# Patient Record
Sex: MALE | Race: WHITE | NOT HISPANIC OR LATINO | Employment: OTHER | URBAN - METROPOLITAN AREA
[De-identification: names, ages, dates, MRNs, and addresses within clinical notes are randomized per-mention and may not be internally consistent; named-entity substitution may affect disease eponyms.]

---

## 2017-01-04 RX ORDER — HYDROCODONE BITARTRATE AND ACETAMINOPHEN 5; 325 MG/1; MG/1
1 TABLET ORAL 4 TIMES DAILY PRN
Qty: 120 TABLET | Refills: 0 | Status: SHIPPED | OUTPATIENT
Start: 2017-01-04 | End: 2017-02-14 | Stop reason: SDUPTHER

## 2017-01-04 NOTE — TELEPHONE ENCOUNTER
Medication Refill Request    Date of phone call: 1/3/17    Medication being requested: Hydro-apap 5 sixday  Qty: 120    Date of last visit: 16    Date of last refill: 16    HOME up to date?: 16    Next Follow up?: 17    Any new pertinent information? (i.e, new medication allergies, new use of medications, change in patient's health or condition, non-compliance or inconsistency with prescribing agreement?): n/a

## 2017-01-18 ENCOUNTER — OFFICE VISIT (OUTPATIENT)
Dept: SPORTS MEDICINE | Facility: CLINIC | Age: 67
End: 2017-01-18

## 2017-01-18 ENCOUNTER — OFFICE VISIT (OUTPATIENT)
Dept: PAIN MEDICINE | Facility: CLINIC | Age: 67
End: 2017-01-18

## 2017-01-18 VITALS
DIASTOLIC BLOOD PRESSURE: 80 MMHG | TEMPERATURE: 98.1 F | SYSTOLIC BLOOD PRESSURE: 135 MMHG | WEIGHT: 203 LBS | HEIGHT: 67 IN | HEART RATE: 77 BPM | OXYGEN SATURATION: 92 % | BODY MASS INDEX: 31.86 KG/M2

## 2017-01-18 VITALS
BODY MASS INDEX: 32.02 KG/M2 | HEART RATE: 113 BPM | RESPIRATION RATE: 16 BRPM | DIASTOLIC BLOOD PRESSURE: 95 MMHG | TEMPERATURE: 97.7 F | OXYGEN SATURATION: 94 % | SYSTOLIC BLOOD PRESSURE: 161 MMHG | HEIGHT: 67 IN | WEIGHT: 204 LBS

## 2017-01-18 DIAGNOSIS — Z79.899 ENCOUNTER FOR LONG-TERM (CURRENT) USE OF HIGH-RISK MEDICATION: ICD-10-CM

## 2017-01-18 DIAGNOSIS — G62.9 POLYNEUROPATHY: ICD-10-CM

## 2017-01-18 DIAGNOSIS — G89.29 OTHER CHRONIC PAIN: Primary | ICD-10-CM

## 2017-01-18 DIAGNOSIS — K14.8 LESION OF TONGUE: Primary | ICD-10-CM

## 2017-01-18 DIAGNOSIS — M79.609 PAIN IN EXTREMITY, UNSPECIFIED EXTREMITY: ICD-10-CM

## 2017-01-18 LAB
POC AMPHETAMINES: NEGATIVE
POC BARBITURATES: NEGATIVE
POC BENZODIAZEPHINES: NEGATIVE
POC COCAINE: NEGATIVE
POC METHADONE: NEGATIVE
POC METHAMPHETAMINE SCREEN URINE: NEGATIVE
POC OPIATES: POSITIVE
POC OXYCODONE: POSITIVE
POC PHENCYCLIDINE: NEGATIVE
POC PROPOXYPHENE: NEGATIVE
POC THC: NEGATIVE
POC TRICYCLIC ANTIDEPRESSANTS: POSITIVE

## 2017-01-18 PROCEDURE — 80305 DRUG TEST PRSMV DIR OPT OBS: CPT | Performed by: NURSE PRACTITIONER

## 2017-01-18 PROCEDURE — 99212 OFFICE O/P EST SF 10 MIN: CPT | Performed by: FAMILY MEDICINE

## 2017-01-18 PROCEDURE — 99213 OFFICE O/P EST LOW 20 MIN: CPT | Performed by: NURSE PRACTITIONER

## 2017-01-18 NOTE — MR AVS SNAPSHOT
Kelby Peters   1/18/2017 9:40 AM   Office Visit    Dept Phone:  [phone]   Encounter #:  [id number]    Provider:  ABEBA Calvert   Department:  [facility] PAIN MANAGEMENT                Your Full Care Plan              Your Updated Medication List          This list is accurate as of: 1/18/17 10:19 AM.  Always use your most recent med list.                amLODIPine 5 MG tablet   Commonly known as:  NORVASC   TAKE ONE TABLET BY MOUTH DAILY       B-D ULTRAFINE III SHORT PEN 31G X 8 MM misc   Generic drug:  Insulin Pen Needle       CINNAMON PO       DULoxetine 60 MG capsule   Commonly known as:  CYMBALTA   TAKE ONE CAPSULE BY MOUTH DAILY       FLORASTOR 250 MG capsule   Generic drug:  saccharomyces boulardii   TAKE ONE CAPSULE BY MOUTH TWICE A DAY       gabapentin 800 MG tablet   Commonly known as:  NEURONTIN   TAKE ONE TABLET BY MOUTH FOUR TIMES A DAY       HYDROcodone-acetaminophen 5-325 MG per tablet   Commonly known as:  NORCO   Take 1 tablet by mouth 4 (Four) Times a Day As Needed (pain).       KROGER BLOOD GLUCOSE TEST test strip   Generic drug:  glucose blood       KROGER LANCETS MICRO THIN 33G misc       levETIRAcetam 500 MG tablet   Commonly known as:  KEPPRA   TAKE ONE TABLET BY MOUTH TWICE A DAY       MAGNESIUM PO       NOVOLIN N RELION 100 UNIT/ML injection   Generic drug:  insulin NPH       NOVOLOG FLEXPEN 100 UNIT/ML solution pen-injector sc pen   Generic drug:  insulin aspart       PRILOSEC OTC 20 MG EC tablet   Generic drug:  omeprazole OTC       VITAMIN B-12 CR PO               You Were Diagnosed With        Codes Comments    Other chronic pain    -  Primary ICD-10-CM: G89.29  ICD-9-CM: 338.29     Polyneuropathy     ICD-10-CM: G62.9  ICD-9-CM: 356.9     Pain in extremity, unspecified extremity     ICD-10-CM: M79.609  ICD-9-CM: 729.5     Encounter for long-term (current) use of high-risk medication     ICD-10-CM: Z79.899  ICD-9-CM: V58.69        "  Instructions     None    Patient Instructions History      Upcoming Appointments     Visit Type Date Time Department    OFFICE VISIT 2017  9:40 AM [facility]      BookBag Signup     [facility] Book[employer] allows you to send messages to your doctor, view your test results, renew your prescriptions, schedule appointments, and more. To sign up, go to [employer] and click on the Sign Up Now link in the New User? box. Enter your BookBag Activation Code exactly as it appears below along with the last four digits of your Social Security Number and your Date of Birth () to complete the sign-up process. If you do not sign up before the expiration date, you must request a new code.    BookBag Activation Code: [email or web address]  Expires: 2017  5:40 AM    If you have questions, you can email [email or web address] or call [phone] to talk to our BookBag staff. Remember, BookBag is NOT to be used for urgent needs. For medical emergencies, dial 911.               Other Info from Your Visit           Allergies     No Known Allergies      Reason for Visit     Pain           Vital Signs     Blood Pressure Pulse Temperature Respirations Height Weight    161/95 113 97.7 °F (36.5 °C) 16 67\" (170.2 cm) 204 lb (92.5 kg)    Oxygen Saturation Body Mass Index Smoking Status             94% 31.95 kg/m2 Former Smoker         Problems and Diagnoses Noted     Chronic pain    Encounter for long-term (current) use of high-risk medication    Limb pain    Polyneuropathy        "

## 2017-01-18 NOTE — MR AVS SNAPSHOT
Kelby Peters   1/18/2017 11:00 AM   Office Visit    Dept Phone:  [phone]   Encounter #:  [id number]    Provider:  Francois Dougherty MD   Department:  [facility] FAMILY [facility] SP[facility]ICINE                Your Full Care Plan              Your Updated Medication List          This list is accurate as of: 1/18/17 11:07 AM.  Always use your most recent med list.                amLODIPine 5 MG tablet   Commonly known as:  NORVASC   TAKE ONE TABLET BY MOUTH DAILY       B-D ULTRAFINE III SHORT PEN 31G X 8 MM misc   Generic drug:  Insulin Pen Needle       CINNAMON PO       DULoxetine 60 MG capsule   Commonly known as:  CYMBALTA   TAKE ONE CAPSULE BY MOUTH DAILY       FLORASTOR 250 MG capsule   Generic drug:  saccharomyces boulardii   TAKE ONE CAPSULE BY MOUTH TWICE A DAY       gabapentin 800 MG tablet   Commonly known as:  NEURONTIN   TAKE ONE TABLET BY MOUTH FOUR TIMES A DAY       HYDROcodone-acetaminophen 5-325 MG per tablet   Commonly known as:  NORCO   Take 1 tablet by mouth 4 (Four) Times a Day As Needed (pain).       KROGER BLOOD GLUCOSE TEST test strip   Generic drug:  glucose blood       KROGER LANCETS MICRO THIN 33G misc       levETIRAcetam 500 MG tablet   Commonly known as:  KEPPRA   TAKE ONE TABLET BY MOUTH TWICE A DAY       MAGNESIUM PO       NOVOLIN N RELION 100 UNIT/ML injection   Generic drug:  insulin NPH       NOVOLOG FLEXPEN 100 UNIT/ML solution pen-injector sc pen   Generic drug:  insulin aspart       PRILOSEC OTC 20 MG EC tablet   Generic drug:  omeprazole OTC       VITAMIN B-12 CR PO               We Performed the Following     Ambulatory Referral to ENT (Otolaryngology)       You Were Diagnosed With        Codes Comments    Lesion of tongue    -  Primary ICD-10-CM: K14.8  ICD-9-CM: 529.8       Instructions     None    Patient Instructions History      Upcoming Appointments     Visit Type Date Time Department    OFFICE VISIT 1/18/2017  9:40 AM [facility]    OFFICE VISIT 2017 11:00 AM [facility]    OFFICE VISIT 3/16/2017  9:40 AM [facility]      [facility] Signup     [facility] [employer] allows you to send messages to your doctor, view your test results, renew your prescriptions, schedule appointments, and more. To sign up, go to [employer] and click on the Sign Up Now link in the New User? box. Enter your [employer] Activation Code exactly as it appears below along with the last four digits of your Social Security Number and your Date of Birth () to complete the sign-up process. If you do not sign up before the expiration date, you must request a new code.    [employer] Activation Code: [email or web address]  Expires: 2017  5:40 AM    If you have questions, you can email [email or web address] or call [phone] to talk to our [employer] staff. Remember, [employer] is NOT to be used for urgent needs. For medical emergencies, dial 911.               Other Info from Your Visit           Your Appointments     Mar 16, 2017  9:40 AM EDT   Office Visit with ABEBA Calvert   [facility] (--)    [address] [facility], [address] [address] [phone]           Arrive 15 minutes prior to appointment. Arrive 30 minutes prior to your appointment. Bring insurance card, photo ID and copay.              Allergies     No Known Allergies      Reason for Visit     Follow-up pulled tooth and is now having issues      Vital Signs     Blood Pressure Pulse Temperature Height Weight Oxygen Saturation    135/80 (BP Location: Left arm, Patient Position: Sitting, Cuff Size: Adult) 77 98.1 °F (36.7 °C) (Oral) 67\" (170.2 cm) 203 lb (92.1 kg) 92%    Body Mass Index Smoking Status                31.79 kg/m2 Former Smoker          Problems and Diagnoses Noted     Lesion of tongue    -  Primary        "

## 2017-01-18 NOTE — PROGRESS NOTES
CHIEF COMPLAINT  Since 11/18/16 office visit,Pt states bilateral lower arms,hands,lower legs and feet pain is basically unchanged,being tolerable overall    Subjective   Kelby Peters is a 66 y.o. male  who presents to the office for follow-up.He has a history of extremity pain due to peripheral neuropathy. ALso has diabetic neuropathy.    Complains of pain in his extremities. Today his pain is 6/10VAS. Describes the pain as fairly continuous. Continues with Hydrocodone 5/325 3-4/day, Cymbalta and gabapentin. Denies any side effects from the regimen. The regimen helps decrease his pain moderately. ADL's by self.    He self-removed a tooth last week. Now he is having increased mouth pain. Seeing his PCP to discuss this.    Extremity Pain    The pain is present in the left hand, right hand, left lower leg and right lower leg. This is a chronic problem. The current episode started more than 1 year ago. The problem occurs constantly. Progression since onset: relatively unchanged since last office visit. The pain is at a severity of 6/10. The pain is moderate. Pertinent negatives include no fever or numbness. He has tried oral narcotics and rest (cymbalta) for the symptoms. The treatment provided moderate relief. His past medical history is significant for diabetes.      PEG Assessment   What number best describes your pain on average in the past week?6  What number best describes how, during the past week, pain has interfered with your enjoyment of life?6  What number best describes how, during the past week, pain has interfered with your general activity?  6    The following portions of the patient's history were reviewed and updated as appropriate: allergies, current medications, past family history, past medical history, past social history, past surgical history and problem list.    Review of Systems   Constitutional: Negative for activity change, appetite change and fever.   Respiratory: Positive for apnea. Negative  "for chest tightness and shortness of breath.    Cardiovascular: Negative for chest pain.   Gastrointestinal: Negative for constipation, diarrhea and nausea.   Genitourinary: Negative for difficulty urinating and dysuria.   Neurological: Negative for dizziness, light-headedness and numbness.   Psychiatric/Behavioral: Negative for sleep disturbance and suicidal ideas.       Vitals:    01/18/17 0955   BP: 161/95   Pulse: 113   Resp: 16   Temp: 97.7 °F (36.5 °C)   SpO2: 94%   Weight: 204 lb (92.5 kg)   Height: 67\" (170.2 cm)   PainSc: 6  Comment: extremity pain ranges from 2-8/10   PainLoc: Generalized     Objective   Physical Exam   Constitutional: He is oriented to person, place, and time. Vital signs are normal. He appears well-developed and well-nourished. He is cooperative.   HENT:   Head: Normocephalic and atraumatic.   Nose: Nose normal.   Eyes: Conjunctivae and lids are normal.   Neck: No spinous process tenderness and no muscular tenderness present. Decreased range of motion (head forward posturing noted) present.   Cardiovascular: Normal rate, regular rhythm and normal heart sounds.    Pulmonary/Chest: Effort normal and breath sounds normal. No respiratory distress.   Abdominal: Normal appearance.   Musculoskeletal:   Dysthesia of BUE and BLE.    Neurological: He is alert and oriented to person, place, and time. Coordination and gait normal.   Reflex Scores:       Bicep reflexes are 1+ on the right side and 1+ on the left side.       Brachioradialis reflexes are 1+ on the right side and 1+ on the left side.       Patellar reflexes are 1+ on the right side and 1+ on the left side.  Skin: Skin is warm, dry and intact.   Psychiatric: He has a normal mood and affect. His speech is normal and behavior is normal. Judgment and thought content normal. Cognition and memory are normal.   Nursing note and vitals reviewed.      Assessment/Plan   Kelby was seen today for pain.    Diagnoses and all orders for this " visit:    Other chronic pain  -     Urine Drug Screen Confirmation  -     POC Urine Drug Screen, Triage    Polyneuropathy  -     Urine Drug Screen Confirmation  -     POC Urine Drug Screen, Triage    Pain in extremity, unspecified extremity  -     Urine Drug Screen Confirmation  -     POC Urine Drug Screen, Triage    Encounter for long-term (current) use of high-risk medication  -     Urine Drug Screen Confirmation  -     POC Urine Drug Screen, Triage      --- Routine UDS in office today as part of monitoring requirements for controlled substances.  The specimen was viewed and the immunoassay result reviewed and is +OPI, +TCA, +OXY(anticipate + OXY is false positive).  This specimen will be sent to [facility] laboratory for confirmation.   Patient has had regular UDS confirmation in past.  --- Continue with regimen. No changes at this time. Patient appears stable with current regimen. No adverse effects. Regarding continuation of opioids, there is no evidence of aberrant behavior or any red flags.  The patient continues with appropriate response to opioid therapy. ADL's remain intact by self.   --- Encouraged patient to follow-up with dentist in regards to mouth pain and recent self-extraction.  --- Follow-up 2 months or sooner if needed.       HOME REPORT    As part of the patient's treatment plan, I am prescribing controlled substances. The patient has been made aware of appropriate use of such medications, including potential risk of somnolence, limited ability to drive and/or work safely, and the potential for dependence or overdose. It has also bee made clear that these medications are for use by this patient only, without concomitant use of alcohol or other substances unless prescribed.     Patient has completed prescribing agreement detailing terms of continued prescribing of controlled substances, including monitoring HOME reports, urine drug screening, and pill counts if necessary. The patient is aware  that inappropriate use will results in cessation of prescribing such medications.    HOME report has been reviewed and scanned into the patient's chart.    Date of last HOME : 1-13-17. Last refill of Hydrocodone was 1-13-17.    History and physical exam exhibit continued safe and appropriate use of controlled substances.      EMR Dragon/Transcription disclaimer:   Much of this encounter note is an electronic transcription/translation of spoken language to printed text. The electronic translation of spoken language may permit erroneous, or at times, nonsensical words or phrases to be inadvertently transcribed; Although I have reviewed the note for such errors, some may still exist.

## 2017-01-18 NOTE — PROGRESS NOTES
"Subjective   Kelby Peters is a 66 y.o. male.   Chief Complaint   Patient presents with   • Follow-up     pulled tooth and is now having issues       History of Present Illness   1/10/2017 pulled his own R lower molar because it was \"loose\".  The next day he noted an area of irritation on that side of his tongue.  No dysphagia.  No fever or chills.    I have reviewed the patient's medical history in detail and updated the computerized patient record.          Review of Systems   Constitutional: Negative.    HENT:        Per history of present illness   Respiratory: Negative.    Cardiovascular: Negative.      Visit Vitals   • /80 (BP Location: Left arm, Patient Position: Sitting, Cuff Size: Adult)   • Pulse 77   • Temp 98.1 °F (36.7 °C) (Oral)   • Ht 67\" (170.2 cm)   • Wt 203 lb (92.1 kg)   • SpO2 92%   • BMI 31.79 kg/m2       Objective   Physical Exam   Constitutional: He appears well-developed and well-nourished.   HENT:   Base of the tongue on the right there is a shallow ulceration that has a firmness to it also some leukoplakia.  In the tooth socket right lower molar there appears to be a white bony structure and/or root present.   Vitals reviewed.      Assessment/Plan   Kelby was seen today for follow-up.    Diagnoses and all orders for this visit:    Lesion of tongue  -     Ambulatory Referral to ENT (Otolaryngology)      This certainly could be an aphthous ulcer over the leukoplakia and being at the base of the tongue and think it best to refer to ENT.  He will follow-up with his dentist in regard to the structure in the tooth socket.         "

## 2017-01-19 ENCOUNTER — TELEPHONE (OUTPATIENT)
Dept: SPORTS MEDICINE | Facility: CLINIC | Age: 67
End: 2017-01-19

## 2017-02-14 RX ORDER — HYDROCODONE BITARTRATE AND ACETAMINOPHEN 5; 325 MG/1; MG/1
1 TABLET ORAL 4 TIMES DAILY PRN
Qty: 120 TABLET | Refills: 0 | Status: SHIPPED | OUTPATIENT
Start: 2017-02-14 | End: 2017-03-20 | Stop reason: SDUPTHER

## 2017-02-14 NOTE — TELEPHONE ENCOUNTER
Medication Refill Request    Date of phone call: 17    Medication being requested: Hydro-apap 5 sixday  Qty: 120    Date of last visit: 17    Date of last refill: 17    HOME up to date?: 17    Next Follow up?: 3/16/17    Any new pertinent information? (i.e, new medication allergies, new use of medications, change in patient's health or condition, non-compliance or inconsistency with prescribing agreement?): n/a

## 2017-03-14 RX ORDER — GABAPENTIN 800 MG/1
TABLET ORAL
Qty: 120 TABLET | Refills: 1 | Status: SHIPPED | OUTPATIENT
Start: 2017-03-14 | End: 2017-06-01 | Stop reason: SDUPTHER

## 2017-03-16 RX ORDER — HYDROCODONE BITARTRATE AND ACETAMINOPHEN 5; 325 MG/1; MG/1
1 TABLET ORAL 4 TIMES DAILY PRN
Qty: 120 TABLET | Refills: 0 | OUTPATIENT
Start: 2017-03-16

## 2017-03-16 NOTE — TELEPHONE ENCOUNTER
Medication Refill Request    Date of phone call: 3/16/2017    Medication being requested: Hydro-apap 5 sixday  Qty: 120    Date of last visit: 17    Date of last refill: 17    HOME up to date?: 17    Next Follow up?: n/a    Any new pertinent information? (i.e, new medication allergies, new use of medications, change in patient's health or condition, non-compliance or inconsistency with prescribing agreement?): patient was scheduled today and was cancelled through the automated reminder system

## 2017-03-20 ENCOUNTER — OFFICE VISIT (OUTPATIENT)
Dept: PAIN MEDICINE | Facility: CLINIC | Age: 67
End: 2017-03-20

## 2017-03-20 VITALS
BODY MASS INDEX: 33.21 KG/M2 | HEART RATE: 73 BPM | SYSTOLIC BLOOD PRESSURE: 148 MMHG | OXYGEN SATURATION: 99 % | DIASTOLIC BLOOD PRESSURE: 82 MMHG | HEIGHT: 67 IN | RESPIRATION RATE: 16 BRPM | WEIGHT: 211.6 LBS | TEMPERATURE: 97.4 F

## 2017-03-20 DIAGNOSIS — M51.16 NEURITIS OR RADICULITIS DUE TO RUPTURE OF LUMBAR INTERVERTEBRAL DISC: ICD-10-CM

## 2017-03-20 DIAGNOSIS — M79.609 PAIN IN EXTREMITY, UNSPECIFIED EXTREMITY: ICD-10-CM

## 2017-03-20 DIAGNOSIS — G89.29 OTHER CHRONIC PAIN: Primary | ICD-10-CM

## 2017-03-20 DIAGNOSIS — G62.9 POLYNEUROPATHY: ICD-10-CM

## 2017-03-20 DIAGNOSIS — Z79.899 ENCOUNTER FOR LONG-TERM (CURRENT) USE OF HIGH-RISK MEDICATION: ICD-10-CM

## 2017-03-20 PROCEDURE — 99213 OFFICE O/P EST LOW 20 MIN: CPT | Performed by: NURSE PRACTITIONER

## 2017-03-20 RX ORDER — HYDROCODONE BITARTRATE AND ACETAMINOPHEN 5; 325 MG/1; MG/1
1 TABLET ORAL 4 TIMES DAILY PRN
Qty: 120 TABLET | Refills: 0 | Status: SHIPPED | OUTPATIENT
Start: 2017-03-20 | End: 2017-04-20 | Stop reason: SDUPTHER

## 2017-03-20 NOTE — PROGRESS NOTES
CHIEF COMPLAINT    Since last visit on 1-18-17, Pt states bilateral lower arms,hands,lower legs and feet pain (neuropathy) has gotten a little worse by 3-5%.     Subjective   Kelby Peters is a 66 y.o. male  who presents to the office for follow-up.He has a history of extremity pain due to neuropathy. His pain is slightly worse since his last office visit.     Complains of pain in his extremities. Today his pain is 6/10VAs. Describes the pain as continuous. Continues with Hydrocodone 5/325 4/day and gabapentin 800 mg 4/day. Denies any side effects from the regimen. Denies any constipation or somnolence. The regimen helps decrease his pain moderately. ADL's by self.    He is starting a new job at [employer]. It is listed as a full-time position. However, he believes he will be more-so part-time.     He cares for his 43 year old son.     He did go to therapy for 8 weeks.     He did ask about increasing his pain medication.    Extremity Pain    The pain is present in the left hand, right hand, left lower leg and right lower leg. This is a chronic problem. The current episode started more than 1 year ago. The problem occurs constantly. Progression since onset: relatively unchanged since last office visit. The pain is at a severity of 6/10. The pain is moderate. Associated symptoms include numbness. Pertinent negatives include no fever. He has tried oral narcotics and rest (cymbalta) for the symptoms. The treatment provided moderate relief. His past medical history is significant for diabetes.      PEG Assessment   What number best describes your pain on average in the past week?7  What number best describes how, during the past week, pain has interfered with your enjoyment of life?6  What number best describes how, during the past week, pain has interfered with your general activity?  6    The following portions of the patient's history were reviewed and updated as appropriate: allergies, current  "medications, past family history, past medical history, past social history, past surgical history and problem list.    Review of Systems   Constitutional: Negative for activity change, appetite change and fever.   Respiratory: Positive for apnea, cough and shortness of breath. Negative for chest tightness.    Cardiovascular: Negative for chest pain.   Gastrointestinal: Negative for constipation, diarrhea and nausea.   Genitourinary: Negative for difficulty urinating and dysuria.   Neurological: Positive for dizziness, weakness, numbness and headaches. Negative for light-headedness.   Psychiatric/Behavioral: Negative for agitation, sleep disturbance and suicidal ideas.       Vitals:    03/20/17 1115   BP: 148/82   Pulse: 73   Resp: 16   Temp: 97.4 °F (36.3 °C)   SpO2: 99%   Weight: 211 lb 9.6 oz (96 kg)   Height: 67\" (170.2 cm)   PainSc: 6  Comment: arms, hands, lower legs, feet   PainLoc: Generalized       Objective   Physical Exam   Constitutional: He is oriented to person, place, and time. Vital signs are normal. He appears well-developed and well-nourished. He is cooperative.   HENT:   Head: Normocephalic and atraumatic.   Nose: Nose normal.   Eyes: Conjunctivae and lids are normal.   Neck: No spinous process tenderness and no muscular tenderness present. Decreased range of motion (head forward posturing noted) present.   Cardiovascular: Normal rate, regular rhythm and normal heart sounds.    Pulmonary/Chest: Effort normal and breath sounds normal. No respiratory distress.   Abdominal: Normal appearance.   Musculoskeletal:   Dysthesia of BUE and BLE.    Neurological: He is alert and oriented to person, place, and time. Gait normal.   Reflex Scores:       Bicep reflexes are 1+ on the right side and 1+ on the left side.       Brachioradialis reflexes are 1+ on the right side and 1+ on the left side.       Patellar reflexes are 1+ on the right side and 1+ on the left side.  Skin: Skin is warm, dry and intact. "   Psychiatric: He has a normal mood and affect. His speech is normal and behavior is normal. Judgment and thought content normal. Cognition and memory are normal.   Nursing note and vitals reviewed.      Assessment/Plan   Kelby was seen today for pain.    Diagnoses and all orders for this visit:    Other chronic pain    Pain in extremity, unspecified extremity    Polyneuropathy    Neuritis or radiculitis due to rupture of lumbar intervertebral disc    Encounter for long-term (current) use of high-risk medication    Other orders  -     HYDROcodone-acetaminophen (NORCO) 5-325 MG per tablet; Take 1 tablet by mouth 4 (Four) Times a Day As Needed (pain).      --- The urine drug screen confirmation from 1-18-17 has been reviewed and the result is appropriate based on patient history and HOME report  --- Refill Hydrocodone. NO changes to medication regimen. Patient appears stable with current regimen. No adverse effects. Regarding continuation of opioids, there is no evidence of aberrant behavior or any red flags.  The patient continues with appropriate response to opioid therapy. ADL's remain intact by self.   --- Follow-up 2 months or sooner if needed.         HOME REPORT    As part of the patient's treatment plan, I am prescribing controlled substances. The patient has been made aware of appropriate use of such medications, including potential risk of somnolence, limited ability to drive and/or work safely, and the potential for dependence or overdose. It has also bee made clear that these medications are for use by this patient only, without concomitant use of alcohol or other substances unless prescribed.     Patient has completed prescribing agreement detailing terms of continued prescribing of controlled substances, including monitoring HOME reports, urine drug screening, and pill counts if necessary. The patient is aware that inappropriate use will results in cessation of prescribing such  medications.    HOME report has been reviewed and scanned into the patient's chart.    Date of last HOME : 3-17-17    History and physical exam exhibit continued safe and appropriate use of controlled substances.      EMR Dragon/Transcription disclaimer:   Much of this encounter note is an electronic transcription/translation of spoken language to printed text. The electronic translation of spoken language may permit erroneous, or at times, nonsensical words or phrases to be inadvertently transcribed; Although I have reviewed the note for such errors, some may still exist.

## 2017-04-03 RX ORDER — LEVETIRACETAM 500 MG/1
TABLET ORAL
Qty: 60 TABLET | Refills: 9 | Status: SHIPPED | OUTPATIENT
Start: 2017-04-03 | End: 2018-02-01 | Stop reason: SDUPTHER

## 2017-04-20 NOTE — TELEPHONE ENCOUNTER
Medication Refill Request    Date of phone call: 2017    Medication being requested: Hydro-apap 5 sixday  Qty: 120    Date of last visit: 3/20/17    Date of last refill: 3/20/17    HMOE up to date?: 17    Next Follow up?: 17    Any new pertinent information? (i.e, new medication allergies, new use of medications, change in patient's health or condition, non-compliance or inconsistency with prescribing agreement?): n/a

## 2017-04-24 RX ORDER — HYDROCODONE BITARTRATE AND ACETAMINOPHEN 5; 325 MG/1; MG/1
1 TABLET ORAL 4 TIMES DAILY PRN
Qty: 120 TABLET | Refills: 0 | Status: SHIPPED | OUTPATIENT
Start: 2017-04-24 | End: 2017-05-18 | Stop reason: DRUGHIGH

## 2017-05-18 ENCOUNTER — OFFICE VISIT (OUTPATIENT)
Dept: PAIN MEDICINE | Facility: CLINIC | Age: 67
End: 2017-05-18

## 2017-05-18 VITALS
BODY MASS INDEX: 32.62 KG/M2 | SYSTOLIC BLOOD PRESSURE: 156 MMHG | WEIGHT: 207.8 LBS | RESPIRATION RATE: 18 BRPM | HEART RATE: 74 BPM | HEIGHT: 67 IN | DIASTOLIC BLOOD PRESSURE: 80 MMHG | OXYGEN SATURATION: 95 % | TEMPERATURE: 98.1 F

## 2017-05-18 DIAGNOSIS — G89.29 OTHER CHRONIC PAIN: Primary | ICD-10-CM

## 2017-05-18 DIAGNOSIS — M79.609 PAIN IN EXTREMITY, UNSPECIFIED EXTREMITY: ICD-10-CM

## 2017-05-18 DIAGNOSIS — G62.9 POLYNEUROPATHY: ICD-10-CM

## 2017-05-18 DIAGNOSIS — Z79.899 ENCOUNTER FOR LONG-TERM (CURRENT) USE OF HIGH-RISK MEDICATION: ICD-10-CM

## 2017-05-18 PROCEDURE — 99214 OFFICE O/P EST MOD 30 MIN: CPT | Performed by: NURSE PRACTITIONER

## 2017-05-18 RX ORDER — HYDROCODONE BITARTRATE AND ACETAMINOPHEN 7.5; 325 MG/1; MG/1
1 TABLET ORAL EVERY 6 HOURS PRN
Qty: 120 TABLET | Refills: 0 | Status: SHIPPED | OUTPATIENT
Start: 2017-05-18 | End: 2017-06-15 | Stop reason: DRUGHIGH

## 2017-05-22 RX ORDER — AMLODIPINE BESYLATE 5 MG/1
TABLET ORAL
Qty: 30 TABLET | Refills: 4 | Status: SHIPPED | OUTPATIENT
Start: 2017-05-22 | End: 2017-10-18 | Stop reason: SDUPTHER

## 2017-06-01 RX ORDER — GABAPENTIN 800 MG/1
TABLET ORAL
Qty: 120 TABLET | Refills: 1 | Status: SHIPPED | OUTPATIENT
Start: 2017-06-01 | End: 2017-06-15 | Stop reason: SDUPTHER

## 2017-06-15 ENCOUNTER — OFFICE VISIT (OUTPATIENT)
Dept: PAIN MEDICINE | Facility: CLINIC | Age: 67
End: 2017-06-15

## 2017-06-15 VITALS
BODY MASS INDEX: 33.09 KG/M2 | SYSTOLIC BLOOD PRESSURE: 160 MMHG | RESPIRATION RATE: 16 BRPM | TEMPERATURE: 97.3 F | HEART RATE: 70 BPM | WEIGHT: 210.8 LBS | OXYGEN SATURATION: 97 % | DIASTOLIC BLOOD PRESSURE: 78 MMHG | HEIGHT: 67 IN

## 2017-06-15 DIAGNOSIS — G90.9 AUTONOMIC NEUROPATHY: ICD-10-CM

## 2017-06-15 DIAGNOSIS — G62.9 POLYNEUROPATHY: ICD-10-CM

## 2017-06-15 DIAGNOSIS — M79.609 PAIN IN EXTREMITY, UNSPECIFIED EXTREMITY: ICD-10-CM

## 2017-06-15 DIAGNOSIS — G89.29 OTHER CHRONIC PAIN: Primary | ICD-10-CM

## 2017-06-15 DIAGNOSIS — Z79.899 ENCOUNTER FOR LONG-TERM (CURRENT) USE OF HIGH-RISK MEDICATION: ICD-10-CM

## 2017-06-15 PROCEDURE — 99214 OFFICE O/P EST MOD 30 MIN: CPT | Performed by: NURSE PRACTITIONER

## 2017-06-15 RX ORDER — HYDROCODONE BITARTRATE AND ACETAMINOPHEN 5; 325 MG/1; MG/1
1 TABLET ORAL EVERY 6 HOURS PRN
Qty: 120 TABLET | Refills: 0 | Status: SHIPPED | OUTPATIENT
Start: 2017-06-15 | End: 2017-07-25 | Stop reason: SDUPTHER

## 2017-06-15 RX ORDER — GABAPENTIN 800 MG/1
800 TABLET ORAL 4 TIMES DAILY
Qty: 120 TABLET | Refills: 1 | Status: SHIPPED | OUTPATIENT
Start: 2017-06-15 | End: 2017-08-14 | Stop reason: SDUPTHER

## 2017-06-15 NOTE — PROGRESS NOTES
CHIEF COMPLAINT    Since last visit on 5-18-17, pt states leg pain is unchanged. He states he has good and bad days.    Subjective   Kelby Peters is a 66 y.o. male  who presents to the office for follow-up.He has a history of chronic extremity pain due to neuropathy. At his last office visit, his regimen was changed slightly. He was changed to Hydrocodone 7.5/325 q6-8hrs PRN. He is not noticing any improvement in his pain and also feels more tired.    Complains of pain in his extremities. Today his pain is 6/10VAs. His primary pain ins his feet/legs. Describes the pain as continuous. Was taking Hydrocodone 7.5/325 q6hrs PRN. Is noticing increased feelings of sleepiness. He was previously on Hydrocodone 5/325 q6hrs PRN. Denied any side effects from this.  He admits he is only taking gabapentin 800 mg TID. Asked about increasing. Denies any side effects from this. ADL's by self.    Extremity Pain    The pain is present in the left hand, right hand, left lower leg and right lower leg. This is a chronic problem. The current episode started more than 1 year ago. The problem occurs constantly. Progression since onset: relatively unchanged since last office visit. The pain is at a severity of 6/10. The pain is moderate. Associated symptoms include numbness (bilateral feet). Pertinent negatives include no fever. He has tried oral narcotics and rest (cymbalta) for the symptoms. The treatment provided moderate relief. His past medical history is significant for diabetes.      PEG Assessment   What number best describes your pain on average in the past week?6  What number best describes how, during the past week, pain has interfered with your enjoyment of life?5  What number best describes how, during the past week, pain has interfered with your general activity?  6    The following portions of the patient's history were reviewed and updated as appropriate: allergies, current medications, past family history, past medical  "history, past social history, past surgical history and problem list.    Review of Systems   Constitutional: Positive for fatigue. Negative for chills and fever.   HENT: Negative for congestion.    Eyes: Negative for visual disturbance.   Respiratory: Positive for cough. Negative for shortness of breath and wheezing.    Cardiovascular: Negative.    Gastrointestinal: Negative for constipation and diarrhea.   Genitourinary: Negative for difficulty urinating.   Musculoskeletal: Positive for joint swelling (bilateral ankles).   Neurological: Positive for weakness (bilateral arms and hands) and numbness (bilateral feet). Negative for dizziness, light-headedness and headaches.   Psychiatric/Behavioral: Positive for sleep disturbance. Negative for agitation, confusion, hallucinations and suicidal ideas. The patient is not nervous/anxious.        Vitals:    06/15/17 1455   BP: 160/78   Pulse: 70   Resp: 16   Temp: 97.3 °F (36.3 °C)   SpO2: 97%   Weight: 210 lb 12.8 oz (95.6 kg)   Height: 67\" (170.2 cm)   PainSc:   6   PainLoc: Leg     Objective   Physical Exam   Constitutional: He is oriented to person, place, and time. Vital signs are normal. He appears well-developed and well-nourished. He is cooperative.   HENT:   Head: Normocephalic and atraumatic.   Nose: Nose normal.   Eyes: Conjunctivae and lids are normal.   Neck: No spinous process tenderness and no muscular tenderness present. Decreased range of motion (head forward posturing noted) present.   Cardiovascular: Normal rate, regular rhythm and normal heart sounds.    Pulmonary/Chest: Effort normal and breath sounds normal. No respiratory distress.   Abdominal: Normal appearance.   Musculoskeletal:   Dysthesia of BUE and BLE.    Neurological: He is alert and oriented to person, place, and time. Gait normal.   Reflex Scores:       Bicep reflexes are 1+ on the right side and 1+ on the left side.       Brachioradialis reflexes are 1+ on the right side and 1+ on the left " side.       Patellar reflexes are 1+ on the right side and 1+ on the left side.  Skin: Skin is warm, dry and intact.   Psychiatric: He has a normal mood and affect. His speech is normal and behavior is normal. Judgment and thought content normal. Cognition and memory are normal.   Nursing note and vitals reviewed.      Assessment/Plan   Kelby was seen today for extremity pain.    Diagnoses and all orders for this visit:    Other chronic pain    Polyneuropathy    Autonomic neuropathy    Pain in extremity, unspecified extremity    Encounter for long-term (current) use of high-risk medication    Other orders  -     HYDROcodone-acetaminophen (NORCO) 5-325 MG per tablet; Take 1 tablet by mouth Every 6 (Six) Hours As Needed for Severe Pain (7-10).  -     gabapentin (NEURONTIN) 800 MG tablet; Take 1 tablet by mouth 4 (Four) Times a Day.      --- The urine drug screen confirmation from 1-18-17 has been reviewed and the result is appropriate based on patient history and HOME report  --- Increase gabapentin 800 mg QID. Discussed medication with the patient.  Included in this discussion was the potential for side effects and adverse events.  Patient verbalized understanding and wished to proceed.  Prescription will be sent to pharmacy.  --- Return to previous regimen of Hydrocodone 5/325 q6hrs. Patient appears stable with current regimen. No adverse effects. Regarding continuation of opioids, there is no evidence of aberrant behavior or any red flags.  The patient continues with appropriate response to opioid therapy. ADL's remain intact by self.   --- Follow-up 2 months or sooner if needed.         HOME REPORT    As part of the patient's treatment plan, I am prescribing controlled substances. The patient has been made aware of appropriate use of such medications, including potential risk of somnolence, limited ability to drive and/or work safely, and the potential for dependence or overdose. It has also bee made clear  that these medications are for use by this patient only, without concomitant use of alcohol or other substances unless prescribed.     Patient has completed prescribing agreement detailing terms of continued prescribing of controlled substances, including monitoring HOME reports, urine drug screening, and pill counts if necessary. The patient is aware that inappropriate use will results in cessation of prescribing such medications.    HOME report has been reviewed and scanned into the patient's chart.    Date of last HOME : 6-14-17    History and physical exam exhibit continued safe and appropriate use of controlled substances.      EMR Dragon/Transcription disclaimer:   Much of this encounter note is an electronic transcription/translation of spoken language to printed text. The electronic translation of spoken language may permit erroneous, or at times, nonsensical words or phrases to be inadvertently transcribed; Although I have reviewed the note for such errors, some may still exist.

## 2017-07-25 RX ORDER — HYDROCODONE BITARTRATE AND ACETAMINOPHEN 5; 325 MG/1; MG/1
1 TABLET ORAL EVERY 6 HOURS PRN
Qty: 120 TABLET | Refills: 0 | Status: SHIPPED | OUTPATIENT
Start: 2017-07-25 | End: 2017-08-17 | Stop reason: DRUGHIGH

## 2017-07-25 NOTE — TELEPHONE ENCOUNTER
Medication Refill Request    Date of phone call: 7/23/17    Medication being requested: Hydro-apap 5 sig: q6hrs  Qty: 120    Date of last visit: 6/15/17    Date of last refill: 6/15/17    HOME up to date?: 6/14/17    Next Follow up?: 8/17/17    Any new pertinent information? (i.e, new medication allergies, new use of medications, change in patient's health or condition, non-compliance or inconsistency with prescribing agreement?): n/a

## 2017-08-14 RX ORDER — GABAPENTIN 800 MG/1
800 TABLET ORAL 4 TIMES DAILY
Qty: 120 TABLET | Refills: 1 | OUTPATIENT
Start: 2017-08-14 | End: 2017-10-23 | Stop reason: SDUPTHER

## 2017-08-17 ENCOUNTER — OFFICE VISIT (OUTPATIENT)
Dept: PAIN MEDICINE | Facility: CLINIC | Age: 67
End: 2017-08-17

## 2017-08-17 VITALS
SYSTOLIC BLOOD PRESSURE: 157 MMHG | OXYGEN SATURATION: 98 % | BODY MASS INDEX: 32.33 KG/M2 | TEMPERATURE: 97.4 F | HEART RATE: 69 BPM | RESPIRATION RATE: 16 BRPM | WEIGHT: 206 LBS | HEIGHT: 67 IN | DIASTOLIC BLOOD PRESSURE: 81 MMHG

## 2017-08-17 DIAGNOSIS — G89.29 OTHER CHRONIC PAIN: Primary | ICD-10-CM

## 2017-08-17 DIAGNOSIS — Z79.899 ENCOUNTER FOR LONG-TERM (CURRENT) USE OF HIGH-RISK MEDICATION: ICD-10-CM

## 2017-08-17 DIAGNOSIS — G62.9 POLYNEUROPATHY: ICD-10-CM

## 2017-08-17 DIAGNOSIS — G90.9 AUTONOMIC NEUROPATHY: ICD-10-CM

## 2017-08-17 DIAGNOSIS — M79.609 PAIN IN EXTREMITY, UNSPECIFIED EXTREMITY: ICD-10-CM

## 2017-08-17 PROCEDURE — 99214 OFFICE O/P EST MOD 30 MIN: CPT | Performed by: NURSE PRACTITIONER

## 2017-08-17 RX ORDER — HYDROCODONE BITARTRATE AND ACETAMINOPHEN 7.5; 325 MG/1; MG/1
1 TABLET ORAL EVERY 6 HOURS PRN
Qty: 120 TABLET | Refills: 0 | Status: SHIPPED | OUTPATIENT
Start: 2017-08-17 | End: 2017-09-14 | Stop reason: SDUPTHER

## 2017-08-17 NOTE — PROGRESS NOTES
CHIEF COMPLAINT  Pt states bilateral arm and leg pain has significantly increased since 6/15/17 office visit.    Subjective   Kelby Peters is a 67 y.o. male  who presents to the office for follow-up.He has a history of bilateral leg and arm pain due to neuropathy. He states his pain is worse since last office visit.    Complains of pain in his upper and lower extremities. Today his pain is 8/10VAS. Describes the pain as continuous and worse since last office visit. Continues with Hydrocodone 5/325 4/day and gabapentin 800 mg 4/day. Denies any side effects from the regimen. Denies any constipation or somnolence. The regimen helps decrease his pain by 25% . ADL's by self.    Previously, he had been on Hydrocodone 7.5/325 q6hrs. He states he had been having improved pain control with this. At his last office visit he was returned to previous regimen of Hydrocodone 5/325 due to increased pain control.     Extremity Pain    The pain is present in the left hand, right hand, left lower leg and right lower leg. This is a chronic problem. The current episode started more than 1 year ago. The problem occurs constantly. Progression since onset: worse since last office visit. The pain is at a severity of 8/10. The pain is moderate. Associated symptoms include numbness (bilateral feet). Pertinent negatives include no fever. He has tried oral narcotics and rest (cymbalta) for the symptoms. The treatment provided moderate relief. His past medical history is significant for diabetes.      PEG Assessment   What number best describes your pain on average in the past week?7  What number best describes how, during the past week, pain has interfered with your enjoyment of life?8  What number best describes how, during the past week, pain has interfered with your general activity?  7    The following portions of the patient's history were reviewed and updated as appropriate: allergies, current medications, past family history, past  "medical history, past social history, past surgical history and problem list.    Review of Systems   Constitutional: Positive for activity change (decreased) and fatigue. Negative for appetite change, chills and fever.   HENT: Negative for congestion.    Eyes: Negative for visual disturbance.   Respiratory: Positive for apnea. Negative for cough, shortness of breath and wheezing.    Cardiovascular: Negative.    Gastrointestinal: Negative for constipation, diarrhea and nausea.   Genitourinary: Positive for difficulty urinating. Negative for dysuria.   Musculoskeletal: Positive for joint swelling (bilateral ankles).   Neurological: Positive for weakness (bilateral arms and hands) and numbness (bilateral feet). Negative for dizziness, light-headedness and headaches.   Psychiatric/Behavioral: Positive for sleep disturbance. Negative for agitation, confusion, hallucinations and suicidal ideas. The patient is not nervous/anxious.        Vitals:    08/17/17 1003   BP: 157/81   Pulse: 69   Resp: 16   Temp: 97.4 °F (36.3 °C)   SpO2: 98%   Weight: 206 lb (93.4 kg)   Height: 67\" (170.2 cm)   PainSc: 8  Comment: bilateral leg pain ranges from 6.5 to 8/10   PainLoc: Leg     Objective   Physical Exam   Constitutional: He is oriented to person, place, and time. Vital signs are normal. He appears well-developed and well-nourished. He is cooperative.   HENT:   Head: Normocephalic and atraumatic.   Nose: Nose normal.   Eyes: Conjunctivae and lids are normal.   Neck: No spinous process tenderness and no muscular tenderness present. Decreased range of motion (head forward posturing noted) present.   Cardiovascular: Normal rate, regular rhythm and normal heart sounds.    Pulmonary/Chest: Effort normal and breath sounds normal. No respiratory distress.   Abdominal: Normal appearance.   Musculoskeletal:   Dysthesia of BUE and BLE.    Neurological: He is alert and oriented to person, place, and time. Gait normal.   Reflex Scores:       " Bicep reflexes are 1+ on the right side and 1+ on the left side.       Brachioradialis reflexes are 1+ on the right side and 1+ on the left side.       Patellar reflexes are 1+ on the right side and 1+ on the left side.  Skin: Skin is warm, dry and intact.   Psychiatric: He has a normal mood and affect. His speech is normal and behavior is normal. Judgment and thought content normal. Cognition and memory are normal.   Nursing note and vitals reviewed.      Assessment/Plan   Kelby was seen today for leg pain and arm pain.    Diagnoses and all orders for this visit:    Other chronic pain    Autonomic neuropathy    Polyneuropathy    Pain in extremity, unspecified extremity    Encounter for long-term (current) use of high-risk medication    Other orders  -     HYDROcodone-acetaminophen (NORCO) 7.5-325 MG per tablet; Take 1 tablet by mouth Every 6 (Six) Hours As Needed for Moderate Pain .      --- The urine drug screen confirmation from 1-18-17 has been reviewed and the result is appropriate based on patient history and HOME report  --- Change to Hydrocodone 7.5/325 q6hrs PRN. Discussed medication with the patient.  Included in this discussion was the potential for side effects and adverse events.  Patient verbalized understanding and wished to proceed.  Prescription will be given to patient.  --- Follow-up 1 month or sooner if needed.       HOME REPORT    As part of the patient's treatment plan, I am prescribing controlled substances. The patient has been made aware of appropriate use of such medications, including potential risk of somnolence, limited ability to drive and/or work safely, and the potential for dependence or overdose. It has also bee made clear that these medications are for use by this patient only, without concomitant use of alcohol or other substances unless prescribed.     Patient has completed prescribing agreement detailing terms of continued prescribing of controlled substances, including  monitoring HOME reports, urine drug screening, and pill counts if necessary. The patient is aware that inappropriate use will results in cessation of prescribing such medications.    HOME report has been reviewed and scanned into the patient's chart.    Date of last HOME : 8-15-17    History and physical exam exhibit continued safe and appropriate use of controlled substances.      EMR Dragon/Transcription disclaimer:   Much of this encounter note is an electronic transcription/translation of spoken language to printed text. The electronic translation of spoken language may permit erroneous, or at times, nonsensical words or phrases to be inadvertently transcribed; Although I have reviewed the note for such errors, some may still exist.

## 2017-08-29 ENCOUNTER — TELEPHONE (OUTPATIENT)
Dept: SPORTS MEDICINE | Facility: CLINIC | Age: 67
End: 2017-08-29

## 2017-09-14 ENCOUNTER — OFFICE VISIT (OUTPATIENT)
Dept: PAIN MEDICINE | Facility: CLINIC | Age: 67
End: 2017-09-14

## 2017-09-14 VITALS
DIASTOLIC BLOOD PRESSURE: 92 MMHG | OXYGEN SATURATION: 98 % | RESPIRATION RATE: 18 BRPM | HEART RATE: 70 BPM | BODY MASS INDEX: 32.49 KG/M2 | SYSTOLIC BLOOD PRESSURE: 158 MMHG | TEMPERATURE: 98.1 F | WEIGHT: 207 LBS | HEIGHT: 67 IN

## 2017-09-14 DIAGNOSIS — G62.9 POLYNEUROPATHY: ICD-10-CM

## 2017-09-14 DIAGNOSIS — M79.609 PAIN IN EXTREMITY, UNSPECIFIED EXTREMITY: ICD-10-CM

## 2017-09-14 DIAGNOSIS — R51.9 HEADACHE, UNSPECIFIED HEADACHE TYPE: ICD-10-CM

## 2017-09-14 DIAGNOSIS — Z87.898 HISTORY OF SEIZURES: ICD-10-CM

## 2017-09-14 DIAGNOSIS — Z79.899 ENCOUNTER FOR LONG-TERM (CURRENT) USE OF HIGH-RISK MEDICATION: ICD-10-CM

## 2017-09-14 DIAGNOSIS — G90.9 AUTONOMIC NEUROPATHY: ICD-10-CM

## 2017-09-14 DIAGNOSIS — G89.29 OTHER CHRONIC PAIN: Primary | ICD-10-CM

## 2017-09-14 LAB
POC AMPHETAMINES: NEGATIVE
POC BARBITURATES: NEGATIVE
POC BENZODIAZEPHINES: NEGATIVE
POC COCAINE: NEGATIVE
POC METHADONE: NEGATIVE
POC METHAMPHETAMINE SCREEN URINE: NEGATIVE
POC OPIATES: POSITIVE
POC OXYCODONE: NEGATIVE
POC PHENCYCLIDINE: NEGATIVE
POC PROPOXYPHENE: NEGATIVE
POC THC: NEGATIVE
POC TRICYCLIC ANTIDEPRESSANTS: NEGATIVE

## 2017-09-14 PROCEDURE — 80305 DRUG TEST PRSMV DIR OPT OBS: CPT | Performed by: NURSE PRACTITIONER

## 2017-09-14 PROCEDURE — 99213 OFFICE O/P EST LOW 20 MIN: CPT | Performed by: NURSE PRACTITIONER

## 2017-09-14 RX ORDER — HYDROCODONE BITARTRATE AND ACETAMINOPHEN 7.5; 325 MG/1; MG/1
1 TABLET ORAL EVERY 6 HOURS PRN
Qty: 120 TABLET | Refills: 0 | Status: SHIPPED | OUTPATIENT
Start: 2017-09-14 | End: 2017-10-24 | Stop reason: SDUPTHER

## 2017-09-14 NOTE — PROGRESS NOTES
CHIEF COMPLAINT  Patient states that his bilateral arm and leg pain has decreased since last visit.    Subjective   Kelby Peters is a 67 y.o. male  who presents to the office for follow-up.He has a history of bilateral arm and lef pain due to neuropathy. AT his last office visit, his medication regimen was changed to Hydrocodone 7.5/325 q6hrs PRN.    Complains of pain in his hands and feet. Today his pain is 6/10VAS. Describes the pain as continuous and slightly improved since last office visit/with medication changes. Continues with Hydrocodone 7.5/325 4/day and gabapentin 800 mg 4/day. Denies any side effects from the regimen. Denies any constipation or somnolence. The regimen helps decrease his pain moderately . ADL's by self.    He used to take Amitriptylline for silent migraines. Has been having headaches every 2-3 days lately.   Is on Keppra for seizures. Has not seen neurology for some time. Used to be under care of Dr. Rodarte.    Extremity Pain    The pain is present in the left hand, right hand, left lower leg and right lower leg. This is a chronic problem. The current episode started more than 1 year ago. The problem occurs constantly. Progression since onset: improved with medication changes at last office visit. The pain is at a severity of 6/10. The pain is moderate. Pertinent negatives include no fever or numbness. He has tried oral narcotics and rest (cymbalta) for the symptoms. The treatment provided moderate relief. His past medical history is significant for diabetes.      PEG Assessment   What number best describes your pain on average in the past week?6  What number best describes how, during the past week, pain has interfered with your enjoyment of life?6  What number best describes how, during the past week, pain has interfered with your general activity?  6    The following portions of the patient's history were reviewed and updated as appropriate: allergies, current medications, past family  "history, past medical history, past social history, past surgical history and problem list.    Review of Systems   Constitutional: Negative for chills and fever.   Respiratory: Negative for shortness of breath.    Cardiovascular: Negative for chest pain.   Gastrointestinal: Negative for constipation, diarrhea, nausea and vomiting.   Genitourinary: Negative for difficulty urinating and dysuria.   Musculoskeletal:        Bilateral arm and leg pain   Neurological: Positive for headaches. Negative for dizziness, weakness, light-headedness and numbness.   Psychiatric/Behavioral: Negative for confusion, hallucinations, self-injury, sleep disturbance and suicidal ideas. The patient is not nervous/anxious.        Vitals:    09/14/17 1136   BP: 158/92   Pulse: 70   Resp: 18   Temp: 98.1 °F (36.7 °C)   SpO2: 98%   Weight: 207 lb (93.9 kg)   Height: 67\" (170.2 cm)   PainSc:   6   PainLoc: Arm     Objective   Physical Exam   Constitutional: He is oriented to person, place, and time. Vital signs are normal. He appears well-developed and well-nourished. He is cooperative.   HENT:   Head: Normocephalic and atraumatic.   Nose: Nose normal.   Eyes: Conjunctivae and lids are normal.   Neck: No spinous process tenderness and no muscular tenderness present. Decreased range of motion (head forward posturing noted) present.   Cardiovascular: Normal rate, regular rhythm and normal heart sounds.    Pulmonary/Chest: Effort normal and breath sounds normal. No respiratory distress.   Abdominal: Normal appearance.   Musculoskeletal:   Dysthesia of BUE and BLE.    Neurological: He is alert and oriented to person, place, and time. Gait normal.   Reflex Scores:       Bicep reflexes are 1+ on the right side and 1+ on the left side.       Brachioradialis reflexes are 1+ on the right side and 1+ on the left side.  Skin: Skin is warm, dry and intact.   Psychiatric: He has a normal mood and affect. His speech is normal and behavior is normal. Judgment " and thought content normal. Cognition and memory are normal.   Nursing note and vitals reviewed.      Assessment/Plan   Kelby was seen today for extremity pain.    Diagnoses and all orders for this visit:    Other chronic pain  -     Urine Drug Screen Confirmation  -     POC Urine Drug Screen, Triage    Polyneuropathy  -     Urine Drug Screen Confirmation  -     POC Urine Drug Screen, Triage    Pain in extremity, unspecified extremity  -     Urine Drug Screen Confirmation  -     POC Urine Drug Screen, Triage    Autonomic neuropathy  -     Urine Drug Screen Confirmation  -     POC Urine Drug Screen, Triage    Encounter for long-term (current) use of high-risk medication  -     Urine Drug Screen Confirmation  -     POC Urine Drug Screen, Triage    Headache, unspecified headache type  -     Ambulatory Referral to Neurology  -     Urine Drug Screen Confirmation  -     POC Urine Drug Screen, Triage    History of seizures  -     Ambulatory Referral to Neurology  -     Urine Drug Screen Confirmation  -     POC Urine Drug Screen, Triage    Other orders  -     HYDROcodone-acetaminophen (NORCO) 7.5-325 MG per tablet; Take 1 tablet by mouth Every 6 (Six) Hours As Needed for Moderate Pain  (DNF until 9-22-17).      --- Routine UDS in office today as part of monitoring requirements for controlled substances.  The specimen was viewed and the immunoassay result reviewed and is +OPI(APPROPRIATE).  This specimen will be sent to [facility] for confirmation.     --- Refill Hydrocodone. Patient appears stable with current regimen. No adverse effects. Regarding continuation of opioids, there is no evidence of aberrant behavior or any red flags.  The patient continues with appropriate response to opioid therapy. ADL's remain intact by self.   --- Refer to neurology for history of seizures and headaches.   --- Follow-up 2 months or sooner if needed.       HOME REPORT    As part of the patient's treatment plan, I am  prescribing controlled substances. The patient has been made aware of appropriate use of such medications, including potential risk of somnolence, limited ability to drive and/or work safely, and the potential for dependence or overdose. It has also bee made clear that these medications are for use by this patient only, without concomitant use of alcohol or other substances unless prescribed.     Patient has completed prescribing agreement detailing terms of continued prescribing of controlled substances, including monitoring HOME reports, urine drug screening, and pill counts if necessary. The patient is aware that inappropriate use will results in cessation of prescribing such medications.    HOME report has been reviewed and scanned into the patient's chart.    Date of last HOME : 9-12-17    History and physical exam exhibit continued safe and appropriate use of controlled substances.      EMR Dragon/Transcription disclaimer:   Much of this encounter note is an electronic transcription/translation of spoken language to printed text. The electronic translation of spoken language may permit erroneous, or at times, nonsensical words or phrases to be inadvertently transcribed; Although I have reviewed the note for such errors, some may still exist.

## 2017-10-18 RX ORDER — AMLODIPINE BESYLATE 5 MG/1
TABLET ORAL
Qty: 30 TABLET | Refills: 3 | Status: SHIPPED | OUTPATIENT
Start: 2017-10-18 | End: 2018-06-13 | Stop reason: SDUPTHER

## 2017-10-23 RX ORDER — GABAPENTIN 800 MG/1
800 TABLET ORAL 4 TIMES DAILY
Qty: 120 TABLET | Refills: 2 | OUTPATIENT
Start: 2017-10-23 | End: 2018-02-05 | Stop reason: SDUPTHER

## 2017-10-24 RX ORDER — HYDROCODONE BITARTRATE AND ACETAMINOPHEN 7.5; 325 MG/1; MG/1
1 TABLET ORAL EVERY 6 HOURS PRN
Qty: 120 TABLET | Refills: 0 | Status: SHIPPED | OUTPATIENT
Start: 2017-10-24 | End: 2017-11-22 | Stop reason: SDUPTHER

## 2017-10-24 NOTE — TELEPHONE ENCOUNTER
Medication Refill Request    Date of phone call: 10/24/17    Medication being requested: Hydrocodone-acetaminophen 7.5-325 mg  sig: Take 1 tablet by mouth every 6 hours as needed for moderate pain   Qty: 120 tablets    Date of last visit: 9-14-17    Date of last refill: 9-14-17    HOME up to date?: yes    Next Follow up?: 12/04/17    Any new pertinent information? (i.e, new medication allergies, new use of medications, change in patient's health or condition, non-compliance or inconsistency with prescribing agreement?): n/a

## 2017-11-03 ENCOUNTER — OFFICE VISIT (OUTPATIENT)
Dept: SPORTS MEDICINE | Facility: CLINIC | Age: 67
End: 2017-11-03

## 2017-11-03 VITALS
WEIGHT: 203 LBS | DIASTOLIC BLOOD PRESSURE: 72 MMHG | BODY MASS INDEX: 31.86 KG/M2 | HEART RATE: 67 BPM | HEIGHT: 67 IN | OXYGEN SATURATION: 96 % | SYSTOLIC BLOOD PRESSURE: 144 MMHG

## 2017-11-03 DIAGNOSIS — R55 SYNCOPE AND COLLAPSE: ICD-10-CM

## 2017-11-03 DIAGNOSIS — I10 ESSENTIAL HYPERTENSION: ICD-10-CM

## 2017-11-03 DIAGNOSIS — Z11.59 NEED FOR HEPATITIS C SCREENING TEST: ICD-10-CM

## 2017-11-03 DIAGNOSIS — E11.43 TYPE 2 DIABETES MELLITUS WITH DIABETIC AUTONOMIC NEUROPATHY, WITH LONG-TERM CURRENT USE OF INSULIN (HCC): Primary | ICD-10-CM

## 2017-11-03 DIAGNOSIS — G62.9 POLYNEUROPATHY: ICD-10-CM

## 2017-11-03 DIAGNOSIS — Z79.4 TYPE 2 DIABETES MELLITUS WITH DIABETIC AUTONOMIC NEUROPATHY, WITH LONG-TERM CURRENT USE OF INSULIN (HCC): Primary | ICD-10-CM

## 2017-11-03 PROCEDURE — 99214 OFFICE O/P EST MOD 30 MIN: CPT | Performed by: FAMILY MEDICINE

## 2017-11-03 NOTE — PROGRESS NOTES
"Kelby is a 67 y.o. year old male    Chief Complaint   Patient presents with   • Diabetes       History of Present Illness   HPI   1.  BG \"go up and down\", \"I can control them\".   2.  FU htn, No chest pain, shortness breath or palpitations.  Patient has not had any further syncopal episodes.  3.  Follow-up lipids, patient to this point has declined cholesterol medication.  4.  Patient is now working at [employer] and is doing well.  \"I'm glad to be back at work\".  5.  Patient needs updated handicap placard.        I have reviewed the patient's medical, family, and social history in detail and updated the computerized patient record.    Review of Systems   Constitutional: Negative.    HENT: Negative.    Respiratory: Negative.    Cardiovascular: Negative.    Gastrointestinal: Negative.    Endocrine: Negative.    Neurological: Positive for numbness.       /72  Pulse 67  Ht 67\" (170.2 cm)  Wt 203 lb (92.1 kg)  SpO2 96%  BMI 31.79 kg/m2     Physical Exam   Constitutional: He is oriented to person, place, and time. He appears well-developed and well-nourished.   HENT:   Head: Normocephalic and atraumatic.   Eyes: Conjunctivae and EOM are normal. Pupils are equal, round, and reactive to light.   Cardiovascular: Normal rate, regular rhythm and normal heart sounds.    No peripheral edema   Pulmonary/Chest: Effort normal and breath sounds normal. He has no wheezes.   Neurological: He is alert and oriented to person, place, and time.   Skin: Skin is warm and dry.   Psychiatric: He has a normal mood and affect. His behavior is normal.   Vitals reviewed.     Diabetic foot exam:   Left: Filament test absent   Pulses Dorsalis Pedis:  present   Reflexes 1+    Vibratory sensation absent   Proprioception absent   Sharp/dull discrimination diminished       Right: Filament test absent   Pulses Dorsalis Pedis:  present   Reflexes 1+    Vibratory sensation absent   Proprioception absent   Sharp/dull discrimination " diminished      Diagnoses and all orders for this visit:    Type 2 diabetes mellitus with diabetic autonomic neuropathy, with long-term current use of insulin  -     Hemoglobin A1c  -     Microalbumin / Creatinine Urine Ratio - Urine, Clean Catch  -     CBC & Differential  -     Comprehensive Metabolic Panel  -     Lipid Panel With / Chol / HDL Ratio  -     PSA  -     T4, Free  -     TSH  -     UA / M With / Rflx Culture([facility] ONLY) - Urine, Clean Catch  -     Heavy Metals, Blood  -     Hepatitis C Antibody    Essential hypertension  -     Hemoglobin A1c  -     Microalbumin / Creatinine Urine Ratio - Urine, Clean Catch  -     CBC & Differential  -     Comprehensive Metabolic Panel  -     Lipid Panel With / Chol / HDL Ratio  -     PSA  -     T4, Free  -     TSH  -     UA / M With / Rflx Culture([facility] ONLY) - Urine, Clean Catch  -     Heavy Metals, Blood  -     Hepatitis C Antibody    Syncope and collapse  -     Hemoglobin A1c  -     Microalbumin / Creatinine Urine Ratio - Urine, Clean Catch  -     CBC & Differential  -     Comprehensive Metabolic Panel  -     Lipid Panel With / Chol / HDL Ratio  -     PSA  -     T4, Free  -     TSH  -     UA / M With / Rflx Culture([facility] ONLY) - Urine, Clean Catch  -     Heavy Metals, Blood  -     Hepatitis C Antibody    Need for hepatitis C screening test  -     Hemoglobin A1c  -     Microalbumin / Creatinine Urine Ratio - Urine, Clean Catch  -     CBC & Differential  -     Comprehensive Metabolic Panel  -     Lipid Panel With / Chol / HDL Ratio  -     PSA  -     T4, Free  -     TSH  -     UA / M With / Rflx Culture([facility] ONLY) - Urine, Clean Catch  -     Heavy Metals, Blood  -     Hepatitis C Antibody    Polyneuropathy  -     Hemoglobin A1c  -     Microalbumin / Creatinine Urine Ratio - Urine, Clean Catch  -     CBC & Differential  -     Comprehensive Metabolic Panel  -     Lipid Panel With / Chol / HDL Ratio  -     PSA  -     T4, Free  -     TSH  -     UA / M With / Rflx  Culture([facility] ONLY) - Urine, Clean Catch  -     Heavy Metals, Blood  -     Hepatitis C Antibody        EMR Dragon/Transcription disclaimer:    Much of this encounter note is an electronic transcription/translation of spoken language to printed text.  The electronic translation of spoken language may permit erroneous, or at times, nonsensical words or phrases to be inadvertently transcribed.  Although I have reviewed the note for such errors some may still exist.

## 2017-11-06 LAB
ALBUMIN SERPL-MCNC: 4.7 G/DL (ref 3.6–4.8)
ALBUMIN/GLOB SERPL: 1.9 {RATIO} (ref 1.2–2.2)
ALP SERPL-CCNC: 61 IU/L (ref 39–117)
ALT SERPL-CCNC: 18 IU/L (ref 0–44)
ARSENIC BLD-MCNC: 5 UG/L (ref 2–23)
AST SERPL-CCNC: 25 IU/L (ref 0–40)
BASOPHILS # BLD AUTO: 0 X10E3/UL (ref 0–0.2)
BASOPHILS NFR BLD AUTO: 0 %
BILIRUB SERPL-MCNC: 0.4 MG/DL (ref 0–1.2)
BUN SERPL-MCNC: 21 MG/DL (ref 8–27)
BUN/CREAT SERPL: 25 (ref 10–24)
CALCIUM SERPL-MCNC: 9.6 MG/DL (ref 8.6–10.2)
CHLORIDE SERPL-SCNC: 101 MMOL/L (ref 96–106)
CHOLEST SERPL-MCNC: 223 MG/DL (ref 100–199)
CHOLEST/HDLC SERPL: 5.3 RATIO UNITS (ref 0–5)
CO2 SERPL-SCNC: 25 MMOL/L (ref 18–29)
CREAT SERPL-MCNC: 0.84 MG/DL (ref 0.76–1.27)
CREAT UR-MCNC: NORMAL MG/DL
EOSINOPHIL # BLD AUTO: 0.1 X10E3/UL (ref 0–0.4)
EOSINOPHIL NFR BLD AUTO: 2 %
ERYTHROCYTE [DISTWIDTH] IN BLOOD BY AUTOMATED COUNT: 13.6 % (ref 12.3–15.4)
GFR SERPLBLD CREATININE-BSD FMLA CKD-EPI: 105 ML/MIN/1.73
GFR SERPLBLD CREATININE-BSD FMLA CKD-EPI: 91 ML/MIN/1.73
GLOBULIN SER CALC-MCNC: 2.5 G/DL (ref 1.5–4.5)
GLUCOSE SERPL-MCNC: 82 MG/DL (ref 65–99)
GLUCOSE UR QL: NORMAL
HBA1C MFR BLD: 10.4 % (ref 4.8–5.6)
HCT VFR BLD AUTO: 41.3 % (ref 37.5–51)
HCV AB S/CO SERPL IA: <0.1 S/CO RATIO (ref 0–0.9)
HDLC SERPL-MCNC: 42 MG/DL
HGB BLD-MCNC: 13.3 G/DL (ref 12.6–17.7)
IMM GRANULOCYTES # BLD: 0 X10E3/UL (ref 0–0.1)
IMM GRANULOCYTES NFR BLD: 0 %
KETONES UR QL STRIP: NORMAL
LDLC SERPL CALC-MCNC: 137 MG/DL (ref 0–99)
LEAD BLD-MCNC: 1 UG/DL (ref 0–19)
LYMPHOCYTES # BLD AUTO: 1.5 X10E3/UL (ref 0.7–3.1)
LYMPHOCYTES NFR BLD AUTO: 21 %
MCH RBC QN AUTO: 29.8 PG (ref 26.6–33)
MCHC RBC AUTO-ENTMCNC: 32.2 G/DL (ref 31.5–35.7)
MCV RBC AUTO: 92 FL (ref 79–97)
MERCURY BLD-MCNC: NORMAL UG/L (ref 0–14.9)
MICROALBUMIN UR-MCNC: NORMAL
MONOCYTES # BLD AUTO: 0.5 X10E3/UL (ref 0.1–0.9)
MONOCYTES NFR BLD AUTO: 8 %
NEUTROPHILS # BLD AUTO: 4.8 X10E3/UL (ref 1.4–7)
NEUTROPHILS NFR BLD AUTO: 69 %
PH UR STRIP: NORMAL [PH]
PLATELET # BLD AUTO: 222 X10E3/UL (ref 150–379)
POTASSIUM SERPL-SCNC: 5.1 MMOL/L (ref 3.5–5.2)
PROT SERPL-MCNC: 7.2 G/DL (ref 6–8.5)
PROT UR QL STRIP: NORMAL
PSA SERPL-MCNC: 0.3 NG/ML (ref 0–4)
RBC # BLD AUTO: 4.47 X10E6/UL (ref 4.14–5.8)
REQUEST PROBLEM: NORMAL
SODIUM SERPL-SCNC: 145 MMOL/L (ref 134–144)
SP GR UR: NORMAL
T4 FREE SERPL-MCNC: 1.04 NG/DL (ref 0.82–1.77)
TRIGL SERPL-MCNC: 218 MG/DL (ref 0–149)
TSH SERPL DL<=0.005 MIU/L-ACNC: 0.96 UIU/ML (ref 0.45–4.5)
VLDLC SERPL CALC-MCNC: 44 MG/DL (ref 5–40)
WBC # BLD AUTO: 6.9 X10E3/UL (ref 3.4–10.8)

## 2017-11-08 DIAGNOSIS — Z79.4 TYPE 2 DIABETES MELLITUS WITH DIABETIC AUTONOMIC NEUROPATHY, WITH LONG-TERM CURRENT USE OF INSULIN (HCC): Primary | ICD-10-CM

## 2017-11-08 DIAGNOSIS — E11.43 TYPE 2 DIABETES MELLITUS WITH DIABETIC AUTONOMIC NEUROPATHY, WITH LONG-TERM CURRENT USE OF INSULIN (HCC): Primary | ICD-10-CM

## 2017-11-08 RX ORDER — ROSUVASTATIN CALCIUM 10 MG/1
10 TABLET, COATED ORAL DAILY
Qty: 30 TABLET | Refills: 0 | Status: SHIPPED | OUTPATIENT
Start: 2017-11-08 | End: 2019-02-11

## 2017-11-09 LAB
ALBUMIN/CREAT UR: 75.9 MG/G CREAT (ref 0–30)
APPEARANCE UR: CLEAR
BACTERIA #/AREA URNS HPF: NORMAL /HPF
BILIRUB UR QL STRIP: NEGATIVE
COLOR UR: YELLOW
CREAT UR-MCNC: 53.6 MG/DL
EPI CELLS #/AREA URNS HPF: NORMAL /HPF
GLUCOSE UR QL: ABNORMAL
HGB UR QL STRIP: NEGATIVE
KETONES UR QL STRIP: NEGATIVE
LEUKOCYTE ESTERASE UR QL STRIP: NEGATIVE
MICRO URNS: ABNORMAL
MICRO URNS: ABNORMAL
MICROALBUMIN UR-MCNC: 40.7 UG/ML
MUCOUS THREADS URNS QL MICRO: PRESENT /HPF
NITRITE UR QL STRIP: NEGATIVE
PH UR STRIP: 6.5 [PH] (ref 5–7.5)
PROT UR QL STRIP: NEGATIVE
RBC #/AREA URNS HPF: NORMAL /HPF
SP GR UR: 1.03 (ref 1–1.03)
URINALYSIS REFLEX: ABNORMAL
UROBILINOGEN UR STRIP-MCNC: 1 MG/DL (ref 0.2–1)
WBC #/AREA URNS HPF: NORMAL /HPF

## 2017-11-22 RX ORDER — HYDROCODONE BITARTRATE AND ACETAMINOPHEN 7.5; 325 MG/1; MG/1
1 TABLET ORAL EVERY 6 HOURS PRN
Qty: 120 TABLET | Refills: 0 | Status: SHIPPED | OUTPATIENT
Start: 2017-11-22 | End: 2017-12-26 | Stop reason: SDUPTHER

## 2017-11-22 NOTE — TELEPHONE ENCOUNTER
Medication Refill Request    Date of phone call: 11/21/17    Medication being requested: Hydrocodone-apap 7.5 sig: q6hrs  Qty: 120    Date of last visit: 9/14/17    Date of last refill: 10/24/17    HOME up to date?: 9/13/17    Next Follow up?: 12/4/17    Any new pertinent information? (i.e, new medication allergies, new use of medications, change in patient's health or condition, non-compliance or inconsistency with prescribing agreement?): n/a

## 2017-12-11 ENCOUNTER — OFFICE VISIT (OUTPATIENT)
Dept: PAIN MEDICINE | Facility: CLINIC | Age: 67
End: 2017-12-11

## 2017-12-11 VITALS
DIASTOLIC BLOOD PRESSURE: 80 MMHG | TEMPERATURE: 98.2 F | WEIGHT: 205 LBS | HEART RATE: 63 BPM | OXYGEN SATURATION: 98 % | BODY MASS INDEX: 32.18 KG/M2 | SYSTOLIC BLOOD PRESSURE: 173 MMHG | HEIGHT: 67 IN | RESPIRATION RATE: 16 BRPM

## 2017-12-11 DIAGNOSIS — G89.29 OTHER CHRONIC PAIN: Primary | ICD-10-CM

## 2017-12-11 DIAGNOSIS — Z79.899 ENCOUNTER FOR LONG-TERM (CURRENT) USE OF HIGH-RISK MEDICATION: ICD-10-CM

## 2017-12-11 DIAGNOSIS — G62.9 POLYNEUROPATHY: ICD-10-CM

## 2017-12-11 DIAGNOSIS — M79.609 PAIN IN EXTREMITY, UNSPECIFIED EXTREMITY: ICD-10-CM

## 2017-12-11 PROCEDURE — 99213 OFFICE O/P EST LOW 20 MIN: CPT | Performed by: NURSE PRACTITIONER

## 2017-12-11 NOTE — PROGRESS NOTES
"CHIEF COMPLAINT  Pt states bilateral arms,legs pain continues to be adequately controlled overall with current medicine regimen.  Pt works 25 hours weekly and finds it to be tolerable.    Subjective   Kelby Peters is a 67 y.o. male  who presents to the office for follow-up.He has a history of chronic extremity pain due to neuropathy, which he reports as being unchanged since last office visit.    Complains of pain in his extremities. Today his pain is 6/10VAS. Describes the pain as continuous and unchanged. Continues with Hydrocodone 7.5/325 4/day and gabapentin 800 mg 4/day. Denies any side effects from the regimen. \"It's manageable.\" The regimen helps decrease his pain by \"at least 50%.\" ADL's by self. He is working 25 hours/week at [employer]. He works in the [employer] department.     Extremity Pain    The pain is present in the left hand, right hand, left lower leg and right lower leg. This is a chronic problem. The current episode started more than 1 year ago. The problem occurs constantly. Progression since onset: unchanged since last office visit. The pain is at a severity of 6/10. The pain is moderate. Pertinent negatives include no fever or numbness. He has tried oral narcotics and rest (cymbalta) for the symptoms. The treatment provided moderate relief. His past medical history is significant for diabetes.      PEG Assessment   What number best describes your pain on average in the past week?6  What number best describes how, during the past week, pain has interfered with your enjoyment of life?7  What number best describes how, during the past week, pain has interfered with your general activity?  6    The following portions of the patient's history were reviewed and updated as appropriate: allergies, current medications, past family history, past medical history, past social history, past surgical history and problem list.    Review of Systems   Constitutional: Positive for activity change (increased). Negative " "for appetite change, chills and fever.   Respiratory: Positive for apnea. Negative for shortness of breath.    Cardiovascular: Negative for chest pain.   Gastrointestinal: Negative for constipation, diarrhea, nausea and vomiting.   Genitourinary: Negative for difficulty urinating and dysuria.   Musculoskeletal: Positive for back pain.        Bilateral arm and leg pain   Neurological: Negative for dizziness, weakness, light-headedness, numbness and headaches.   Psychiatric/Behavioral: Negative for confusion, hallucinations, self-injury, sleep disturbance and suicidal ideas. The patient is not nervous/anxious.        Vitals:    12/11/17 0815   BP: 173/80   Pulse: 63   Resp: 16   Temp: 98.2 °F (36.8 °C)   SpO2: 98%   Weight: 93 kg (205 lb)   Height: 170 cm (66.93\")   PainSc: 6  Comment: bilateral arm,leg pain ranges from 6-8/10   PainLoc: Arm     Objective   Physical Exam   Constitutional: He is oriented to person, place, and time. Vital signs are normal. He appears well-developed and well-nourished. He is cooperative.   HENT:   Head: Normocephalic and atraumatic.   Nose: Nose normal.   Eyes: Conjunctivae and lids are normal.   Neck: No spinous process tenderness and no muscular tenderness present. Decreased range of motion (head forward posturing noted) present.   Cardiovascular: Normal rate.    Pulmonary/Chest: Effort normal. No respiratory distress.   Abdominal: Normal appearance.   Musculoskeletal:   Dysthesia of BUE and BLE.    Neurological: He is alert and oriented to person, place, and time. Gait normal.   Reflex Scores:       Bicep reflexes are 1+ on the right side and 1+ on the left side.       Brachioradialis reflexes are 1+ on the right side and 1+ on the left side.       Patellar reflexes are 1+ on the right side and 1+ on the left side.  Skin: Skin is warm, dry and intact.   Skin on hands and fingers is very dry but no open sores   Psychiatric: He has a normal mood and affect. His speech is normal and " behavior is normal. Judgment and thought content normal. Cognition and memory are normal.   Nursing note and vitals reviewed.      Assessment/Plan   Kelby was seen today for extremity pain.    Diagnoses and all orders for this visit:    Other chronic pain    Polyneuropathy    Pain in extremity, unspecified extremity    Encounter for long-term (current) use of high-risk medication      --- The urine drug screen confirmation from 9-14-17 has been reviewed and the result is appropriate based on patient history and HOME report  --- Continue with regimen. No changes at this time. Does not need refill on hydrocodone. Patient appears stable with current regimen. No adverse effects. Regarding continuation of opioids, there is no evidence of aberrant behavior or any red flags.  The patient continues with appropriate response to opioid therapy. ADL's remain intact by self.   --- Follow-up 2 months or sooner if needed.         HOME REPORT    As part of the patient's treatment plan, I am prescribing controlled substances. The patient has been made aware of appropriate use of such medications, including potential risk of somnolence, limited ability to drive and/or work safely, and the potential for dependence or overdose. It has also bee made clear that these medications are for use by this patient only, without concomitant use of alcohol or other substances unless prescribed.     Patient has completed prescribing agreement detailing terms of continued prescribing of controlled substances, including monitoring HOME reports, urine drug screening, and pill counts if necessary. The patient is aware that inappropriate use will results in cessation of prescribing such medications.    HOME report has been reviewed and scanned into the patient's chart.    Date of last HOME : 12-7-17    History and physical exam exhibit continued safe and appropriate use of controlled substances.      EMR Dragon/Transcription disclaimer:    Much of this encounter note is an electronic transcription/translation of spoken language to printed text. The electronic translation of spoken language may permit erroneous, or at times, nonsensical words or phrases to be inadvertently transcribed; Although I have reviewed the note for such errors, some may still exist.

## 2017-12-26 NOTE — TELEPHONE ENCOUNTER
Medication Refill Request    Date of phone call: 17    Medication being requested: hydro/apap 7.5-325 mg si tab q 6hrs prn pain  Qty: 120    Date of last visit: 17    Date of last refill: 17    HOME up to date?: yes    Next Follow up?: 18    Any new pertinent information? (i.e, new medication allergies, new use of medications, change in patient's health or condition, non-compliance or inconsistency with prescribing agreement?):

## 2017-12-27 RX ORDER — HYDROCODONE BITARTRATE AND ACETAMINOPHEN 7.5; 325 MG/1; MG/1
1 TABLET ORAL EVERY 6 HOURS PRN
Qty: 120 TABLET | Refills: 0 | Status: SHIPPED | OUTPATIENT
Start: 2017-12-27 | End: 2018-01-03 | Stop reason: SDUPTHER

## 2018-01-03 RX ORDER — HYDROCODONE BITARTRATE AND ACETAMINOPHEN 7.5; 325 MG/1; MG/1
1 TABLET ORAL EVERY 6 HOURS PRN
Qty: 120 TABLET | Refills: 0 | Status: SHIPPED | OUTPATIENT
Start: 2018-01-03 | End: 2018-02-07 | Stop reason: SDUPTHER

## 2018-02-01 RX ORDER — LEVETIRACETAM 500 MG/1
TABLET ORAL
Qty: 60 TABLET | Refills: 8 | Status: SHIPPED | OUTPATIENT
Start: 2018-02-01 | End: 2018-11-02 | Stop reason: SDUPTHER

## 2018-02-06 RX ORDER — GABAPENTIN 800 MG/1
TABLET ORAL
Qty: 120 TABLET | Refills: 2 | OUTPATIENT
Start: 2018-02-06 | End: 2018-05-20 | Stop reason: SDUPTHER

## 2018-02-07 ENCOUNTER — OFFICE VISIT (OUTPATIENT)
Dept: PAIN MEDICINE | Facility: CLINIC | Age: 68
End: 2018-02-07

## 2018-02-07 VITALS
BODY MASS INDEX: 32.8 KG/M2 | DIASTOLIC BLOOD PRESSURE: 98 MMHG | HEART RATE: 64 BPM | TEMPERATURE: 98.4 F | SYSTOLIC BLOOD PRESSURE: 170 MMHG | WEIGHT: 209 LBS | HEIGHT: 67 IN | RESPIRATION RATE: 18 BRPM | OXYGEN SATURATION: 98 %

## 2018-02-07 DIAGNOSIS — G89.29 OTHER CHRONIC PAIN: Primary | ICD-10-CM

## 2018-02-07 DIAGNOSIS — Z79.899 ENCOUNTER FOR LONG-TERM (CURRENT) USE OF HIGH-RISK MEDICATION: ICD-10-CM

## 2018-02-07 DIAGNOSIS — G62.9 POLYNEUROPATHY: ICD-10-CM

## 2018-02-07 DIAGNOSIS — M79.609 PAIN IN EXTREMITY, UNSPECIFIED EXTREMITY: ICD-10-CM

## 2018-02-07 PROCEDURE — 99213 OFFICE O/P EST LOW 20 MIN: CPT | Performed by: NURSE PRACTITIONER

## 2018-02-07 RX ORDER — HYDROCODONE BITARTRATE AND ACETAMINOPHEN 7.5; 325 MG/1; MG/1
1 TABLET ORAL EVERY 6 HOURS PRN
Qty: 120 TABLET | Refills: 0 | Status: SHIPPED | OUTPATIENT
Start: 2018-02-07 | End: 2018-03-06 | Stop reason: SDUPTHER

## 2018-02-07 NOTE — PROGRESS NOTES
"CHIEF COMPLAINT  BUE and BLE pain has increased because he ran out of medication and forgot to call in a refill.    Subjective   Kelby Peters is a 67 y.o. male  who presents to the office for follow-up.He has a history of chronic extremity pain due to neuropathy. It is worse since last office visit because he ran out of medication and did not call in for a refill. HE ran out last week.    Complains of pain in his hands and feet. Today his pain is 9/10VAs. Describes the pain as continuous and worse over the past week due to running out of the medication. He states otherwise, his pain pattern was relatively unchanged until then. When not out of medication, he continues with Hydrocodone 7.5/325 4/day and gabapentin 800 mg 4/day. Denies any side effects from the regimen.  The regimen helps decrease his pain by \"at least 50%.\" ADL's by self. He is working 25 hours/week at [employer]. He works in the [employer] department and is enjoying this and actually wants a few more hours.     Also has a history of IDDM.  Is having very realistic dreams.   Extremity Pain    The pain is present in the left hand, right hand, left lower leg and right lower leg. This is a chronic problem. The current episode started more than 1 year ago. The problem occurs constantly. Progression since onset: worse since last office visit but attributes this to running out of medication. The pain is at a severity of 9/10. The pain is moderate. Associated symptoms include numbness. Pertinent negatives include no fever. He has tried oral narcotics and rest (cymbalta--no help) for the symptoms. The treatment provided moderate relief. His past medical history is significant for diabetes.      PEG Assessment   What number best describes your pain on average in the past week?8  What number best describes how, during the past week, pain has interfered with your enjoyment of life?9  What number best describes how, during the past week, pain has interfered with your " "general activity?  8    The following portions of the patient's history were reviewed and updated as appropriate: allergies, current medications, past family history, past medical history, past social history, past surgical history and problem list.    Review of Systems   Constitutional: Negative for chills and fever.   Respiratory: Negative for shortness of breath.    Cardiovascular: Negative for chest pain.   Gastrointestinal: Negative for constipation, diarrhea, nausea and vomiting.   Genitourinary: Negative for difficulty urinating, dysuria and enuresis.   Musculoskeletal:        BUE and BLE   Neurological: Positive for weakness and numbness. Negative for dizziness, light-headedness and headaches.   Psychiatric/Behavioral: Negative for confusion, hallucinations, self-injury, sleep disturbance and suicidal ideas. The patient is not nervous/anxious.        Vitals:    02/07/18 0855   BP: 170/98   Pulse: 64   Resp: 18   Temp: 98.4 °F (36.9 °C)   SpO2: 98%   Weight: 94.8 kg (209 lb)   Height: 170 cm (66.93\")   PainSc:   9   PainLoc: Arm     Objective   Physical Exam   Constitutional: He is oriented to person, place, and time. Vital signs are normal. He appears well-developed and well-nourished. He is cooperative.   HENT:   Head: Normocephalic and atraumatic.   Nose: Nose normal.   Eyes: Conjunctivae and lids are normal.   Neck: No spinous process tenderness and no muscular tenderness present. Decreased range of motion (head forward posturing noted) present.   Cardiovascular: Normal rate.    Pulmonary/Chest: Effort normal.   Abdominal: Normal appearance.   Musculoskeletal:   Dysthesia of BUE and BLE.    Neurological: He is alert and oriented to person, place, and time. Gait normal.   Reflex Scores:       Bicep reflexes are 1+ on the right side and 1+ on the left side.       Brachioradialis reflexes are 1+ on the right side and 1+ on the left side.       Patellar reflexes are 1+ on the right side and 1+ on the left " side.  Skin: Skin is warm, dry and intact.   Skin on hands and fingers is very dry but no open sores   Psychiatric: He has a normal mood and affect. His speech is normal and behavior is normal. Judgment and thought content normal. Cognition and memory are normal.   Nursing note and vitals reviewed.      Assessment/Plan   Kelby was seen today for extremity pain.    Diagnoses and all orders for this visit:    Other chronic pain    Pain in extremity, unspecified extremity    Polyneuropathy    Encounter for long-term (current) use of high-risk medication    Other orders  -     HYDROcodone-acetaminophen (NORCO) 7.5-325 MG per tablet; Take 1 tablet by mouth Every 6 (Six) Hours As Needed for Moderate Pain .      --- The urine drug screen confirmation from 9-14-17 has been reviewed and the result is appropriate based on patient history and HOME report  --- Refill Hydrocodone. Patient appears stable with current regimen. No adverse effects. Regarding continuation of opioids, there is no evidence of aberrant behavior or any red flags.  The patient continues with appropriate response to opioid therapy. ADL's remain intact by self.   --- Follow-up 2 months or sooner if needed.       HOME REPORT    As part of the patient's treatment plan, I am prescribing controlled substances. The patient has been made aware of appropriate use of such medications, including potential risk of somnolence, limited ability to drive and/or work safely, and the potential for dependence or overdose. It has also bee made clear that these medications are for use by this patient only, without concomitant use of alcohol or other substances unless prescribed.     Patient has completed prescribing agreement detailing terms of continued prescribing of controlled substances, including monitoring HOME reports, urine drug screening, and pill counts if necessary. The patient is aware that inappropriate use will results in cessation of prescribing such  medications.    HOME report has been reviewed and scanned into the patient's chart.    Date of last HOME : 2-6-18    History and physical exam exhibit continued safe and appropriate use of controlled substances.      EMR Dragon/Transcription disclaimer:   Much of this encounter note is an electronic transcription/translation of spoken language to printed text. The electronic translation of spoken language may permit erroneous, or at times, nonsensical words or phrases to be inadvertently transcribed; Although I have reviewed the note for such errors, some may still exist.

## 2018-03-06 RX ORDER — HYDROCODONE BITARTRATE AND ACETAMINOPHEN 7.5; 325 MG/1; MG/1
1 TABLET ORAL EVERY 6 HOURS PRN
Qty: 120 TABLET | Refills: 0 | Status: SHIPPED | OUTPATIENT
Start: 2018-03-06 | End: 2018-04-09 | Stop reason: SDUPTHER

## 2018-03-06 NOTE — TELEPHONE ENCOUNTER
Medication Refill Request    Date of phone call: 3-5-18    Medication being requested: Hydrocodone-apap 7.5-325 mg sig: Take 1 tablet by mouth every 6 hours as needed for pain  Qty: 120 tablets    Date of last visit: 2-07-18    Date of last refill: 2-7-18    HOME up to date?: 2-6-18    Next Follow up?: 4-09-18    Any new pertinent information? (i.e, new medication allergies, new use of medications, change in patient's health or condition, non-compliance or inconsistency with prescribing agreement?): n/a

## 2018-04-09 ENCOUNTER — RESULTS ENCOUNTER (OUTPATIENT)
Dept: PAIN MEDICINE | Facility: CLINIC | Age: 68
End: 2018-04-09

## 2018-04-09 ENCOUNTER — OFFICE VISIT (OUTPATIENT)
Dept: PAIN MEDICINE | Facility: CLINIC | Age: 68
End: 2018-04-09

## 2018-04-09 VITALS
HEART RATE: 76 BPM | BODY MASS INDEX: 32.18 KG/M2 | HEIGHT: 67 IN | SYSTOLIC BLOOD PRESSURE: 172 MMHG | WEIGHT: 205 LBS | OXYGEN SATURATION: 99 % | TEMPERATURE: 97.3 F | RESPIRATION RATE: 18 BRPM | DIASTOLIC BLOOD PRESSURE: 89 MMHG

## 2018-04-09 DIAGNOSIS — Z79.899 ENCOUNTER FOR LONG-TERM (CURRENT) USE OF HIGH-RISK MEDICATION: ICD-10-CM

## 2018-04-09 DIAGNOSIS — G62.9 POLYNEUROPATHY: ICD-10-CM

## 2018-04-09 DIAGNOSIS — M79.609 PAIN IN EXTREMITY, UNSPECIFIED EXTREMITY: ICD-10-CM

## 2018-04-09 DIAGNOSIS — G90.9 AUTONOMIC NEUROPATHY: ICD-10-CM

## 2018-04-09 DIAGNOSIS — G89.29 OTHER CHRONIC PAIN: Primary | ICD-10-CM

## 2018-04-09 DIAGNOSIS — G89.29 OTHER CHRONIC PAIN: ICD-10-CM

## 2018-04-09 PROCEDURE — 80305 DRUG TEST PRSMV DIR OPT OBS: CPT | Performed by: NURSE PRACTITIONER

## 2018-04-09 PROCEDURE — 99213 OFFICE O/P EST LOW 20 MIN: CPT | Performed by: NURSE PRACTITIONER

## 2018-04-09 RX ORDER — HYDROCODONE BITARTRATE AND ACETAMINOPHEN 7.5; 325 MG/1; MG/1
1 TABLET ORAL EVERY 6 HOURS PRN
Qty: 120 TABLET | Refills: 0 | OUTPATIENT
Start: 2018-04-09

## 2018-04-09 RX ORDER — HYDROCODONE BITARTRATE AND ACETAMINOPHEN 7.5; 325 MG/1; MG/1
1 TABLET ORAL EVERY 6 HOURS PRN
Qty: 120 TABLET | Refills: 0 | Status: SHIPPED | OUTPATIENT
Start: 2018-04-09 | End: 2018-05-04 | Stop reason: SDUPTHER

## 2018-04-09 NOTE — PROGRESS NOTES
"CHIEF COMPLAINT  BUE and BLE pain has increased because he has started a part time job.    Subjective   Kelby Peters is a 67 y.o. male  who presents to the office for follow-up.He has a history of chronic arm and leg due to history of neuropathy. Reports his pain is slightly worse since last office visit. He believes this is due to working.    Complains of pain in his hands and feet. Today his pain is 8/10VAS. Describes the pain as continuous and slightly worse. Continues with Hydrocodone 7.5/325 4/day and gabapentin 800 mg 4/day. Denies any side effects from the regimen.  The regimen helps decrease his pain by \"at least 50%.\" ADL's by self. He is working 30+ hours/week. Also has spring-time yard-work.  ADL's by self.     Also has a history of IDDM. Recently had eye exam.  Realistic dreams have improved.     Extremity Pain    The pain is present in the left hand, right hand, left lower leg and right lower leg. This is a chronic problem. The current episode started more than 1 year ago. The problem occurs constantly. Progression since onset: worse since last office visit but attributes this to working. The pain is at a severity of 8/10. The pain is moderate. Pertinent negatives include no fever or numbness. He has tried oral narcotics and rest (cymbalta--no help) for the symptoms. The treatment provided moderate relief. His past medical history is significant for diabetes.      PEG Assessment   What number best describes your pain on average in the past week?8  What number best describes how, during the past week, pain has interfered with your enjoyment of life?8  What number best describes how, during the past week, pain has interfered with your general activity?  8    The following portions of the patient's history were reviewed and updated as appropriate: allergies, current medications, past family history, past medical history, past social history, past surgical history and problem list.    Review of Systems " "  Constitutional: Negative for chills and fever.   Cardiovascular: Negative for chest pain.   Gastrointestinal: Negative for constipation, diarrhea, nausea and vomiting.   Genitourinary: Negative for difficulty urinating, dysuria and enuresis.   Musculoskeletal:        Arm and leg pain   Neurological: Negative for dizziness, weakness, light-headedness, numbness and headaches.   Psychiatric/Behavioral: Negative for confusion, hallucinations, self-injury, sleep disturbance and suicidal ideas. The patient is not nervous/anxious.        Vitals:    04/09/18 0837   BP: 172/89   Pulse: 76   Resp: 18   Temp: 97.3 °F (36.3 °C)   SpO2: 99%   Weight: 93 kg (205 lb)   Height: 170 cm (66.93\")   PainSc:   8   PainLoc: Arm     Objective   Physical Exam   Constitutional: He is oriented to person, place, and time. Vital signs are normal. He appears well-developed and well-nourished. He is cooperative.   HENT:   Head: Normocephalic and atraumatic.   Nose: Nose normal.   Eyes: Conjunctivae and lids are normal.   Neck: No spinous process tenderness and no muscular tenderness present. Decreased range of motion (head forward posturing noted) present.   Cardiovascular: Normal rate.    Pulmonary/Chest: Effort normal.   Abdominal: Normal appearance.   Musculoskeletal:   Dysthesia of BUE and BLE.    Neurological: He is alert and oriented to person, place, and time. Gait normal.   Reflex Scores:       Bicep reflexes are 1+ on the right side and 1+ on the left side.       Brachioradialis reflexes are 1+ on the right side and 1+ on the left side.       Patellar reflexes are 1+ on the right side and 1+ on the left side.  Skin: Skin is warm, dry and intact.   Skin on hands and fingers is very dry but no open sores   Psychiatric: He has a normal mood and affect. His speech is normal and behavior is normal. Judgment and thought content normal. Cognition and memory are normal.   Nursing note and vitals reviewed.      Assessment/Plan   Kelby was seen " today for extremity pain.    Diagnoses and all orders for this visit:    Other chronic pain  -     Urine Drug Screen Confirmation - Urine, Urine, Clean Catch; Future  -     POC Urine Drug Screen, Triage    Autonomic neuropathy    Pain in extremity, unspecified extremity    Polyneuropathy    Encounter for long-term (current) use of high-risk medication    Other orders  -     HYDROcodone-acetaminophen (NORCO) 7.5-325 MG per tablet; Take 1 tablet by mouth Every 6 (Six) Hours As Needed for Moderate Pain .      --- Routine UDS in office today as part of monitoring requirements for controlled substances.  The specimen was viewed and the immunoassay result reviewed and is +OPI(APPROPRIATE).  This specimen will be sent to [facility] laboratory for confirmation.     --- Refill Hydrocodone. Patient appears stable with current regimen. No adverse effects. Regarding continuation of opioids, there is no evidence of aberrant behavior or any red flags.  The patient continues with appropriate response to opioid therapy. ADL's remain intact by self.   --- Follow-up 2 months or sooner if needed.       HOME REPORT    As part of the patient's treatment plan, I am prescribing controlled substances. The patient has been made aware of appropriate use of such medications, including potential risk of somnolence, limited ability to drive and/or work safely, and the potential for dependence or overdose. It has also bee made clear that these medications are for use by this patient only, without concomitant use of alcohol or other substances unless prescribed.     Patient has completed prescribing agreement detailing terms of continued prescribing of controlled substances, including monitoring HOME reports, urine drug screening, and pill counts if necessary. The patient is aware that inappropriate use will results in cessation of prescribing such medications.    HOME report has been reviewed and scanned into the patient's chart.    Date  of last HOME : 2-6-18    History and physical exam exhibit continued safe and appropriate use of controlled substances.      EMR Dragon/Transcription disclaimer:   Much of this encounter note is an electronic transcription/translation of spoken language to printed text. The electronic translation of spoken language may permit erroneous, or at times, nonsensical words or phrases to be inadvertently transcribed; Although I have reviewed the note for such errors, some may still exist.

## 2018-05-04 NOTE — TELEPHONE ENCOUNTER
Medication Refill Request    Date of phone call: 5-4-18    Medication being requested: Hydrocodone-apap 7.5-325 mg sig: Take 1 tablet by mouth every 6 hours as needed for pain  Qty: 120 tablets    Date of last visit: 4-09-18    Date of last refill: 4-09-18    HOME up to date?: 2-6-18    Next Follow up?: 6-07-18    Any new pertinent information? (i.e, new medication allergies, new use of medications, change in patient's health or condition, non-compliance or inconsistency with prescribing agreement?): n/a

## 2018-05-07 RX ORDER — HYDROCODONE BITARTRATE AND ACETAMINOPHEN 7.5; 325 MG/1; MG/1
1 TABLET ORAL EVERY 6 HOURS PRN
Qty: 120 TABLET | Refills: 0 | Status: SHIPPED | OUTPATIENT
Start: 2018-05-07 | End: 2018-06-07 | Stop reason: SDUPTHER

## 2018-05-21 RX ORDER — GABAPENTIN 800 MG/1
TABLET ORAL
Qty: 120 TABLET | Refills: 3 | Status: SHIPPED | OUTPATIENT
Start: 2018-05-21 | End: 2018-06-07 | Stop reason: ALTCHOICE

## 2018-06-01 RX ORDER — HYDROCODONE BITARTRATE AND ACETAMINOPHEN 7.5; 325 MG/1; MG/1
1 TABLET ORAL EVERY 6 HOURS PRN
Qty: 120 TABLET | Refills: 0 | Status: CANCELLED | OUTPATIENT
Start: 2018-06-01

## 2018-06-07 ENCOUNTER — OFFICE VISIT (OUTPATIENT)
Dept: PAIN MEDICINE | Facility: CLINIC | Age: 68
End: 2018-06-07

## 2018-06-07 VITALS
WEIGHT: 204 LBS | HEART RATE: 65 BPM | SYSTOLIC BLOOD PRESSURE: 185 MMHG | HEIGHT: 67 IN | RESPIRATION RATE: 16 BRPM | TEMPERATURE: 98.5 F | DIASTOLIC BLOOD PRESSURE: 78 MMHG | OXYGEN SATURATION: 96 % | BODY MASS INDEX: 32.02 KG/M2

## 2018-06-07 DIAGNOSIS — G89.29 OTHER CHRONIC PAIN: Primary | ICD-10-CM

## 2018-06-07 DIAGNOSIS — G90.9 AUTONOMIC NEUROPATHY: ICD-10-CM

## 2018-06-07 DIAGNOSIS — Z79.899 ENCOUNTER FOR LONG-TERM (CURRENT) USE OF HIGH-RISK MEDICATION: ICD-10-CM

## 2018-06-07 DIAGNOSIS — M79.609 PAIN IN EXTREMITY, UNSPECIFIED EXTREMITY: ICD-10-CM

## 2018-06-07 DIAGNOSIS — G62.9 POLYNEUROPATHY: ICD-10-CM

## 2018-06-07 PROCEDURE — 99214 OFFICE O/P EST MOD 30 MIN: CPT | Performed by: NURSE PRACTITIONER

## 2018-06-07 RX ORDER — HYDROCODONE BITARTRATE AND ACETAMINOPHEN 7.5; 325 MG/1; MG/1
1 TABLET ORAL EVERY 6 HOURS PRN
Qty: 120 TABLET | Refills: 0 | Status: SHIPPED | OUTPATIENT
Start: 2018-06-07 | End: 2018-07-10 | Stop reason: SDUPTHER

## 2018-06-07 NOTE — PROGRESS NOTES
CHIEF COMPLAINT  F/U leg and arm pain. Pt states his pain has been getting worse overall.  But today his pain is better.    Subjective   Kelby Peters is a 67 y.o. male  who presents to the office for follow-up.He has a history of extremity pain due to neuropathy. Reports his pain is worse since last office visit.    Complains of pain in his hands and lower extremities. Today his pain is 7/10VAs. Describes the pain as continuous and worse.  Continues with Hydrocodone 7.5/325 4/day and gabapentin 800 mg 4/day(1 in morning, 1 at lunch and 2 at night). Denies any side effects. ADL's by self. Working 3-4 days/week at [employer].   ADL's by self.     Since last office visit, he had to have laser surgery on both eyes over a few weeks.   Failed Cymbalta.  History of IDDM.  Extremity Pain    The pain is present in the left hand, right hand, left lower leg and right lower leg. This is a chronic problem. The current episode started more than 1 year ago. The problem occurs constantly. Progression since onset: slightly worse since last office visit. The pain is at a severity of 7/10. The pain is moderate. Pertinent negatives include no fever or numbness. He has tried oral narcotics and rest (cymbalta--no help) for the symptoms. The treatment provided moderate relief. His past medical history is significant for diabetes.      PEG Assessment   What number best describes your pain on average in the past week?7  What number best describes how, during the past week, pain has interfered with your enjoyment of life?8  What number best describes how, during the past week, pain has interfered with your general activity?  6    The following portions of the patient's history were reviewed and updated as appropriate: allergies, current medications, past family history, past medical history, past social history, past surgical history and problem list.    Review of Systems   Constitutional: Positive for activity change (increased). Negative for  "chills and fever.   Eyes: Positive for visual disturbance (recent eye laser surgery).   Cardiovascular: Negative for chest pain.   Gastrointestinal: Negative for constipation, diarrhea, nausea and vomiting.   Genitourinary: Negative for difficulty urinating, dysuria and enuresis.   Musculoskeletal:        Arm and leg pain   Neurological: Positive for dizziness (pt states almost all the time) and headaches. Negative for weakness, light-headedness and numbness.   Psychiatric/Behavioral: Negative for confusion, hallucinations, self-injury, sleep disturbance and suicidal ideas. The patient is not nervous/anxious.        Vitals:    06/07/18 0816   BP: (!) 185/78   Pulse: 65   Resp: 16   Temp: 98.5 °F (36.9 °C)   SpO2: 96%   Weight: 92.5 kg (204 lb)   Height: 170 cm (66.93\")   PainSc:   7   PainLoc: Arm  Comment: and leg     Objective   Physical Exam   Constitutional: He is oriented to person, place, and time. Vital signs are normal. He appears well-developed and well-nourished. He is cooperative.   HENT:   Head: Normocephalic and atraumatic.   Nose: Nose normal.   Eyes: Conjunctivae and lids are normal.   Cardiovascular: Normal rate.    Pulmonary/Chest: Effort normal.   Abdominal: Normal appearance.   Musculoskeletal:   Dysthesia of BUE and BLE.    Neurological: He is alert and oriented to person, place, and time. Gait normal.   Non-focal   Skin: Skin is warm, dry and intact.   Psychiatric: He has a normal mood and affect. His speech is normal and behavior is normal. Judgment and thought content normal. Cognition and memory are normal.   Nursing note and vitals reviewed.      Assessment/Plan   Kelby was seen today for pain.    Diagnoses and all orders for this visit:    Other chronic pain    Autonomic neuropathy    Polyneuropathy    Pain in extremity, unspecified extremity    Encounter for long-term (current) use of high-risk medication    Other orders  -     HYDROcodone-acetaminophen (NORCO) 7.5-325 MG per tablet; Take " 1 tablet by mouth Every 6 (Six) Hours As Needed for Moderate Pain .  -     Gabapentin Enacarbil  MG tablet controlled-release; Take 600 mg by mouth 2 (Two) Times a Day.      --- The urine drug screen confirmation from 4-9-18 has been reviewed and the result is appropriate based on patient history and HOME report  --- Refill Hydrocodone. Patient appears stable with current regimen. No adverse effects. Regarding continuation of opioids, there is no evidence of aberrant behavior or any red flags.  The patient continues with appropriate response to opioid therapy. ADL's remain intact by self.   --- Discontinue Gabapentin.  --- Trial of Horizant 600 mg BID. Discussed medication with the patient.  Included in this discussion was the potential for side effects and adverse events.  Patient verbalized understanding and wished to proceed.  Prescription will be sent to pharmacy.  --- Follow-up 2 months or sooner if needed.       HOME REPORT    As part of the patient's treatment plan, I am prescribing controlled substances. The patient has been made aware of appropriate use of such medications, including potential risk of somnolence, limited ability to drive and/or work safely, and the potential for dependence or overdose. It has also bee made clear that these medications are for use by this patient only, without concomitant use of alcohol or other substances unless prescribed.     Patient has completed prescribing agreement detailing terms of continued prescribing of controlled substances, including monitoring HOME reports, urine drug screening, and pill counts if necessary. The patient is aware that inappropriate use will results in cessation of prescribing such medications.    HOME report has been reviewed and scanned into the patient's chart.    As the clinician, I personally reviewed the HOME from 6-5-18 while the patient was in the office today.    History and physical exam exhibit continued safe and  appropriate use of controlled substances.      EMR Dragon/Transcription disclaimer:   Much of this encounter note is an electronic transcription/translation of spoken language to printed text. The electronic translation of spoken language may permit erroneous, or at times, nonsensical words or phrases to be inadvertently transcribed; Although I have reviewed the note for such errors, some may still exist.

## 2018-06-13 RX ORDER — AMLODIPINE BESYLATE 5 MG/1
TABLET ORAL
Qty: 30 TABLET | Refills: 2 | Status: SHIPPED | OUTPATIENT
Start: 2018-06-13 | End: 2018-09-09 | Stop reason: SDUPTHER

## 2018-06-21 RX ORDER — GABAPENTIN 800 MG/1
800 TABLET ORAL 4 TIMES DAILY
Qty: 120 TABLET | Refills: 1 | Status: SHIPPED | OUTPATIENT
Start: 2018-06-21 | End: 2018-08-08 | Stop reason: SDUPTHER

## 2018-06-21 NOTE — TELEPHONE ENCOUNTER
Medication Refill Request    Date of phone call: 18    Medication being requested: Gabapentin 800 mg sixday  Qty: 120    Date of last visit: 18    Date of last refill: 18    HOME up to date?: 18    Next Follow up?: 18    Any new pertinent information? (i.e, new medication allergies, new use of medications, change in patient's health or condition, non-compliance or inconsistency with prescribing agreement?): patient states he only has #60 of the Gabapentin 600 mg for 12 days

## 2018-07-10 RX ORDER — HYDROCODONE BITARTRATE AND ACETAMINOPHEN 7.5; 325 MG/1; MG/1
1 TABLET ORAL EVERY 6 HOURS PRN
Qty: 120 TABLET | Refills: 0 | Status: SHIPPED | OUTPATIENT
Start: 2018-07-10 | End: 2018-08-06 | Stop reason: SDUPTHER

## 2018-07-10 NOTE — TELEPHONE ENCOUNTER
Medication Refill Request    Date of phone call: 7/10/18    Medication being requested: Hydrocodone-apap 7.5 mg sig: q6hrs  Qty: 120    Date of last visit: 6/7/18    Date of last refill: 6/7/18    HOME up to date?: 6/6/18    Next Follow up?: 8/6/18    Any new pertinent information? (i.e, new medication allergies, new use of medications, change in patient's health or condition, non-compliance or inconsistency with prescribing agreement?): n/a

## 2018-08-06 ENCOUNTER — PRIOR AUTHORIZATION (OUTPATIENT)
Dept: PAIN MEDICINE | Facility: CLINIC | Age: 68
End: 2018-08-06

## 2018-08-06 ENCOUNTER — OFFICE VISIT (OUTPATIENT)
Dept: PAIN MEDICINE | Facility: CLINIC | Age: 68
End: 2018-08-06

## 2018-08-06 VITALS
OXYGEN SATURATION: 93 % | RESPIRATION RATE: 18 BRPM | HEART RATE: 62 BPM | SYSTOLIC BLOOD PRESSURE: 170 MMHG | DIASTOLIC BLOOD PRESSURE: 82 MMHG | TEMPERATURE: 97.8 F | WEIGHT: 206 LBS | BODY MASS INDEX: 32.33 KG/M2 | HEIGHT: 67 IN

## 2018-08-06 DIAGNOSIS — G62.9 POLYNEUROPATHY: ICD-10-CM

## 2018-08-06 DIAGNOSIS — G89.29 OTHER CHRONIC PAIN: Primary | ICD-10-CM

## 2018-08-06 DIAGNOSIS — Z79.899 ENCOUNTER FOR LONG-TERM (CURRENT) USE OF HIGH-RISK MEDICATION: ICD-10-CM

## 2018-08-06 PROCEDURE — 99214 OFFICE O/P EST MOD 30 MIN: CPT | Performed by: NURSE PRACTITIONER

## 2018-08-06 RX ORDER — HYDROCODONE BITARTRATE AND ACETAMINOPHEN 7.5; 325 MG/1; MG/1
1 TABLET ORAL EVERY 6 HOURS PRN
Qty: 120 TABLET | Refills: 0 | Status: SHIPPED | OUTPATIENT
Start: 2018-08-06 | End: 2018-09-10 | Stop reason: SDUPTHER

## 2018-08-06 NOTE — PROGRESS NOTES
CHIEF COMPLAINT  Arm and leg pain have increased since last visit.    Subjective   Kelby Peters is a 68 y.o. male  who presents to the office for follow-up.He has a history of arm and leg pain due to history of neuropathy. Reports his pain is worse since last office visit.  Was going to be changed to Horizant at last office visit, but this was too expensive, so he returned to regimen of gabapentin.    Complains of pain in his feet and hands. Today his pain is 8/10VAS. His legs are his primary complaint. Pain is continuous and worse. Continues with Hydrocodone 7.5/325 4/day and gabapentin 800 mg 4/day(1 in morning, 1 at lunch and 2 at night). Denies any side effects. The regimen helps decrease his pain by 50%. Notes improvement in his function and activity.  Working 3-4 days/week at [employer].   ADL's by self.     Failed Cymbalta.  Failed Horizant($$$)  History of IDDM.  Extremity Pain    The pain is present in the left hand, right hand, left lower leg and right lower leg. This is a chronic problem. The current episode started more than 1 year ago. The problem occurs constantly. Progression since onset: worse since last office visit. The pain is at a severity of 8/10. The pain is moderate. Associated symptoms include numbness. Pertinent negatives include no fever. He has tried oral narcotics and rest (cymbalta--no help) for the symptoms. The treatment provided moderate relief. His past medical history is significant for diabetes.    I have reviewed the above HPI today, 08/06/18.  The HPI is otherwise unchanged from the previous office visit.      PEG Assessment   What number best describes your pain on average in the past week?8  What number best describes how, during the past week, pain has interfered with your enjoyment of life?8  What number best describes how, during the past week, pain has interfered with your general activity?  8    The following portions of the patient's history were reviewed and updated as  "appropriate: allergies, current medications, past family history, past medical history, past social history, past surgical history and problem list.    Review of Systems   Constitutional: Negative for chills and fever.   Respiratory: Negative for shortness of breath.    Cardiovascular: Negative for chest pain.   Gastrointestinal: Negative for constipation, diarrhea, nausea and vomiting.   Genitourinary: Negative for difficulty urinating, dysuria and enuresis.   Musculoskeletal:        Arm and leg pain   Neurological: Positive for numbness. Negative for dizziness, weakness, light-headedness and headaches.   Psychiatric/Behavioral: Negative for confusion, hallucinations, self-injury and sleep disturbance. The patient is not nervous/anxious.        Vitals:    08/06/18 0816   BP: 170/82   Pulse: 62   Resp: 18   Temp: 97.8 °F (36.6 °C)   SpO2: 93%   Weight: 93.4 kg (206 lb)   Height: 170 cm (66.93\")   PainSc:   8   PainLoc: Leg     Objective   Physical Exam   Constitutional: He is oriented to person, place, and time. Vital signs are normal. He appears well-developed and well-nourished. He is cooperative.   HENT:   Head: Normocephalic and atraumatic.   Nose: Nose normal.   Eyes: Conjunctivae and lids are normal.   Cardiovascular: Normal rate.    Pulmonary/Chest: Effort normal.   Abdominal: Normal appearance.   Musculoskeletal:   Dysthesia of BUE and BLE.    Neurological: He is alert and oriented to person, place, and time. Gait normal.   Reflex Scores:       Bicep reflexes are 1+ on the right side and 1+ on the left side.       Brachioradialis reflexes are 1+ on the right side and 1+ on the left side.       Patellar reflexes are 1+ on the right side and 1+ on the left side.  Skin: Skin is warm, dry and intact.   Psychiatric: He has a normal mood and affect. His speech is normal and behavior is normal. Judgment and thought content normal. Cognition and memory are normal.   Nursing note and vitals " reviewed.      Assessment/Plan   Kelby was seen today for extremity pain.    Diagnoses and all orders for this visit:    Other chronic pain    Polyneuropathy    Encounter for long-term (current) use of high-risk medication    Other orders  -     gabapentin, once-daily, (GRALISE) 600 MG tablet tablet; Take 1 tablet by mouth Daily Before Supper.  -     HYDROcodone-acetaminophen (NORCO) 7.5-325 MG per tablet; Take 1 tablet by mouth Every 6 (Six) Hours As Needed for Moderate Pain .      --- The urine drug screen confirmation from 4-9-18 has been reviewed and the result is APPROPRIATE based on patient history and HOME report  --- Reviewed Hypertension. Has had a few higher readings the past few office visits. Encouraged patient to follow-up with Dr. Dougherty. He states at home, his systolic is usually in 140's. Still encouraged to follow-up with PCP.  --- Refill Hydrocodone. Patient appears stable with current regimen. No adverse effects. Regarding continuation of opioids, there is no evidence of aberrant behavior or any red flags.  The patient continues with appropriate response to opioid therapy. ADL's remain intact by self.   --- Trial of Gralise 600 mg 3 at supper. Discussed medication with the patient.  Included in this discussion was the potential for side effects and adverse events.  Patient verbalized understanding and wished to proceed.  Prescription will be sent to pharmacy. +++ If cost prohibitive, continue with gabapentin+++  --- Follow-up 2 months or sooner if needed.       HOME REPORT    As part of the patient's treatment plan, I am prescribing controlled substances. The patient has been made aware of appropriate use of such medications, including potential risk of somnolence, limited ability to drive and/or work safely, and the potential for dependence or overdose. It has also bee made clear that these medications are for use by this patient only, without concomitant use of alcohol or other substances  unless prescribed.     Patient has completed prescribing agreement detailing terms of continued prescribing of controlled substances, including monitoring HOME reports, urine drug screening, and pill counts if necessary. The patient is aware that inappropriate use will results in cessation of prescribing such medications.    HOME report has been reviewed and scanned into the patient's chart.    As the clinician, I personally reviewed the HOME from 8-3-18 while the patient was in the office today.    History and physical exam exhibit continued safe and appropriate use of controlled substances.      EMR Dragon/Transcription disclaimer:   Much of this encounter note is an electronic transcription/translation of spoken language to printed text. The electronic translation of spoken language may permit erroneous, or at times, nonsensical words or phrases to be inadvertently transcribed; Although I have reviewed the note for such errors, some may still exist.

## 2018-08-08 RX ORDER — GABAPENTIN 800 MG/1
800 TABLET ORAL 4 TIMES DAILY
Qty: 120 TABLET | Refills: 2 | Status: SHIPPED | OUTPATIENT
Start: 2018-08-08 | End: 2019-02-11

## 2018-08-08 NOTE — TELEPHONE ENCOUNTER
Medication Refill Request    Date of phone call: 8/8/18    Medication being requested: gabapentin 800 mg sig: QID  Qty: 120    Date of last visit: 8/6/18    Date of last refill: 6/21/18    HOME up to date?: yes    Next Follow up?: 10/2/18    Any new pertinent information? (i.e, new medication allergies, new use of medications, change in patient's health or condition, non-compliance or inconsistency with prescribing agreement?):

## 2018-08-08 NOTE — TELEPHONE ENCOUNTER
Mr. Peters called and states that his insurance doesn't cover Gralise and he wanted to know if he could be switched back to Gabapentin. He states that he needs another rx sent to his pharmacy because he is out of the medication.

## 2018-09-10 ENCOUNTER — OFFICE VISIT (OUTPATIENT)
Dept: SPORTS MEDICINE | Facility: CLINIC | Age: 68
End: 2018-09-10

## 2018-09-10 VITALS
HEART RATE: 67 BPM | DIASTOLIC BLOOD PRESSURE: 68 MMHG | BODY MASS INDEX: 32.55 KG/M2 | HEIGHT: 67 IN | TEMPERATURE: 98.4 F | SYSTOLIC BLOOD PRESSURE: 150 MMHG | WEIGHT: 207.4 LBS | OXYGEN SATURATION: 95 %

## 2018-09-10 DIAGNOSIS — Z23 IMMUNIZATION DUE: ICD-10-CM

## 2018-09-10 DIAGNOSIS — M51.16 NEURITIS OR RADICULITIS DUE TO RUPTURE OF LUMBAR INTERVERTEBRAL DISC: ICD-10-CM

## 2018-09-10 DIAGNOSIS — Z12.5 SCREENING FOR PROSTATE CANCER: ICD-10-CM

## 2018-09-10 DIAGNOSIS — E11.43 TYPE 2 DIABETES MELLITUS WITH DIABETIC AUTONOMIC NEUROPATHY, WITH LONG-TERM CURRENT USE OF INSULIN (HCC): ICD-10-CM

## 2018-09-10 DIAGNOSIS — Z79.4 TYPE 2 DIABETES MELLITUS WITH DIABETIC AUTONOMIC NEUROPATHY, WITH LONG-TERM CURRENT USE OF INSULIN (HCC): ICD-10-CM

## 2018-09-10 DIAGNOSIS — I10 ESSENTIAL HYPERTENSION: Primary | ICD-10-CM

## 2018-09-10 PROCEDURE — G0009 ADMIN PNEUMOCOCCAL VACCINE: HCPCS | Performed by: FAMILY MEDICINE

## 2018-09-10 PROCEDURE — 90670 PCV13 VACCINE IM: CPT | Performed by: FAMILY MEDICINE

## 2018-09-10 PROCEDURE — 99214 OFFICE O/P EST MOD 30 MIN: CPT | Performed by: FAMILY MEDICINE

## 2018-09-10 RX ORDER — AMLODIPINE BESYLATE 5 MG/1
TABLET ORAL
Qty: 30 TABLET | Refills: 1 | Status: SHIPPED | OUTPATIENT
Start: 2018-09-10 | End: 2018-11-11 | Stop reason: SDUPTHER

## 2018-09-10 RX ORDER — HYDROCODONE BITARTRATE AND ACETAMINOPHEN 7.5; 325 MG/1; MG/1
1 TABLET ORAL EVERY 6 HOURS PRN
Qty: 120 TABLET | Refills: 0 | Status: SHIPPED | OUTPATIENT
Start: 2018-09-10 | End: 2018-10-02 | Stop reason: SDUPTHER

## 2018-09-10 NOTE — PROGRESS NOTES
"Kelby is a 68 y.o. year old male    Chief Complaint   Patient presents with   • Follow-up     medication        History of Present Illness   HPI   1.  Follow-up hypertension, no chest pain, shortness breath, syncope or near-syncope.  2.  Follow-up type 2 diabetes, no episodes of hypoglycemia.  Not really checking his blood sugars at home.  3.  Patient continues to see pain management for his lumbar radiculitis, was instructed to be sure and get blood work due to medications.  I have reviewed the patient's medical, family, and social history in detail and updated the computerized patient record.    Review of Systems   Constitutional: Negative.    HENT: Negative.    Respiratory: Negative.    Cardiovascular: Negative.    Gastrointestinal: Negative.    Genitourinary: Negative.        /68 (BP Location: Left arm, Patient Position: Sitting, Cuff Size: Large Adult)   Pulse 67   Temp 98.4 °F (36.9 °C) (Oral)   Ht 170 cm (66.93\")   Wt 94.1 kg (207 lb 6.4 oz)   SpO2 95%   BMI 32.55 kg/m²      Physical Exam   Constitutional: He is oriented to person, place, and time. He appears well-developed and well-nourished.   HENT:   Head: Normocephalic and atraumatic.   Eyes: Pupils are equal, round, and reactive to light. Conjunctivae and EOM are normal.   Cardiovascular: Normal rate, regular rhythm and normal heart sounds.    No peripheral edema   Pulmonary/Chest: Effort normal and breath sounds normal.   Neurological: He is alert and oriented to person, place, and time.   Skin: Skin is warm and dry.   Psychiatric: He has a normal mood and affect. His behavior is normal.   Vitals reviewed.       Diagnoses and all orders for this visit:    Essential hypertension  -     Hemoglobin A1c  -     Microalbumin / Creatinine Urine Ratio - Urine, Clean Catch  -     Comprehensive Metabolic Panel  -     CBC & Differential  -     TSH  -     PSA Screen    Type 2 diabetes mellitus with diabetic autonomic neuropathy, with long-term current " use of insulin (CMS/HCC)  -     Hemoglobin A1c  -     Microalbumin / Creatinine Urine Ratio - Urine, Clean Catch  -     Comprehensive Metabolic Panel  -     CBC & Differential  -     TSH  -     PSA Screen    Neuritis or radiculitis due to rupture of lumbar intervertebral disc  -     Hemoglobin A1c  -     Microalbumin / Creatinine Urine Ratio - Urine, Clean Catch  -     Comprehensive Metabolic Panel  -     CBC & Differential  -     TSH  -     PSA Screen    Screening for prostate cancer  -     Hemoglobin A1c  -     Microalbumin / Creatinine Urine Ratio - Urine, Clean Catch  -     Comprehensive Metabolic Panel  -     CBC & Differential  -     TSH  -     PSA Screen    Immunization due  -     Pneumococcal Conjugate Vaccine 13-[facility]             EMR Dragon/Transcription disclaimer:    Much of this encounter note is an electronic transcription/translation of spoken language to printed text.  The electronic translation of spoken language may permit erroneous, or at times, nonsensical words or phrases to be inadvertently transcribed.  Although I have reviewed the note for such errors some may still exist.

## 2018-09-11 LAB
ALBUMIN SERPL-MCNC: 4.3 G/DL (ref 3.5–5.2)
ALBUMIN/CREAT UR: 115.5 MG/G CREAT (ref 0–30)
ALBUMIN/GLOB SERPL: 1.4 G/DL
ALP SERPL-CCNC: 71 U/L (ref 39–117)
ALT SERPL-CCNC: 19 U/L (ref 1–41)
AST SERPL-CCNC: 16 U/L (ref 1–40)
BASOPHILS # BLD AUTO: 0.02 10*3/MM3 (ref 0–0.2)
BASOPHILS NFR BLD AUTO: 0.3 % (ref 0–1.5)
BILIRUB SERPL-MCNC: 0.3 MG/DL (ref 0.1–1.2)
BUN SERPL-MCNC: 12 MG/DL (ref 8–23)
BUN/CREAT SERPL: 18.5 (ref 7–25)
CALCIUM SERPL-MCNC: 9.5 MG/DL (ref 8.6–10.5)
CHLORIDE SERPL-SCNC: 96 MMOL/L (ref 98–107)
CO2 SERPL-SCNC: 28.4 MMOL/L (ref 22–29)
CREAT SERPL-MCNC: 0.65 MG/DL (ref 0.76–1.27)
CREAT UR-MCNC: 49.1 MG/DL
EOSINOPHIL # BLD AUTO: 0.17 10*3/MM3 (ref 0–0.7)
EOSINOPHIL NFR BLD AUTO: 2.6 % (ref 0.3–6.2)
ERYTHROCYTE [DISTWIDTH] IN BLOOD BY AUTOMATED COUNT: 12.5 % (ref 11.5–14.5)
GLOBULIN SER CALC-MCNC: 3 GM/DL
GLUCOSE SERPL-MCNC: 185 MG/DL (ref 65–99)
HBA1C MFR BLD: 9.5 % (ref 4.8–5.6)
HCT VFR BLD AUTO: 42.8 % (ref 40.4–52.2)
HGB BLD-MCNC: 14.3 G/DL (ref 13.7–17.6)
IMM GRANULOCYTES # BLD: 0.02 10*3/MM3 (ref 0–0.03)
IMM GRANULOCYTES NFR BLD: 0.3 % (ref 0–0.5)
LYMPHOCYTES # BLD AUTO: 1.29 10*3/MM3 (ref 0.9–4.8)
LYMPHOCYTES NFR BLD AUTO: 19.4 % (ref 19.6–45.3)
MCH RBC QN AUTO: 31.5 PG (ref 27–32.7)
MCHC RBC AUTO-ENTMCNC: 33.4 G/DL (ref 32.6–36.4)
MCV RBC AUTO: 94.3 FL (ref 79.8–96.2)
MICROALBUMIN UR-MCNC: 56.7 UG/ML
MONOCYTES # BLD AUTO: 0.54 10*3/MM3 (ref 0.2–1.2)
MONOCYTES NFR BLD AUTO: 8.1 % (ref 5–12)
NEUTROPHILS # BLD AUTO: 4.62 10*3/MM3 (ref 1.9–8.1)
NEUTROPHILS NFR BLD AUTO: 69.6 % (ref 42.7–76)
PLATELET # BLD AUTO: 209 10*3/MM3 (ref 140–500)
POTASSIUM SERPL-SCNC: 5.4 MMOL/L (ref 3.5–5.2)
PROT SERPL-MCNC: 7.3 G/DL (ref 6–8.5)
PSA SERPL-MCNC: 0.32 NG/ML (ref 0–4)
RBC # BLD AUTO: 4.54 10*6/MM3 (ref 4.6–6)
SODIUM SERPL-SCNC: 138 MMOL/L (ref 136–145)
TSH SERPL DL<=0.005 MIU/L-ACNC: 1.3 MIU/ML (ref 0.27–4.2)
WBC # BLD AUTO: 6.64 10*3/MM3 (ref 4.5–10.7)

## 2018-09-13 ENCOUNTER — TELEPHONE (OUTPATIENT)
Dept: SPORTS MEDICINE | Facility: CLINIC | Age: 68
End: 2018-09-13

## 2018-10-02 ENCOUNTER — OFFICE VISIT (OUTPATIENT)
Dept: PAIN MEDICINE | Facility: CLINIC | Age: 68
End: 2018-10-02

## 2018-10-02 VITALS
OXYGEN SATURATION: 98 % | RESPIRATION RATE: 15 BRPM | TEMPERATURE: 98.2 F | SYSTOLIC BLOOD PRESSURE: 177 MMHG | BODY MASS INDEX: 32.49 KG/M2 | HEART RATE: 65 BPM | WEIGHT: 207 LBS | DIASTOLIC BLOOD PRESSURE: 86 MMHG | HEIGHT: 67 IN

## 2018-10-02 DIAGNOSIS — G89.29 OTHER CHRONIC PAIN: Primary | ICD-10-CM

## 2018-10-02 DIAGNOSIS — Z79.899 ENCOUNTER FOR LONG-TERM (CURRENT) USE OF HIGH-RISK MEDICATION: ICD-10-CM

## 2018-10-02 DIAGNOSIS — G62.9 POLYNEUROPATHY: ICD-10-CM

## 2018-10-02 PROCEDURE — 99213 OFFICE O/P EST LOW 20 MIN: CPT | Performed by: NURSE PRACTITIONER

## 2018-10-02 RX ORDER — HYDROCODONE BITARTRATE AND ACETAMINOPHEN 7.5; 325 MG/1; MG/1
1 TABLET ORAL EVERY 6 HOURS PRN
Qty: 120 TABLET | Refills: 0 | Status: SHIPPED | OUTPATIENT
Start: 2018-10-02 | End: 2018-11-05 | Stop reason: SDUPTHER

## 2018-10-02 NOTE — PROGRESS NOTES
"CHIEF COMPLAINT  F/U leg and arm pain. Pt states unchanged since last visit.    Subjective   Kelby Peters is a 68 y.o. male  who presents to the office for follow-up.He has a history of chronic extremity pain due to neuropathy. Reports pain is unchanged since last office visit. Wife had hip replacement last week.    Complains of pain in his hands and feet. Today his pain is 5/10VAS. Describes the pain as nearly continuous but unchanged. Can have increased pain at night in his arms. \"It feels like it's on fire.\" Continues with Hydrocodone 7.5/325 4/day and gabapentin 800 mg 4/day(1 in morning, 1 at lunch and 2 at night). Also continues with Keppra 500 mg BID. Denies any side effects. The regimen helps decrease his pain by 50%. Notes improvement in his function and activity.  Working 3-4 days/week at [employer].   ADL's by self.   Recently saw Dr. Dougherty for \"check-up.\" \"Labs are good.' But is going to be referred to endocrinologist.   Failed Cymbalta.  Failed Horizant($$$) and Gralise ($$$).  History of IDDM.  Extremity Pain    The pain is present in the left hand, right hand, left lower leg and right lower leg. This is a chronic problem. The current episode started more than 1 year ago. The problem occurs constantly. Progression since onset: unchanged since last office visit. The pain is at a severity of 5/10. The pain is moderate. Associated symptoms include numbness. Pertinent negatives include no fever. He has tried oral narcotics and rest (cymbalta--no help) for the symptoms. The treatment provided moderate relief. His past medical history is significant for diabetes.      PEG Assessment   What number best describes your pain on average in the past week?7  What number best describes how, during the past week, pain has interfered with your enjoyment of life?7  What number best describes how, during the past week, pain has interfered with your general activity?  7    The following portions of the patient's history " "were reviewed and updated as appropriate: allergies, current medications, past family history, past medical history, past social history, past surgical history and problem list.    Review of Systems   Constitutional: Negative for chills and fever.   Respiratory: Negative for shortness of breath.    Cardiovascular: Negative for chest pain.   Gastrointestinal: Negative for constipation, diarrhea, nausea and vomiting.   Genitourinary: Negative for difficulty urinating, dysuria and enuresis.   Musculoskeletal:        Arm and leg pain   Neurological: Positive for numbness. Negative for dizziness, weakness, light-headedness and headaches.   Psychiatric/Behavioral: Negative for confusion, hallucinations, self-injury and sleep disturbance. The patient is not nervous/anxious.        Vitals:    10/02/18 0801   BP: 177/86   Pulse: 65   Resp: 15   Temp: 98.2 °F (36.8 °C)   SpO2: 98%   Weight: 93.9 kg (207 lb)   Height: 170 cm (66.93\")   PainSc:   5   PainLoc: Leg  Comment: and arm     Objective   Physical Exam   Constitutional: He is oriented to person, place, and time. Vital signs are normal. He appears well-developed and well-nourished. He is cooperative.   HENT:   Head: Normocephalic and atraumatic.   Nose: Nose normal.   Eyes: Conjunctivae and lids are normal.   Cardiovascular: Normal rate.    Pulmonary/Chest: Effort normal.   Abdominal: Normal appearance.   Musculoskeletal:   Dysthesia of BUE and BLE.    Neurological: He is alert and oriented to person, place, and time. Gait normal.   Reflex Scores:       Bicep reflexes are 1+ on the right side and 1+ on the left side.       Brachioradialis reflexes are 1+ on the right side and 1+ on the left side.       Patellar reflexes are 1+ on the right side and 1+ on the left side.  Skin: Skin is warm, dry and intact.   Psychiatric: He has a normal mood and affect. His speech is normal and behavior is normal. Judgment and thought content normal. Cognition and memory are normal. "   Nursing note and vitals reviewed.      Assessment/Plan   Kelby was seen today for extremity pain.    Diagnoses and all orders for this visit:    Other chronic pain    Polyneuropathy    Encounter for long-term (current) use of high-risk medication    Other orders  -     HYDROcodone-acetaminophen (NORCO) 7.5-325 MG per tablet; Take 1 tablet by mouth Every 6 (Six) Hours As Needed for Moderate Pain .      --- The urine drug screen confirmation from 4-9-18 has been reviewed and the result is APPROPRIATE based on patient history and HOME report  --- Refill Hydrocodone DNF until 10-9-18. Patient appears stable with current regimen. No adverse effects. Regarding continuation of opioids, there is no evidence of aberrant behavior or any red flags.  The patient continues with appropriate response to opioid therapy. ADL's remain intact by self.   --- Follow-up 2 months or sooner if needed.       HOME REPORT    As part of the patient's treatment plan, I am prescribing controlled substances. The patient has been made aware of appropriate use of such medications, including potential risk of somnolence, limited ability to drive and/or work safely, and the potential for dependence or overdose. It has also bee made clear that these medications are for use by this patient only, without concomitant use of alcohol or other substances unless prescribed.     Patient has completed prescribing agreement detailing terms of continued prescribing of controlled substances, including monitoring HOME reports, urine drug screening, and pill counts if necessary. The patient is aware that inappropriate use will results in cessation of prescribing such medications.    HOME report has been reviewed and scanned into the patient's chart.    As the clinician, I personally reviewed the HOME from 10-1-18 while the patient was in the office today.    History and physical exam exhibit continued safe and appropriate use of controlled  substances.      EMR Dragon/Transcription disclaimer:   Much of this encounter note is an electronic transcription/translation of spoken language to printed text. The electronic translation of spoken language may permit erroneous, or at times, nonsensical words or phrases to be inadvertently transcribed; Although I have reviewed the note for such errors, some may still exist.

## 2018-11-02 RX ORDER — LEVETIRACETAM 500 MG/1
TABLET ORAL
Qty: 60 TABLET | Refills: 7 | Status: SHIPPED | OUTPATIENT
Start: 2018-11-02 | End: 2018-11-05

## 2018-11-05 RX ORDER — HYDROCODONE BITARTRATE AND ACETAMINOPHEN 7.5; 325 MG/1; MG/1
1 TABLET ORAL EVERY 6 HOURS PRN
Qty: 120 TABLET | Refills: 0 | Status: SHIPPED | OUTPATIENT
Start: 2018-11-05 | End: 2018-12-03 | Stop reason: SDUPTHER

## 2018-11-05 RX ORDER — LEVETIRACETAM 500 MG/1
TABLET ORAL
Qty: 60 TABLET | Refills: 7 | Status: SHIPPED | OUTPATIENT
Start: 2018-11-05 | End: 2019-07-06 | Stop reason: SDUPTHER

## 2018-11-05 NOTE — TELEPHONE ENCOUNTER
Medication Refill Request    Date of phone call: 18    Medication being requested: hydro/apap 7.5-325 mg si tab q 6 hrs  Qty: 120    Date of last visit: 10/2/18    Date of last refill: 10/9/18    HOME up to date?: yes    Next Follow up?: 12/3/18    Any new pertinent information? (i.e, new medication allergies, new use of medications, change in patient's health or condition, non-compliance or inconsistency with prescribing agreement?):

## 2018-11-06 ENCOUNTER — OFFICE VISIT (OUTPATIENT)
Dept: SPORTS MEDICINE | Facility: CLINIC | Age: 68
End: 2018-11-06

## 2018-11-06 VITALS
HEART RATE: 79 BPM | BODY MASS INDEX: 30.92 KG/M2 | WEIGHT: 197 LBS | DIASTOLIC BLOOD PRESSURE: 58 MMHG | SYSTOLIC BLOOD PRESSURE: 124 MMHG | OXYGEN SATURATION: 91 % | TEMPERATURE: 99.2 F | HEIGHT: 67 IN

## 2018-11-06 DIAGNOSIS — R30.0 DYSURIA: Primary | ICD-10-CM

## 2018-11-06 DIAGNOSIS — N41.0 ACUTE PROSTATITIS: ICD-10-CM

## 2018-11-06 DIAGNOSIS — R11.0 NAUSEA: ICD-10-CM

## 2018-11-06 PROCEDURE — 99214 OFFICE O/P EST MOD 30 MIN: CPT | Performed by: FAMILY MEDICINE

## 2018-11-06 RX ORDER — ONDANSETRON 8 MG/1
8 TABLET, ORALLY DISINTEGRATING ORAL EVERY 8 HOURS PRN
Qty: 20 TABLET | Refills: 0 | Status: SHIPPED | OUTPATIENT
Start: 2018-11-06 | End: 2019-02-11

## 2018-11-06 RX ORDER — CIPROFLOXACIN 500 MG/1
500 TABLET, FILM COATED ORAL EVERY 12 HOURS SCHEDULED
Qty: 40 TABLET | Refills: 0 | Status: SHIPPED | OUTPATIENT
Start: 2018-11-06 | End: 2018-12-07

## 2018-11-06 NOTE — PROGRESS NOTES
"Kelby is a 68 y.o. year old male    Chief Complaint   Patient presents with   • Fever   • Abdominal Pain   • Headache       History of Present Illness   HPI   4 days ago began with nausea with emesis after eating pizza, no emesis since then but nauseated still. Has also had loose stool, no melena or BRBPR. Has noted over past 2-3 days dysuria and decrease flow. Has had fever from 99 to 101.8. Has missed work.  Has had a generalized headache as well. Pressure in the perineum as well.      I have reviewed the patient's medical, family, and social history in detail and updated the computerized patient record.    Review of Systems   Constitutional: Positive for fever. Negative for chills.   HENT: Negative.    Respiratory: Negative.    Cardiovascular: Negative.    Gastrointestinal: Negative.    Genitourinary: Positive for dysuria and frequency. Negative for discharge, penile pain and penile swelling.   Neurological: Negative.    Hematological: Negative.        /58 (BP Location: Right arm, Patient Position: Sitting, Cuff Size: Adult)   Pulse 79   Temp 99.2 °F (37.3 °C) (Oral)   Ht 170 cm (66.93\")   Wt 89.4 kg (197 lb)   SpO2 91%   BMI 30.92 kg/m²      Physical Exam   Constitutional: He is oriented to person, place, and time. He appears well-developed and well-nourished.   HENT:   Head: Normocephalic and atraumatic.   Eyes: Pupils are equal, round, and reactive to light. Conjunctivae and EOM are normal.   Cardiovascular: Normal rate, regular rhythm and normal heart sounds.    No peripheral edema   Pulmonary/Chest: Effort normal and breath sounds normal.   Abdominal: Soft. Bowel sounds are normal. He exhibits no distension. There is no tenderness. There is no guarding.   Neurological: He is alert and oriented to person, place, and time.   Skin: Skin is warm and dry.   Psychiatric: He has a normal mood and affect. His behavior is normal.   Vitals reviewed.       Diagnoses and all orders for this " visit:    Dysuria  -     CBC & Differential  -     Comprehensive Metabolic Panel  -     Amylase  -     Lipase  -     PSA Screen  -     UA / M With / Rflx Culture([facility] ONLY) - Urine, Clean Catch    Acute prostatitis  -     CBC & Differential  -     Comprehensive Metabolic Panel  -     Amylase  -     Lipase  -     PSA Screen  -     UA / M With / Rflx Culture([facility] ONLY) - Urine, Clean Catch  -     ciprofloxacin (CIPRO) 500 MG tablet; Take 1 tablet by mouth Every 12 (Twelve) Hours.    Nausea  -     CBC & Differential  -     Comprehensive Metabolic Panel  -     Amylase  -     Lipase  -     PSA Screen  -     UA / M With / Rflx Culture([facility] ONLY) - Urine, Clean Catch  -     ondansetron ODT (ZOFRAN ODT) 8 MG disintegrating tablet; Take 1 tablet by mouth Every 8 (Eight) Hours As Needed for Nausea or Vomiting.       Of course is possible that he has a viral gastroenteritis.  He certainly appears to have some symptoms of acute prostate infection.  We'll check the above labs and start Cipro, if PSA is elevated then we'll treat for 3 weeks.  He'll return the office in 3 days it is not seeing much improvement.      EMR Dragon/Transcription disclaimer:    Much of this encounter note is an electronic transcription/translation of spoken language to printed text.  The electronic translation of spoken language may permit erroneous, or at times, nonsensical words or phrases to be inadvertently transcribed.  Although I have reviewed the note for such errors some may still exist.

## 2018-11-07 LAB
ALBUMIN SERPL-MCNC: 4.1 G/DL (ref 3.5–5.2)
ALBUMIN/GLOB SERPL: 1.3 G/DL
ALP SERPL-CCNC: 78 U/L (ref 39–117)
ALT SERPL-CCNC: 14 U/L (ref 1–41)
AMYLASE SERPL-CCNC: 21 U/L (ref 28–100)
APPEARANCE UR: CLEAR
AST SERPL-CCNC: 14 U/L (ref 1–40)
BACTERIA #/AREA URNS HPF: ABNORMAL /HPF
BASOPHILS # BLD AUTO: 0.03 10*3/MM3 (ref 0–0.2)
BASOPHILS NFR BLD AUTO: 0.2 % (ref 0–1.5)
BILIRUB SERPL-MCNC: 0.3 MG/DL (ref 0.1–1.2)
BILIRUB UR QL STRIP: NEGATIVE
BUN SERPL-MCNC: 18 MG/DL (ref 8–23)
BUN/CREAT SERPL: 21.4 (ref 7–25)
CALCIUM SERPL-MCNC: 9.4 MG/DL (ref 8.6–10.5)
CASTS URNS MICRO: ABNORMAL
CASTS URNS QL MICRO: PRESENT /LPF
CHLORIDE SERPL-SCNC: 97 MMOL/L (ref 98–107)
CO2 SERPL-SCNC: 28.7 MMOL/L (ref 22–29)
COLOR UR: YELLOW
CREAT SERPL-MCNC: 0.84 MG/DL (ref 0.76–1.27)
CRYSTALS URNS MICRO: ABNORMAL
EOSINOPHIL # BLD AUTO: 0.04 10*3/MM3 (ref 0–0.7)
EOSINOPHIL NFR BLD AUTO: 0.3 % (ref 0.3–6.2)
EPI CELLS #/AREA URNS HPF: ABNORMAL /HPF
ERYTHROCYTE [DISTWIDTH] IN BLOOD BY AUTOMATED COUNT: 12.4 % (ref 11.5–14.5)
GLOBULIN SER CALC-MCNC: 3.2 GM/DL
GLUCOSE SERPL-MCNC: 149 MG/DL (ref 65–99)
GLUCOSE UR QL: ABNORMAL
HCT VFR BLD AUTO: 43.6 % (ref 40.4–52.2)
HGB BLD-MCNC: 13.5 G/DL (ref 13.7–17.6)
HGB UR QL STRIP: NEGATIVE
IMM GRANULOCYTES # BLD: 0.03 10*3/MM3 (ref 0–0.03)
IMM GRANULOCYTES NFR BLD: 0.2 % (ref 0–0.5)
KETONES UR QL STRIP: ABNORMAL
LEUKOCYTE ESTERASE UR QL STRIP: NEGATIVE
LIPASE SERPL-CCNC: 20 U/L (ref 13–60)
LYMPHOCYTES # BLD AUTO: 1.52 10*3/MM3 (ref 0.9–4.8)
LYMPHOCYTES NFR BLD AUTO: 12.3 % (ref 19.6–45.3)
MCH RBC QN AUTO: 30.4 PG (ref 27–32.7)
MCHC RBC AUTO-ENTMCNC: 31 G/DL (ref 32.6–36.4)
MCV RBC AUTO: 98.2 FL (ref 79.8–96.2)
MICRO URNS: ABNORMAL
MONOCYTES # BLD AUTO: 1.11 10*3/MM3 (ref 0.2–1.2)
MONOCYTES NFR BLD AUTO: 9 % (ref 5–12)
MUCOUS THREADS URNS QL MICRO: PRESENT /HPF
NEUTROPHILS # BLD AUTO: 9.59 10*3/MM3 (ref 1.9–8.1)
NEUTROPHILS NFR BLD AUTO: 78 % (ref 42.7–76)
NITRITE UR QL STRIP: NEGATIVE
PH UR STRIP: 5.5 [PH] (ref 5–7.5)
PLATELET # BLD AUTO: 307 10*3/MM3 (ref 140–500)
POTASSIUM SERPL-SCNC: 4.2 MMOL/L (ref 3.5–5.2)
PROT SERPL-MCNC: 7.3 G/DL (ref 6–8.5)
PROT UR QL STRIP: ABNORMAL
PSA SERPL-MCNC: 34.74 NG/ML (ref 0–4)
RBC # BLD AUTO: 4.44 10*6/MM3 (ref 4.6–6)
RBC #/AREA URNS HPF: ABNORMAL /HPF
SODIUM SERPL-SCNC: 141 MMOL/L (ref 136–145)
SP GR UR: >=1.03 (ref 1–1.03)
UNIDENT CRYS URNS QL MICRO: PRESENT /LPF
URINALYSIS REFLEX: ABNORMAL
UROBILINOGEN UR STRIP-MCNC: 1 MG/DL (ref 0.2–1)
WBC # BLD AUTO: 12.32 10*3/MM3 (ref 4.5–10.7)
WBC #/AREA URNS HPF: ABNORMAL /HPF

## 2018-11-12 RX ORDER — AMLODIPINE BESYLATE 5 MG/1
TABLET ORAL
Qty: 30 TABLET | Refills: 0 | Status: SHIPPED | OUTPATIENT
Start: 2018-11-12 | End: 2018-11-30 | Stop reason: SDUPTHER

## 2018-11-14 RX ORDER — AMLODIPINE BESYLATE 5 MG/1
TABLET ORAL
Qty: 30 TABLET | Refills: 0 | Status: SHIPPED | OUTPATIENT
Start: 2018-11-14 | End: 2018-12-07 | Stop reason: SDUPTHER

## 2018-11-30 RX ORDER — AMLODIPINE BESYLATE 5 MG/1
TABLET ORAL
Qty: 30 TABLET | Refills: 0 | Status: SHIPPED | OUTPATIENT
Start: 2018-11-30 | End: 2018-12-07

## 2018-12-03 ENCOUNTER — OFFICE VISIT (OUTPATIENT)
Dept: PAIN MEDICINE | Facility: CLINIC | Age: 68
End: 2018-12-03

## 2018-12-03 ENCOUNTER — RESULTS ENCOUNTER (OUTPATIENT)
Dept: PAIN MEDICINE | Facility: CLINIC | Age: 68
End: 2018-12-03

## 2018-12-03 VITALS
OXYGEN SATURATION: 98 % | BODY MASS INDEX: 31.55 KG/M2 | SYSTOLIC BLOOD PRESSURE: 181 MMHG | WEIGHT: 201 LBS | RESPIRATION RATE: 18 BRPM | DIASTOLIC BLOOD PRESSURE: 89 MMHG | HEIGHT: 67 IN | HEART RATE: 62 BPM | TEMPERATURE: 98.3 F

## 2018-12-03 DIAGNOSIS — G62.9 POLYNEUROPATHY: ICD-10-CM

## 2018-12-03 DIAGNOSIS — G89.29 OTHER CHRONIC PAIN: ICD-10-CM

## 2018-12-03 DIAGNOSIS — G89.29 OTHER CHRONIC PAIN: Primary | ICD-10-CM

## 2018-12-03 DIAGNOSIS — Z79.899 ENCOUNTER FOR LONG-TERM (CURRENT) USE OF HIGH-RISK MEDICATION: ICD-10-CM

## 2018-12-03 PROCEDURE — 99213 OFFICE O/P EST LOW 20 MIN: CPT | Performed by: NURSE PRACTITIONER

## 2018-12-03 PROCEDURE — 80305 DRUG TEST PRSMV DIR OPT OBS: CPT | Performed by: NURSE PRACTITIONER

## 2018-12-03 RX ORDER — HYDROCODONE BITARTRATE AND ACETAMINOPHEN 7.5; 325 MG/1; MG/1
1 TABLET ORAL EVERY 6 HOURS PRN
Qty: 120 TABLET | Refills: 0 | Status: SHIPPED | OUTPATIENT
Start: 2018-12-03 | End: 2019-01-07 | Stop reason: SDUPTHER

## 2018-12-03 NOTE — PROGRESS NOTES
CHIEF COMPLAINT  Arm and leg pain have decreased since last visit.    Subjective   Kelby Peters is a 68 y.o. male  who presents to the office for follow-up.He has a history of chronic arm and leg pain due to neuropathy. Reports his pain is improved since last office visit. Feeling better. Had recent diagnosis acute prostatitis. Finished Cipro.     Complains of pain in his arms and legs. Today his pain is 6/10VAS. Describes his pain as continuous burning. Continues with Hydrocodone 7.5/325 4/day and gabapentin 800 mg 4/day(1 in morning, 1 at lunch and 2 at night). Also continues with Keppra 500 mg BID. denies any side effects from the regimen. The regimen helps decrease his pain by 50%. Notes improvement in his function and activity.  Working 3-4 days/week at [employer].   ADL's by self.     Failed Cymbalta.  Failed Horizant($$$) and Gralise ($$$).  History of IDDM.    Extremity Pain    The pain is present in the left hand, right hand, left lower leg and right lower leg. This is a chronic problem. The current episode started more than 1 year ago. The problem occurs constantly. Progression since onset: decreased since last office visit. The pain is at a severity of 6/10. The pain is moderate. Pertinent negatives include no fever or numbness. He has tried oral narcotics and rest (cymbalta--no help) for the symptoms. The treatment provided moderate relief. His past medical history is significant for diabetes.      PEG Assessment   What number best describes your pain on average in the past week?7  What number best describes how, during the past week, pain has interfered with your enjoyment of life?7  What number best describes how, during the past week, pain has interfered with your general activity?  6    The following portions of the patient's history were reviewed and updated as appropriate: allergies, current medications, past family history, past medical history, past social history, past surgical history and problem  "list.    Review of Systems   Constitutional: Negative for chills and fever.   Respiratory: Negative for shortness of breath.    Cardiovascular: Negative for chest pain.   Gastrointestinal: Negative for constipation, diarrhea, nausea and vomiting.   Genitourinary: Negative for difficulty urinating, dysuria and flank pain.   Musculoskeletal:        Arm and leg pain   Neurological: Negative for dizziness, weakness, light-headedness, numbness and headaches.   Psychiatric/Behavioral: Negative for confusion, hallucinations, self-injury, sleep disturbance and suicidal ideas. The patient is not nervous/anxious.        Vitals:    12/03/18 0816   BP: (!) 181/89   BP Location: Left arm   Patient Position: Sitting   Cuff Size: Adult   Pulse: 62   Resp: 18   Temp: 98.3 °F (36.8 °C)   TempSrc: Oral   SpO2: 98%   Weight: 91.2 kg (201 lb)   Height: 170 cm (66.93\")   PainSc:   6   PainLoc: Arm     Objective   Physical Exam   Constitutional: He is oriented to person, place, and time. Vital signs are normal. He appears well-developed and well-nourished. He is cooperative.   HENT:   Head: Normocephalic and atraumatic.   Nose: Nose normal.   Eyes: Conjunctivae and lids are normal.   Cardiovascular: Normal rate.   Pulmonary/Chest: Effort normal.   Abdominal: Normal appearance.   Musculoskeletal:   Dysthesia of BUE and BLE.    Neurological: He is alert and oriented to person, place, and time. Gait normal.   Skin: Skin is warm, dry and intact.   Psychiatric: He has a normal mood and affect. His speech is normal and behavior is normal. Judgment and thought content normal. Cognition and memory are normal.   Nursing note and vitals reviewed.    Assessment/Plan   Kelby was seen today for extremity pain.    Diagnoses and all orders for this visit:    Other chronic pain    Polyneuropathy    Encounter for long-term (current) use of high-risk medication    Other orders  -     HYDROcodone-acetaminophen (NORCO) 7.5-325 MG per tablet; Take 1 tablet " by mouth Every 6 (Six) Hours As Needed for Moderate Pain .      --- Routine UDS in office today as part of monitoring requirements for controlled substances.  The specimen was viewed and the immunoassay result reviewed and is +OPI(APPROPRIATE).  This specimen will be sent to [facility] laboratory for confirmation.   Last dose of Hydrocodone was this morning.   --- Refill Hydrocodone DNF until 12-7-18. Patient appears stable with current regimen. No adverse effects. Regarding continuation of opioids, there is no evidence of aberrant behavior or any red flags.  The patient continues with appropriate response to opioid therapy. ADL's remain intact by self.   ---He did ask about increasing BP medication.  Told to discuss with PCP.  --- Follow-up 2 months or sooner if needed.       HOME REPORT    As part of the patient's treatment plan, I am prescribing controlled substances. The patient has been made aware of appropriate use of such medications, including potential risk of somnolence, limited ability to drive and/or work safely, and the potential for dependence or overdose. It has also bee made clear that these medications are for use by this patient only, without concomitant use of alcohol or other substances unless prescribed.     Patient has completed prescribing agreement detailing terms of continued prescribing of controlled substances, including monitoring HOME reports, urine drug screening, and pill counts if necessary. The patient is aware that inappropriate use will results in cessation of prescribing such medications.    HOME report has been reviewed and scanned into the patient's chart.    As the clinician, I personally reviewed the HOME from 11-29-18 while the patient was in the office today.    History and physical exam exhibit continued safe and appropriate use of controlled substances.      EMR Dragon/Transcription disclaimer:   Much of this encounter note is an electronic  transcription/translation of spoken language to printed text. The electronic translation of spoken language may permit erroneous, or at times, nonsensical words or phrases to be inadvertently transcribed; Although I have reviewed the note for such errors, some may still exist.

## 2018-12-07 ENCOUNTER — OFFICE VISIT (OUTPATIENT)
Dept: SPORTS MEDICINE | Facility: CLINIC | Age: 68
End: 2018-12-07

## 2018-12-07 VITALS
WEIGHT: 202 LBS | OXYGEN SATURATION: 96 % | DIASTOLIC BLOOD PRESSURE: 82 MMHG | TEMPERATURE: 98.1 F | SYSTOLIC BLOOD PRESSURE: 164 MMHG | HEART RATE: 73 BPM | BODY MASS INDEX: 31.71 KG/M2 | HEIGHT: 67 IN

## 2018-12-07 DIAGNOSIS — I10 ESSENTIAL HYPERTENSION: ICD-10-CM

## 2018-12-07 DIAGNOSIS — N41.0 ACUTE PROSTATITIS: Primary | ICD-10-CM

## 2018-12-07 LAB — PSA SERPL-MCNC: 1.79 NG/ML (ref 0–4)

## 2018-12-07 PROCEDURE — 99213 OFFICE O/P EST LOW 20 MIN: CPT | Performed by: FAMILY MEDICINE

## 2018-12-07 RX ORDER — AMLODIPINE BESYLATE 10 MG/1
10 TABLET ORAL DAILY
Qty: 90 TABLET | Refills: 3 | Status: SHIPPED | OUTPATIENT
Start: 2018-12-07 | End: 2020-02-17

## 2018-12-07 NOTE — PROGRESS NOTES
"Kelby is a 68 y.o. year old male    Chief Complaint   Patient presents with   • Follow-up       History of Present Illness   HPI   1.  Patient is feeling much better since treatment for prostatitis.  All symptoms have resolved.  Was treated for 3 weeks with Cipro.  2.  Patient has noted systolic hypertension at home with readings in the 160-180 range.  No chest pain, shortness of breath, syncope or near-syncope.  I have reviewed the patient's medical, family, and social history in detail and updated the computerized patient record.    Review of Systems   Constitutional: Negative.    HENT: Negative.    Respiratory: Negative.    Cardiovascular: Negative.    Gastrointestinal: Negative.    Genitourinary: Negative.        /82 (BP Location: Right arm, Patient Position: Sitting, Cuff Size: Adult)   Pulse 73   Temp 98.1 °F (36.7 °C) (Oral)   Ht 170 cm (66.93\")   Wt 91.6 kg (202 lb)   SpO2 96%   BMI 31.70 kg/m²      Physical Exam   Constitutional: He is oriented to person, place, and time. He appears well-developed and well-nourished.   HENT:   Head: Normocephalic and atraumatic.   Eyes: Conjunctivae and EOM are normal. Pupils are equal, round, and reactive to light.   Cardiovascular: Normal rate, regular rhythm and normal heart sounds.   No peripheral edema   Pulmonary/Chest: Effort normal and breath sounds normal.   Neurological: He is alert and oriented to person, place, and time.   Skin: Skin is warm and dry.   Psychiatric: He has a normal mood and affect. His behavior is normal.   Vitals reviewed.       Diagnoses and all orders for this visit:    Acute prostatitis  -     PSA Screen    Essential hypertension  -     amLODIPine (NORVASC) 10 MG tablet; Take 1 tablet by mouth Daily.         If PSA is still even mildly elevated then will treat for another 3 weeks and recheck PSA after treatment.     Increase amlodipine to 10 mg 1 by mouth daily follow-up blood pressure 1 month.  EMR Dragon/Transcription " disclaimer:    Much of this encounter note is an electronic transcription/translation of spoken language to printed text.  The electronic translation of spoken language may permit erroneous, or at times, nonsensical words or phrases to be inadvertently transcribed.  Although I have reviewed the note for such errors some may still exist.

## 2018-12-10 DIAGNOSIS — N41.0 ACUTE PROSTATITIS: ICD-10-CM

## 2018-12-10 DIAGNOSIS — R97.20 ELEVATED PSA: Primary | ICD-10-CM

## 2018-12-10 RX ORDER — CIPROFLOXACIN 500 MG/1
500 TABLET, FILM COATED ORAL EVERY 12 HOURS SCHEDULED
Qty: 28 TABLET | Refills: 0 | Status: SHIPPED | OUTPATIENT
Start: 2018-12-10 | End: 2020-06-02

## 2018-12-17 RX ORDER — GABAPENTIN 800 MG/1
TABLET ORAL
Qty: 120 TABLET | Refills: 3 | Status: SHIPPED | OUTPATIENT
Start: 2018-12-17 | End: 2019-04-25 | Stop reason: SDUPTHER

## 2019-01-03 ENCOUNTER — RESULTS ENCOUNTER (OUTPATIENT)
Dept: SPORTS MEDICINE | Facility: CLINIC | Age: 69
End: 2019-01-03

## 2019-01-03 DIAGNOSIS — R97.20 ELEVATED PSA: ICD-10-CM

## 2019-01-07 NOTE — TELEPHONE ENCOUNTER
Medication Refill Request    Date of phone call: 19    Medication being requested: hydro/apap 7.5-325 mg si tab q 6hrs  Qty: 120    Date of last visit: 12/3/18    Date of last refill: 18    HOME up to date?: yes    Next Follow up?: 19    Any new pertinent information? (i.e, new medication allergies, new use of medications, change in patient's health or condition, non-compliance or inconsistency with prescribing agreement?):

## 2019-01-08 RX ORDER — HYDROCODONE BITARTRATE AND ACETAMINOPHEN 7.5; 325 MG/1; MG/1
1 TABLET ORAL EVERY 6 HOURS PRN
Qty: 120 TABLET | Refills: 0 | Status: SHIPPED | OUTPATIENT
Start: 2019-01-08 | End: 2019-02-11 | Stop reason: SDUPTHER

## 2019-01-11 ENCOUNTER — PRIOR AUTHORIZATION (OUTPATIENT)
Dept: PAIN MEDICINE | Facility: CLINIC | Age: 69
End: 2019-01-11

## 2019-01-11 ENCOUNTER — TELEPHONE (OUTPATIENT)
Dept: SPORTS MEDICINE | Facility: CLINIC | Age: 69
End: 2019-01-11

## 2019-01-11 LAB — PSA SERPL-MCNC: 0.62 NG/ML (ref 0–4)

## 2019-02-04 ENCOUNTER — TELEPHONE (OUTPATIENT)
Dept: SPORTS MEDICINE | Facility: CLINIC | Age: 69
End: 2019-02-04

## 2019-02-04 NOTE — TELEPHONE ENCOUNTER
Pt c/o recurring prostatitis sxs. Would like to know if you would put him back on abx or if he should make an appointment to be evaluated again. Please advise, thank you.

## 2019-02-05 DIAGNOSIS — N41.0 ACUTE PROSTATITIS: Primary | ICD-10-CM

## 2019-02-05 RX ORDER — DOXYCYCLINE 100 MG/1
100 TABLET ORAL 2 TIMES DAILY
Qty: 60 TABLET | Refills: 0 | Status: SHIPPED | OUTPATIENT
Start: 2019-02-05 | End: 2019-02-11

## 2019-02-05 NOTE — TELEPHONE ENCOUNTER
I will call in antibiotic but he should FU with a urologist if he has one or we can refer to one if he does not

## 2019-02-11 ENCOUNTER — OFFICE VISIT (OUTPATIENT)
Dept: PAIN MEDICINE | Facility: CLINIC | Age: 69
End: 2019-02-11

## 2019-02-11 VITALS
TEMPERATURE: 98.4 F | HEIGHT: 67 IN | OXYGEN SATURATION: 96 % | HEART RATE: 66 BPM | BODY MASS INDEX: 32.49 KG/M2 | DIASTOLIC BLOOD PRESSURE: 81 MMHG | RESPIRATION RATE: 16 BRPM | SYSTOLIC BLOOD PRESSURE: 139 MMHG | WEIGHT: 207 LBS

## 2019-02-11 DIAGNOSIS — G89.29 OTHER CHRONIC PAIN: Primary | ICD-10-CM

## 2019-02-11 DIAGNOSIS — G90.9 AUTONOMIC NEUROPATHY: ICD-10-CM

## 2019-02-11 DIAGNOSIS — M79.609 PAIN IN EXTREMITY, UNSPECIFIED EXTREMITY: ICD-10-CM

## 2019-02-11 DIAGNOSIS — E11.42 DIABETIC PERIPHERAL NEUROPATHY (HCC): ICD-10-CM

## 2019-02-11 DIAGNOSIS — Z79.899 ENCOUNTER FOR LONG-TERM (CURRENT) USE OF HIGH-RISK MEDICATION: ICD-10-CM

## 2019-02-11 PROCEDURE — 99213 OFFICE O/P EST LOW 20 MIN: CPT | Performed by: NURSE PRACTITIONER

## 2019-02-11 RX ORDER — HYDROCODONE BITARTRATE AND ACETAMINOPHEN 7.5; 325 MG/1; MG/1
1 TABLET ORAL EVERY 6 HOURS PRN
Qty: 120 TABLET | Refills: 0 | Status: SHIPPED | OUTPATIENT
Start: 2019-02-11 | End: 2019-03-14 | Stop reason: SDUPTHER

## 2019-02-11 NOTE — PATIENT INSTRUCTIONS
---  Education about Stellate Ganglion Blockade:    The stellate ganglion is part of the sympathetic nervous system that is located in your neck, on either side of your voice box. A stellate ganglion block is an injection of medication into these nerves that can help relieve pain in the head, neck, upper arm and upper chest. It also can help increase circulation and blood supply to the arm.    A stellate ganglion block may be right for you if you have nerve pain in the head, neck, upper arm or upper chest that does not respond to other treatment.  A stellate ganglion block is used to diagnose or treat circulation problems or nerve injuries, including but not limited to Complex Regional Pain Syndrome Types 1 or 2, also known as Reflex Sympathetic Dystrophy (RSD) or Causalgia, respectively, and also sometimes for Phantom Limb Pain or other nerve problems.      Stellate Ganglion Blockade:    First, you may be given an intravenous medication to relax you. Then, you’ll lie on your back on an x-ray table and your neck will be cleansed.  The doctor will insert a thin needle into your neck, near your voice box, and inject a local anesthetic. Then, with x-ray or ultrasound guidance, he or she will insert a second needle to the location on the anterior aspect of the spine, in the vicinity of the stellate ganglion,  and carefully inject an anesthetic medication.  Usually, the procedure takes less than 30 minutes, and you can go home the same day after being monitored in the recovery room.  How effective is a stellate ganglion block?  Some patients report pain relief immediately after the injection, but the pain may return a few hours later as the local anesthetic wears off. Other patients have longer term relief that outlasts the duration of the local anesthetic and helps them to reduce their medication use and increase their participation in physical therapy.  How long the pain relief lasts is different for each patient. Some  may be pain-free for days or weeks. Usually people need a series of injections to continue the pain relief. Sometimes it takes only two injections; sometimes it takes more than 10. The relief tends to last longer with each treatment.  What are the risks?  The risk of complications from a stellate ganglion block is very low. However, there could be bruising or soreness at the injection site. Serious complications, including infection, bleeding and nerve damage, are uncommon.  Side effects of the procedure may include:  Drooping eyelids   Red or “bloodshot” eyes   Tearing   Nasal stuffiness   Hoarse voice   Feeling of a “lump” in your throat   Difficulty swallowing   Warmth or tingling in your arm or hand  These effects will subside within a few hours.  What happens after the procedure?  Do not drive or do any rigorous activity for 24 hours after your stellate ganglion block. Take it easy. You can return to your normal activities the next day.  If you have difficulty swallowing or voice changes, then wait until this resolves before eating a meal.

## 2019-02-11 NOTE — PROGRESS NOTES
CHIEF COMPLAINT  Pt states his bilateral hands numbness and burning has increased since 12/3/18 office visit.  Extremity pain bilaterally is also notable but hands are the most concerning.    Subjective   Kelby Peters is a 68 y.o. male  who presents to the office for follow-up.He has a history of chronic peripheral neuropathy. Reports his pain is worse since last office visit, especially in his hands.     Complains of pain in hand, arms and legs. Today his pain is 7/10VAs.  Describes the pain as continuous burning. Pain increases with cold weather, use of extremities and prolonged activity; pain decreases with medication, rest. Continues with Hydrocodone 7.5/325 4/day and gabapentin 800 mg 4/day(1 in morning, 1 at lunch and 2 at night). Also continues with Keppra 500 mg BID. Denies any side effects from the regimen. The regimen helps decrease his pain by 50%. Notes improvement in his function and activity.  Working 3-4 days/week at [employer].   ADL's by self.     Failed Cymbalta.  Failed Horizant($$$) and Gralise ($$$).  Failed Lyrica---pedal edema  History of IDDM  Extremity Pain    The pain is present in the left hand, right hand, left lower leg and right lower leg. This is a chronic problem. The current episode started more than 1 year ago. The problem occurs constantly. Progression since onset: worse since last office visit-- hands are worst. The pain is at a severity of 7/10. The pain is moderate. Associated symptoms include numbness (hands,feet bilaterally). Pertinent negatives include no fever. He has tried oral narcotics and rest (cymbalta--no help) for the symptoms. The treatment provided moderate relief. His past medical history is significant for diabetes.      PEG Assessment   What number best describes your pain on average in the past week?7  What number best describes how, during the past week, pain has interfered with your enjoyment of life?9  What number best describes how, during the past week, pain has  "interfered with your general activity?  9    The following portions of the patient's history were reviewed and updated as appropriate: allergies, current medications, past family history, past medical history, past social history, past surgical history and problem list.    Review of Systems   Constitutional: Positive for activity change (decreased). Negative for chills and fever.   Respiratory: Negative for apnea and shortness of breath.    Cardiovascular: Negative for chest pain.   Gastrointestinal: Negative for constipation, diarrhea, nausea and vomiting.   Genitourinary: Negative for difficulty urinating, dysuria and flank pain.   Musculoskeletal:        Arm and leg pain   Neurological: Positive for weakness (legs bilaterally) and numbness (hands,feet bilaterally). Negative for dizziness, light-headedness (\"balance not good\") and headaches.   Psychiatric/Behavioral: Negative for confusion, hallucinations, self-injury, sleep disturbance and suicidal ideas. The patient is not nervous/anxious.        Vitals:    02/11/19 0847   BP: 139/81   Pulse: 66   Resp: 16   Temp: 98.4 °F (36.9 °C)   SpO2: 96%   Weight: 93.9 kg (207 lb)   Height: 170 cm (66.93\")   PainSc:   7   PainLoc: Hand  Comment: hand pain bilat. ranges from 7-8/10     Objective   Physical Exam   Constitutional: He is oriented to person, place, and time. Vital signs are normal. He appears well-developed and well-nourished. He is cooperative.   HENT:   Head: Normocephalic and atraumatic.   Nose: Nose normal.   Eyes: Conjunctivae and lids are normal.   Cardiovascular: Normal rate.   Pulmonary/Chest: Effort normal.   Abdominal: Normal appearance.   Musculoskeletal:   Dysthesia of BUE and BLE.    Neurological: He is alert and oriented to person, place, and time. Gait normal.   Skin: Skin is warm, dry and intact.   Psychiatric: He has a normal mood and affect. His speech is normal and behavior is normal. Judgment and thought content normal. Cognition and memory " are normal.   Nursing note and vitals reviewed.      Assessment/Plan   Kelby was seen today for extremity pain.    Diagnoses and all orders for this visit:    Other chronic pain    Autonomic neuropathy    Pain in extremity, unspecified extremity    Diabetic peripheral neuropathy (CMS/HCC)    Encounter for long-term (current) use of high-risk medication    Other orders  -     HYDROcodone-acetaminophen (NORCO) 7.5-325 MG per tablet; Take 1 tablet by mouth Every 6 (Six) Hours As Needed for Moderate Pain .      --- The urine drug screen confirmation from 12-3-18 has been reviewed and the result is APPROPRIATE based on patient history and HOME report  --- Refill Hydrocodone. Patient appears stable with current regimen. No adverse effects. Regarding continuation of opioids, there is no evidence of aberrant behavior or any red flags.  The patient continues with appropriate response to opioid therapy. ADL's remain intact by self.   --- compounded pain creme with gabapentin and ketamine. Discussed medication with the patient.  Included in this discussion was the potential for side effects and adverse events.  Patient verbalized understanding and wished to proceed.  Prescription will be sent to pharmacy.  --- consideration for stellate ganglion block in future PRN. Patient wants to wait at this time. Hand out given.   --- Follow-up 2 months or sooner if needed.       HOME REPORT    As part of the patient's treatment plan, I am prescribing controlled substances. The patient has been made aware of appropriate use of such medications, including potential risk of somnolence, limited ability to drive and/or work safely, and the potential for dependence or overdose. It has also bee made clear that these medications are for use by this patient only, without concomitant use of alcohol or other substances unless prescribed.     Patient has completed prescribing agreement detailing terms of continued prescribing of controlled  substances, including monitoring HOME reports, urine drug screening, and pill counts if necessary. The patient is aware that inappropriate use will results in cessation of prescribing such medications.    HOME report has been reviewed and scanned into the patient's chart.    As the clinician, I personally reviewed the HOME from 2-5-19 while the patient was in the office today.    History and physical exam exhibit continued safe and appropriate use of controlled substances.      EMR Dragon/Transcription disclaimer:   Much of this encounter note is an electronic transcription/translation of spoken language to printed text. The electronic translation of spoken language may permit erroneous, or at times, nonsensical words or phrases to be inadvertently transcribed; Although I have reviewed the note for such errors, some may still exist.

## 2019-03-14 RX ORDER — HYDROCODONE BITARTRATE AND ACETAMINOPHEN 7.5; 325 MG/1; MG/1
1 TABLET ORAL EVERY 6 HOURS PRN
Qty: 120 TABLET | Refills: 0 | Status: SHIPPED | OUTPATIENT
Start: 2019-03-14 | End: 2019-04-11 | Stop reason: SDUPTHER

## 2019-03-14 NOTE — TELEPHONE ENCOUNTER
Medication Refill Request    Date of phone call: 3-13-19    Medication being requested: Hydrocodone-apap 7.5-325 mg sig: Take 1 tablet by mouth Every 6 (Six) Hours As Needed for Moderate Pain   Qty: 120    Date of last visit: 2-11-19    Date of last refill: 2-11-19    HOME up to date?: 2-07-19    Next Follow up?: 4-11-19    Any new pertinent information? (i.e, new medication allergies, new use of medications, change in patient's health or condition, non-compliance or inconsistency with prescribing agreement?): n/a

## 2019-04-08 ENCOUNTER — OFFICE VISIT (OUTPATIENT)
Dept: SPORTS MEDICINE | Facility: CLINIC | Age: 69
End: 2019-04-08

## 2019-04-08 VITALS
OXYGEN SATURATION: 92 % | BODY MASS INDEX: 32.96 KG/M2 | HEART RATE: 73 BPM | DIASTOLIC BLOOD PRESSURE: 60 MMHG | WEIGHT: 210 LBS | HEIGHT: 67 IN | SYSTOLIC BLOOD PRESSURE: 150 MMHG

## 2019-04-08 DIAGNOSIS — M17.11 ARTHRITIS OF RIGHT KNEE: Primary | ICD-10-CM

## 2019-04-08 PROCEDURE — 99214 OFFICE O/P EST MOD 30 MIN: CPT | Performed by: FAMILY MEDICINE

## 2019-04-08 PROCEDURE — 73562 X-RAY EXAM OF KNEE 3: CPT | Performed by: FAMILY MEDICINE

## 2019-04-11 ENCOUNTER — OFFICE VISIT (OUTPATIENT)
Dept: PAIN MEDICINE | Facility: CLINIC | Age: 69
End: 2019-04-11

## 2019-04-11 VITALS
DIASTOLIC BLOOD PRESSURE: 77 MMHG | TEMPERATURE: 98.1 F | SYSTOLIC BLOOD PRESSURE: 166 MMHG | HEART RATE: 68 BPM | BODY MASS INDEX: 32.55 KG/M2 | HEIGHT: 67 IN | OXYGEN SATURATION: 94 % | WEIGHT: 207.4 LBS | RESPIRATION RATE: 18 BRPM

## 2019-04-11 DIAGNOSIS — Z79.899 ENCOUNTER FOR LONG-TERM (CURRENT) USE OF HIGH-RISK MEDICATION: ICD-10-CM

## 2019-04-11 DIAGNOSIS — M51.16 NEURITIS OR RADICULITIS DUE TO RUPTURE OF LUMBAR INTERVERTEBRAL DISC: ICD-10-CM

## 2019-04-11 DIAGNOSIS — G89.29 OTHER CHRONIC PAIN: Primary | ICD-10-CM

## 2019-04-11 DIAGNOSIS — G90.9 AUTONOMIC NEUROPATHY: ICD-10-CM

## 2019-04-11 DIAGNOSIS — G62.9 POLYNEUROPATHY: ICD-10-CM

## 2019-04-11 DIAGNOSIS — M25.569 KNEE PAIN, UNSPECIFIED CHRONICITY, UNSPECIFIED LATERALITY: ICD-10-CM

## 2019-04-11 PROCEDURE — 99214 OFFICE O/P EST MOD 30 MIN: CPT | Performed by: NURSE PRACTITIONER

## 2019-04-11 RX ORDER — HYDROCODONE BITARTRATE AND ACETAMINOPHEN 7.5; 325 MG/1; MG/1
1 TABLET ORAL EVERY 6 HOURS PRN
Qty: 120 TABLET | Refills: 0 | Status: SHIPPED | OUTPATIENT
Start: 2019-04-11 | End: 2019-05-13 | Stop reason: SDUPTHER

## 2019-04-11 NOTE — PROGRESS NOTES
"CHIEF COMPLAINT  F/U leg and arm pain- patient states that his pain has remained the same since his last visit. Patient states that he has developed in his right knee. He had xrays and states they told him it was arthritis.     Subjective   Kelby Peters is a 68 y.o. male  who presents to the office for follow-up.He has a history of chronic extremity pain due to neuropathy. Also has newer right knee pain. Fell recently. Saw PCP. Had Xray. \"He said I have arthritis.\" Is still having pain in the right lower knee, medial compartment. He does also occasionally notice this on left knee as well. Given a trial of Pennsaid \"I don't know if it works.\"     Complains of pain in his hands, legs and knees. Today his pain is 6/10VAS(right knee). Describes the pain as continuous burning. Continues with Hydrocodone 7.5/325 4/day and gabapentin 800 mg 4/day(1 in morning, 1 at lunch and 2 at night). Also continues with Keppra 500 mg BID. Denies any side effects from the regimen. The regimen helps decrease his pain by 40-50%. Notes improvement in his function and activity.  ADL's by self.  Quit work last week. 'I'm getting a lot of things done at home.\"      Failed Cymbalta.  Failed Horizant($$$) and Gralise ($$$).  Failed Lyrica---pedal edema  History of IDDM    Never received compounded pain creme. He states no one ever called him.     Extremity Pain    The pain is present in the left hand, right hand, left lower leg and right lower leg. This is a chronic problem. The current episode started more than 1 year ago. The problem occurs constantly. Progression since onset: unchanged since last office visit. The pain is at a severity of 6/10. The pain is moderate. Associated symptoms include numbness (hands,feet bilaterally). Pertinent negatives include no fever. He has tried oral narcotics and rest (cymbalta--no help) for the symptoms. The treatment provided moderate relief. His past medical history is significant for diabetes.   Knee " "Pain    Associated symptoms include numbness (hands,feet bilaterally).      Dr Dougherty Xray 4-8-19--his personal read. Unable to view xray.       PEG Assessment   What number best describes your pain on average in the past week?7  What number best describes how, during the past week, pain has interfered with your enjoyment of life?7  What number best describes how, during the past week, pain has interfered with your general activity?  7    The following portions of the patient's history were reviewed and updated as appropriate: allergies, current medications, past family history, past medical history, past social history, past surgical history and problem list.    Review of Systems   Constitutional: Positive for activity change (decreased). Negative for chills, fatigue and fever.   Respiratory: Negative for apnea, cough and shortness of breath.    Cardiovascular: Negative for chest pain.   Gastrointestinal: Negative for abdominal pain, constipation, diarrhea, nausea and vomiting.   Genitourinary: Negative for difficulty urinating, dysuria and flank pain.   Musculoskeletal:        Arm and leg pain   Neurological: Positive for weakness (legs bilaterally) and numbness (hands,feet bilaterally). Negative for dizziness, light-headedness (\"balance not good\") and headaches.   Psychiatric/Behavioral: Positive for agitation. Negative for confusion, hallucinations, self-injury, sleep disturbance and suicidal ideas. The patient is not nervous/anxious.        Vitals:    04/11/19 0850   BP: 166/77   Pulse: 68   Resp: 18   Temp: 98.1 °F (36.7 °C)   SpO2: 94%   Weight: 94.1 kg (207 lb 6.4 oz)   Height: 170 cm (66.93\")   PainSc:   6   PainLoc: Knee  Comment: right     Objective   Physical Exam   Constitutional: He is oriented to person, place, and time. Vital signs are normal. He appears well-developed and well-nourished. He is cooperative.   HENT:   Head: Normocephalic and atraumatic.   Nose: Nose normal.   Eyes: Conjunctivae and " lids are normal.   Cardiovascular: Normal rate.   Pulmonary/Chest: Effort normal.   Abdominal: Normal appearance.   Musculoskeletal:        Right knee: He exhibits decreased range of motion and effusion. Tenderness found. Medial joint line tenderness noted.   Dysthesia of BUE and BLE.    Neurological: He is alert and oriented to person, place, and time. Gait normal.   Skin: Skin is warm, dry and intact.   Psychiatric: He has a normal mood and affect. His speech is normal and behavior is normal. Judgment and thought content normal. Cognition and memory are normal.   Nursing note and vitals reviewed.    Assessment/Plan   Kelby was seen today for leg pain.    Diagnoses and all orders for this visit:    Other chronic pain    Autonomic neuropathy    Neuritis or radiculitis due to rupture of lumbar intervertebral disc    Polyneuropathy    Encounter for long-term (current) use of high-risk medication    Knee pain, unspecified chronicity, unspecified laterality    Other orders  -     HYDROcodone-acetaminophen (NORCO) 7.5-325 MG per tablet; Take 1 tablet by mouth Every 6 (Six) Hours As Needed for Moderate Pain . DNF until 4-13-19      --- The urine drug screen confirmation from 12-3-18 has been reviewed and the result is APPROPRIATE based on patient history and HOME report  --- REfill Hydrocodone with DNF. Patient appears stable with current regimen. No adverse effects. Regarding continuation of opioids, there is no evidence of aberrant behavior or any red flags.  The patient continues with appropriate response to opioid therapy. ADL's remain intact by self.   --- Re-order compounded pain creme with gabapentin and ketamine.  --- Follow-up 2 months or sooner if needed.     HOME REPORT    As part of the patient's treatment plan, I am prescribing controlled substances. The patient has been made aware of appropriate use of such medications, including potential risk of somnolence, limited ability to drive and/or work safely,  and the potential for dependence or overdose. It has also bee made clear that these medications are for use by this patient only, without concomitant use of alcohol or other substances unless prescribed.     Patient has completed prescribing agreement detailing terms of continued prescribing of controlled substances, including monitoring HOME reports, urine drug screening, and pill counts if necessary. The patient is aware that inappropriate use will results in cessation of prescribing such medications.    HOME report has been reviewed and scanned into the patient's chart.    As the clinician, I personally reviewed the HOME from 4-9-19 while the patient was in the office today.    History and physical exam exhibit continued safe and appropriate use of controlled substances.      EMR Dragon/Transcription disclaimer:   Much of this encounter note is an electronic transcription/translation of spoken language to printed text. The electronic translation of spoken language may permit erroneous, or at times, nonsensical words or phrases to be inadvertently transcribed; Although I have reviewed the note for such errors, some may still exist.

## 2019-04-24 ENCOUNTER — TELEPHONE (OUTPATIENT)
Dept: SPORTS MEDICINE | Facility: CLINIC | Age: 69
End: 2019-04-24

## 2019-04-24 RX ORDER — MELOXICAM 15 MG/1
15 TABLET ORAL DAILY
Qty: 30 TABLET | Refills: 2 | Status: SHIPPED | OUTPATIENT
Start: 2019-04-24 | End: 2020-08-03

## 2019-04-24 NOTE — TELEPHONE ENCOUNTER
Pt had appointment with Dr. Dougherty today. They rescheduled for May 1st. Patient's wife called saying they want an anti-inflammatory for him. His leg gets swelled and painful by end of day. This is an ongoing problem. Patient's wife says they cannot wait until next week. Could you call in something today  or should I have them wait until Dr. Dougherty comes back tomorrow?

## 2019-04-25 RX ORDER — GABAPENTIN 800 MG/1
TABLET ORAL
Qty: 120 TABLET | Refills: 2 | Status: SHIPPED | OUTPATIENT
Start: 2019-04-25 | End: 2019-07-20 | Stop reason: SDUPTHER

## 2019-04-26 RX ORDER — GABAPENTIN 800 MG/1
800 TABLET ORAL 4 TIMES DAILY
Qty: 120 TABLET | Refills: 3 | OUTPATIENT
Start: 2019-04-26

## 2019-05-13 RX ORDER — HYDROCODONE BITARTRATE AND ACETAMINOPHEN 7.5; 325 MG/1; MG/1
1 TABLET ORAL EVERY 6 HOURS PRN
Qty: 120 TABLET | Refills: 0 | Status: SHIPPED | OUTPATIENT
Start: 2019-05-13 | End: 2019-06-11 | Stop reason: SDUPTHER

## 2019-05-13 NOTE — TELEPHONE ENCOUNTER
Medication Refill Request    Date of phone call: 19    Medication being requested: Norco 7.5-325 mg si tab   Qty: 120    Date of last visit: 19    Date of last refill: 19    HOME up to date?: yes    Next Follow up?: 19    Any new pertinent information? (i.e, new medication allergies, new use of medications, change in patient's health or condition, non-compliance or inconsistency with prescribing agreement?):

## 2019-06-11 ENCOUNTER — OFFICE VISIT (OUTPATIENT)
Dept: PAIN MEDICINE | Facility: CLINIC | Age: 69
End: 2019-06-11

## 2019-06-11 ENCOUNTER — RESULTS ENCOUNTER (OUTPATIENT)
Dept: PAIN MEDICINE | Facility: CLINIC | Age: 69
End: 2019-06-11

## 2019-06-11 VITALS
HEART RATE: 71 BPM | WEIGHT: 207.4 LBS | BODY MASS INDEX: 32.55 KG/M2 | DIASTOLIC BLOOD PRESSURE: 78 MMHG | HEIGHT: 67 IN | OXYGEN SATURATION: 96 % | SYSTOLIC BLOOD PRESSURE: 157 MMHG | TEMPERATURE: 98.5 F | RESPIRATION RATE: 16 BRPM

## 2019-06-11 DIAGNOSIS — E11.42 DIABETIC PERIPHERAL NEUROPATHY (HCC): ICD-10-CM

## 2019-06-11 DIAGNOSIS — Z79.899 ENCOUNTER FOR LONG-TERM (CURRENT) USE OF HIGH-RISK MEDICATION: ICD-10-CM

## 2019-06-11 DIAGNOSIS — G90.9 AUTONOMIC NEUROPATHY: ICD-10-CM

## 2019-06-11 DIAGNOSIS — G89.29 OTHER CHRONIC PAIN: ICD-10-CM

## 2019-06-11 DIAGNOSIS — G89.29 OTHER CHRONIC PAIN: Primary | ICD-10-CM

## 2019-06-11 LAB
POC AMPHETAMINES: NEGATIVE
POC BARBITURATES: NEGATIVE
POC BENZODIAZEPHINES: NEGATIVE
POC COCAINE: NEGATIVE
POC METHADONE: NEGATIVE
POC METHAMPHETAMINE SCREEN URINE: NEGATIVE
POC OPIATES: POSITIVE
POC OXYCODONE: POSITIVE
POC PHENCYCLIDINE: NEGATIVE
POC PROPOXYPHENE: NEGATIVE
POC THC: NEGATIVE
POC TRICYCLIC ANTIDEPRESSANTS: NEGATIVE

## 2019-06-11 PROCEDURE — 99213 OFFICE O/P EST LOW 20 MIN: CPT | Performed by: NURSE PRACTITIONER

## 2019-06-11 PROCEDURE — 80305 DRUG TEST PRSMV DIR OPT OBS: CPT | Performed by: NURSE PRACTITIONER

## 2019-06-11 RX ORDER — HYDROCODONE BITARTRATE AND ACETAMINOPHEN 7.5; 325 MG/1; MG/1
1 TABLET ORAL EVERY 6 HOURS PRN
Qty: 120 TABLET | Refills: 0 | Status: SHIPPED | OUTPATIENT
Start: 2019-06-11 | End: 2019-07-16 | Stop reason: SDUPTHER

## 2019-06-11 NOTE — PROGRESS NOTES
"CHIEF COMPLAINT  F/U bilateral leg and arm pain- Patient states that his pain has worsened since his last visit.     Subjective   Kelby Peters is a 68 y.o. male  who presents to the office for follow-up.He has a history of chronic extremity pain(legs and arms) Reports his pain is worse since last office visit. He is unsure why. He reports he had extreme pain in his left foot that \"was burning in a pin-head.\" He reports the pain was at a 9 and 10 level for almost 3 days.\" He admits his blood sugar is probably running high. His meter is broken. He believes his last A1c was 11 approximately 6 months ago. He states no one is currently prescribing his insulin. He is buying this over the counter at [facility]. Is buying Novolin N and Novolin R.     Complains of pain in his legs and arms. Today his pain is 7/10VAs. Describes the pain as continuous burning with electrical shocking. Continues with Hydrocodone 7.5/325 4/day and gabapentin 800 mg 4/day(1 in morning, 1 at lunch and 2 at night). Also continues with Keppra 500 mg BID. Denies any side effects from the regimen. The regimen helps decrease his pain by 30-40%. Notes improvement in his function and activity.  ADL's by self.     Failed Cymbalta.  Failed Horizant($$$) and Gralise ($$$).  Failed Lyrica---pedal edema  History of IDDM     Extremity Pain    The pain is present in the left hand, right hand, left lower leg and right lower leg. This is a chronic problem. The current episode started more than 1 year ago. The problem occurs constantly. Progression since onset: worse since last office visit. The pain is at a severity of 7/10. The pain is moderate. Associated symptoms include numbness (hands,feet bilaterally). Pertinent negatives include no fever. He has tried oral narcotics and rest (cymbalta--no help) for the symptoms. The treatment provided moderate relief. His past medical history is significant for diabetes.   Knee Pain    Associated symptoms include numbness " "(hands,feet bilaterally).            The following portions of the patient's history were reviewed and updated as appropriate: allergies, current medications, past family history, past medical history, past social history, past surgical history and problem list.    Review of Systems   Constitutional: Positive for activity change (decreased) and fatigue. Negative for chills and fever.   Respiratory: Positive for shortness of breath. Negative for apnea, cough and chest tightness.    Cardiovascular: Negative for chest pain.   Gastrointestinal: Positive for abdominal pain. Negative for constipation, diarrhea, nausea and vomiting.   Genitourinary: Negative for difficulty urinating, dysuria and flank pain.   Musculoskeletal:        Arm and leg pain   Neurological: Positive for weakness (legs bilaterally), numbness (hands,feet bilaterally) and headaches. Negative for dizziness and light-headedness (\"balance not good\").   Psychiatric/Behavioral: Positive for agitation and sleep disturbance. Negative for confusion, hallucinations, self-injury and suicidal ideas. The patient is nervous/anxious.        Vitals:    06/11/19 0932   BP: 157/78   Pulse: 71   Resp: 16   Temp: 98.5 °F (36.9 °C)   SpO2: 96%   Weight: 94.1 kg (207 lb 6.4 oz)   Height: 170.2 cm (67\")   PainSc:   7   PainLoc: Arm  Comment: bilateral arms and legs, back     Objective   Physical Exam   Constitutional: He is oriented to person, place, and time. Vital signs are normal. He appears well-developed and well-nourished. He is cooperative.   HENT:   Head: Normocephalic and atraumatic.   Nose: Nose normal.   Eyes: Conjunctivae and lids are normal.   Cardiovascular: Normal rate.   Pulmonary/Chest: Effort normal.   Abdominal: Normal appearance.   Musculoskeletal:        Right knee: He exhibits decreased range of motion and effusion. Tenderness found. Medial joint line tenderness noted.   Dysthesia of BUE and BLE.    Neurological: He is alert and oriented to person, " place, and time. Gait normal.   Skin: Skin is warm, dry and intact.   Psychiatric: He has a normal mood and affect. His speech is normal and behavior is normal. Judgment and thought content normal. Cognition and memory are normal.   Nursing note and vitals reviewed.      Assessment/Plan   Kelby was seen today for leg pain.    Diagnoses and all orders for this visit:    Other chronic pain    Autonomic neuropathy    Diabetic peripheral neuropathy (CMS/HCC)    Encounter for long-term (current) use of high-risk medication    Other orders  -     HYDROcodone-acetaminophen (NORCO) 7.5-325 MG per tablet; Take 1 tablet by mouth Every 6 (Six) Hours As Needed for Moderate Pain .      --- Routine UDS in office today as part of monitoring requirements for controlled substances.  The specimen was viewed and the immunoassay result reviewed and is +OPI(APPROPRIATE).  This specimen will be sent to [facility] laboratory for confirmation.     --- Refill Hydrocodone. Patient appears stable with current regimen. No adverse effects. Regarding continuation of opioids, there is no evidence of aberrant behavior or any red flags.  The patient continues with appropriate response to opioid therapy. ADL's remain intact by self.   --- Discussed seeing PCP for wellness visit and to evaluated IDDM.  --- Follow-up 2 months or sooner if needed.     HOME REPORT    As part of the patient's treatment plan, I am prescribing controlled substances. The patient has been made aware of appropriate use of such medications, including potential risk of somnolence, limited ability to drive and/or work safely, and the potential for dependence or overdose. It has also bee made clear that these medications are for use by this patient only, without concomitant use of alcohol or other substances unless prescribed.     Patient has completed prescribing agreement detailing terms of continued prescribing of controlled substances, including monitoring HOME reports,  urine drug screening, and pill counts if necessary. The patient is aware that inappropriate use will results in cessation of prescribing such medications.    HOME report has been reviewed and scanned into the patient's chart.    As the clinician, I personally reviewed the HOME from 6-10-19 while the patient was in the office today.    History and physical exam exhibit continued safe and appropriate use of controlled substances.      EMR Dragon/Transcription disclaimer:   Much of this encounter note is an electronic transcription/translation of spoken language to printed text. The electronic translation of spoken language may permit erroneous, or at times, nonsensical words or phrases to be inadvertently transcribed; Although I have reviewed the note for such errors, some may still exist.

## 2019-07-08 RX ORDER — LEVETIRACETAM 500 MG/1
TABLET ORAL
Qty: 60 TABLET | Refills: 2 | Status: SHIPPED | OUTPATIENT
Start: 2019-07-08 | End: 2020-01-28

## 2019-07-16 RX ORDER — HYDROCODONE BITARTRATE AND ACETAMINOPHEN 7.5; 325 MG/1; MG/1
1 TABLET ORAL EVERY 6 HOURS PRN
Qty: 120 TABLET | Refills: 0 | Status: SHIPPED | OUTPATIENT
Start: 2019-07-16 | End: 2019-08-08 | Stop reason: SDUPTHER

## 2019-07-16 NOTE — TELEPHONE ENCOUNTER
Medication Refill Request    Date of phone call: 19    Medication being requested: Norco 7.5-325mg  si tab po q 6 hrs prn  Qty: 120    Date of last visit: 19    Date of last refill: 19    HOME up to date?: yes    Next Follow up?: 19    Any new pertinent information? (i.e, new medication allergies, new use of medications, change in patient's health or condition, non-compliance or inconsistency with prescribing agreement?):

## 2019-07-23 RX ORDER — GABAPENTIN 800 MG/1
TABLET ORAL
Qty: 120 TABLET | Refills: 1 | Status: SHIPPED | OUTPATIENT
Start: 2019-07-23 | End: 2019-09-24 | Stop reason: SDUPTHER

## 2019-08-07 ENCOUNTER — OFFICE VISIT (OUTPATIENT)
Dept: SPORTS MEDICINE | Facility: CLINIC | Age: 69
End: 2019-08-07

## 2019-08-07 VITALS
WEIGHT: 206 LBS | TEMPERATURE: 98.4 F | SYSTOLIC BLOOD PRESSURE: 144 MMHG | HEART RATE: 71 BPM | OXYGEN SATURATION: 98 % | BODY MASS INDEX: 32.33 KG/M2 | DIASTOLIC BLOOD PRESSURE: 80 MMHG | HEIGHT: 67 IN

## 2019-08-07 DIAGNOSIS — R42 VERTIGO: ICD-10-CM

## 2019-08-07 DIAGNOSIS — R11.0 NAUSEATED: ICD-10-CM

## 2019-08-07 DIAGNOSIS — I67.1 CEREBRAL ANEURYSM: ICD-10-CM

## 2019-08-07 DIAGNOSIS — G62.9 POLYNEUROPATHY: ICD-10-CM

## 2019-08-07 DIAGNOSIS — R79.9 ABNORMAL FINDING OF BLOOD CHEMISTRY: ICD-10-CM

## 2019-08-07 DIAGNOSIS — R27.0 ATAXIA: ICD-10-CM

## 2019-08-07 DIAGNOSIS — G90.9 AUTONOMIC NEUROPATHY: Primary | ICD-10-CM

## 2019-08-07 PROCEDURE — 99214 OFFICE O/P EST MOD 30 MIN: CPT | Performed by: FAMILY MEDICINE

## 2019-08-07 RX ORDER — ONDANSETRON 4 MG/1
4 TABLET, ORALLY DISINTEGRATING ORAL EVERY 8 HOURS PRN
Qty: 30 TABLET | Refills: 2 | Status: SHIPPED | OUTPATIENT
Start: 2019-08-07 | End: 2022-07-20 | Stop reason: ALTCHOICE

## 2019-08-07 NOTE — PROGRESS NOTES
"Kelby is a 69 y.o. year old male evaluation of a problem that is new to this examiner.    Chief Complaint   Patient presents with   • Dizziness     x 2 weeks - off and on - has not fallen    • Nausea     x 2 weeks - off and on        History of Present Illness   HPI   2 weeks ago patient was mowing his yard on a riding lawn more, while doing so he became very \"car sick\" had to quit mowing his yard due to dizziness and nauseousness.  Has noted intermittent episodes since that time and they are not precipitated by body or head movement but by peripheral vision movement.  No focal motor deficits noted.  No emesis.  No visual complaints.  History of autonomic neuropathy and polyneuropathy and also with a history of cerebral aneurysm in the past that has not been evaluated for several years according to the patient.  Would like to see a different neurologist.      I have reviewed the patient's medical, family, and social history in detail and updated the computerized patient record.    Review of Systems   Constitutional: Negative.    HENT: Negative.    Respiratory: Negative.    Cardiovascular: Negative.    Gastrointestinal: Positive for nausea. Negative for abdominal distention, abdominal pain, anal bleeding, blood in stool, constipation, diarrhea and vomiting.   Endocrine: Negative.    Genitourinary: Negative.    Allergic/Immunologic: Negative.    Neurological: Positive for dizziness. Negative for weakness.   Psychiatric/Behavioral: Negative.        /80   Pulse 71   Temp 98.4 °F (36.9 °C)   Ht 170.2 cm (67.01\")   Wt 93.4 kg (206 lb)   SpO2 98%   BMI 32.26 kg/m²      Physical Exam   Constitutional: He is oriented to person, place, and time. He appears well-developed and well-nourished.   HENT:   Head: Normocephalic and atraumatic.   Eyes: Conjunctivae and EOM are normal. Pupils are equal, round, and reactive to light.   Cardiovascular:   No peripheral edema   Pulmonary/Chest: Effort normal.   Neurological: He " is alert and oriented to person, place, and time.   Patient has some issues with double leg stance with eyes closed, his rapid alternating movements hands normal, heel-to-shin normal.  Negative [place].   Skin: Skin is warm and dry.   Psychiatric: He has a normal mood and affect. His behavior is normal.   Vitals reviewed.        Current Outpatient Medications:   •  amLODIPine (NORVASC) 10 MG tablet, Take 1 tablet by mouth Daily., Disp: 90 tablet, Rfl: 3  •  ciprofloxacin (CIPRO) 500 MG tablet, Take 1 tablet by mouth Every 12 (Twelve) Hours., Disp: 28 tablet, Rfl: 0  •  gabapentin (NEURONTIN) 800 MG tablet, TAKE ONE TABLET BY MOUTH FOUR TIMES A DAY, Disp: 120 tablet, Rfl: 1  •  glucose blood ([employer] BLOOD GLUCOSE TEST) test strip, daily., Disp: , Rfl:   •  HYDROcodone-acetaminophen (NORCO) 7.5-325 MG per tablet, Take 1 tablet by mouth Every 6 (Six) Hours As Needed for Moderate Pain ., Disp: 120 tablet, Rfl: 0  •  insulin NPH (NOVOLIN N RELION) 100 UNIT/ML injection, Inject under the skin. Inject 35 units under skin once daily, Disp: , Rfl:   •  Insulin Pen Needle (B-D ULTRAFINE III SHORT PEN) 31G X 8 MM misc, , Disp: , Rfl:   •  [employer] LANCETS MICRO THIN 33G misc, , Disp: , Rfl:   •  levETIRAcetam (KEPPRA) 500 MG tablet, TAKE ONE TABLET BY MOUTH TWICE A DAY, Disp: 60 tablet, Rfl: 2  •  meloxicam (MOBIC) 15 MG tablet, Take 1 tablet by mouth Daily., Disp: 30 tablet, Rfl: 2  •  omeprazole OTC (PRILOSEC OTC) 20 MG EC tablet, Take  by mouth As Needed., Disp: , Rfl:   •  ondansetron ODT (ZOFRAN-ODT) 4 MG disintegrating tablet, Take 1 tablet by mouth Every 8 (Eight) Hours As Needed for Nausea or Vomiting., Disp: 30 tablet, Rfl: 2     Diagnoses and all orders for this visit:    Autonomic neuropathy  -     Ambulatory Referral to Neurology  -     MRI angiogram head wo contrast; Future  -     CBC & Differential  -     Comprehensive Metabolic Panel  -     TSH  -     T4, Free  -     Hemoglobin A1c  -     UA / M With / Rflx  Culture([facility] ONLY) - Urine, Clean Catch    Vertigo  -     Ambulatory Referral to Neurology  -     MRI angiogram head wo contrast; Future  -     CBC & Differential  -     Comprehensive Metabolic Panel  -     TSH  -     T4, Free  -     Hemoglobin A1c  -     UA / M With / Rflx Culture([facility] ONLY) - Urine, Clean Catch    Ataxia  -     Ambulatory Referral to Neurology  -     MRI angiogram head wo contrast; Future  -     CBC & Differential  -     Comprehensive Metabolic Panel  -     TSH  -     T4, Free  -     Hemoglobin A1c  -     UA / M With / Rflx Culture([facility] ONLY) - Urine, Clean Catch    Nauseated  -     ondansetron ODT (ZOFRAN-ODT) 4 MG disintegrating tablet; Take 1 tablet by mouth Every 8 (Eight) Hours As Needed for Nausea or Vomiting.  -     Ambulatory Referral to Neurology  -     MRI angiogram head wo contrast; Future  -     CBC & Differential  -     Comprehensive Metabolic Panel  -     TSH  -     T4, Free  -     Hemoglobin A1c  -     UA / M With / Rflx Culture([facility] ONLY) - Urine, Clean Catch    Cerebral aneurysm  -     Ambulatory Referral to Neurology  -     MRI angiogram head wo contrast; Future  -     CBC & Differential  -     Comprehensive Metabolic Panel  -     TSH  -     T4, Free  -     Hemoglobin A1c  -     UA / M With / Rflx Culture([facility] ONLY) - Urine, Clean Catch    Polyneuropathy  -     CBC & Differential  -     Comprehensive Metabolic Panel  -     TSH  -     T4, Free  -     Hemoglobin A1c  -     UA / M With / Rflx Culture([facility] ONLY) - Urine, Clean Catch    Abnormal finding of blood chemistry   -     Hemoglobin A1c             EMR Dragon/Transcription disclaimer:    Much of this encounter note is an electronic transcription/translation of spoken language to printed text.  The electronic translation of spoken language may permit erroneous, or at times, nonsensical words or phrases to be inadvertently transcribed.  Although I have reviewed the note for such errors some may still exist.

## 2019-08-08 ENCOUNTER — TELEPHONE (OUTPATIENT)
Dept: SPORTS MEDICINE | Facility: CLINIC | Age: 69
End: 2019-08-08

## 2019-08-08 ENCOUNTER — OFFICE VISIT (OUTPATIENT)
Dept: PAIN MEDICINE | Facility: CLINIC | Age: 69
End: 2019-08-08

## 2019-08-08 VITALS
BODY MASS INDEX: 32.49 KG/M2 | HEIGHT: 67 IN | DIASTOLIC BLOOD PRESSURE: 84 MMHG | WEIGHT: 207 LBS | TEMPERATURE: 98.4 F | SYSTOLIC BLOOD PRESSURE: 147 MMHG | HEART RATE: 71 BPM | RESPIRATION RATE: 16 BRPM | OXYGEN SATURATION: 96 %

## 2019-08-08 DIAGNOSIS — G62.9 POLYNEUROPATHY: ICD-10-CM

## 2019-08-08 DIAGNOSIS — Z79.899 ENCOUNTER FOR LONG-TERM (CURRENT) USE OF HIGH-RISK MEDICATION: ICD-10-CM

## 2019-08-08 DIAGNOSIS — G90.9 AUTONOMIC NEUROPATHY: ICD-10-CM

## 2019-08-08 DIAGNOSIS — G89.29 OTHER CHRONIC PAIN: Primary | ICD-10-CM

## 2019-08-08 LAB
ALBUMIN SERPL-MCNC: 4.3 G/DL (ref 3.5–5.2)
ALBUMIN/GLOB SERPL: 1.5 G/DL
ALP SERPL-CCNC: 66 U/L (ref 39–117)
ALT SERPL-CCNC: 17 U/L (ref 1–41)
APPEARANCE UR: CLEAR
AST SERPL-CCNC: 19 U/L (ref 1–40)
BACTERIA #/AREA URNS HPF: NORMAL /HPF
BASOPHILS # BLD AUTO: 0.05 10*3/MM3 (ref 0–0.2)
BASOPHILS NFR BLD AUTO: 0.9 % (ref 0–1.5)
BILIRUB SERPL-MCNC: 0.4 MG/DL (ref 0.2–1.2)
BILIRUB UR QL STRIP: NEGATIVE
BUN SERPL-MCNC: 13 MG/DL (ref 8–23)
BUN/CREAT SERPL: 15.7 (ref 7–25)
CALCIUM SERPL-MCNC: 9.4 MG/DL (ref 8.6–10.5)
CHLORIDE SERPL-SCNC: 97 MMOL/L (ref 98–107)
CO2 SERPL-SCNC: 29.1 MMOL/L (ref 22–29)
COLOR UR: YELLOW
CREAT SERPL-MCNC: 0.83 MG/DL (ref 0.76–1.27)
EOSINOPHIL # BLD AUTO: 0.12 10*3/MM3 (ref 0–0.4)
EOSINOPHIL NFR BLD AUTO: 2.2 % (ref 0.3–6.2)
EPI CELLS #/AREA URNS HPF: NORMAL /HPF
ERYTHROCYTE [DISTWIDTH] IN BLOOD BY AUTOMATED COUNT: 12.5 % (ref 12.3–15.4)
GLOBULIN SER CALC-MCNC: 2.9 GM/DL
GLUCOSE SERPL-MCNC: 167 MG/DL (ref 65–99)
GLUCOSE UR QL: ABNORMAL
HBA1C MFR BLD: 11.4 % (ref 4.8–5.6)
HCT VFR BLD AUTO: 48.2 % (ref 37.5–51)
HGB BLD-MCNC: 14.8 G/DL (ref 13–17.7)
HGB UR QL STRIP: NEGATIVE
IMM GRANULOCYTES # BLD AUTO: 0.02 10*3/MM3 (ref 0–0.05)
IMM GRANULOCYTES NFR BLD AUTO: 0.4 % (ref 0–0.5)
KETONES UR QL STRIP: NEGATIVE
LEUKOCYTE ESTERASE UR QL STRIP: NEGATIVE
LYMPHOCYTES # BLD AUTO: 1.35 10*3/MM3 (ref 0.7–3.1)
LYMPHOCYTES NFR BLD AUTO: 24.8 % (ref 19.6–45.3)
MCH RBC QN AUTO: 29.1 PG (ref 26.6–33)
MCHC RBC AUTO-ENTMCNC: 30.7 G/DL (ref 31.5–35.7)
MCV RBC AUTO: 94.9 FL (ref 79–97)
MICRO URNS: ABNORMAL
MICRO URNS: ABNORMAL
MONOCYTES # BLD AUTO: 0.56 10*3/MM3 (ref 0.1–0.9)
MONOCYTES NFR BLD AUTO: 10.3 % (ref 5–12)
MUCOUS THREADS URNS QL MICRO: PRESENT /HPF
NEUTROPHILS # BLD AUTO: 3.35 10*3/MM3 (ref 1.7–7)
NEUTROPHILS NFR BLD AUTO: 61.4 % (ref 42.7–76)
NITRITE UR QL STRIP: NEGATIVE
NRBC BLD AUTO-RTO: 0.2 /100 WBC (ref 0–0.2)
PH UR STRIP: 7.5 [PH] (ref 5–7.5)
PLATELET # BLD AUTO: 234 10*3/MM3 (ref 140–450)
POTASSIUM SERPL-SCNC: 5.2 MMOL/L (ref 3.5–5.2)
PROT SERPL-MCNC: 7.2 G/DL (ref 6–8.5)
PROT UR QL STRIP: ABNORMAL
RBC # BLD AUTO: 5.08 10*6/MM3 (ref 4.14–5.8)
RBC #/AREA URNS HPF: NORMAL /HPF
SODIUM SERPL-SCNC: 138 MMOL/L (ref 136–145)
SP GR UR: 1.01 (ref 1–1.03)
T4 FREE SERPL-MCNC: 1.13 NG/DL (ref 0.93–1.7)
TSH SERPL DL<=0.005 MIU/L-ACNC: 0.92 MIU/ML (ref 0.27–4.2)
URINALYSIS REFLEX: ABNORMAL
UROBILINOGEN UR STRIP-MCNC: 0.2 MG/DL (ref 0.2–1)
WBC # BLD AUTO: 5.45 10*3/MM3 (ref 3.4–10.8)
WBC #/AREA URNS HPF: NORMAL /HPF

## 2019-08-08 PROCEDURE — 99213 OFFICE O/P EST LOW 20 MIN: CPT | Performed by: NURSE PRACTITIONER

## 2019-08-08 RX ORDER — HYDROCODONE BITARTRATE AND ACETAMINOPHEN 7.5; 325 MG/1; MG/1
1 TABLET ORAL EVERY 6 HOURS PRN
Qty: 120 TABLET | Refills: 0 | Status: SHIPPED | OUTPATIENT
Start: 2019-08-08 | End: 2019-09-17 | Stop reason: SDUPTHER

## 2019-08-08 NOTE — PATIENT INSTRUCTIONS
"-----  Education about CBD supplements and Medical Marijuana  Updated August 2018    There has been a sea change regarding perceptions about marijuana use as well as marijuana-derived substances such CBD oils.  While many persons believe that marijuana is a cure-all, unfortunately this is not the case.  There is a common belief that since marijuana is a plant, that this \"all natural\" or otherwise \"organic\" origin makes it both a natural remedy and a safe product, but unfortunately this is note the case either.     Regarding CBD (cannabidiol), it does not appear to be psychoactive like THC, but it does have some psychoactive effects.  However, its medical effects are questionable at best.  Anecdotal reports do not constitute scientific evidence.   CBD products at this time have THC present in them.  Different sources cite that anywhere from 20% to all the CBD products sold will cause a person to confirm THC positivity on drug testing.  A risk of CBD is that 10-20% of persons who utilize it will have an increase in liver enzymes, which is a marker for liver injury.      It is difficult to study marijuana derived products, due to the Schedule-I status of marijuana by the [employer] (Drug Enforcement Agency) of the [place].  The American Society of Regional [employer]est[employer] and Pain [employer] (BRANDON) has issued a position statement urging the federal government to reclassify marijuana as a Schedule-II drug to allow easier access to enter into clinical trials.  The hope would be to identify uses and appropriate candidates and to also allow for development of more tailored therapies to limit side effects.  BRANDON also has urged the National Institutes of Health to develop   research guidelines for cannabis in order to facilitate research.     Difficulties in use of marijuana medically stem from this lack of research.  There is no reasonable evidence that marijuana or any derivatives thereof including CBD have any antiinflammatory " "properties or antianxiety properties.  There have been some very small animal studies and also small human studies that were very biased and of poor quality, and therefore does not constitute medical evidence.  Anecdotal \"evidence\" is not evidence.  There is no evidence that marijuana or any derivative cures cancer.  There is evidence that marijuana can prevent 20% of seizures for patients with a rare type of pediatric seizure disorder.   There is no way to know how to dose marijuana.  This \"natural\" product is not regulated with regards to any standardization of dosing.  Furthermore, marijuana and its derivatives can and do interfere with metabolism of other drugs.      The best guidelines for the consideration for medical marijuana comes from the [employer] of [place].  A publication in their journal, [place] [employer], (Darrion larkin al, CFP, Feb 2008, 64 111-120) discusses best practices for medical marijuana use, which of course is applicable only to [place].  Again, in the [place], it is not appropriate to consider because of our federal guidelines.  The CFP article recommends use of Nabiximols or Nabilone strongly over the raw marijuana plant, and recommends strongly against use of the marijuana plant as the initial product to try.  They continue to recommend strongly against smoking marijuana.  Only three conditions show marijuana performing better than placebo:  Chronic refractory neuropathic pain or end stage cancer pain, spasticity due to multiple sclerosis or spinal cord injury, and post-chemotherapy nausea and vomiting.     Regarding chronic pain secondary to neuropathic pain or end stage cancer pain, and this is as an adjuvant analgesic only.  Again, it is NOT recommended as 1st/2nd line therapy.  The CFP recommends against marijuana for most conditions.  Strong recommendations are against use for acute pain, headache, and rheumatologic pain.  The CFP goes on to strongly " recommend against marijuana for primary or secondary lines of therapy for neuropathic pain, and continues to state a recommendation against use as monotherapy even as 3rd line.  For refractory neuropathy pain, the CFP requires an intense discussion about the limited benefit and the known risks and harms of marijuana, and this discussion must be documented, and requires trials of at least 3 analgesics prior to use of marijuana.  For cancer pain, the CFP issues a strong recommendation against monotherapy and also 1st/2nd line therapy use, requires the same documented discussion for informed consent, and also continues to recommend a trial of at least 2 analgesics prior to marijuana.  Regarding use for spasticity, the CFP article reflects the same recommendations against marijuana or other derived products as 1st or 2nd line therapies.      In conclusion, at this time, the use of marijuana is illegal in the [place], despite any deregulation that an individual state or [facility] may institute.  The presence of THC on drug screening objectively constitutes marijuana use regardless of source, including CBD, and this would preclude the prescription of controlled substances to persons who have THC in their circulation.      ------

## 2019-08-08 NOTE — PROGRESS NOTES
CHIEF COMPLAINT  F/U bilateral leg and arm pain- patient states that his pain has worsened.     Subjective   Kelby Peters is a 69 y.o. male  who presents to the office for follow-up.He has a history of bilateral arm and leg pain. Reports his pain is worse since last office visit.    Complains of pain in his extremities(legs and arms). Today his pain is 7/10VAS.  Describes his pain as continuous burning. Continues with Hydrocodone 7.5/325 4/day and gabapentin 800 mg 4/day(1 in morning, 1 at lunch and 2 at night). Also continues with Keppra 500 mg BID. His pain can interrupt his sleep. He usually awakens nightly at 0330 and has to take pain medication at this time.  Denies any side effects from the regimen. The regimen helps decrease his pain by 30-40%. Notes improvement in his function and activity.  ADL's by self.     Saw Dr Dougherty(PCP) yesterday. Has been having nausea and dizziness.     Failed Cymbalta.  Failed Horizant($$$) and Gralise ($$$).  Failed Lyrica---pedal edema  History of IDDM.    He did ask about CBD oil.     Extremity Pain    The pain is present in the left hand, right hand, left lower leg and right lower leg. This is a chronic problem. The current episode started more than 1 year ago. The problem occurs constantly. Progression since onset: worse since last office visit. The pain is at a severity of 7/10. The pain is moderate. Associated symptoms include numbness (hands,feet bilaterally). Pertinent negatives include no fever. He has tried oral narcotics and rest (cymbalta--no help) for the symptoms. The treatment provided moderate relief. His past medical history is significant for diabetes.   Knee Pain    Associated symptoms include numbness (hands,feet bilaterally).      PEG Assessment   What number best describes your pain on average in the past week?7  What number best describes how, during the past week, pain has interfered with your enjoyment of life?8  What number best describes how, during  "the past week, pain has interfered with your general activity?  8    The following portions of the patient's history were reviewed and updated as appropriate: allergies, current medications, past family history, past medical history, past social history, past surgical history and problem list.    Review of Systems   Constitutional: Positive for activity change (decreased) and fatigue. Negative for chills and fever.   Respiratory: Positive for shortness of breath. Negative for apnea, cough and chest tightness.    Cardiovascular: Negative for chest pain.   Gastrointestinal: Positive for abdominal pain, constipation, diarrhea and nausea. Negative for vomiting.   Genitourinary: Negative for difficulty urinating, dysuria and flank pain.   Musculoskeletal:        Arm and leg pain   Neurological: Positive for dizziness, weakness (legs bilaterally), light-headedness (\"balance not good\"), numbness (hands,feet bilaterally) and headaches.   Psychiatric/Behavioral: Positive for agitation. Negative for confusion, hallucinations, self-injury, sleep disturbance and suicidal ideas. The patient is not nervous/anxious.        Vitals:    08/08/19 0931   BP: 147/84   Pulse: 71   Resp: 16   Temp: 98.4 °F (36.9 °C)   SpO2: 96%   Weight: 93.9 kg (207 lb)   Height: 170.2 cm (67\")   PainSc:   7   PainLoc: Generalized     Objective   Physical Exam   Constitutional: He is oriented to person, place, and time. Vital signs are normal. He appears well-developed and well-nourished. He is cooperative.   HENT:   Head: Normocephalic and atraumatic.   Nose: Nose normal.   Eyes: Conjunctivae and lids are normal.   Cardiovascular: Normal rate.   Pulmonary/Chest: Effort normal.   Abdominal: Normal appearance.   Musculoskeletal:        Right knee: He exhibits decreased range of motion and effusion. Tenderness found. Medial joint line tenderness noted.   Dysthesia of BUE and BLE.    Neurological: He is alert and oriented to person, place, and time. Gait " normal.   Skin: Skin is warm, dry and intact.   Psychiatric: He has a normal mood and affect. His speech is normal and behavior is normal. Judgment and thought content normal. Cognition and memory are normal.   Nursing note and vitals reviewed.    Assessment/Plan   Kelby was seen today for leg pain.    Diagnoses and all orders for this visit:    Other chronic pain    Autonomic neuropathy    Polyneuropathy    Encounter for long-term (current) use of high-risk medication    Other orders  -     HYDROcodone-acetaminophen (NORCO) 7.5-325 MG per tablet; Take 1 tablet by mouth Every 6 (Six) Hours As Needed for Moderate Pain .      --- The urine drug screen confirmation from 6-11-19 has been reviewed and the result is APPROPRIATE based on patient history and HOME report  --- Discussed taking gabapentin at bedtime and then again at 0330 if needed.  --- Refill Hydrocodone DNF until 8-15-19. Patient appears stable with current regimen. No adverse effects. Regarding continuation of opioids, there is no evidence of aberrant behavior or any red flags.  The patient continues with appropriate response to opioid therapy. ADL's remain intact by self.   --- Follow-up 2 months or sooner if needed       HOME REPORT    As part of the patient's treatment plan, I am prescribing controlled substances. The patient has been made aware of appropriate use of such medications, including potential risk of somnolence, limited ability to drive and/or work safely, and the potential for dependence or overdose. It has also bee made clear that these medications are for use by this patient only, without concomitant use of alcohol or other substances unless prescribed.     Patient has completed prescribing agreement detailing terms of continued prescribing of controlled substances, including monitoring HOME reports, urine drug screening, and pill counts if necessary. The patient is aware that inappropriate use will results in cessation of  prescribing such medications.    HOME report has been reviewed and scanned into the patient's chart.    As the clinician, I personally reviewed the HOME from 8-7-19 while the patient was in the office today.    History and physical exam exhibit continued safe and appropriate use of controlled substances.      EMR Dragon/Transcription disclaimer:   Much of this encounter note is an electronic transcription/translation of spoken language to printed text. The electronic translation of spoken language may permit erroneous, or at times, nonsensical words or phrases to be inadvertently transcribed; Although I have reviewed the note for such errors, some may still exist.

## 2019-08-21 ENCOUNTER — HOSPITAL ENCOUNTER (OUTPATIENT)
Dept: MRI IMAGING | Facility: HOSPITAL | Age: 69
Discharge: HOME | End: 2019-08-21
Admitting: FAMILY MEDICINE

## 2019-08-21 DIAGNOSIS — R42 VERTIGO: ICD-10-CM

## 2019-08-21 DIAGNOSIS — R27.0 ATAXIA: ICD-10-CM

## 2019-08-21 DIAGNOSIS — R11.0 NAUSEATED: ICD-10-CM

## 2019-08-21 DIAGNOSIS — I67.1 CEREBRAL ANEURYSM: ICD-10-CM

## 2019-08-21 DIAGNOSIS — G90.9 AUTONOMIC NEUROPATHY: ICD-10-CM

## 2019-08-21 PROCEDURE — 70544 MR ANGIOGRAPHY HEAD W/O DYE: CPT

## 2019-09-17 RX ORDER — HYDROCODONE BITARTRATE AND ACETAMINOPHEN 7.5; 325 MG/1; MG/1
1 TABLET ORAL EVERY 6 HOURS PRN
Qty: 120 TABLET | Refills: 0 | Status: SHIPPED | OUTPATIENT
Start: 2019-09-17 | End: 2019-10-08 | Stop reason: SDUPTHER

## 2019-09-25 RX ORDER — GABAPENTIN 800 MG/1
TABLET ORAL
Qty: 120 TABLET | Refills: 2 | Status: SHIPPED | OUTPATIENT
Start: 2019-09-25 | End: 2019-12-23

## 2019-10-08 ENCOUNTER — OFFICE VISIT (OUTPATIENT)
Dept: PAIN MEDICINE | Facility: CLINIC | Age: 69
End: 2019-10-08

## 2019-10-08 VITALS
HEART RATE: 66 BPM | RESPIRATION RATE: 20 BRPM | TEMPERATURE: 97.2 F | OXYGEN SATURATION: 96 % | SYSTOLIC BLOOD PRESSURE: 175 MMHG | WEIGHT: 209.2 LBS | BODY MASS INDEX: 32.83 KG/M2 | DIASTOLIC BLOOD PRESSURE: 91 MMHG | HEIGHT: 67 IN

## 2019-10-08 DIAGNOSIS — G62.9 POLYNEUROPATHY: ICD-10-CM

## 2019-10-08 DIAGNOSIS — Z79.899 ENCOUNTER FOR LONG-TERM (CURRENT) USE OF HIGH-RISK MEDICATION: ICD-10-CM

## 2019-10-08 DIAGNOSIS — G89.29 OTHER CHRONIC PAIN: Primary | ICD-10-CM

## 2019-10-08 PROCEDURE — 99214 OFFICE O/P EST MOD 30 MIN: CPT | Performed by: NURSE PRACTITIONER

## 2019-10-08 RX ORDER — HYDROCODONE BITARTRATE AND ACETAMINOPHEN 7.5; 325 MG/1; MG/1
1 TABLET ORAL EVERY 6 HOURS PRN
Qty: 120 TABLET | Refills: 0 | Status: SHIPPED | OUTPATIENT
Start: 2019-10-08 | End: 2019-11-14 | Stop reason: SDUPTHER

## 2019-10-08 NOTE — PROGRESS NOTES
CHIEF COMPLAINT  F/U bilateral leg and arm pain- patient states that his pain has worsened since last ov. Pt sts a week ago, left ankle felt like it was on fire, he was experiencing severe pain, it lasted about 2 days then went away. Pt sts norco and gabapentin temporarily numbs the pain.      Subjective   Kelby Peters is a 69 y.o. male  who presents to the office for follow-up.He has a history of bilateral arm and leg pain.    Complains of pain in his extremities(legs and arms). Today his pain is 6/10VAS.  Continues with Hydrocodone 7.5/325 4/day and gabapentin 800 mg 4/day(1 in morning, 1 at lunch and 2 at night). Also continues with Keppra 500 mg BID. Denies any side effects from the regimen. The regimen helps decrease his pain by 30-40%. Notes improvement in his function and activity.  ADL's by self.      Failed Cymbalta.  Failed Horizant($$$) and Gralise ($$$).  Failed Lyrica---pedal edema  History of IDDM.    Extremity Pain    The pain is present in the left hand, right hand, left lower leg and right lower leg. This is a chronic problem. The current episode started more than 1 year ago. The problem occurs constantly. Progression since onset: worse since last office visit. The pain is at a severity of 6/10. The pain is moderate. Associated symptoms include numbness (hands,feet bilaterally). Pertinent negatives include no fever. He has tried oral narcotics and rest (cymbalta--no help) for the symptoms. The treatment provided moderate relief. His past medical history is significant for diabetes.     PEG Assessment   What number best describes your pain on average in the past week?7  What number best describes how, during the past week, pain has interfered with your enjoyment of life?6  What number best describes how, during the past week, pain has interfered with your general activity?  7      The following portions of the patient's history were reviewed and updated as appropriate: allergies, current medications,  "past family history, past medical history, past social history, past surgical history and problem list.    Review of Systems   Constitutional: Positive for fatigue (occ). Negative for activity change, chills and fever.   Respiratory: Negative for apnea, cough, chest tightness and shortness of breath.    Cardiovascular: Negative for chest pain.   Gastrointestinal: Positive for nausea (on zofran). Negative for abdominal pain, constipation, diarrhea and vomiting.   Genitourinary: Negative for difficulty urinating, dysuria and flank pain.   Musculoskeletal: Positive for arthralgias (bilat leg and arms).        Arm and leg pain   Allergic/Immunologic: Negative for immunocompromised state.   Neurological: Positive for dizziness (pt sts lack of balance), weakness (legs bilaterally), numbness (hands,feet bilaterally) and headaches (occ). Negative for light-headedness (\"balance not good\").   Psychiatric/Behavioral: Positive for agitation and sleep disturbance (occ). Negative for confusion, hallucinations, self-injury and suicidal ideas. The patient is not nervous/anxious.        Vitals:    10/08/19 0922   BP: 175/91  Comment: pt sts had bp meds today   Pulse: 66   Resp: 20   Temp: 97.2 °F (36.2 °C)   SpO2: 96%   Weight: 94.9 kg (209 lb 3.2 oz)   Height: 170.2 cm (67\")   PainSc:   6   PainLoc: Leg  Comment: bilat       Objective   Physical Exam   Constitutional: He is oriented to person, place, and time. He appears well-developed and well-nourished. No distress.   HENT:   Head: Normocephalic and atraumatic.   Eyes: Conjunctivae and EOM are normal.   Neck: Neck supple.   Cardiovascular: Normal rate.   Pulmonary/Chest: Effort normal. No respiratory distress.   Neurological: He is alert and oriented to person, place, and time. No sensory deficit. Gait normal.   Skin: Skin is warm and dry. He is not diaphoretic.   Psychiatric: He has a normal mood and affect. His behavior is normal.   Nursing note and vitals " reviewed.    Assessment/Plan   Kelby was seen today for leg pain and arm pain.    Diagnoses and all orders for this visit:    Other chronic pain    Polyneuropathy    Encounter for long-term (current) use of high-risk medication      --- Refill Hydrocodone. Patient appears stable with current regimen. No adverse effects. Regarding continuation of opioids, there is no evidence of aberrant behavior or any red flags.  The patient continues with appropriate response to opioid therapy. ADL's remain intact by self.   --- The urine drug screen confirmation from 6/11/19 has been reviewed and the result is appropriate based on patient history and HOME report  --- Compounded pain cream   --- Follow-up 2 months        HOME REPORT  As part of the patient's treatment plan, I am prescribing controlled substances. The patient has been made aware of appropriate use of such medications, including potential risk of somnolence, limited ability to drive and/or work safely, and the potential for dependence or overdose. It has also bee made clear that these medications are for use by this patient only, without concomitant use of alcohol or other substances unless prescribed.     Patient has completed prescribing agreement detailing terms of continued prescribing of controlled substances, including monitoring HOME reports, urine drug screening, and pill counts if necessary. The patient is aware that inappropriate use will results in cessation of prescribing such medications.    HOME report has been reviewed and scanned into the patient's chart.    As the clinician, I personally reviewed the HOME from 10/8/19 while the patient was in the office today.    History and physical exam exhibit continued safe and appropriate use of controlled substances.    EMR Dragon/Transcription disclaimer:   Much of this encounter note is an electronic transcription/translation of spoken language to printed text. The electronic translation of spoken  language may permit erroneous, or at times, nonsensical words or phrases to be inadvertently transcribed; Although I have reviewed the note for such errors, some may still exist.    INITIAL VISIT WITH ABEBA ABURTO

## 2019-11-14 NOTE — TELEPHONE ENCOUNTER
Medication Refill Request    Date of phone call: 19    Medication being requested: hydro/apap 7.5-325 si tab q 6 hrs  Qty: 120    Date of last visit: 10/8/19    Date of last refill: 10/18/19    HOME up to date?: yes    Next Follow up?: 19    Any new pertinent information? (i.e, new medication allergies, new use of medications, change in patient's health or condition, non-compliance or inconsistency with prescribing agreement?):

## 2019-11-15 RX ORDER — HYDROCODONE BITARTRATE AND ACETAMINOPHEN 7.5; 325 MG/1; MG/1
1 TABLET ORAL EVERY 6 HOURS PRN
Qty: 120 TABLET | Refills: 0 | Status: SHIPPED | OUTPATIENT
Start: 2019-11-15 | End: 2019-12-11 | Stop reason: SDUPTHER

## 2019-11-22 NOTE — TELEPHONE ENCOUNTER
Medication Refill Request    Date of phone call: 19    Medication being requested: Gabapentin 800 mg   si tab po four times a day  Qty: 120    Date of last visit: 10/8/19    Date of last refill: 10/28/19    HOME up to date?: yes    Next Follow up?: 19    Any new pertinent information? (i.e, new medication allergies, new use of medications, change in patient's health or condition, non-compliance or inconsistency with prescribing agreement?): Patient states that he is going out of town on Monday until Saturday and will be out of meds while gone so he would like to know if he can get his refill early

## 2019-11-25 RX ORDER — GABAPENTIN 800 MG/1
800 TABLET ORAL 4 TIMES DAILY
Qty: 120 TABLET | Refills: 2 | OUTPATIENT
Start: 2019-11-25

## 2019-12-04 ENCOUNTER — OFFICE VISIT (OUTPATIENT)
Dept: PAIN MEDICINE | Facility: CLINIC | Age: 69
End: 2019-12-04

## 2019-12-04 VITALS
RESPIRATION RATE: 16 BRPM | OXYGEN SATURATION: 95 % | HEART RATE: 66 BPM | SYSTOLIC BLOOD PRESSURE: 182 MMHG | BODY MASS INDEX: 32.93 KG/M2 | TEMPERATURE: 98.6 F | HEIGHT: 67 IN | WEIGHT: 209.8 LBS | DIASTOLIC BLOOD PRESSURE: 77 MMHG

## 2019-12-04 DIAGNOSIS — G62.9 POLYNEUROPATHY: ICD-10-CM

## 2019-12-04 DIAGNOSIS — G89.29 OTHER CHRONIC PAIN: Primary | ICD-10-CM

## 2019-12-04 DIAGNOSIS — Z79.899 ENCOUNTER FOR LONG-TERM (CURRENT) USE OF HIGH-RISK MEDICATION: ICD-10-CM

## 2019-12-04 PROCEDURE — 99213 OFFICE O/P EST LOW 20 MIN: CPT | Performed by: NURSE PRACTITIONER

## 2019-12-04 NOTE — PROGRESS NOTES
CHIEF COMPLAINT  F/U bilateral leg and arm pain- patient states that his pain has remained the same since his last visit.     Subjective   Kelby Peters is a 69 y.o. male  who presents to the office for follow-up.He has a history of extremity pain, neuropathy.    Complains of pain in his extremities(legs and arms). Today his pain is 6/10VAS.  Continues with Hydrocodone 7.5/325 4/day and gabapentin 800 mg 4/day(1 in morning, 1 at lunch and 2 at night). Also continues with Keppra 500 mg BID. Denies any side effects from the regimen. The regimen helps decrease his pain by 30-40%. Notes improvement in his function and activity.  ADL's by self.      Failed Cymbalta.  Failed Horizant($$$) and Gralise ($$$).  Failed Lyrica---pedal edema  History of IDDM.    Extremity Pain    The pain is present in the left hand, right hand, left lower leg and right lower leg. This is a chronic problem. The current episode started more than 1 year ago. The problem occurs constantly. Progression since onset: worse since last office visit. The pain is at a severity of 6/10. The pain is moderate. Associated symptoms include numbness (hands,feet bilaterally). Pertinent negatives include no fever. He has tried oral narcotics and rest (cymbalta--no help) for the symptoms. The treatment provided moderate relief. His past medical history is significant for diabetes.      PEG Assessment   What number best describes your pain on average in the past week?7  What number best describes how, during the past week, pain has interfered with your enjoyment of life?8  What number best describes how, during the past week, pain has interfered with your general activity?  7      The following portions of the patient's history were reviewed and updated as appropriate: allergies, current medications, past family history, past medical history, past social history, past surgical history and problem list.    Review of Systems   Constitutional: Positive for fatigue  "(occ). Negative for activity change, chills and fever.   Respiratory: Negative for apnea, cough, chest tightness and shortness of breath.    Cardiovascular: Negative for chest pain.   Gastrointestinal: Positive for nausea (on zofran). Negative for abdominal pain, constipation, diarrhea and vomiting.   Genitourinary: Negative for difficulty urinating, dysuria and flank pain.   Musculoskeletal: Positive for arthralgias (bilat leg and arms).        Arm and leg pain   Allergic/Immunologic: Negative for immunocompromised state.   Neurological: Positive for dizziness (pt sts lack of balance), weakness (legs bilaterally) and numbness (hands,feet bilaterally). Negative for light-headedness (\"balance not good\") and headaches (occ).   Psychiatric/Behavioral: Positive for agitation and sleep disturbance (occ). Negative for confusion, hallucinations, self-injury and suicidal ideas. The patient is nervous/anxious.        Vitals:    12/04/19 0939   BP: (!) 182/77   Pulse: 66   Resp: 16   Temp: 98.6 °F (37 °C)   SpO2: 95%   Weight: 95.2 kg (209 lb 12.8 oz)   Height: 170.2 cm (67\")   PainSc:   6   PainLoc: Arm  Comment: bilateral     Objective   Physical Exam   Constitutional: He is oriented to person, place, and time. He appears well-developed and well-nourished. No distress.   HENT:   Head: Normocephalic and atraumatic.   Eyes: Conjunctivae and EOM are normal.   Neck: Neck supple.   Cardiovascular: Normal rate.   Pulmonary/Chest: Effort normal. No respiratory distress.   Neurological: He is alert and oriented to person, place, and time. No sensory deficit. Gait normal.   Skin: Skin is warm and dry. He is not diaphoretic.   Psychiatric: He has a normal mood and affect. His behavior is normal.   Nursing note and vitals reviewed.    Assessment/Plan   Kelby was seen today for leg pain.    Diagnoses and all orders for this visit:    Other chronic pain    Polyneuropathy    Encounter for long-term (current) use of high-risk " medication      --- Continue Hydrocodone. Patient appears stable with current regimen. No adverse effects. Regarding continuation of opioids, there is no evidence of aberrant behavior or any red flags.  The patient continues with appropriate response to opioid therapy. ADL's remain intact by self.   --- The urine drug screen confirmation from 6/11/19 has been reviewed and the result is appropriate based on patient history and HOME report  --- Follow-up 2 months        HOME REPORT    As part of the patient's treatment plan, I am prescribing controlled substances. The patient has been made aware of appropriate use of such medications, including potential risk of somnolence, limited ability to drive and/or work safely, and the potential for dependence or overdose. It has also bee made clear that these medications are for use by this patient only, without concomitant use of alcohol or other substances unless prescribed.     Patient has completed prescribing agreement detailing terms of continued prescribing of controlled substances, including monitoring HOME reports, urine drug screening, and pill counts if necessary. The patient is aware that inappropriate use will results in cessation of prescribing such medications.    HOME report has been reviewed and scanned into the patient's chart.    As the clinician, I personally reviewed the HOME from 12/4/19 while the patient was in the office today.    History and physical exam exhibit continued safe and appropriate use of controlled substances.    EMR Dragon/Transcription disclaimer:   Much of this encounter note is an electronic transcription/translation of spoken language to printed text. The electronic translation of spoken language may permit erroneous, or at times, nonsensical words or phrases to be inadvertently transcribed; Although I have reviewed the note for such errors, some may still exist.

## 2019-12-11 RX ORDER — HYDROCODONE BITARTRATE AND ACETAMINOPHEN 7.5; 325 MG/1; MG/1
1 TABLET ORAL EVERY 6 HOURS PRN
Qty: 120 TABLET | Refills: 0 | Status: SHIPPED | OUTPATIENT
Start: 2019-12-11 | End: 2020-01-14 | Stop reason: SDUPTHER

## 2019-12-11 NOTE — TELEPHONE ENCOUNTER
Medication Refill Request    Date of phone call: 19    Medication being requested: norco 7.5/325mg si tab po q 6 hours prn   Qty: 120    Date of last visit: 19    Date of last refill: 19    HOME up to date?: yes    Next Follow up?: 2/3/20    Any new pertinent information? (i.e, new medication allergies, new use of medications, change in patient's health or condition, non-compliance or inconsistency with prescribing agreement?):

## 2019-12-23 RX ORDER — GABAPENTIN 800 MG/1
TABLET ORAL
Qty: 120 TABLET | Refills: 5 | Status: SHIPPED | OUTPATIENT
Start: 2019-12-23 | End: 2020-04-22 | Stop reason: SDUPTHER

## 2020-01-14 RX ORDER — HYDROCODONE BITARTRATE AND ACETAMINOPHEN 7.5; 325 MG/1; MG/1
1 TABLET ORAL EVERY 6 HOURS PRN
Qty: 120 TABLET | Refills: 0 | Status: SHIPPED | OUTPATIENT
Start: 2020-01-14 | End: 2020-02-21 | Stop reason: SDUPTHER

## 2020-01-14 NOTE — TELEPHONE ENCOUNTER
Medication Refill Request    Date of phone call: 20    Medication being requested: Norco 7.5-325 mg    si tab po q 6 hrs prn  Qty: 120    Date of last visit: 19    Date of last refill: 19    HOME up to date?: yes    Next Follow up?: 2/3/20    Any new pertinent information? (i.e, new medication allergies, new use of medications, change in patient's health or condition, non-compliance or inconsistency with prescribing agreement?):

## 2020-01-28 RX ORDER — LEVETIRACETAM 500 MG/1
TABLET ORAL
Qty: 60 TABLET | Refills: 5 | Status: SHIPPED | OUTPATIENT
Start: 2020-01-28 | End: 2020-07-30

## 2020-02-03 ENCOUNTER — OFFICE VISIT (OUTPATIENT)
Dept: PAIN MEDICINE | Facility: CLINIC | Age: 70
End: 2020-02-03

## 2020-02-03 VITALS
HEART RATE: 69 BPM | HEIGHT: 67 IN | BODY MASS INDEX: 33.27 KG/M2 | SYSTOLIC BLOOD PRESSURE: 174 MMHG | DIASTOLIC BLOOD PRESSURE: 88 MMHG | TEMPERATURE: 98.4 F | RESPIRATION RATE: 18 BRPM | WEIGHT: 212 LBS

## 2020-02-03 DIAGNOSIS — G62.9 POLYNEUROPATHY: ICD-10-CM

## 2020-02-03 DIAGNOSIS — Z79.899 ENCOUNTER FOR LONG-TERM (CURRENT) USE OF HIGH-RISK MEDICATION: ICD-10-CM

## 2020-02-03 DIAGNOSIS — G89.29 OTHER CHRONIC PAIN: Primary | ICD-10-CM

## 2020-02-03 PROCEDURE — 80305 DRUG TEST PRSMV DIR OPT OBS: CPT | Performed by: NURSE PRACTITIONER

## 2020-02-03 PROCEDURE — 99213 OFFICE O/P EST LOW 20 MIN: CPT | Performed by: NURSE PRACTITIONER

## 2020-02-03 NOTE — PROGRESS NOTES
CHIEF COMPLAINT  Follow-up for leg pain. Mr. Peters states that his leg pain has increased some since his last appt.    Subjective   Kelby Peters is a 69 y.o. male  who presents to the office for follow-up.He has a history of extremity pain, neuropathy.    Complains of pain in his extremities(legs and arms). Today his pain is 6/10VAS.  Continues with Hydrocodone 7.5/325 4/day and gabapentin 800 mg 4/day(1 in morning, 1 at lunch and 2 at night). Also continues with Keppra 500 mg BID. Denies any side effects from the regimen. The regimen helps decrease his pain by 30-40%. Notes improvement in his function and activity.  ADL's by self.      Failed Cymbalta.  Failed Horizant($$$) and Gralise ($$$).  Failed Lyrica---pedal edema  History of IDDM.    Extremity Pain    The pain is present in the left hand, right hand, left lower leg and right lower leg. This is a chronic problem. The current episode started more than 1 year ago. The problem occurs constantly. Progression since onset: worse since last office visit. The pain is at a severity of 6/10. The pain is moderate. Associated symptoms include numbness. Pertinent negatives include no fever. He has tried oral narcotics and rest (cymbalta--no help) for the symptoms. The treatment provided moderate relief. His past medical history is significant for diabetes.     PEG Assessment   What number best describes your pain on average in the past week?7  What number best describes how, during the past week, pain has interfered with your enjoyment of life?7  What number best describes how, during the past week, pain has interfered with your general activity?  8    The following portions of the patient's history were reviewed and updated as appropriate: allergies, current medications, past family history, past medical history, past social history, past surgical history and problem list.    Review of Systems   Constitutional: Positive for fatigue. Negative for fever.   HENT: Positive  "for congestion.    Eyes: Positive for visual disturbance.   Respiratory: Positive for cough. Negative for shortness of breath and wheezing.    Cardiovascular: Negative.    Gastrointestinal: Negative for constipation and diarrhea.   Genitourinary: Negative for difficulty urinating.   Musculoskeletal: Negative for joint swelling.   Neurological: Positive for numbness. Negative for weakness.   Psychiatric/Behavioral: Negative for sleep disturbance and suicidal ideas. The patient is not nervous/anxious.      Vitals:    02/03/20 0959   BP: 174/88   Pulse: 69   Resp: 18   Temp: 98.4 °F (36.9 °C)   Weight: 96.2 kg (212 lb)   Height: 170.2 cm (67\")   PainSc:   6   PainLoc: Leg     Objective   Physical Exam   Constitutional: He is oriented to person, place, and time. He appears well-developed and well-nourished. No distress.   HENT:   Head: Normocephalic and atraumatic.   Eyes: Conjunctivae and EOM are normal.   Neck: Neck supple.   Cardiovascular: Normal rate.   Pulmonary/Chest: Effort normal. No respiratory distress.   Neurological: He is alert and oriented to person, place, and time. No sensory deficit. Gait normal.   Skin: Skin is warm and dry. He is not diaphoretic.   Psychiatric: He has a normal mood and affect. His behavior is normal.   Nursing note and vitals reviewed.    Assessment/Plan   Kelby was seen today for leg pain.    Diagnoses and all orders for this visit:    Other chronic pain    Polyneuropathy    Encounter for long-term (current) use of high-risk medication      --- Continue Hydrocodone. Patient appears stable with current regimen. No adverse effects. Regarding continuation of opioids, there is no evidence of aberrant behavior or any red flags.  The patient continues with appropriate response to opioid therapy. ADL's remain intact by self.   --- The urine drug screen confirmation from 6/11/19 has been reviewed and the result is appropriate based on patient history and HOME report  --- Routine UDS in " office today as part of monitoring requirements for controlled substances.  The specimen was viewed and the immunoassay result reviewed and is +OPI.  This specimen will be sent to [facility] for confirmation.     --- Follow-up 2 months        HOME REPORT  As part of the patient's treatment plan, I am prescribing controlled substances. The patient has been made aware of appropriate use of such medications, including potential risk of somnolence, limited ability to drive and/or work safely, and the potential for dependence or overdose. It has also bee made clear that these medications are for use by this patient only, without concomitant use of alcohol or other substances unless prescribed.     Patient has completed prescribing agreement detailing terms of continued prescribing of controlled substances, including monitoring HOME reports, urine drug screening, and pill counts if necessary. The patient is aware that inappropriate use will results in cessation of prescribing such medications.    HOME report has been reviewed and scanned into the patient's chart.    As the clinician, I personally reviewed the HOME from 2/3/2020 while the patient was in the office today.    History and physical exam exhibit continued safe and appropriate use of controlled substances.    EMR Dragon/Transcription disclaimer:   Much of this encounter note is an electronic transcription/translation of spoken language to printed text. The electronic translation of spoken language may permit erroneous, or at times, nonsensical words or phrases to be inadvertently transcribed; Although I have reviewed the note for such errors, some may still exist.

## 2020-02-08 ENCOUNTER — RESULTS ENCOUNTER (OUTPATIENT)
Dept: PAIN MEDICINE | Facility: CLINIC | Age: 70
End: 2020-02-08

## 2020-02-08 DIAGNOSIS — G62.9 POLYNEUROPATHY: ICD-10-CM

## 2020-02-08 DIAGNOSIS — Z79.899 ENCOUNTER FOR LONG-TERM (CURRENT) USE OF HIGH-RISK MEDICATION: ICD-10-CM

## 2020-02-08 DIAGNOSIS — G89.29 OTHER CHRONIC PAIN: ICD-10-CM

## 2020-02-15 DIAGNOSIS — I10 ESSENTIAL HYPERTENSION: ICD-10-CM

## 2020-02-17 RX ORDER — AMLODIPINE BESYLATE 10 MG/1
TABLET ORAL
Qty: 90 TABLET | Refills: 2 | Status: SHIPPED | OUTPATIENT
Start: 2020-02-17 | End: 2021-02-01

## 2020-02-21 NOTE — TELEPHONE ENCOUNTER
Medication Refill Request    Date of phone call: 2020    Medication being requested: hydro/apap 7.5-325 mg si tab q 6 hrs  Qty: 120    Date of last visit: 2/3/2020    Date of last refill: 2020    HOME up to date?: yes    Next Follow up?: 4/3/2020    Any new pertinent information? (i.e, new medication allergies, new use of medications, change in patient's health or condition, non-compliance or inconsistency with prescribing agreement?): Last seen by Miriam

## 2020-02-25 RX ORDER — HYDROCODONE BITARTRATE AND ACETAMINOPHEN 7.5; 325 MG/1; MG/1
1 TABLET ORAL EVERY 6 HOURS PRN
Qty: 120 TABLET | Refills: 0 | Status: SHIPPED | OUTPATIENT
Start: 2020-02-25 | End: 2020-03-23 | Stop reason: SDUPTHER

## 2020-03-23 RX ORDER — HYDROCODONE BITARTRATE AND ACETAMINOPHEN 7.5; 325 MG/1; MG/1
1 TABLET ORAL EVERY 6 HOURS PRN
Qty: 120 TABLET | Refills: 0 | Status: SHIPPED | OUTPATIENT
Start: 2020-03-23 | End: 2020-04-22 | Stop reason: SDUPTHER

## 2020-03-23 NOTE — TELEPHONE ENCOUNTER
.Medication Refill Request    Date of phone call: 3/23/20    Medication being requested: Norco 7.5-325 mg   si tab po q 6 hrs prn  Qty: 120    Date of last visit: 20    Date of last refill: 20    HOME up to date?: yes    Next Follow up?: 4/3/20    Any new pertinent information? (i.e, new medication allergies, new use of medications, change in patient's health or condition, non-compliance or inconsistency with prescribing agreement?): Please refill for colleen

## 2020-04-22 ENCOUNTER — OFFICE VISIT (OUTPATIENT)
Dept: PAIN MEDICINE | Facility: CLINIC | Age: 70
End: 2020-04-22

## 2020-04-22 DIAGNOSIS — G89.29 OTHER CHRONIC PAIN: Primary | ICD-10-CM

## 2020-04-22 DIAGNOSIS — Z79.899 ENCOUNTER FOR LONG-TERM (CURRENT) USE OF HIGH-RISK MEDICATION: ICD-10-CM

## 2020-04-22 DIAGNOSIS — G62.9 POLYNEUROPATHY: ICD-10-CM

## 2020-04-22 PROCEDURE — 99443 PR PHYS/QHP TELEPHONE EVALUATION 21-30 MIN: CPT | Performed by: ANESTHESIOLOGY

## 2020-04-22 RX ORDER — GABAPENTIN 800 MG/1
800 TABLET ORAL 4 TIMES DAILY
Qty: 120 TABLET | Refills: 5 | Status: SHIPPED | OUTPATIENT
Start: 2020-04-22 | End: 2020-11-10

## 2020-04-22 RX ORDER — HYDROCODONE BITARTRATE AND ACETAMINOPHEN 7.5; 325 MG/1; MG/1
1 TABLET ORAL EVERY 6 HOURS PRN
Qty: 120 TABLET | Refills: 0 | Status: SHIPPED | OUTPATIENT
Start: 2020-04-22 | End: 2020-05-21 | Stop reason: SDUPTHER

## 2020-04-22 RX ORDER — HYDROCODONE BITARTRATE AND ACETAMINOPHEN 7.5; 325 MG/1; MG/1
1 TABLET ORAL EVERY 6 HOURS PRN
Qty: 120 TABLET | Refills: 0 | Status: CANCELLED | OUTPATIENT
Start: 2020-04-22

## 2020-04-22 NOTE — PROGRESS NOTES
TELEPHONE VISIT    You have chosen to receive care through a telephone visit. Do you consent to use a telephone visit for your medical care today? Yes      CHIEF COMPLAINT      Subjective   Kelby Peters is a 69 y.o. male  who presents for a telephonic follow-up.He has a history of polyneuropathy.      Extremity Pain    The pain is present in the left hand, right hand, left lower leg and right lower leg. This is a chronic problem. The current episode started more than 1 year ago. The problem occurs constantly. The problem has been unchanged. The pain is moderate. Associated symptoms include numbness. Pertinent negatives include no fever. He has tried oral narcotics and rest (cymbalta--no help) for the symptoms. The treatment provided moderate relief. His past medical history is significant for diabetes.        PEG Assessment   What number best describes your pain on average in the past week?5  What number best describes how, during the past week, pain has interfered with your enjoyment of life?6  What number best describes how, during the past week, pain has interfered with your general activity?  4    --  The aforementioned information the Chief Complaint section and above subjective data including any HPI data has been personally reviewed and affirmed.  --        The following portions of the patient's history were reviewed and updated as appropriate: allergies, current medications, past family history, past medical history, past social history, past surgical history and problem list.    -------    The following portions of the patient's history were reviewed and updated as appropriate: allergies, current medications, past family history, past medical history, past social history, past surgical history and problem list.    Allergies   Allergen Reactions   • Latex Other (See Comments)     Blisters on skin         Current Outpatient Medications:   •  amLODIPine (NORVASC) 10 MG tablet, TAKE ONE TABLET BY MOUTH DAILY,  Disp: 90 tablet, Rfl: 2  •  ciprofloxacin (CIPRO) 500 MG tablet, Take 1 tablet by mouth Every 12 (Twelve) Hours., Disp: 28 tablet, Rfl: 0  •  gabapentin (NEURONTIN) 800 MG tablet, Take 1 tablet by mouth 4 (Four) Times a Day., Disp: 120 tablet, Rfl: 5  •  glucose blood ([employer] BLOOD GLUCOSE TEST) test strip, daily., Disp: , Rfl:   •  HYDROcodone-acetaminophen (NORCO) 7.5-325 MG per tablet, Take 1 tablet by mouth Every 6 (Six) Hours As Needed for Moderate Pain ., Disp: 120 tablet, Rfl: 0  •  insulin NPH (NOVOLIN N RELION) 100 UNIT/ML injection, Inject under the skin. Inject 35 units under skin once daily, Disp: , Rfl:   •  Insulin Pen Needle (B-D ULTRAFINE III SHORT PEN) 31G X 8 MM misc, , Disp: , Rfl:   •  [employer] LANCETS MICRO THIN 33G misc, , Disp: , Rfl:   •  levETIRAcetam (KEPPRA) 500 MG tablet, TAKE ONE TABLET BY MOUTH TWICE A DAY, Disp: 60 tablet, Rfl: 5  •  meloxicam (MOBIC) 15 MG tablet, Take 1 tablet by mouth Daily., Disp: 30 tablet, Rfl: 2  •  omeprazole OTC (PRILOSEC OTC) 20 MG EC tablet, Take  by mouth As Needed., Disp: , Rfl:   •  ondansetron ODT (ZOFRAN-ODT) 4 MG disintegrating tablet, Take 1 tablet by mouth Every 8 (Eight) Hours As Needed for Nausea or Vomiting., Disp: 30 tablet, Rfl: 2    Current Outpatient Medications on File Prior to Visit   Medication Sig Dispense Refill   • amLODIPine (NORVASC) 10 MG tablet TAKE ONE TABLET BY MOUTH DAILY 90 tablet 2   • ciprofloxacin (CIPRO) 500 MG tablet Take 1 tablet by mouth Every 12 (Twelve) Hours. 28 tablet 0   • glucose blood ([employer] BLOOD GLUCOSE TEST) test strip daily.     • insulin NPH (NOVOLIN N RELION) 100 UNIT/ML injection Inject under the skin. Inject 35 units under skin once daily     • Insulin Pen Needle (B-D ULTRAFINE III SHORT PEN) 31G X 8 MM misc      • [employer] LANCETS MICRO THIN 33G misc      • levETIRAcetam (KEPPRA) 500 MG tablet TAKE ONE TABLET BY MOUTH TWICE A DAY 60 tablet 5   • meloxicam (MOBIC) 15 MG tablet Take 1 tablet by mouth Daily. 30  tablet 2   • omeprazole OTC (PRILOSEC OTC) 20 MG EC tablet Take  by mouth As Needed.     • ondansetron ODT (ZOFRAN-ODT) 4 MG disintegrating tablet Take 1 tablet by mouth Every 8 (Eight) Hours As Needed for Nausea or Vomiting. 30 tablet 2   • [DISCONTINUED] gabapentin (NEURONTIN) 800 MG tablet TAKE ONE TABLET BY MOUTH FOUR TIMES A  tablet 5   • [DISCONTINUED] HYDROcodone-acetaminophen (NORCO) 7.5-325 MG per tablet Take 1 tablet by mouth Every 6 (Six) Hours As Needed for Moderate Pain . 120 tablet 0     No current facility-administered medications on file prior to visit.        Patient Active Problem List   Diagnosis   • Autonomic neuropathy   • Chronic pain   • Diabetes mellitus (CMS/HCC)   • Paraparesis (CMS/HCC)   • Neuritis or radiculitis due to rupture of lumbar intervertebral disc   • Muscle pain   • Neck pain   • Pain in thoracic spine   • Polyneuropathy   • Ataxia   • Cholelithiasis   • Dysthymic disorder   • Hypertension   • Syncope and collapse   • Pain in extremity   • Encounter for long-term (current) use of high-risk medication   • Diabetic peripheral neuropathy (CMS/HCC)       Past Medical History:   Diagnosis Date   • Brain injury (CMS/HCC)    • Broken toes    • Diabetes mellitus (CMS/HCC)    • Finger injury    • Gallbladder obstruction    • Hearing disorder    • Hiatal hernia    • Hypercholesterolemia    • Leg injury    • Migraine    • Peripheral neuropathy    • Prostate enlargement    • Seizures (CMS/HCC)    • Sleep apnea        Past Surgical History:   Procedure Laterality Date   • GALLBLADDER SURGERY     • INNER EAR SURGERY         Family History   Problem Relation Age of Onset   • Cancer Mother    • Transient ischemic attack Mother    • Diabetes Father    • Heart disease Father    • COPD Paternal Aunt    • Diabetes Paternal Aunt    • Alzheimer's disease Maternal Grandmother    • Stroke Maternal Grandfather    • Emphysema Paternal Grandfather        Social History     Socioeconomic History    • Marital status:      Spouse name: Not on file   • Number of children: Not on file   • Years of education: Not on file   • Highest education level: Not on file   Tobacco Use   • Smoking status: Former Smoker   • Smokeless tobacco: Never Used   Substance and Sexual Activity   • Alcohol use: Yes     Comment: social drinker   • Drug use: Yes     Types: Marijuana   • Sexual activity: Yes     Partners: Female       -------        Review of Systems   Constitutional: Negative for chills and fever.   HENT: Negative for ear pain, rhinorrhea and sinus pressure.    Eyes: Negative for pain and discharge.   Respiratory: Negative for cough, chest tightness and shortness of breath.    Cardiovascular: Negative for chest pain and palpitations.   Neurological: Positive for numbness.             His e [facility] report is reviewed with a request [id number].  This is a 3 page report.  There is no pertinent data on the third page.  All the prescriptions on this [facility] report are from our practice.  Most recently hydrocodone with acetaminophen was refilled as a quantity of 120 pills is a 30-day supply on March 25.  Gabapentin was refilled on March 30.  The patient consistently uses the same [facility] pharmacy.    [facility] urine drug screen confirmation report is reviewed it was positive appropriately for gabapentin also for hydrocodone and this was on February 3, 2020.        There were no vitals filed for this visit.      Objective   Physical Exam  As this is a telephone check-in, the ability to perform a routine physical exam is extremely limited. The patient seems alert and is oriented appropriately.   On this phone call there is not any evidence of respiratory distress.   The patient seems of normal mood.   The remainder of a routine physical exam is deferred.      Assessment/Plan   Diagnoses and all orders for this visit:    Other chronic pain    Polyneuropathy  -     HYDROcodone-acetaminophen (NORCO) 7.5-325 MG per  tablet; Take 1 tablet by mouth Every 6 (Six) Hours As Needed for Moderate Pain .  -     gabapentin (NEURONTIN) 800 MG tablet; Take 1 tablet by mouth 4 (Four) Times a Day.    Encounter for long-term (current) use of high-risk medication          ----------------      Our practice is offering alternative &/or electronic methods to continue to follow our patients while at the same time further the efforts toward social distancing, in accordance with our organizational policies, professional societies' guidance, and gubernatorial mandates.  I support the Healthy at Home campaign and in this visit I have counseled the patient on our needs to limit in-person office visits and physical encounters with medical facilities whenever possible.  I have also educated the patient on the medical necessities of maintaining social distancing while we continue to function during this crisis period.      The patient was counseled on the need to consider telehealth options. The patient was offered the opportunity for a Video Visit using [employer]. The patient agreed to a Video Visit. The patient had obstacles which preclude consideration for a Video Visit. As a Video Visit was not practical, the patient was offered the option for a Telephone Check-In. The patient agreed to a Telephone Check-In. The patient was educated about our efforts to comply with monitoring standards when prescribing potent medications.    TIME:  Total Time:  24 minutes. Topics discussed:  Neuropathic pain, anti-neuropathic oral medications, comparing gabapentin versus Keppra, discussion about utility of compounded multimodal pain creams, discussion about viruses and social distancing      ----------------      --- Follow-up about 6 wks    --Prescription sent in as above    --He may want to consider a compounded pain cream in the future and it is worth reviewing that topic in considering it once again at the next office visit.       Patient appears stable with  current regimen. No adverse effects. Regarding continuation of opioids, there is no evidence of aberrant behavior or any red flags.  The patient continues with appropriate response to opioid therapy. ADL's remain intact by self.     Discussed with the patient regarding long-term side effects of opioids including but not limited to dependence, addiction, sedation, respiratory depression, opioid induced hormonal suppression, hyperalgesia, and elevated risk of myocardial infarction.          ---    HOME REPORT    As part of the patient's treatment plan, I am prescribing controlled substances. The patient has been made aware of appropriate use of such medications, including potential risk of somnolence, limited ability to drive and/or work safely, and the potential for dependence or overdose. It has also been made clear that these medications are for use by this patient only, without concomitant use of alcohol or other substances unless prescribed.     Patient has completed prescribing agreement detailing terms of continued prescribing of controlled substances, including monitoring HOME reports, urine drug screening, and pill counts if necessary. The patient is aware that inappropriate use will results in cessation of prescribing such medications.    HOME report has been reviewed and scanned into the patient's chart.    As the clinician, I personally reviewed the HOME from as above while the patient was on the telephonic visit today.    History and physical exam exhibit continued safe and appropriate use of controlled substances.    -------    EMR Dragon/Transcription disclaimer:   Much of this encounter note is an electronic transcription/translation of spoken language to printed text. The electronic translation of spoken language may permit erroneous, or at times, nonsensical words or phrases to be inadvertently transcribed; Although I have reviewed the note for such errors, some may still exist.

## 2020-04-22 NOTE — TELEPHONE ENCOUNTER
Medication Refill Request    Date of phone call: 20    Medication being requested: norco 7.5/325mg si tablet by mouth Every 6 (Six) Hours As Needed   Qty: 120    Date of last visit: 2/3/20    Date of last refill: 3/25/20    HOME up to date?: yes    Next Follow up?: 20    Any new pertinent information? (i.e, new medication allergies, new use of medications, change in patient's health or condition, non-compliance or inconsistency with prescribing agreement?):

## 2020-05-21 DIAGNOSIS — G62.9 POLYNEUROPATHY: ICD-10-CM

## 2020-05-21 RX ORDER — HYDROCODONE BITARTRATE AND ACETAMINOPHEN 7.5; 325 MG/1; MG/1
1 TABLET ORAL EVERY 6 HOURS PRN
Qty: 120 TABLET | Refills: 0 | Status: SHIPPED | OUTPATIENT
Start: 2020-05-21 | End: 2020-06-02 | Stop reason: SDUPTHER

## 2020-05-21 NOTE — TELEPHONE ENCOUNTER
Medication Refill Request    Date of phone call: 20    Medication being requested: Norco 7.5-325 mg    si tab po q 6 hrs prn  Qty: 120    Date of last visit: 20    Date of last refill: 20    HOME up to date?: yes    Next Follow up?: 20    Any new pertinent information? (i.e, new medication allergies, new use of medications, change in patient's health or condition, non-compliance or inconsistency with prescribing agreement?):

## 2020-06-02 ENCOUNTER — OFFICE VISIT (OUTPATIENT)
Dept: PAIN MEDICINE | Facility: CLINIC | Age: 70
End: 2020-06-02

## 2020-06-02 VITALS
SYSTOLIC BLOOD PRESSURE: 160 MMHG | WEIGHT: 209.6 LBS | RESPIRATION RATE: 16 BRPM | BODY MASS INDEX: 32.9 KG/M2 | OXYGEN SATURATION: 94 % | HEIGHT: 67 IN | DIASTOLIC BLOOD PRESSURE: 81 MMHG | TEMPERATURE: 97.1 F | HEART RATE: 73 BPM

## 2020-06-02 DIAGNOSIS — Z79.899 ENCOUNTER FOR LONG-TERM (CURRENT) USE OF HIGH-RISK MEDICATION: Primary | ICD-10-CM

## 2020-06-02 DIAGNOSIS — G90.9 AUTONOMIC NEUROPATHY: ICD-10-CM

## 2020-06-02 DIAGNOSIS — G62.9 POLYNEUROPATHY: ICD-10-CM

## 2020-06-02 DIAGNOSIS — G89.29 OTHER CHRONIC PAIN: ICD-10-CM

## 2020-06-02 PROCEDURE — 99214 OFFICE O/P EST MOD 30 MIN: CPT | Performed by: NURSE PRACTITIONER

## 2020-06-02 RX ORDER — HYDROCODONE BITARTRATE AND ACETAMINOPHEN 7.5; 325 MG/1; MG/1
1 TABLET ORAL EVERY 6 HOURS PRN
Qty: 120 TABLET | Refills: 0 | Status: SHIPPED | OUTPATIENT
Start: 2020-06-02 | End: 2020-07-17 | Stop reason: SDUPTHER

## 2020-06-02 NOTE — PROGRESS NOTES
"CHIEF COMPLAINT  F/U leg pain- patient states that his pain has remained the same since his last visit.     Initial evaluation by ABEBA Morales   Kelby Peters is a 69 y.o. male  who presents for follow-up.He has a history of autonomic neuropathy  Today his pain is 7/10VAS in severity. Continues with Hydrocodone 7.5/325 4/day and gabapentin 800 mg 4/day(1 in morning, 1 at lunch and 2 at night). Also continues with Keppra 500 mg BID.  His medication regimen decreases his pain by 80%.  He complains of dry mouth, he denies any other side effects including somnolence.     Recommend use of biotine for dry mouth.     Failed Cymbalta.  Failed Horizant(cost prohibative) and Gralise (cost prohibative).  Failed Lyrica---pedal edema  History of IDDM.    Of note: The patient initially refused to wear a mask appropriately while in the building.  Our office received a call from the , Adalid, who stated the patient gave him trouble regarding the [facility] mask policy upon entry into the building.  The patient then failed to accept a new mask as his ear pieces were broken.  Our , Jo-Ann, spoke with the patient and reviewed the mask policy.  Upon starting this appointment the patient voiced much frustration over the mask policy and the COVID19 pandemic.  He states that what we are doing is socialism and that the virus is not real.  He states \"this used to be called an upper respiratory infection, I probably had this in 2018\".  I informed the patient that we were not here to discuss Covid, but rather his pain.  He proceeded to share his many opinions on the pandemic and I informed him that I was not going to argue with  Him.  He stated that he was not arguing with me and that he is praying for all of the people younger then him.  He also stated that all young people believe that anyone over 50 are not \"worth the oxygen they breathe\". He also asked about the risk of decreased Oxygen and " increase CO2 while wearing a mask, I informed him that this was not a risk. He continued to discuss his beliefs that the virus is not real and that we are being controlled. I informed him that we are fundamentally not going to agree on this topic. We discontinued the visit with no further issues.     Extremity Pain    The pain is present in the left hand, right hand, left lower leg and right lower leg. This is a chronic problem. The current episode started more than 1 year ago. The problem occurs constantly. The problem has been unchanged. The pain is at a severity of 7/10. The pain is moderate. Associated symptoms include numbness. Pertinent negatives include no fever. He has tried oral narcotics and rest (cymbalta--no help) for the symptoms. The treatment provided moderate relief. His past medical history is significant for diabetes.      PEG Assessment   What number best describes your pain on average in the past week?7  What number best describes how, during the past week, pain has interfered with your enjoyment of life?5  What number best describes how, during the past week, pain has interfered with your general activity?  0    The following portions of the patient's history were reviewed and updated as appropriate: allergies, current medications, past family history, past medical history, past social history, past surgical history and problem list.    Review of Systems   Constitutional: Negative for activity change, chills and fever.   HENT: Negative for ear pain, rhinorrhea and sinus pressure.    Eyes: Negative for pain and discharge.   Respiratory: Negative for cough, chest tightness and shortness of breath.    Cardiovascular: Negative for chest pain and palpitations.   Gastrointestinal: Negative for abdominal pain, constipation and diarrhea.   Genitourinary: Negative for difficulty urinating and dysuria.   Neurological: Positive for light-headedness (occ) and numbness. Negative for dizziness, weakness and  "headaches.   Psychiatric/Behavioral: Positive for agitation. Negative for sleep disturbance. The patient is nervous/anxious.        Vitals:    06/02/20 1023   BP: 160/81   Pulse: 73   Resp: 16   Temp: 97.1 °F (36.2 °C)   SpO2: 94%   Weight: 95.1 kg (209 lb 9.6 oz)   Height: 170.2 cm (67\")   PainSc:   7   PainLoc: Leg     Objective   Physical Exam   Constitutional: He is oriented to person, place, and time. Vital signs are normal. He appears well-developed and well-nourished.   HENT:   Head: Normocephalic and atraumatic.   Eyes: EOM and lids are normal.   Neck: Trachea normal.   Cardiovascular: Normal rate and regular rhythm.   Pulmonary/Chest: Effort normal.   Abdominal: Normal appearance.   Neurological: He is alert and oriented to person, place, and time.   Skin: Skin is warm, dry and intact.   Psychiatric: His speech is normal. Judgment normal. His affect is angry. He is agitated.   Nursing note and vitals reviewed.    Assessment/Plan   Kelby was seen today for leg pain.    Diagnoses and all orders for this visit:    Encounter for long-term (current) use of high-risk medication    Other chronic pain    Autonomic neuropathy    Polyneuropathy      --- Trial OTC biotine for dry mouth  --- The urine drug screen confirmation from 2/3/2020 has been reviewed and the result is appropriate based on patient history and HOME report  --- Refill Norco 7.5/325. DNF 6/21/2020 applied. Patient appears stable with current regimen. No adverse effects. Regarding continuation of opioids, there is no evidence of aberrant behavior or any red flags.  The patient continues with appropriate response to opioid therapy. ADL's remain intact by self.   --- Follow-up 2 months or sooner if needed     HOME REPORT    As part of the patient's treatment plan, I am prescribing controlled substances. The patient has been made aware of appropriate use of such medications, including potential risk of somnolence, limited ability to drive and/or " work safely, and the potential for dependence or overdose. It has also bee made clear that these medications are for use by this patient only, without concomitant use of alcohol or other substances unless prescribed.     Patient has completed prescribing agreement detailing terms of continued prescribing of controlled substances, including monitoring HOME reports, urine drug screening, and pill counts if necessary. The patient is aware that inappropriate use will results in cessation of prescribing such medications.    HOME report has been reviewed and scanned into the patient's chart.    As the clinician, I personally reviewed the HOME from 6/1/2020.    History and physical exam exhibit continued safe and appropriate use of controlled substances.    EMR Dragon/Transcription disclaimer:   Much of this encounter note is an electronic transcription/translation of spoken language to printed text. The electronic translation of spoken language may permit erroneous, or at times, nonsensical words or phrases to be inadvertently transcribed; Although I have reviewed the note for such errors, some may still exist.

## 2020-06-25 ENCOUNTER — TELEPHONE (OUTPATIENT)
Dept: PAIN MEDICINE | Facility: CLINIC | Age: 70
End: 2020-06-25

## 2020-06-25 ENCOUNTER — CLINICAL SUPPORT (OUTPATIENT)
Dept: PAIN MEDICINE | Facility: CLINIC | Age: 70
End: 2020-06-25

## 2020-06-25 ENCOUNTER — RESULTS ENCOUNTER (OUTPATIENT)
Dept: PAIN MEDICINE | Facility: CLINIC | Age: 70
End: 2020-06-25

## 2020-06-25 DIAGNOSIS — G89.29 OTHER CHRONIC PAIN: ICD-10-CM

## 2020-06-25 DIAGNOSIS — G89.29 OTHER CHRONIC PAIN: Primary | ICD-10-CM

## 2020-06-25 PROCEDURE — 80305 DRUG TEST PRSMV DIR OPT OBS: CPT | Performed by: NURSE PRACTITIONER

## 2020-06-25 NOTE — PROGRESS NOTES
"Patient came in for UDS and pill count. His UDS was positive for OPI.    He brought in a bottle with Hydrocodone-Acetaminophen 7.5-325 mg from [facility]. It was filled on 6/21/20 for a qty of 120. The pills matched description of oval, white with M366 marked on the tablets. He had a qty of 142. He stated that some days he takes less than prescribed so he has extras. He also brought in his gabapentin 800 mg that was filled on 5/24/20 for a qty of 120. He had a total of 18.     During the visit he asked why he was being called in for a count because he has never been called in. I told him that pill counts are random. Her stated \"No there not. I know I got called in because I argued with that young girl. I'm going to speak my mind and she didn't like that. She is a little snot and very unprofessional. She knows she has the power\". I told him that I was sorry that he felt that way but when he signed the contract he agreed to come in for random UDS and pill counts. He stated that he signed the contract so long ago that he doesn't remember that and asked for a copy of the controlled substance agreement. I gave him a copy to take home.   "

## 2020-06-25 NOTE — TELEPHONE ENCOUNTER
----- Message from ABEBA Sheppard sent at 6/2/2020 12:36 PM EDT -----  Regarding: UDS/Pill Count  Please bring this patient in for a random UDS and pill count.       Patient will be in today for a pill count.

## 2020-06-29 NOTE — PROGRESS NOTES
Jo-Ann, Please see patient's behavior during last office visit per my note and during UDS/pill count.    Cecilia, you're seeing this patient next. UDS appropriate, but he is consistently negative for hydromorphone...may want to consider a PGT? With his surplus of medication is appears his next refill date would not be until 7/26/2020. Thanks.

## 2020-07-17 DIAGNOSIS — G62.9 POLYNEUROPATHY: ICD-10-CM

## 2020-07-17 NOTE — TELEPHONE ENCOUNTER
Medication Refill Request    Date of phone call: 2020    Medication being requested: hydro/apap 7.5-325 mg si tab q 6 hrs prn  Qty: 120    Date of last visit: 2020    Date of last refill: 2020    HOME up to date?: yes    Next Follow up?: 8/3/2020    Any new pertinent information? (i.e, new medication allergies, new use of medications, change in patient's health or condition, non-compliance or inconsistency with prescribing agreement?):

## 2020-07-18 RX ORDER — HYDROCODONE BITARTRATE AND ACETAMINOPHEN 7.5; 325 MG/1; MG/1
1 TABLET ORAL EVERY 6 HOURS PRN
Qty: 120 TABLET | Refills: 0 | Status: SHIPPED | OUTPATIENT
Start: 2020-07-20 | End: 2020-08-19 | Stop reason: SDUPTHER

## 2020-07-27 ENCOUNTER — TELEPHONE (OUTPATIENT)
Dept: SPORTS MEDICINE | Facility: CLINIC | Age: 70
End: 2020-07-27

## 2020-07-27 DIAGNOSIS — N41.0 ACUTE PROSTATITIS: ICD-10-CM

## 2020-07-27 RX ORDER — CIPROFLOXACIN 500 MG/1
500 TABLET, FILM COATED ORAL EVERY 12 HOURS SCHEDULED
Qty: 40 TABLET | Refills: 0 | Status: SHIPPED | OUTPATIENT
Start: 2020-07-27 | End: 2020-10-01

## 2020-07-27 NOTE — TELEPHONE ENCOUNTER
Patient called in, wanting to know if he can get a refill on the medication you previously gave him for his prostitis. Says his sxs have returned and worsened.     Thanks  bindu

## 2020-07-27 NOTE — TELEPHONE ENCOUNTER
Called to notify patient, no answer when called, left a detailed VM for the patient, and voiced to return call to office with any questions or concerns.     -Yvette

## 2020-07-30 RX ORDER — LEVETIRACETAM 500 MG/1
TABLET ORAL
Qty: 60 TABLET | Refills: 4 | Status: SHIPPED | OUTPATIENT
Start: 2020-07-30 | End: 2020-12-31 | Stop reason: SDUPTHER

## 2020-08-03 ENCOUNTER — OFFICE VISIT (OUTPATIENT)
Dept: PAIN MEDICINE | Facility: CLINIC | Age: 70
End: 2020-08-03

## 2020-08-03 VITALS
HEIGHT: 67 IN | DIASTOLIC BLOOD PRESSURE: 95 MMHG | SYSTOLIC BLOOD PRESSURE: 190 MMHG | HEART RATE: 67 BPM | RESPIRATION RATE: 20 BRPM | OXYGEN SATURATION: 98 % | WEIGHT: 210.6 LBS | BODY MASS INDEX: 33.06 KG/M2 | TEMPERATURE: 98.6 F

## 2020-08-03 DIAGNOSIS — E11.42 DIABETIC PERIPHERAL NEUROPATHY (HCC): ICD-10-CM

## 2020-08-03 DIAGNOSIS — Z79.899 ENCOUNTER FOR LONG-TERM (CURRENT) USE OF HIGH-RISK MEDICATION: ICD-10-CM

## 2020-08-03 DIAGNOSIS — G90.9 AUTONOMIC NEUROPATHY: ICD-10-CM

## 2020-08-03 DIAGNOSIS — G89.29 OTHER CHRONIC PAIN: Primary | ICD-10-CM

## 2020-08-03 PROCEDURE — 99213 OFFICE O/P EST LOW 20 MIN: CPT | Performed by: NURSE PRACTITIONER

## 2020-08-03 NOTE — PROGRESS NOTES
"CHIEF COMPLAINT  F/u extremity pain including left hand, right hand, left lower leg and right lower leg.. Pt sts pain has remained the same since last ov.     Subjective   Kelby Peters is a 70 y.o. male  who presents for follow-up.  He has a history of chronic extremity pain. Reports his pain pattern is relatively unchanged since last evaluation.    Complains of pain in his extremities. Today his pain is 7/10VAS.  Describes the pain as continuous stinging, which becomes tolerable with pain medication. Continues with Hydrocodone 7.5/325 4/day and gabapentin 800 mg 4/day(1 in morning, 1 at lunch and 2 at night). Also continues with Keppra 500 mg BID. His pain can interrupt his sleep.  Denies any side effects from the regimen. The regimen helps decrease his pain by 30-40%. Notes improvement in his function and activity.  ADL's by self.     Reviewed BP with patient. He admits he has only been taking Amlodipine 2.5 mg daily. Was previously prescribed 10 mg but then was decreased to 5 mg/day per PCP.    Reports his blood sugars are running high.    Failed Cymbalta.  Failed Horizant($$$) and Gralise ($$$).  Failed Lyrica---pedal edema  History of IDDM.    Patient \"bonked head\" when he came into office room with RN.    Reviewed last office visit. He reports he was upset. He is compliant with his mask today.  He reports he is concerned he will get legionnaire's disease due to the mask.     Patient remained masked during entire encounter. No cough present. I donned a mask and gloves throughout entire visit. Prior to donning mask and gloves, hand hygiene was performed, as well as when it was doffed.  I was closer than 6 feet, but not for an extended period of time. No obvious exposure to any bodily fluids.    Extremity Pain    The pain is present in the left hand, right hand, left lower leg and right lower leg. This is a chronic problem. The current episode started more than 1 year ago. The problem occurs constantly. " Progression since onset: unchanged since last office visit. The pain is at a severity of 7/10. The pain is moderate. Associated symptoms include numbness (peripheral neuropathy). Pertinent negatives include no fever. He has tried oral narcotics and rest (cymbalta--no help) for the symptoms. The treatment provided moderate relief. His past medical history is significant for diabetes.   Knee Pain    Associated symptoms include numbness (peripheral neuropathy).      PEG Assessment   What number best describes your pain on average in the past week?7  What number best describes how, during the past week, pain has interfered with your enjoyment of life?7  What number best describes how, during the past week, pain has interfered with your general activity?  7    The following portions of the patient's history were reviewed and updated as appropriate: allergies, current medications, past family history, past medical history, past social history, past surgical history and problem list.    Review of Systems   Constitutional: Positive for activity change (dec). Negative for chills, fatigue and fever.   HENT: Negative for congestion, ear pain, rhinorrhea and sinus pressure.    Eyes: Negative for pain, discharge and visual disturbance.   Respiratory: Positive for shortness of breath (occ w exertion). Negative for cough and chest tightness.    Cardiovascular: Negative for chest pain and palpitations.   Gastrointestinal: Positive for abdominal pain (occ), constipation and diarrhea. Negative for nausea (occ) and vomiting.   Genitourinary: Negative for difficulty urinating and dysuria.   Musculoskeletal: Positive for arthralgias (extremity pain). Negative for gait problem.   Allergic/Immunologic: Negative for immunocompromised state.   Neurological: Positive for dizziness (occ), light-headedness (occ) and numbness (peripheral neuropathy). Negative for weakness and headaches.   Psychiatric/Behavioral: Positive for agitation. Negative  "for sleep disturbance and suicidal ideas. The patient is nervous/anxious.        Vitals:    08/03/20 1017 08/03/20 1028   BP: (!) 208/90  Comment: pt sts had bp med this am (!) 190/95   Pulse: 67    Resp: 20    Temp: 98.6 °F (37 °C)    SpO2: 98%    Weight: 95.5 kg (210 lb 9.6 oz)    Height: 170.2 cm (67\")    PainSc:   7    PainLoc: Leg  Comment: extremity pain      Objective   Physical Exam   Constitutional: He is oriented to person, place, and time. Vital signs are normal. He appears well-developed and well-nourished. He is cooperative.   HENT:   Head: Normocephalic and atraumatic.   Nose: Nose normal.   Eyes: Conjunctivae and lids are normal.   Cardiovascular: Normal rate.   Pulmonary/Chest: Effort normal.   Abdominal: Normal appearance.   Musculoskeletal:        Right knee: He exhibits decreased range of motion and effusion. Tenderness found. Medial joint line tenderness noted.   Dysthesia of BUE and BLE.    Neurological: He is alert and oriented to person, place, and time. Gait normal.   Skin: Skin is warm, dry and intact.   Psychiatric: He has a normal mood and affect. His speech is normal and behavior is normal.   Nursing note and vitals reviewed.      Assessment/Plan   Kelby was seen today for leg pain.    Diagnoses and all orders for this visit:    Other chronic pain    Diabetic peripheral neuropathy (CMS/HCC)    Autonomic neuropathy    Encounter for long-term (current) use of high-risk medication      --- The urine drug screen confirmation  from 6-25-20 has been reviewed and the result is APPROPRIATE(multiple lacking of Hydromorphone) based on patient history and HOME report  --- Continue with regimen. Not due for refill. Patient appears stable with current regimen. No adverse effects. Regarding continuation of opioids, there is no evidence of aberrant behavior or any red flags.  The patient continues with appropriate response to opioid therapy. ADL's remain intact by self.   --- Pill count from " 6-25-20--- appropriate  --- Discussed potential for PGT testing. Patient does not want to proceed with this.   --- Follow-up 2 months or sooner if needed.         HOME REPORT    As part of the patient's treatment plan, I am prescribing controlled substances. The patient has been made aware of appropriate use of such medications, including potential risk of somnolence, limited ability to drive and/or work safely, and the potential for dependence or overdose. It has also bee made clear that these medications are for use by this patient only, without concomitant use of alcohol or other substances unless prescribed.     Patient has completed prescribing agreement detailing terms of continued prescribing of controlled substances, including monitoring HOME reports, urine drug screening, and pill counts if necessary. The patient is aware that inappropriate use will results in cessation of prescribing such medications.    HOME report has been reviewed and scanned into the patient's chart.    As the clinician, I personally reviewed the HOME from 7-31-20 .    History and physical exam exhibit continued safe and appropriate use of controlled substances.      EMR Dragon/Transcription disclaimer:   Much of this encounter note is an electronic transcription/translation of spoken language to printed text. The electronic translation of spoken language may permit erroneous, or at times, nonsensical words or phrases to be inadvertently transcribed; Although I have reviewed the note for such errors, some may still exist.

## 2020-08-19 DIAGNOSIS — G62.9 POLYNEUROPATHY: ICD-10-CM

## 2020-08-19 RX ORDER — HYDROCODONE BITARTRATE AND ACETAMINOPHEN 7.5; 325 MG/1; MG/1
1 TABLET ORAL EVERY 6 HOURS PRN
Qty: 120 TABLET | Refills: 0 | Status: SHIPPED | OUTPATIENT
Start: 2020-08-19 | End: 2020-09-21 | Stop reason: SDUPTHER

## 2020-08-19 NOTE — TELEPHONE ENCOUNTER
Medication Refill Request    Date of phone call: 20    Medication being requested: Norco 7.5-325 mg    si tab po q 6 hrs prn  Qty: 120    Date of last visit: 8/3/20    Date of last refill: 20    HOME up to date?: yes    Next Follow up?: 10/1/20    Any new pertinent information? (i.e, new medication allergies, new use of medications, change in patient's health or condition, non-compliance or inconsistency with prescribing agreement?):

## 2020-09-21 DIAGNOSIS — G62.9 POLYNEUROPATHY: ICD-10-CM

## 2020-09-21 RX ORDER — HYDROCODONE BITARTRATE AND ACETAMINOPHEN 7.5; 325 MG/1; MG/1
1 TABLET ORAL EVERY 6 HOURS PRN
Qty: 120 TABLET | Refills: 0 | Status: SHIPPED | OUTPATIENT
Start: 2020-09-21 | End: 2020-10-16 | Stop reason: SDUPTHER

## 2020-10-01 ENCOUNTER — OFFICE VISIT (OUTPATIENT)
Dept: PAIN MEDICINE | Facility: CLINIC | Age: 70
End: 2020-10-01

## 2020-10-01 VITALS
DIASTOLIC BLOOD PRESSURE: 83 MMHG | BODY MASS INDEX: 32.27 KG/M2 | HEIGHT: 67 IN | HEART RATE: 66 BPM | RESPIRATION RATE: 18 BRPM | SYSTOLIC BLOOD PRESSURE: 154 MMHG | TEMPERATURE: 97.1 F | WEIGHT: 205.6 LBS | OXYGEN SATURATION: 95 %

## 2020-10-01 DIAGNOSIS — Z79.899 ENCOUNTER FOR LONG-TERM (CURRENT) USE OF HIGH-RISK MEDICATION: ICD-10-CM

## 2020-10-01 DIAGNOSIS — G89.29 OTHER CHRONIC PAIN: Primary | ICD-10-CM

## 2020-10-01 DIAGNOSIS — E11.42 DIABETIC PERIPHERAL NEUROPATHY (HCC): ICD-10-CM

## 2020-10-01 PROCEDURE — 99213 OFFICE O/P EST LOW 20 MIN: CPT | Performed by: NURSE PRACTITIONER

## 2020-10-01 NOTE — PROGRESS NOTES
CHIEF COMPLAINT  Follow-up for leg pain.    Subjective   Kelby Peters is a 70 y.o. male  who presents for follow-up.  He has a history of chronic bilateral leg pain.    Complains of pain in his extremities(legs and arms). Today his pain is 7/10VAS.  Continues with Hydrocodone 7.5/325 4/day and gabapentin 800 mg 4/day(1 in morning, 1 at lunch and 2 at night). Also continues with Keppra 500 mg BID. Denies any side effects from the regimen. The regimen helps decrease his pain by 30-40%. Notes improvement in his function and activity.  ADL's by self.     Failed Cymbalta.  Failed Horizant($$$) and Gralise ($$$).  Failed Lyrica---pedal edema  History of IDDM.    Extremity Pain   The pain is present in the left hand, right hand, left lower leg and right lower leg. This is a chronic problem. The current episode started more than 1 year ago. The problem occurs constantly. Progression since onset: worse since last office visit. The pain is at a severity of 7/10. The pain is moderate. Associated symptoms include numbness. Pertinent negatives include no fever. He has tried oral narcotics and rest (cymbalta--no help) for the symptoms. The treatment provided moderate relief. His past medical history is significant for diabetes.      PEG Assessment   What number best describes your pain on average in the past week?7  What number best describes how, during the past week, pain has interfered with your enjoyment of life?8  What number best describes how, during the past week, pain has interfered with your general activity?  8    The following portions of the patient's history were reviewed and updated as appropriate: allergies, current medications, past family history, past medical history, past social history, past surgical history and problem list.    Review of Systems   Constitutional: Negative for fatigue and fever.   HENT: Negative for congestion.    Eyes: Positive for visual disturbance.   Respiratory: Negative for cough,  "shortness of breath and wheezing.    Cardiovascular: Negative.    Gastrointestinal: Positive for constipation and diarrhea.   Genitourinary: Positive for difficulty urinating.   Musculoskeletal: Positive for arthralgias (left knee).   Neurological: Positive for weakness and numbness.   Psychiatric/Behavioral: Positive for sleep disturbance. Negative for suicidal ideas. The patient is nervous/anxious.      I have reviewed and confirmed the accuracy of the ROS as documented by the MA/LPN/RN ABEBA Malcolm    Vitals:    10/01/20 1032   BP: 154/83   Pulse: 66   Resp: 18   Temp: 97.1 °F (36.2 °C)   SpO2: 95%   Weight: 93.3 kg (205 lb 9.6 oz)   Height: 170.2 cm (67\")   PainSc:   7   PainLoc: Leg     Objective   Physical Exam  Vitals signs and nursing note reviewed.   Constitutional:       General: He is not in acute distress.     Appearance: Normal appearance. He is not ill-appearing.   HENT:      Head: Atraumatic.   Eyes:      Conjunctiva/sclera: Conjunctivae normal.   Cardiovascular:      Rate and Rhythm: Normal rate.      Pulses:           Dorsalis pedis pulses are 1+ on the right side and 1+ on the left side.        Posterior tibial pulses are 1+ on the right side and 1+ on the left side.   Pulmonary:      Effort: Pulmonary effort is normal. No respiratory distress.   Musculoskeletal:      Right foot: Tenderness present.      Left foot: Tenderness present.   Skin:     General: Skin is warm and dry.   Neurological:      General: No focal deficit present.      Mental Status: He is alert and oriented to person, place, and time.      Sensory: Sensory deficit (bilateral feet) present.      Motor: No weakness.      Gait: Gait abnormal (slowed).   Psychiatric:         Mood and Affect: Mood normal.         Behavior: Behavior normal.             Assessment/Plan   Kelby was seen today for leg pain.    Diagnoses and all orders for this visit:    Other chronic pain    Diabetic peripheral neuropathy (CMS/HCC)    Encounter " for long-term (current) use of high-risk medication      --- Continue Hydrocodone. Patient appears stable with current regimen. No adverse effects. Regarding continuation of opioids, there is no evidence of aberrant behavior or any red flags.  The patient continues with appropriate response to opioid therapy. ADL's remain intact by self.   --- The urine drug screen confirmation from 6/25/2020 has been reviewed and the result is appropriate based on patient history and HOME report  --- Follow-up 2 months            HOME REPORT  As part of the patient's treatment plan, I am prescribing controlled substances. The patient has been made aware of appropriate use of such medications, including potential risk of somnolence, limited ability to drive and/or work safely, and the potential for dependence or overdose. It has also bee made clear that these medications are for use by this patient only, without concomitant use of alcohol or other substances unless prescribed.     Patient has completed prescribing agreement detailing terms of continued prescribing of controlled substances, including monitoring HOME reports, urine drug screening, and pill counts if necessary. The patient is aware that inappropriate use will results in cessation of prescribing such medications.    HOME report has been reviewed and scanned into the patient's chart.    As the clinician, I personally reviewed the HOME from 10/1/2020 while the patient was in the office today.    History and physical exam exhibit continued safe and appropriate use of controlled substances.      EMR Dragon/Transcription disclaimer:   Much of this encounter note is an electronic transcription/translation of spoken language to printed text. The electronic translation of spoken language may permit erroneous, or at times, nonsensical words or phrases to be inadvertently transcribed; Although I have reviewed the note for such errors, some may still exist.

## 2020-10-16 DIAGNOSIS — G62.9 POLYNEUROPATHY: ICD-10-CM

## 2020-10-16 RX ORDER — HYDROCODONE BITARTRATE AND ACETAMINOPHEN 7.5; 325 MG/1; MG/1
1 TABLET ORAL EVERY 6 HOURS PRN
Qty: 120 TABLET | Refills: 0 | Status: SHIPPED | OUTPATIENT
Start: 2020-10-16 | End: 2020-11-20 | Stop reason: SDUPTHER

## 2020-10-16 NOTE — TELEPHONE ENCOUNTER
Medication Refill Request    Date of phone call: 10/16/20    Medication being requested: norco 7.5/325mg si tab po q 6 hours prn   Qty: 120    Date of last visit: 10/1/20    Date of last refill:     HOME up to date?:     Next Follow up?: 20    Any new pertinent information? (i.e, new medication allergies, new use of medications, change in patient's health or condition, non-compliance or inconsistency with prescribing agreement?):

## 2020-11-09 DIAGNOSIS — G62.9 POLYNEUROPATHY: ICD-10-CM

## 2020-11-10 RX ORDER — GABAPENTIN 800 MG/1
TABLET ORAL
Qty: 120 TABLET | Refills: 4 | Status: SHIPPED | OUTPATIENT
Start: 2020-11-10 | End: 2021-04-13

## 2020-11-20 DIAGNOSIS — G62.9 POLYNEUROPATHY: ICD-10-CM

## 2020-11-20 RX ORDER — HYDROCODONE BITARTRATE AND ACETAMINOPHEN 7.5; 325 MG/1; MG/1
1 TABLET ORAL EVERY 6 HOURS PRN
Qty: 120 TABLET | Refills: 0 | Status: SHIPPED | OUTPATIENT
Start: 2020-11-20 | End: 2020-12-18 | Stop reason: SDUPTHER

## 2020-11-20 NOTE — TELEPHONE ENCOUNTER
Medication Refill Request    Date of phone call: 20    Medication being requested: Norco 7.5-325 mg    si tab po q 6 hrs prn  Qty: 120    Date of last visit: 10/1/20    Date of last refill: 10/22/20    HOME up to date?: no    Next Follow up?: 20    Any new pertinent information? (i.e, new medication allergies, new use of medications, change in patient's health or condition, non-compliance or inconsistency with prescribing agreement?):

## 2020-11-28 NOTE — TELEPHONE ENCOUNTER
Medication Refill Request    Date of phone call: 19    Medication being requested: Norco 7.5-325 mg  si tab po q 6 hrs prn   Qty: 120    Date of last visit: 19    Date of last refill: 8/15/19    HOME up to date?: yes    Next Follow up?: 10/8/19    Any new pertinent information? (i.e, new medication allergies, new use of medications, change in patient's health or condition, non-compliance or inconsistency with prescribing agreement?):    28-Nov-2020 23:00

## 2020-12-01 ENCOUNTER — OFFICE VISIT (OUTPATIENT)
Dept: PAIN MEDICINE | Facility: CLINIC | Age: 70
End: 2020-12-01

## 2020-12-01 DIAGNOSIS — Z79.899 ENCOUNTER FOR LONG-TERM (CURRENT) USE OF HIGH-RISK MEDICATION: ICD-10-CM

## 2020-12-01 DIAGNOSIS — G89.29 OTHER CHRONIC PAIN: Primary | ICD-10-CM

## 2020-12-01 DIAGNOSIS — G62.9 POLYNEUROPATHY: ICD-10-CM

## 2020-12-01 PROCEDURE — 99441 PR PHYS/QHP TELEPHONE EVALUATION 5-10 MIN: CPT | Performed by: NURSE PRACTITIONER

## 2020-12-01 NOTE — PROGRESS NOTES
TELEPHONE VISIT  You have chosen to receive care through a telephone visit. Do you consent to use a telephone visit for your medical care today? Yes    CHIEF COMPLAINT  Pain of BLE    Subjective   Kelby Peters is a 70 y.o. male  who presents for a telephonic follow-up.He has a history of chronic pain BLE's.    Complains of pain in his extremities(legs and arms). Today his pain is 7/10VAS.  Continues with Hydrocodone 7.5/325 4/day and gabapentin 800 mg 4/day(1 in morning, 1 at lunch and 2 at night). Also continues with Keppra 500 mg BID. Denies any side effects from the regimen. The regimen helps decrease his pain by 30-40%. Notes improvement in his function and activity.  ADL's by self.      Failed Cymbalta.   Failed Horizant($$$) and Gralise ($$$).  Failed Lyrica---pedal edema  History of IDDM.    Extremity Pain   The pain is present in the left hand, right hand, left lower leg and right lower leg. This is a chronic problem. The current episode started more than 1 year ago. The problem occurs constantly. Progression since onset: worse since last office visit. The pain is at a severity of 7/10. The pain is moderate. Associated symptoms include numbness. Pertinent negatives include no fever. He has tried oral narcotics and rest (cymbalta--no help) for the symptoms. The treatment provided moderate relief. His past medical history is significant for diabetes.      The following portions of the patient's history were reviewed and updated as appropriate: allergies, current medications, past family history, past medical history, past social history, past surgical history and problem list.    Review of Systems   Constitutional: Negative for fever.   Neurological: Positive for numbness.     Vitals:    12/01/20 1302   PainSc:   7   PainLoc: Leg     Objective   Physical Exam  As this is a telephone check-in, the ability to perform a routine physical exam is extremely limited. The patient seems alert and is oriented  appropriately.   On this phone call there is not any evidence of respiratory distress.   The patient seems of normal mood.   The remainder of a routine physical exam is deferred.    Assessment/Plan   Diagnoses and all orders for this visit:    1. Other chronic pain (Primary)    2. Polyneuropathy    3. Encounter for long-term (current) use of high-risk medication      ----------------    Our practice is offering alternative &/or electronic methods to continue to follow our patients while at the same time further the efforts toward social distancing, in accordance with our organizational policies, professional societies' guidance, and gubernatorial mandates.  I support the Healthy at Home campaign and in this visit I have counseled the patient on our needs to limit in-person office visits and physical encounters with medical facilities whenever possible.  I have also educated the patient on the medical necessities of maintaining social distancing while we continue to function during this crisis period.      The patient was counseled on the need to consider telehealth options. The patient was offered the opportunity for a Video Visit using [employer]. The patient had obstacles which preclude consideration for a Video Visit. The patient agreed to a Telephone Check-In. The patient was counseled on the need for a check-in visit. The patient was educated about our efforts to comply with monitoring standards when prescribing potent medications.    ----------------    This visit has been rescheduled as a phone visit to comply with patient safety concerns in accordance with CDC recommendations. Total time of discussion was 6 minutes.    --- Continue Hydrocodone. Patient appears stable with current regimen. No adverse effects. Regarding continuation of opioids, there is no evidence of aberrant behavior or any red flags.  The patient continues with appropriate response to opioid therapy. ADL's remain intact by self.   --- The  urine drug screen confirmation from 6/25/2020 has been reviewed and the result is appropriate based on patient history and HOME report  --- Follow-up 2 months        HOME REPORT  As part of the patient's treatment plan, I am prescribing controlled substances. The patient has been made aware of appropriate use of such medications, including potential risk of somnolence, limited ability to drive and/or work safely, and the potential for dependence or overdose. It has also bee made clear that these medications are for use by this patient only, without concomitant use of alcohol or other substances unless prescribed.     Patient has completed prescribing agreement detailing terms of continued prescribing of controlled substances, including monitoring HOME reports, urine drug screening, and pill counts if necessary. The patient is aware that inappropriate use will results in cessation of prescribing such medications.    HOME report has been reviewed and scanned into the patient's chart.    As the clinician, I personally reviewed the HOME from 12/1/2020 while the patient was in the office today.    History and physical exam exhibit continued safe and appropriate use of controlled substances.    -------    EMR Dragon/Transcription disclaimer:   Much of this encounter note is an electronic transcription/translation of spoken language to printed text. The electronic translation of spoken language may permit erroneous, or at times, nonsensical words or phrases to be inadvertently transcribed; Although I have reviewed the note for such errors, some may still exist.

## 2020-12-18 DIAGNOSIS — G62.9 POLYNEUROPATHY: ICD-10-CM

## 2020-12-18 RX ORDER — HYDROCODONE BITARTRATE AND ACETAMINOPHEN 7.5; 325 MG/1; MG/1
1 TABLET ORAL EVERY 6 HOURS PRN
Qty: 120 TABLET | Refills: 0 | Status: SHIPPED | OUTPATIENT
Start: 2020-12-18 | End: 2021-01-22 | Stop reason: SDUPTHER

## 2020-12-18 NOTE — TELEPHONE ENCOUNTER
Medication Refill Request    Date of phone call: 20    Medication being requested: norco 7.5/325mg si tab po q 6 hours prn   Qty: 120    Date of last visit: 20    Date of last refill:     HOME up to date?:     Next Follow up?: 21    Any new pertinent information? (i.e, new medication allergies, new use of medications, change in patient's health or condition, non-compliance or inconsistency with prescribing agreement?):

## 2020-12-30 RX ORDER — LEVETIRACETAM 500 MG/1
TABLET ORAL
Qty: 60 TABLET | Refills: 3 | OUTPATIENT
Start: 2020-12-30

## 2020-12-31 RX ORDER — LEVETIRACETAM 500 MG/1
500 TABLET ORAL 2 TIMES DAILY
Qty: 60 TABLET | Refills: 4 | Status: SHIPPED | OUTPATIENT
Start: 2020-12-31 | End: 2021-05-27 | Stop reason: SDUPTHER

## 2021-01-21 ENCOUNTER — TELEPHONE (OUTPATIENT)
Dept: PAIN MEDICINE | Facility: CLINIC | Age: 71
End: 2021-01-21

## 2021-01-21 NOTE — TELEPHONE ENCOUNTER
Provider: SCOTTY KRAFT    Caller: PATIENT    Relationship to Patient: SELF      Phone Number: [phone]    Reason for Call: PT. STATES THAT HE HAS TO TALK TO HIS INSURANCE [employer] EVERY YEAR ABOUT DOING A TIER EXEMPTION.   HE STATES THAT THEY ARE GIVING HIM PROBLEMS WITH COVERING THE GABAPENTIN.   STATES THAT HE WANTED TO GIVE DARREL A HEADS UP TO LET HER KNOW THAT [employer] WILL BE CALLING.   PLEASE CALL PT. WITH ANY QUESTIONS.

## 2021-01-31 DIAGNOSIS — I10 ESSENTIAL HYPERTENSION: ICD-10-CM

## 2021-02-01 ENCOUNTER — OFFICE VISIT (OUTPATIENT)
Dept: PAIN MEDICINE | Facility: CLINIC | Age: 71
End: 2021-02-01

## 2021-02-01 VITALS
HEIGHT: 67 IN | SYSTOLIC BLOOD PRESSURE: 153 MMHG | DIASTOLIC BLOOD PRESSURE: 83 MMHG | HEART RATE: 67 BPM | BODY MASS INDEX: 32.8 KG/M2 | OXYGEN SATURATION: 97 % | RESPIRATION RATE: 20 BRPM | WEIGHT: 209 LBS | TEMPERATURE: 96.6 F

## 2021-02-01 DIAGNOSIS — Z79.899 ENCOUNTER FOR LONG-TERM (CURRENT) USE OF HIGH-RISK MEDICATION: ICD-10-CM

## 2021-02-01 DIAGNOSIS — E11.42 DIABETIC PERIPHERAL NEUROPATHY (HCC): ICD-10-CM

## 2021-02-01 DIAGNOSIS — G89.29 OTHER CHRONIC PAIN: Primary | ICD-10-CM

## 2021-02-01 PROCEDURE — 80305 DRUG TEST PRSMV DIR OPT OBS: CPT | Performed by: NURSE PRACTITIONER

## 2021-02-01 PROCEDURE — 99214 OFFICE O/P EST MOD 30 MIN: CPT | Performed by: NURSE PRACTITIONER

## 2021-02-01 RX ORDER — QUININE SULFATE 324 MG/1
648 CAPSULE ORAL EVERY 8 HOURS SCHEDULED
COMMUNITY
End: 2022-07-20 | Stop reason: ALTCHOICE

## 2021-02-01 RX ORDER — AMLODIPINE BESYLATE 10 MG/1
TABLET ORAL
Qty: 90 TABLET | Refills: 0 | Status: SHIPPED | OUTPATIENT
Start: 2021-02-01 | End: 2021-05-05

## 2021-02-01 NOTE — PROGRESS NOTES
CHIEF COMPLAINT  F/u extremity pain. Pt sts bilat leg and bilat arm pain has worsened since last ov. Pt also c/o increased back pain over the past 6 weeks.     Subjective   Kelby Peters is a 70 y.o. male  who presents for follow-up.  He has a history of chronic extremity pain due to neuropathy. Reports this is worse since last evaluation. Is also complaining of low back pain.     Complains of pain in his extremities. Today his pain is 7/10VAS. Describes the pain as nearly continuous burning and throbbing. Pain increases with cold and stress; pain decreases with medication and rest. Occasionally, his pain can interfere with his sleep.  Continues with Hydrocodone 7.5/325 4/day and gabapentin 800 mg 4/day(1 in morning, 1 at lunch and 2 at night). Also continues with Keppra 500 mg BID. His pain can interrupt his sleep.  Denies any side effects from the regimen. The regimen helps decrease his pain by 30-40%.   ADL's by self.     Is taking quinine tonic water for COVID prevention. (schwabb's tonic water).     Endocrinologist is Dr Edouard. However, he later states he does not see Dr Edouard.  He reports he just buys his insulin over the counter. He has not had bloodwork since AUGUST 2019--- last A1C was 11.4    Failed Cymbalta.  Failed Horizant($$$) and Gralise ($$$).  Failed Lyrica---pedal edema  History of IDDM.    Patient remained masked during entire encounter. No cough present. I donned a mask and eye protection throughout entire visit. Prior to donning mask and eye protection, hand hygiene was performed, as well as when it was doffed.  I was closer than 6 feet, but not for an extended period of time. No obvious exposure to any bodily fluids.    Extremity Pain   The pain is present in the left hand, right hand, left lower leg and right lower leg. This is a chronic problem. The current episode started more than 1 year ago. The problem occurs constantly. Progression since onset: worse since last office visit. The pain is at  a severity of 7/10. The pain is moderate. Associated symptoms include numbness (legs, feet and hands, hx peripheral neuropathy). Pertinent negatives include no fever. He has tried oral narcotics and rest (cymbalta--no help) for the symptoms. The treatment provided moderate relief. His past medical history is significant for diabetes.   Knee Pain   Associated symptoms include numbness (legs, feet and hands, hx peripheral neuropathy).      PEG Assessment   What number best describes your pain on average in the past week?7  What number best describes how, during the past week, pain has interfered with your enjoyment of life?7  What number best describes how, during the past week, pain has interfered with your general activity?  7    The following portions of the patient's history were reviewed and updated as appropriate: allergies, current medications, past family history, past medical history, past social history, past surgical history and problem list.    Review of Systems   Constitutional: Positive for fatigue. Negative for activity change and fever.   HENT: Negative for congestion.    Eyes: Negative for visual disturbance.   Respiratory: Negative for cough and shortness of breath.    Cardiovascular: Negative for chest pain.   Gastrointestinal: Negative for constipation and diarrhea.   Genitourinary: Negative for difficulty urinating.   Musculoskeletal: Positive for arthralgias (extremity bilat arms and legs) and back pain.   Allergic/Immunologic: Negative for immunocompromised state.   Neurological: Positive for weakness (bilat legs) and numbness (legs, feet and hands, hx peripheral neuropathy). Negative for dizziness, light-headedness and headaches.   Psychiatric/Behavioral: Positive for sleep disturbance. Negative for agitation and suicidal ideas. The patient is not nervous/anxious.      I have reviewed and confirmed the accuracy of the ROS as documented by the MA/LPN/RN ABEBA Gramajo      Vitals:     "02/01/21 1004 02/01/21 1017   BP: (!) 182/83  Comment: pt sts had bp meds this am 153/83   Pulse: 67    Resp: 20    Temp: 96.6 °F (35.9 °C)    SpO2: 97%    Weight: 94.8 kg (209 lb)    Height: 170.2 cm (67\")    PainSc:   7    PainLoc: Leg  Comment: bilat arms and legs      Objective   Physical Exam  Vitals signs and nursing note reviewed.   Constitutional:       Appearance: He is well-developed.   HENT:      Head: Normocephalic and atraumatic.   Neurological:      Mental Status: He is alert.      Gait: Gait abnormal.      Comments: Patient reports dysthesia of bilateral hands and feet.   Psychiatric:         Speech: Speech normal.         Behavior: Behavior normal.         Assessment/Plan   Diagnoses and all orders for this visit:    1. Other chronic pain (Primary)    2. Diabetic peripheral neuropathy (CMS/HCC)    3. Encounter for long-term (current) use of high-risk medication      --- Routine UDS in office today as part of monitoring requirements for controlled substances.  The specimen was viewed and the immunoassay result reviewed and is +OPI(APPROPRIATE).  This specimen will be sent to [facility] for confirmation.     --- The patient signed an updated copy of the controlled substance agreement on 2-1-21  --- Extensive discussion with patient to follow-up with PCP. He needs evaluation for his IDDM. Reviewed needed updated labwork. Reviewed how his blood glucose level and control can interfere with his pain control as well.  Patient states he will follow-up with Dr Dougherty.   --- He has previously refused PGT testing.  --- Continue with regimen. Not due for refill. Patient appears stable with current regimen. No adverse effects. Regarding continuation of opioids, there is no evidence of aberrant behavior or any red flags.  The patient continues with appropriate response to opioid therapy. ADL's remain intact by self.   --- Follow-up 2 months or sooner if needed.    This was an extended office visit. " Over 34 minutes was spent in face to face contact with the patient or with charting for encounter for today.        HOME REPORT  As part of the patient's treatment plan, I am prescribing controlled substances. The patient has been made aware of appropriate use of such medications, including potential risk of somnolence, limited ability to drive and/or work safely, and the potential for dependence or overdose. It has also bee made clear that these medications are for use by this patient only, without concomitant use of alcohol or other substances unless prescribed.     Patient has completed prescribing agreement detailing terms of continued prescribing of controlled substances, including monitoring HOME reports, urine drug screening, and pill counts if necessary. The patient is aware that inappropriate use will results in cessation of prescribing such medications.    HOME report has been reviewed and scanned into the patient's chart.    As the clinician, I personally reviewed the HOME from 2-1-21 while the patient was in the office today.    History and physical exam exhibit continued safe and appropriate use of controlled substances.        EMR Dragon/Transcription disclaimer:   Much of this encounter note is an electronic transcription/translation of spoken language to printed text. The electronic translation of spoken language may permit erroneous, or at times, nonsensical words or phrases to be inadvertently transcribed; Although I have reviewed the note for such errors, some may still exist.

## 2021-02-22 DIAGNOSIS — G62.9 POLYNEUROPATHY: ICD-10-CM

## 2021-02-22 NOTE — TELEPHONE ENCOUNTER
Medication Refill Request    Date of phone call: 2/22/2021    Medication being requested: hydro/apap 7.5-325 mg sig: take 1 tab q 6 hrs prn  Qty: 120    Date of last visit: 2/1/2021    Date of last refill: 1/28/2021    HOME up to date?: yes    Next Follow up?: 4/1/2021    Any new pertinent information? (i.e, new medication allergies, new use of medications, change in patient's health or condition, non-compliance or inconsistency with prescribing agreement?):

## 2021-02-24 RX ORDER — HYDROCODONE BITARTRATE AND ACETAMINOPHEN 7.5; 325 MG/1; MG/1
1 TABLET ORAL EVERY 6 HOURS PRN
Qty: 120 TABLET | Refills: 0 | Status: SHIPPED | OUTPATIENT
Start: 2021-02-24 | End: 2021-03-19 | Stop reason: SDUPTHER

## 2021-03-19 DIAGNOSIS — G62.9 POLYNEUROPATHY: ICD-10-CM

## 2021-03-19 RX ORDER — HYDROCODONE BITARTRATE AND ACETAMINOPHEN 7.5; 325 MG/1; MG/1
1 TABLET ORAL EVERY 6 HOURS PRN
Qty: 120 TABLET | Refills: 0 | Status: SHIPPED | OUTPATIENT
Start: 2021-03-19 | End: 2021-04-23 | Stop reason: SDUPTHER

## 2021-03-19 NOTE — TELEPHONE ENCOUNTER
Medication Refill Request    Date of phone call: 3/19/21    Medication being requested: Norco 7.5-325 mg sig: q6hrs  Qty: 120    Date of last visit: 2/1/21    Date of last refill: 2/27/21    Next Follow up?: 4/1/21    Any new pertinent information? (i.e, new medication allergies, new use of medications, change in patient's health or condition, non-compliance or inconsistency with prescribing agreement?): n/a

## 2021-04-08 ENCOUNTER — OFFICE VISIT (OUTPATIENT)
Dept: PAIN MEDICINE | Facility: CLINIC | Age: 71
End: 2021-04-08

## 2021-04-08 VITALS
TEMPERATURE: 96.2 F | WEIGHT: 209.8 LBS | RESPIRATION RATE: 18 BRPM | SYSTOLIC BLOOD PRESSURE: 196 MMHG | HEART RATE: 70 BPM | DIASTOLIC BLOOD PRESSURE: 80 MMHG | BODY MASS INDEX: 32.93 KG/M2 | HEIGHT: 67 IN | OXYGEN SATURATION: 96 %

## 2021-04-08 DIAGNOSIS — G89.29 OTHER CHRONIC PAIN: Primary | ICD-10-CM

## 2021-04-08 DIAGNOSIS — E11.42 DIABETIC PERIPHERAL NEUROPATHY (HCC): ICD-10-CM

## 2021-04-08 DIAGNOSIS — M79.609 PAIN IN EXTREMITY, UNSPECIFIED EXTREMITY: ICD-10-CM

## 2021-04-08 DIAGNOSIS — Z79.899 ENCOUNTER FOR LONG-TERM (CURRENT) USE OF HIGH-RISK MEDICATION: ICD-10-CM

## 2021-04-08 PROCEDURE — 99213 OFFICE O/P EST LOW 20 MIN: CPT | Performed by: NURSE PRACTITIONER

## 2021-04-08 RX ORDER — PREDNISOLONE ACETATE 10 MG/ML
1 SUSPENSION/ DROPS OPHTHALMIC 3 TIMES DAILY
COMMUNITY
Start: 2021-04-03

## 2021-04-08 NOTE — PROGRESS NOTES
CHIEF COMPLAINT  Follow-up for extremity pain.    Subjective   Kelby Peters is a 70 y.o. male  who presents for follow-up.  He has a history of chronic bilateral LE pain.    Complains of pain in his extremities(legs and arms). Today his pain is 7/10VAS.  Continues with Hydrocodone 7.5/325 4/day and gabapentin 800 mg 4/day(1 in morning, 1 at lunch and 2 at night). Also continues with Keppra 500 mg BID. Denies any side effects from the regimen. The regimen helps decrease his pain by 30-40%. Notes improvement in his function and activity.  ADL's by self.     Failed Cymbalta.   Failed Horizant($$$) and Gralise ($$$).  Failed Lyrica---pedal edema  History of IDDM.    Patient remained masked during entire encounter. No cough present. I donned a mask and eye protection throughout entire visit. Prior to donning mask and eye protection, hand hygiene was performed, as well as when it was doffed.  I was closer than 6 feet, but not for an extended period of time. No obvious exposure to any bodily fluids.    Extremity Pain   The pain is present in the left hand, right hand, left lower leg and right lower leg. This is a chronic problem. The current episode started more than 1 year ago. The problem occurs constantly. Progression since onset: worse since last office visit. The pain is at a severity of 7/10. The pain is moderate. Associated symptoms include numbness. Pertinent negatives include no fever. He has tried oral narcotics and rest (cymbalta--no help) for the symptoms. The treatment provided moderate relief. His past medical history is significant for diabetes.     PEG Assessment   What number best describes your pain on average in the past week?8  What number best describes how, during the past week, pain has interfered with your enjoyment of life?8  What number best describes how, during the past week, pain has interfered with your general activity?  8    The following portions of the patient's history were reviewed and  "updated as appropriate: allergies, current medications, past family history, past medical history, past social history, past surgical history and problem list.    Review of Systems   Constitutional: Negative for fatigue and fever.   HENT: Negative for congestion.    Eyes: Positive for visual disturbance (blurry vision).   Respiratory: Negative for cough, shortness of breath and wheezing.    Cardiovascular: Negative.    Gastrointestinal: Negative for constipation and diarrhea.   Genitourinary: Negative for difficulty urinating.   Musculoskeletal: Negative for arthralgias.   Neurological: Positive for weakness and numbness.   Psychiatric/Behavioral: Negative for sleep disturbance and suicidal ideas. The patient is nervous/anxious.      I have reviewed and confirmed the accuracy of the ROS as documented by the MA/LPN/RN ABEBA Malcolm    Vitals:    04/08/21 0857   BP: (!) 196/80   Pulse: 70   Resp: 18   Temp: 96.2 °F (35.7 °C)   SpO2: 96%   Weight: 95.2 kg (209 lb 12.8 oz)   Height: 170.2 cm (67\")     Objective   Physical Exam  Vitals and nursing note reviewed.   Constitutional:       General: He is not in acute distress.     Appearance: Normal appearance. He is not ill-appearing.   Cardiovascular:      Pulses:           Dorsalis pedis pulses are 1+ on the right side and 1+ on the left side.        Posterior tibial pulses are 1+ on the right side and 1+ on the left side.   Pulmonary:      Effort: Pulmonary effort is normal. No respiratory distress.   Musculoskeletal:      Right foot: Tenderness present.      Left foot: Tenderness present.   Skin:     General: Skin is warm and dry.   Neurological:      General: No focal deficit present.      Mental Status: He is alert and oriented to person, place, and time.      Sensory: Sensory deficit (bilateral feet) present.      Motor: No weakness.      Gait: Gait abnormal (slowed).   Psychiatric:         Mood and Affect: Mood normal.         Behavior: Behavior normal. "       Assessment/Plan   Diagnoses and all orders for this visit:    1. Other chronic pain (Primary)    2. Pain in extremity, unspecified extremity    3. Diabetic peripheral neuropathy (CMS/HCC)    4. Encounter for long-term (current) use of high-risk medication      --- Continue Hydrocodone. Patient appears stable with current regimen. No adverse effects. Regarding continuation of opioids, there is no evidence of aberrant behavior or any red flags.  The patient continues with appropriate response to opioid therapy. ADL's remain intact by self.   --- The urine drug screen confirmation from 2/1/2021 has been reviewed and the result is appropriate based on patient history and HOME report  --- The patient signed an updated copy of the controlled substance agreement on 2/1/2021  --- Follow-up 2 months      HOME REPORT  As part of the patient's treatment plan, I am prescribing controlled substances. The patient has been made aware of appropriate use of such medications, including potential risk of somnolence, limited ability to drive and/or work safely, and the potential for dependence or overdose. It has also bee made clear that these medications are for use by this patient only, without concomitant use of alcohol or other substances unless prescribed.     Patient has completed prescribing agreement detailing terms of continued prescribing of controlled substances, including monitoring HOME reports, urine drug screening, and pill counts if necessary. The patient is aware that inappropriate use will results in cessation of prescribing such medications.    HOME report has been reviewed and scanned into the patient's chart.    As the clinician, I personally reviewed the HOME from 4/8/2021 while the patient was in the office today.    History and physical exam exhibit continued safe and appropriate use of controlled substances.    EMR Dragon/Transcription disclaimer:   Much of this encounter note is an electronic  transcription/translation of spoken language to printed text. The electronic translation of spoken language may permit erroneous, or at times, nonsensical words or phrases to be inadvertently transcribed; Although I have reviewed the note for such errors, some may still exist.

## 2021-04-08 NOTE — PATIENT INSTRUCTIONS
Carbohydrate Counting for Diabetes Mellitus, Adult  Carbohydrate counting is a method of keeping track of how many carbohydrates you eat. Eating carbohydrates naturally increases the amount of sugar (glucose) in the blood. Counting how many carbohydrates you eat improves your blood glucose control, which helps you manage your diabetes.  It is important to know how many carbohydrates you can safely have in each meal. This is different for every person. A dietitian can help you make a meal plan and calculate how many carbohydrates you should have at each meal and snack.  What foods contain carbohydrates?  Carbohydrates are found in the following foods:  · Grains, such as breads and cereals.  · Dried beans and soy products.  · Starchy vegetables, such as potatoes, peas, and corn.  · Fruit and fruit juices.  · Milk and yogurt.  · Sweets and snack foods, such as cake, cookies, candy, chips, and soft drinks.  How do I count carbohydrates in foods?  There are two ways to count carbohydrates in food. You can read food labels or learn standard serving sizes of foods. You can use either of the methods or a combination of both.  Using the Nutrition Facts label  The Nutrition Facts list is included on the labels of almost all packaged foods and beverages in the [place] It includes:  · The serving size.  · Information about nutrients in each serving, including the grams (g) of carbohydrate per serving.  To use the Nutrition Facts:  · Decide how many servings you will have.  · Multiply the number of servings by the number of carbohydrates per serving.  · The resulting number is the total amount of carbohydrates that you will be having.  Learning the standard serving sizes of foods  When you eat carbohydrate foods that are not packaged or do not include Nutrition Facts on the label, you need to measure the servings in order to count the amount of carbohydrates.  · Measure the foods that you will eat with a food scale or measuring  cup, if needed.  · Decide how many standard-size servings you will eat.  · Multiply the number of servings by 15. For foods that contain carbohydrates, one serving equals 15 g of carbohydrates.  ? For example, if you eat 2 cups or 10 oz (300 g) of strawberries, you will have eaten 2 servings and 30 g of carbohydrates (2 servings x 15 g = 30 g).  · For foods that have more than one food mixed, such as soups and casseroles, you must count the carbohydrates in each food that is included.  The following list contains standard serving sizes of common carbohydrate-rich foods. Each of these servings has about 15 g of carbohydrates:  · 1 slice of bread.  · 1 six-inch (15 cm) tortilla.  · ? cup or 2 oz (53 g) cooked rice or pasta.  · ½ cup or 3 oz (85 g) cooked or canned, drained and rinsed beans or lentils.  · ½ cup or 3 oz (85 g) starchy vegetable, such as peas, corn, or squash.  · ½ cup or 4 oz (120 g) hot cereal.  · ½ cup or 3 oz (85 g) boiled or mashed potatoes, or ¼ or 3 oz (85 g) of a large baked potato.  · ½ cup or 4 fl oz (118 mL) fruit juice.  · 1 cup or 8 fl oz (237 mL) milk.  · 1 small or 4 oz (106 g) apple.  · ½ or 2 oz (63 g) of a medium banana.  · 1 cup or 5 oz (150 g) strawberries.  · 3 cups or 1 oz (24 g) popped popcorn.  What is an example of carbohydrate counting?  To calculate the number of carbohydrates in this sample meal, follow the steps shown below.  Sample meal  · 3 oz (85 g) chicken breast.  · ? cup or 4 oz (106 g) brown rice.  · ½ cup or 3 oz (85 g) corn.  · 1 cup or 8 fl oz (237 mL) milk.  · 1 cup or 5 oz (150 g) strawberries with sugar-free whipped topping.  Carbohydrate calculation  1. Identify the foods that contain carbohydrates:  ? Rice.  ? Corn.  ? Milk.  ? Strawberries.  2. Calculate how many servings you have of each food:  ? 2 servings rice.  ? 1 serving corn.  ? 1 serving milk.  ? 1 serving strawberries.  3. Multiply each number of servings by 15 g:  ? 2 servings rice x 15 g = 30  g.  ? 1 serving corn x 15 g = 15 g.  ? 1 serving milk x 15 g = 15 g.  ? 1 serving strawberries x 15 g = 15 g.  4. Add together all of the amounts to find the total grams of carbohydrates eaten:  ? 30 g + 15 g + 15 g + 15 g = 75 g of carbohydrates total.  What are tips for following this plan?  Shopping  · Develop a meal plan and then make a shopping list.  · Buy fresh and frozen vegetables, fresh and frozen fruit, dairy, eggs, beans, lentils, and whole grains.  · Look at food labels. Choose foods that have more fiber and less sugar.  · Avoid processed foods and foods with added sugars.  Meal planning  · Aim to have the same amount of carbohydrates at each meal and for each snack time.  · Plan to have regular, balanced meals and snacks.  Where to find more information  · American Diabetes Association: www.diabetes.org  · Centers for Disease Control and Prevention: www.cdc.gov  Summary  · Carbohydrate counting is a method of keeping track of how many carbohydrates you eat.  · Eating carbohydrates naturally increases the amount of sugar (glucose) in the blood.  · Counting how many carbohydrates you eat improves your blood glucose control, which helps you manage your diabetes.  · A dietitian can help you make a meal plan and calculate how many carbohydrates you should have at each meal and snack.  This information is not intended to replace advice given to you by your health care provider. Make sure you discuss any questions you have with your health care provider.  Document Revised: 12/18/2020 Document Reviewed: 12/18/2020  [employer] Patient Education © 2021 [employer]

## 2021-04-09 DIAGNOSIS — G62.9 POLYNEUROPATHY: ICD-10-CM

## 2021-04-13 RX ORDER — GABAPENTIN 800 MG/1
TABLET ORAL
Qty: 120 TABLET | Refills: 3 | Status: SHIPPED | OUTPATIENT
Start: 2021-04-13 | End: 2021-08-17 | Stop reason: SDUPTHER

## 2021-04-23 DIAGNOSIS — G62.9 POLYNEUROPATHY: ICD-10-CM

## 2021-04-23 NOTE — TELEPHONE ENCOUNTER
Medication Refill Request    Date of phone call: 21    Medication being requested: norco 7.5/325mg si tab po q 6 hours prn   Qty: 120    Date of last visit: 21    Date of last refill:     HOME up to date?:     Next Follow up?: 21    Any new pertinent information? (i.e, new medication allergies, new use of medications, change in patient's health or condition, non-compliance or inconsistency with prescribing agreement?):

## 2021-04-27 RX ORDER — HYDROCODONE BITARTRATE AND ACETAMINOPHEN 7.5; 325 MG/1; MG/1
1 TABLET ORAL EVERY 6 HOURS PRN
Qty: 120 TABLET | Refills: 0 | Status: SHIPPED | OUTPATIENT
Start: 2021-04-27 | End: 2021-05-21 | Stop reason: SDUPTHER

## 2021-05-05 DIAGNOSIS — I10 ESSENTIAL HYPERTENSION: ICD-10-CM

## 2021-05-05 RX ORDER — AMLODIPINE BESYLATE 10 MG/1
TABLET ORAL
Qty: 90 TABLET | Refills: 0 | Status: SHIPPED | OUTPATIENT
Start: 2021-05-05 | End: 2021-08-02

## 2021-05-14 ENCOUNTER — OFFICE VISIT (OUTPATIENT)
Dept: SPORTS MEDICINE | Facility: CLINIC | Age: 71
End: 2021-05-14

## 2021-05-14 VITALS
DIASTOLIC BLOOD PRESSURE: 66 MMHG | OXYGEN SATURATION: 96 % | HEIGHT: 67 IN | TEMPERATURE: 97.3 F | HEART RATE: 78 BPM | SYSTOLIC BLOOD PRESSURE: 180 MMHG | WEIGHT: 210 LBS | BODY MASS INDEX: 32.96 KG/M2

## 2021-05-14 DIAGNOSIS — B02.9 HERPES ZOSTER WITHOUT COMPLICATION: Primary | ICD-10-CM

## 2021-05-14 PROCEDURE — 99213 OFFICE O/P EST LOW 20 MIN: CPT | Performed by: FAMILY MEDICINE

## 2021-05-14 RX ORDER — LIDOCAINE 50 MG/G
1 PATCH TOPICAL EVERY 24 HOURS
Qty: 30 EACH | Refills: 2 | Status: SHIPPED | OUTPATIENT
Start: 2021-05-14 | End: 2021-12-23

## 2021-05-14 RX ORDER — FAMCICLOVIR 500 MG/1
500 TABLET ORAL 3 TIMES DAILY
Qty: 30 TABLET | Refills: 0 | Status: SHIPPED | OUTPATIENT
Start: 2021-05-14 | End: 2022-07-20 | Stop reason: ALTCHOICE

## 2021-05-14 NOTE — PROGRESS NOTES
"Chief Complaint  Herpes Zoster (states that it is on his back; lower, started 10 days ago. it started itching and burning, at first he states that he thought it was a bug bite and then it kept spreading, started blistering and decided to look online and decided to come in)    Subjective          Kelby Peters presents to [facility]  History of Present Illness  Patient started having a painful red raised lesion right flank area about 10 days ago, yesterday symptoms increase in this morning it had \"spread\".  Very painful to touch.  Objective   Vital Signs:   /66 (BP Location: Left arm, Patient Position: Sitting, Cuff Size: Adult)   Pulse 78   Temp 97.3 °F (36.3 °C) (Temporal)   Ht 170.2 cm (67.01\")   Wt 95.3 kg (210 lb)   SpO2 96%   BMI 32.88 kg/m²     Physical Exam  Vitals reviewed.   Constitutional:       Appearance: He is well-developed.   HENT:      Head: Normocephalic and atraumatic.   Eyes:      Conjunctiva/sclera: Conjunctivae normal.      Pupils: Pupils are equal, round, and reactive to light.   Cardiovascular:      Comments: No peripheral edema  Pulmonary:      Effort: Pulmonary effort is normal.   Skin:     General: Skin is warm and dry.      Comments: Erythematous vesicular rash right T10 dermatome.  Very painful to touch.   Neurological:      Mental Status: He is alert and oriented to person, place, and time.   Psychiatric:         Behavior: Behavior normal.        Result Review :                 Assessment and Plan    Diagnoses and all orders for this visit:    1. Herpes zoster without complication (Primary)  -     famciclovir (FAMVIR) 500 MG tablet; Take 1 tablet by mouth 3 (Three) Times a Day.  Dispense: 30 tablet; Refill: 0  -     lidocaine (LIDODERM) 5 %; Place 1 patch on the skin as directed by provider Daily. Remove & Discard patch within 12 hours or as directed by MD  Dispense: 30 each; Refill: 2        Follow Up   No follow-ups on file.  Patient was " given instructions and counseling regarding his condition or for health maintenance advice. Please see specific information pulled into the AVS if appropriate.

## 2021-05-21 DIAGNOSIS — G62.9 POLYNEUROPATHY: ICD-10-CM

## 2021-05-25 RX ORDER — HYDROCODONE BITARTRATE AND ACETAMINOPHEN 7.5; 325 MG/1; MG/1
1 TABLET ORAL EVERY 6 HOURS PRN
Qty: 120 TABLET | Refills: 0 | Status: SHIPPED | OUTPATIENT
Start: 2021-05-25 | End: 2021-06-22 | Stop reason: DRUGHIGH

## 2021-05-27 RX ORDER — LEVETIRACETAM 500 MG/1
TABLET ORAL
Qty: 180 TABLET | Refills: 0 | Status: SHIPPED | OUTPATIENT
Start: 2021-05-27 | End: 2021-06-25

## 2021-06-08 ENCOUNTER — OFFICE VISIT (OUTPATIENT)
Dept: PAIN MEDICINE | Facility: CLINIC | Age: 71
End: 2021-06-08

## 2021-06-08 VITALS
BODY MASS INDEX: 32.71 KG/M2 | WEIGHT: 208.4 LBS | TEMPERATURE: 96.8 F | HEART RATE: 66 BPM | HEIGHT: 67 IN | SYSTOLIC BLOOD PRESSURE: 149 MMHG | DIASTOLIC BLOOD PRESSURE: 72 MMHG | OXYGEN SATURATION: 92 % | RESPIRATION RATE: 16 BRPM

## 2021-06-08 DIAGNOSIS — G89.29 OTHER CHRONIC PAIN: Primary | ICD-10-CM

## 2021-06-08 DIAGNOSIS — E11.42 DIABETIC PERIPHERAL NEUROPATHY (HCC): ICD-10-CM

## 2021-06-08 DIAGNOSIS — M79.609 PAIN IN EXTREMITY, UNSPECIFIED EXTREMITY: ICD-10-CM

## 2021-06-08 DIAGNOSIS — Z79.899 ENCOUNTER FOR LONG-TERM (CURRENT) USE OF HIGH-RISK MEDICATION: ICD-10-CM

## 2021-06-08 PROCEDURE — 99214 OFFICE O/P EST MOD 30 MIN: CPT | Performed by: NURSE PRACTITIONER

## 2021-06-08 NOTE — PROGRESS NOTES
CHIEF COMPLAINT  F/U extremity pain- patient states that his pain has remained the same since his last visit.     Subjective   Kelby Peters is a 70 y.o. male  who presents to the office for follow-up.  History of chronic pain of bilateral LE's (DPN).    Complains of pain in his extremities(legs and arms). Today his pain is 7/10VAS (worse today).  Continues with Hydrocodone 7.5/325 4/day and gabapentin 800 mg 4/day(1 in morning, 1 at lunch and 2 at night). Also continues with Keppra 500 mg BID. Denies any side effects from the regimen. The regimen helps decrease his pain by 30-40%. Notes improvement in his function and activity.  ADL's by self.  Does note improvement with an additional hydrocodone daily.  [place]       Failed Cymbalta.   Failed Horizant($$$) and Gralise ($$$).  Failed Lyrica---pedal edema  History of IDDM    Patient remained masked during entire encounter. No cough present. I donned a mask and eye protection throughout entire visit. Prior to donning mask and eye protection, hand hygiene was performed, as well as when it was doffed.  I was closer than 6 feet, but not for an extended period of time. No obvious exposure to any bodily fluids.    Extremity Pain   The pain is present in the left hand, right hand, left lower leg and right lower leg. This is a chronic problem. The current episode started more than 1 year ago. The problem occurs constantly. The problem has been waxing and waning. The pain is at a severity of 8/10. The pain is moderate. Associated symptoms include numbness. Pertinent negatives include no fever. He has tried oral narcotics and rest (cymbalta--no help) for the symptoms. The treatment provided moderate relief. His past medical history is significant for diabetes.      PEG Assessment   What number best describes your pain on average in the past week?8  What number best describes how, during the past week, pain has interfered with your enjoyment of  "life?7  What number best describes how, during the past week, pain has interfered with your general activity?  5    The following portions of the patient's history were reviewed and updated as appropriate: allergies, current medications, past family history, past medical history, past social history, past surgical history and problem list.    Review of Systems   Constitutional: Positive for fatigue. Negative for activity change, chills and fever.   HENT: Negative for congestion.    Eyes: Negative for visual disturbance.   Respiratory: Negative for chest tightness and shortness of breath.    Cardiovascular: Negative for chest pain.   Gastrointestinal: Positive for constipation and diarrhea. Negative for abdominal pain.   Genitourinary: Negative for difficulty urinating and dysuria.   Musculoskeletal: Positive for back pain.   Neurological: Positive for light-headedness and numbness. Negative for dizziness, weakness and headaches.   Psychiatric/Behavioral: Positive for agitation and sleep disturbance. The patient is nervous/anxious.      I have reviewed and confirmed the accuracy of the ROS as documented by the MA/LPN/RN ABEBA Malcolm     Vitals:    06/08/21 0830   BP: 149/72   Pulse: 66   Resp: 16   Temp: 96.8 °F (36 °C)   SpO2: 92%   Weight: 94.5 kg (208 lb 6.4 oz)   Height: 170.2 cm (67\")   PainSc:   8   PainLoc: Leg     Objective   Physical Exam  Vitals and nursing note reviewed.   Constitutional:       General: He is not in acute distress.     Appearance: Normal appearance. He is not ill-appearing.   Cardiovascular:      Pulses:           Dorsalis pedis pulses are 1+ on the right side and 1+ on the left side.        Posterior tibial pulses are 1+ on the right side and 1+ on the left side.   Pulmonary:      Effort: Pulmonary effort is normal. No respiratory distress.   Musculoskeletal:      Right foot: Tenderness present.      Left foot: Tenderness present.   Skin:     General: Skin is warm and dry. "   Neurological:      General: No focal deficit present.      Mental Status: He is alert and oriented to person, place, and time.      Sensory: Sensory deficit (bilateral feet) present.      Motor: No weakness.      Gait: Gait abnormal (slowed).   Psychiatric:         Mood and Affect: Mood normal.         Behavior: Behavior normal.       Assessment/Plan   Diagnoses and all orders for this visit:    1. Other chronic pain (Primary)    2. Diabetic peripheral neuropathy (CMS/HCC)    3. Pain in extremity, unspecified extremity    4. Encounter for long-term (current) use of high-risk medication      --- Continue Hydrocodone, increase to 10/325 4/day max at next refill. Patient appears stable with current regimen. No adverse effects. Regarding continuation of opioids, there is no evidence of aberrant behavior or any red flags.  The patient continues with appropriate response to opioid therapy. ADL's remain intact by self.   --- The urine drug screen confirmation from 2/1/2021 has been reviewed and the result is appropriate based on patient history and HOME report  --- The patient signed an updated copy of the controlled substance agreement on 2/1/2021  --- Follow-up 2 months        HOME REPORT  As part of the patient's treatment plan, I am prescribing controlled substances. The patient has been made aware of appropriate use of such medications, including potential risk of somnolence, limited ability to drive and/or work safely, and the potential for dependence or overdose. It has also bee made clear that these medications are for use by this patient only, without concomitant use of alcohol or other substances unless prescribed.     Patient has completed prescribing agreement detailing terms of continued prescribing of controlled substances, including monitoring HOME reports, urine drug screening, and pill counts if necessary. The patient is aware that inappropriate use will results in cessation of prescribing such  medications.    As the clinician, I personally reviewed the HOME from 6/8/2021 while the patient was in the office today.    History and physical exam exhibit continued safe and appropriate use of controlled substances.    EMR Dragon/Transcription disclaimer:   Much of this encounter note is an electronic transcription/translation of spoken language to printed text. The electronic translation of spoken language may permit erroneous, or at times, nonsensical words or phrases to be inadvertently transcribed; Although I have reviewed the note for such errors, some may still exist.

## 2021-06-22 DIAGNOSIS — G62.9 POLYNEUROPATHY: ICD-10-CM

## 2021-06-22 RX ORDER — HYDROCODONE BITARTRATE AND ACETAMINOPHEN 10; 325 MG/1; MG/1
1 TABLET ORAL EVERY 6 HOURS PRN
Qty: 120 TABLET | Refills: 0 | Status: SHIPPED | OUTPATIENT
Start: 2021-06-22 | End: 2021-07-26 | Stop reason: SDUPTHER

## 2021-06-22 RX ORDER — HYDROCODONE BITARTRATE AND ACETAMINOPHEN 7.5; 325 MG/1; MG/1
1 TABLET ORAL EVERY 6 HOURS PRN
Qty: 120 TABLET | Refills: 0 | Status: CANCELLED | OUTPATIENT
Start: 2021-06-22

## 2021-06-22 NOTE — TELEPHONE ENCOUNTER
Medication Refill Request    Date of phone call: 6/22/21    Medication being requested: norco 10/325mg sig:   Qty:     Date of last visit: 6/8/21    Date of last refill:     HOME up to date?:     Next Follow up?: 8/10/21    Any new pertinent information? (i.e, new medication allergies, new use of medications, change in patient's health or condition, non-compliance or inconsistency with prescribing agreement?): pt called stg you would increase to 10/325mg at next refill. I reviewed your recent ov note. Please change Rx order prior to sending. Thank you.

## 2021-06-25 RX ORDER — LEVETIRACETAM 500 MG/1
TABLET ORAL
Qty: 60 TABLET | Refills: 0 | Status: SHIPPED | OUTPATIENT
Start: 2021-06-25 | End: 2021-09-27

## 2021-07-26 NOTE — TELEPHONE ENCOUNTER
Medication Refill Request    Date of phone call: 21    Medication being requested: Norco  mg si tab po q 6 hrs prn  Qty: 120    Date of last visit: 21    Date of last refill:     HOME up to date?: no    Next Follow up?: 8/10/21    Any new pertinent information? (i.e, new medication allergies, new use of medications, change in patient's health or condition, non-compliance or inconsistency with prescribing agreement?):

## 2021-07-27 RX ORDER — HYDROCODONE BITARTRATE AND ACETAMINOPHEN 10; 325 MG/1; MG/1
1 TABLET ORAL EVERY 6 HOURS PRN
Qty: 120 TABLET | Refills: 0 | Status: SHIPPED | OUTPATIENT
Start: 2021-07-27 | End: 2021-08-23 | Stop reason: SDUPTHER

## 2021-07-31 DIAGNOSIS — I10 ESSENTIAL HYPERTENSION: ICD-10-CM

## 2021-08-02 RX ORDER — AMLODIPINE BESYLATE 10 MG/1
TABLET ORAL
Qty: 90 TABLET | Refills: 0 | Status: SHIPPED | OUTPATIENT
Start: 2021-08-02 | End: 2021-11-01

## 2021-08-10 ENCOUNTER — OFFICE VISIT (OUTPATIENT)
Dept: PAIN MEDICINE | Facility: CLINIC | Age: 71
End: 2021-08-10

## 2021-08-10 VITALS
OXYGEN SATURATION: 93 % | HEART RATE: 64 BPM | BODY MASS INDEX: 32.58 KG/M2 | RESPIRATION RATE: 16 BRPM | WEIGHT: 207.6 LBS | TEMPERATURE: 96.5 F | DIASTOLIC BLOOD PRESSURE: 76 MMHG | SYSTOLIC BLOOD PRESSURE: 161 MMHG | HEIGHT: 67 IN

## 2021-08-10 DIAGNOSIS — G89.29 OTHER CHRONIC PAIN: Primary | ICD-10-CM

## 2021-08-10 DIAGNOSIS — E11.42 DIABETIC PERIPHERAL NEUROPATHY (HCC): ICD-10-CM

## 2021-08-10 DIAGNOSIS — Z79.899 ENCOUNTER FOR LONG-TERM (CURRENT) USE OF HIGH-RISK MEDICATION: ICD-10-CM

## 2021-08-10 DIAGNOSIS — G62.9 POLYNEUROPATHY: ICD-10-CM

## 2021-08-10 PROCEDURE — 80305 DRUG TEST PRSMV DIR OPT OBS: CPT | Performed by: NURSE PRACTITIONER

## 2021-08-10 PROCEDURE — 99214 OFFICE O/P EST MOD 30 MIN: CPT | Performed by: NURSE PRACTITIONER

## 2021-08-10 NOTE — PROGRESS NOTES
CHIEF COMPLAINT  F/U leg pain- patient states that his pain has remained the same since his last visit.     Subjective   Kelby Peters is a 71 y.o. male  who presents for follow-up.  He has a history of chronic pain of Bilateral UE and LE's (neuropathy).    Complains of pain in his extremities(legs and arms). Today his pain is 6/10VAS.  Continues with Hydrocodone 10/325 4/day (increased last visit, improvement noted) and gabapentin 800 mg 4/day(1 in morning, 1 at lunch and 2 at night). Also continues with Keppra 500 mg BID. Denies any side effects from the regimen. The regimen helps decrease his pain by 40%. Notes improvement in his function and activity.  ADL's by self.  Does note improvement with an additional hydrocodone daily.  [place].       Failed Cymbalta.   Failed Horizant($$$) and Gralise ($$$).  Failed Lyrica---pedal edema    Patient remained masked during entire encounter. No cough present. I donned a mask and eye protection throughout entire visit. Prior to donning mask and eye protection, hand hygiene was performed, as well as when it was doffed.  I was closer than 6 feet, but not for an extended period of time. No obvious exposure to any bodily fluids.    Extremity Pain   The pain is present in the left hand, right hand, left lower leg and right lower leg. This is a chronic problem. The current episode started more than 1 year ago. The problem occurs constantly. The problem has been waxing and waning. The pain is at a severity of 6/10. The pain is moderate. Associated symptoms include numbness. Pertinent negatives include no fever. He has tried oral narcotics and rest (cymbalta--no help) for the symptoms. The treatment provided moderate relief. His past medical history is significant for diabetes.     PEG Assessment   What number best describes your pain on average in the past week?8  What number best describes how, during the past week, pain has interfered with your enjoyment  "of life?8  What number best describes how, during the past week, pain has interfered with your general activity?  8    The following portions of the patient's history were reviewed and updated as appropriate: allergies, current medications, past family history, past medical history, past social history, past surgical history and problem list.    Review of Systems   Constitutional: Positive for activity change (decreased) and fatigue. Negative for chills and fever.   HENT: Negative for congestion.    Eyes: Negative for visual disturbance.   Respiratory: Negative for chest tightness and shortness of breath.    Cardiovascular: Negative for chest pain.   Gastrointestinal: Negative for abdominal pain, constipation and diarrhea.   Genitourinary: Positive for frequency. Negative for difficulty urinating and dysuria.   Musculoskeletal: Negative for back pain.   Neurological: Positive for weakness, light-headedness and numbness. Negative for dizziness and headaches.   Psychiatric/Behavioral: Positive for agitation. Negative for sleep disturbance. The patient is nervous/anxious.      I have reviewed and confirmed the accuracy of the ROS as documented by the MA/LPN/RN ABEBA Malcolm    Vitals:    08/10/21 0759   BP: 161/76   Pulse: 64   Resp: 16   Temp: 96.5 °F (35.8 °C)   SpO2: 93%   Weight: 94.2 kg (207 lb 9.6 oz)   Height: 170.2 cm (67\")   PainSc:   6   PainLoc: Hand     Objective   Physical Exam  Vitals and nursing note reviewed.   Constitutional:       General: He is not in acute distress.     Appearance: Normal appearance. He is not ill-appearing.   Cardiovascular:      Pulses:           Dorsalis pedis pulses are 1+ on the right side and 1+ on the left side.        Posterior tibial pulses are 1+ on the right side and 1+ on the left side.   Pulmonary:      Effort: Pulmonary effort is normal. No respiratory distress.   Musculoskeletal:      Right foot: Tenderness present.      Left foot: Tenderness present. "   Skin:     General: Skin is warm and dry.   Neurological:      General: No focal deficit present.      Mental Status: He is alert and oriented to person, place, and time.      Sensory: Sensory deficit (bilateral feet) present.      Motor: No weakness.      Gait: Gait abnormal (slowed).   Psychiatric:         Mood and Affect: Mood normal.         Behavior: Behavior normal.       Assessment/Plan   Diagnoses and all orders for this visit:    1. Other chronic pain (Primary)    2. Diabetic peripheral neuropathy (CMS/HCC)    3. Polyneuropathy    4. Encounter for long-term (current) use of high-risk medication      --- Continue Hydrocodone. Patient appears stable with current regimen. No adverse effects. Regarding continuation of opioids, there is no evidence of aberrant behavior or any red flags.  The patient continues with appropriate response to opioid therapy. ADL's remain intact by self.   --- Routine UDS in office today as part of monitoring requirements for controlled substances.  The specimen was viewed and the immunoassay result reviewed and is +OPI.  This specimen will be sent to [facility] laboratory for confirmation.     --- Follow-up 2 months/sooner if needed        HOME REPORT  As part of the patient's treatment plan, I am prescribing controlled substances. The patient has been made aware of appropriate use of such medications, including potential risk of somnolence, limited ability to drive and/or work safely, and the potential for dependence or overdose. It has also bee made clear that these medications are for use by this patient only, without concomitant use of alcohol or other substances unless prescribed.     Patient has completed prescribing agreement detailing terms of continued prescribing of controlled substances, including monitoring HOME reports, urine drug screening, and pill counts if necessary. The patient is aware that inappropriate use will results in cessation of prescribing such  medications.    As the clinician, I personally reviewed the HOME from 8/10/2021 while the patient was in the office today.    History and physical exam exhibit continued safe and appropriate use of controlled substances.     Dictated utilizing Dragon dictation.

## 2021-08-17 ENCOUNTER — TELEPHONE (OUTPATIENT)
Dept: PAIN MEDICINE | Facility: CLINIC | Age: 71
End: 2021-08-17

## 2021-08-17 DIAGNOSIS — G62.9 POLYNEUROPATHY: ICD-10-CM

## 2021-08-17 NOTE — TELEPHONE ENCOUNTER
[facility] [employer]FF ATTEMPTED CLINICAL WARM TRANSFER - NO ANSWER     Caller: Kelby Peters    Relationship: Self    Best call back number: [phone]    Medication needed:   GABAPENTIN 800 MG (ONE TABLET EVERY 6 HOURS)     When do you need the refill by: ASAP     What additional details did the patient provide when requesting the medication: PATIENT THOUGHT HE WAS GOING TO GET A REFILL SENT AFTER 08/10/21 APPT w/[facility]    Does the patient have less than a 3 day supply:  [x] Yes  - PATIENT HAS BEEN COMPLETELY OUT FOR A WEEK     What is the patient's preferred pharmacy: [id number] PH: [phone]     Best call back number: [phone] (PLEASE CALL / LEAVE VMAIL WHEN SCRIPT SENT TO PHARMACY)     THANKS

## 2021-08-18 RX ORDER — GABAPENTIN 800 MG/1
800 TABLET ORAL 4 TIMES DAILY
Qty: 120 TABLET | Refills: 3 | Status: SHIPPED | OUTPATIENT
Start: 2021-08-18 | End: 2021-12-13

## 2021-08-23 NOTE — TELEPHONE ENCOUNTER
Medication Refill Request    Date of phone call: 8/20/2021    Medication being requested: hydro/apap  mg sig: take 1 tab q 6 hrs prn  Qty: 120    Date of last visit: 8/10/2021    Date of last refill: 7/27/2021    HOME up to date?: yes    Next Follow up?: 10/19/2021    Any new pertinent information? (i.e, new medication allergies, new use of medications, change in patient's health or condition, non-compliance or inconsistency with prescribing agreement?):

## 2021-08-24 RX ORDER — HYDROCODONE BITARTRATE AND ACETAMINOPHEN 10; 325 MG/1; MG/1
1 TABLET ORAL EVERY 6 HOURS PRN
Qty: 120 TABLET | Refills: 0 | Status: SHIPPED | OUTPATIENT
Start: 2021-08-24 | End: 2021-09-27 | Stop reason: SDUPTHER

## 2021-08-24 NOTE — TELEPHONE ENCOUNTER
Reviewed UDS and HOME. Both updated and appropriate. Refill appropriate.      Reviewed MILES last office visit on 8-10-21. UDS and HOME reviewed and are appropriate. Due to MILES being out of office, will refill appropriately.

## 2021-09-27 RX ORDER — LEVETIRACETAM 500 MG/1
TABLET ORAL
Qty: 60 TABLET | Refills: 0 | Status: SHIPPED | OUTPATIENT
Start: 2021-09-27 | End: 2021-10-25

## 2021-09-27 NOTE — TELEPHONE ENCOUNTER
Medication Refill Request    Date of phone call: 21    Medication being requested: norco 10/325mg si tab po q 6 hours prn   Qty: 120    Date of last visit: 8/10/21    Date of last refill:     HOME up to date?:     Next Follow up?: 10/19/21    Any new pertinent information? (i.e, new medication allergies, new use of medications, change in patient's health or condition, non-compliance or inconsistency with prescribing agreement?):

## 2021-09-28 RX ORDER — HYDROCODONE BITARTRATE AND ACETAMINOPHEN 10; 325 MG/1; MG/1
1 TABLET ORAL EVERY 6 HOURS PRN
Qty: 120 TABLET | Refills: 0 | Status: SHIPPED | OUTPATIENT
Start: 2021-09-28 | End: 2021-10-19 | Stop reason: SDUPTHER

## 2021-10-19 ENCOUNTER — OFFICE VISIT (OUTPATIENT)
Dept: PAIN MEDICINE | Facility: CLINIC | Age: 71
End: 2021-10-19

## 2021-10-19 VITALS
HEIGHT: 67 IN | BODY MASS INDEX: 32.65 KG/M2 | TEMPERATURE: 96.9 F | DIASTOLIC BLOOD PRESSURE: 72 MMHG | OXYGEN SATURATION: 96 % | SYSTOLIC BLOOD PRESSURE: 150 MMHG | RESPIRATION RATE: 20 BRPM | HEART RATE: 61 BPM | WEIGHT: 208 LBS

## 2021-10-19 DIAGNOSIS — G90.9 AUTONOMIC NEUROPATHY: ICD-10-CM

## 2021-10-19 DIAGNOSIS — Z79.899 ENCOUNTER FOR LONG-TERM (CURRENT) USE OF HIGH-RISK MEDICATION: ICD-10-CM

## 2021-10-19 DIAGNOSIS — G89.29 OTHER CHRONIC PAIN: Primary | ICD-10-CM

## 2021-10-19 PROCEDURE — 99213 OFFICE O/P EST LOW 20 MIN: CPT | Performed by: NURSE PRACTITIONER

## 2021-10-19 RX ORDER — BRIMONIDINE TARTRATE/TIMOLOL 0.2%-0.5%
DROPS OPHTHALMIC (EYE)
COMMUNITY
Start: 2021-09-10 | End: 2022-07-20 | Stop reason: ALTCHOICE

## 2021-10-19 RX ORDER — HYDROCODONE BITARTRATE AND ACETAMINOPHEN 10; 325 MG/1; MG/1
1 TABLET ORAL EVERY 6 HOURS PRN
Qty: 120 TABLET | Refills: 0 | Status: SHIPPED | OUTPATIENT
Start: 2021-10-19 | End: 2021-11-22 | Stop reason: SDUPTHER

## 2021-10-19 NOTE — PROGRESS NOTES
CHIEF COMPLAINT  F/u extremity pain. Pt sts pain is the same.    Subjective   Kelby Peters is a 71 y.o. male  who presents for follow-up.  He has a history of chronic extremity pain. Reports his pain is unchanged since last evaluation.    Complains of pain in his extremities. Today his pain is 6/10VAS. Describes his pain as continuous burning and numbing. Pain increases with over-activity, temperature extremes; pain decreases with medication and rest.  Continues with Hydrocodone 7.5/325 4/day and gabapentin 800 mg 4/day(1 in morning, 1 at lunch and 2 at night). Also continues with Keppra 500 mg BID. His pain can interrupt his sleep.  Denies any side effects from the regimen. The regimen helps decrease his pain by 30-40%.   ADL's by self.     Failed Cymbalta.  Failed Horizant($$$) and Gralise ($$$).  Failed Lyrica---pedal edema  History of IDDM.    Patient remained masked during entire encounter. No cough present. I donned a mask and eye protection throughout entire visit. Prior to donning mask and eye protection, hand hygiene was performed, as well as when it was doffed.  I was closer than 6 feet, but not for an extended period of time. No obvious exposure to any bodily fluids.    Extremity Pain   The pain is present in the left hand, right hand, left lower leg and right lower leg. This is a chronic problem. The current episode started more than 1 year ago. The problem occurs constantly. Progression since onset: unchanged since last office visit. The pain is at a severity of 6/10. The pain is moderate. Pertinent negatives include no fever or numbness. He has tried oral narcotics and rest (cymbalta--no help) for the symptoms. The treatment provided moderate relief. His past medical history is significant for diabetes.   Knee Pain   Pertinent negatives include no numbness.      PEG Assessment   What number best describes your pain on average in the past week?6  What number best describes how, during the past week,  "pain has interfered with your enjoyment of life?6  What number best describes how, during the past week, pain has interfered with your general activity?  6    The following portions of the patient's history were reviewed and updated as appropriate: allergies, current medications, past family history, past medical history, past social history, past surgical history and problem list.    Review of Systems   Constitutional: Negative for activity change, fatigue and fever.   HENT: Negative for congestion.    Eyes: Negative for visual disturbance.   Respiratory: Negative for cough and shortness of breath.    Cardiovascular: Negative for chest pain.   Gastrointestinal: Negative for constipation and diarrhea.   Endocrine: Negative for polyuria.   Genitourinary: Negative for difficulty urinating.   Musculoskeletal: Positive for arthralgias (extremity).   Neurological: Negative for dizziness, weakness, light-headedness, numbness and headaches.   Psychiatric/Behavioral: Negative for agitation, sleep disturbance and suicidal ideas. The patient is not nervous/anxious.      I have reviewed and confirmed the accuracy of the ROS as documented by the MA/LPN/RN ABEBA Gramajo      Vitals:    10/19/21 0952   BP: 150/72  Comment: pt sts did not take bp med this am   Pulse: 61   Resp: 20   Temp: 96.9 °F (36.1 °C)   SpO2: 96%   Weight: 94.3 kg (208 lb)   Height: 170.2 cm (67\")   PainSc:   6   PainLoc: Comment: extremity pain       Objective   Physical Exam  Vitals and nursing note reviewed.   Constitutional:       Appearance: He is well-developed.   HENT:      Head: Normocephalic and atraumatic.   Neurological:      Mental Status: He is alert.      Gait: Gait abnormal.      Comments: Patient reports dysthesia of bilateral hands and feet.   Psychiatric:         Speech: Speech normal.         Behavior: Behavior normal.         Assessment/Plan   Diagnoses and all orders for this visit:    1. Other chronic pain (Primary)    2. " Autonomic neuropathy    3. Encounter for long-term (current) use of high-risk medication    Other orders  -     HYDROcodone-acetaminophen (NORCO)  MG per tablet; Take 1 tablet by mouth Every 6 (Six) Hours As Needed for Moderate Pain . DNF until 10-28-21  Dispense: 120 tablet; Refill: 0      --- The urine drug screen confirmation from 8-10-21 has been reviewed and the result is APPROPRIATE based on patient history and HOME report  --- The patient signed an updated copy of the controlled substance agreement on 2-1-21  --- Encouraged patient to get routine check up with Dr Dougherty. Encouraged patient to stay up on routine monitoring.   --- Refill Hydrocodone DNF until 10-28-21. Patient appears stable with current regimen. No adverse effects. Regarding continuation of opioids, there is no evidence of aberrant behavior or any red flags.  The patient continues with appropriate response to opioid therapy. ADL's remain intact by self.   --- Follow-up 2 months or sooner if needed.       HOME REPORT  As part of the patient's treatment plan, I am prescribing controlled substances. The patient has been made aware of appropriate use of such medications, including potential risk of somnolence, limited ability to drive and/or work safely, and the potential for dependence or overdose. It has also bee made clear that these medications are for use by this patient only, without concomitant use of alcohol or other substances unless prescribed.     Patient has completed prescribing agreement detailing terms of continued prescribing of controlled substances, including monitoring HOME reports, urine drug screening, and pill counts if necessary. The patient is aware that inappropriate use will results in cessation of prescribing such medications.    As the clinician, I personally reviewed the HOME from 10-19-21 while the patient was in the office today.    History and physical exam exhibit continued safe and appropriate use  of controlled substances.       Dictated utilizing Dragon dictation.

## 2021-10-25 RX ORDER — LEVETIRACETAM 500 MG/1
TABLET ORAL
Qty: 60 TABLET | Refills: 0 | Status: SHIPPED | OUTPATIENT
Start: 2021-10-25 | End: 2021-11-29 | Stop reason: SDUPTHER

## 2021-10-31 DIAGNOSIS — I10 ESSENTIAL HYPERTENSION: ICD-10-CM

## 2021-11-01 RX ORDER — AMLODIPINE BESYLATE 10 MG/1
TABLET ORAL
Qty: 90 TABLET | Refills: 0 | Status: SHIPPED | OUTPATIENT
Start: 2021-11-01 | End: 2021-12-23

## 2021-11-18 ENCOUNTER — OFFICE VISIT (OUTPATIENT)
Dept: SPORTS MEDICINE | Facility: CLINIC | Age: 71
End: 2021-11-18

## 2021-11-18 VITALS
HEART RATE: 68 BPM | OXYGEN SATURATION: 98 % | SYSTOLIC BLOOD PRESSURE: 152 MMHG | DIASTOLIC BLOOD PRESSURE: 82 MMHG | HEIGHT: 67 IN | TEMPERATURE: 96.9 F | BODY MASS INDEX: 32.96 KG/M2 | RESPIRATION RATE: 16 BRPM | WEIGHT: 210 LBS

## 2021-11-18 DIAGNOSIS — R27.0 ATAXIA: Chronic | ICD-10-CM

## 2021-11-18 DIAGNOSIS — Z12.5 SCREENING FOR PROSTATE CANCER: ICD-10-CM

## 2021-11-18 DIAGNOSIS — I67.1 ANEURYSM OF ANTERIOR COMMUNICATING ARTERY: Chronic | ICD-10-CM

## 2021-11-18 DIAGNOSIS — H73.92 ABNORMAL TYMPANIC MEMBRANE OF LEFT EAR: ICD-10-CM

## 2021-11-18 DIAGNOSIS — G90.9 AUTONOMIC NEUROPATHY: Chronic | ICD-10-CM

## 2021-11-18 DIAGNOSIS — E11.42 DIABETIC PERIPHERAL NEUROPATHY (HCC): Chronic | ICD-10-CM

## 2021-11-18 DIAGNOSIS — I10 PRIMARY HYPERTENSION: Primary | Chronic | ICD-10-CM

## 2021-11-18 PROCEDURE — 99214 OFFICE O/P EST MOD 30 MIN: CPT | Performed by: FAMILY MEDICINE

## 2021-11-18 NOTE — PROGRESS NOTES
"Chief Complaint  Balance Issues    Subjective          Kelby Peters presents to [facility]  History of Present Illness  1.  Over the past several weeks patient has noted a decreased hearing left ear.  Also some mild increase in the amount of tinnitus.  Patient has a history of autonomic neuropathy and polyneuropathy and ataxia due to these issues but since his ear has been bothering him he has noted a little more problems with balance.  Patient also has a history of anterior communicating cerebral aneurysm that has not been evaluated for couple years.  2.  Follow-up hypertension-no chest pain, or shortness of breath  3.  Follow-up diabetes-no symptoms of hypoglycemia.  Objective   Vital Signs:   /82 (BP Location: Left arm, Patient Position: Sitting, Cuff Size: Adult)   Pulse 68   Temp 96.9 °F (36.1 °C) (Temporal)   Resp 16   Ht 170.2 cm (67.01\")   Wt 95.3 kg (210 lb)   SpO2 98%   BMI 32.88 kg/m²     Physical Exam  Vitals reviewed.   Constitutional:       Appearance: He is well-developed.   HENT:      Head: Normocephalic and atraumatic.      Ears:      Comments: Left TM with some retraction and possible cholesteatoma  Eyes:      Conjunctiva/sclera: Conjunctivae normal.      Pupils: Pupils are equal, round, and reactive to light.   Cardiovascular:      Rate and Rhythm: Normal rate and regular rhythm.      Comments: No peripheral edema  Pulmonary:      Effort: Pulmonary effort is normal.      Breath sounds: Normal breath sounds.   Skin:     General: Skin is warm and dry.   Neurological:      Mental Status: He is alert and oriented to person, place, and time.      Coordination: Coordination normal.      Gait: Gait normal.      Comments: His balance seems to be at baseline   Psychiatric:         Behavior: Behavior normal.        Result Review :                MRI Angiogram Head Without Contrast (08/21/2019 17:36)    Assessment and Plan    Diagnoses and all orders for this " visit:    1. Primary hypertension (Primary)  -     CBC & Differential  -     Comprehensive Metabolic Panel  -     Hemoglobin A1c    2. Autonomic neuropathy  -     CBC & Differential  -     Comprehensive Metabolic Panel  -     Hemoglobin A1c    3. Ataxia  -     CBC & Differential  -     Comprehensive Metabolic Panel  -     Hemoglobin A1c    4. Diabetic peripheral neuropathy (HCC)  -     CBC & Differential  -     Comprehensive Metabolic Panel  -     Hemoglobin A1c    5. Aneurysm of anterior communicating artery  -     MRI angiogram head wo contrast; Future  -     CBC & Differential  -     Comprehensive Metabolic Panel  -     Hemoglobin A1c    6. Abnormal tympanic membrane of left ear  -     Ambulatory Referral to ENT (Otolaryngology)  -     CBC & Differential  -     Comprehensive Metabolic Panel  -     Hemoglobin A1c    7. Screening for prostate cancer  -     PSA Screen      1.  Because of the abnormality left TM, decreased hearing and increase in tinnitus will refer to ENT for further evaluation.  It is possible this could be aggravating his underlying ataxia from diabetic neuropathy.  He also however does need repeat MRI angiogram of the head to evaluate the anterior communicating aneurysm.      Follow Up   No follow-ups on file.  Patient was given instructions and counseling regarding his condition or for health maintenance advice. Please see specific information pulled into the AVS if appropriate.

## 2021-11-19 LAB
ALBUMIN SERPL-MCNC: 4.4 G/DL (ref 3.7–4.7)
ALBUMIN/GLOB SERPL: 1.6 {RATIO} (ref 1.2–2.2)
ALP SERPL-CCNC: 61 IU/L (ref 44–121)
ALT SERPL-CCNC: 21 IU/L (ref 0–44)
AST SERPL-CCNC: 20 IU/L (ref 0–40)
BASOPHILS # BLD AUTO: 0 X10E3/UL (ref 0–0.2)
BASOPHILS NFR BLD AUTO: 1 %
BILIRUB SERPL-MCNC: 0.3 MG/DL (ref 0–1.2)
BUN SERPL-MCNC: 10 MG/DL (ref 8–27)
BUN/CREAT SERPL: 14 (ref 10–24)
CALCIUM SERPL-MCNC: 9 MG/DL (ref 8.6–10.2)
CHLORIDE SERPL-SCNC: 98 MMOL/L (ref 96–106)
CO2 SERPL-SCNC: 24 MMOL/L (ref 20–29)
CREAT SERPL-MCNC: 0.7 MG/DL (ref 0.76–1.27)
EOSINOPHIL # BLD AUTO: 0.2 X10E3/UL (ref 0–0.4)
EOSINOPHIL NFR BLD AUTO: 3 %
ERYTHROCYTE [DISTWIDTH] IN BLOOD BY AUTOMATED COUNT: 12.7 % (ref 11.6–15.4)
GLOBULIN SER CALC-MCNC: 2.8 G/DL (ref 1.5–4.5)
GLUCOSE SERPL-MCNC: 163 MG/DL (ref 65–99)
HBA1C MFR BLD: 12 % (ref 4.8–5.6)
HCT VFR BLD AUTO: 43.8 % (ref 37.5–51)
HGB BLD-MCNC: 14.6 G/DL (ref 13–17.7)
IMM GRANULOCYTES # BLD AUTO: 0 X10E3/UL (ref 0–0.1)
IMM GRANULOCYTES NFR BLD AUTO: 0 %
LYMPHOCYTES # BLD AUTO: 1.6 X10E3/UL (ref 0.7–3.1)
LYMPHOCYTES NFR BLD AUTO: 30 %
MCH RBC QN AUTO: 30.1 PG (ref 26.6–33)
MCHC RBC AUTO-ENTMCNC: 33.3 G/DL (ref 31.5–35.7)
MCV RBC AUTO: 90 FL (ref 79–97)
MONOCYTES # BLD AUTO: 0.5 X10E3/UL (ref 0.1–0.9)
MONOCYTES NFR BLD AUTO: 10 %
NEUTROPHILS # BLD AUTO: 2.9 X10E3/UL (ref 1.4–7)
NEUTROPHILS NFR BLD AUTO: 56 %
PLATELET # BLD AUTO: 229 X10E3/UL (ref 150–450)
POTASSIUM SERPL-SCNC: 4.5 MMOL/L (ref 3.5–5.2)
PROT SERPL-MCNC: 7.2 G/DL (ref 6–8.5)
PSA SERPL-MCNC: 0.3 NG/ML (ref 0–4)
RBC # BLD AUTO: 4.85 X10E6/UL (ref 4.14–5.8)
SODIUM SERPL-SCNC: 137 MMOL/L (ref 134–144)
WBC # BLD AUTO: 5.1 X10E3/UL (ref 3.4–10.8)

## 2021-11-22 RX ORDER — HYDROCODONE BITARTRATE AND ACETAMINOPHEN 10; 325 MG/1; MG/1
1 TABLET ORAL EVERY 6 HOURS PRN
Qty: 120 TABLET | Refills: 0 | Status: SHIPPED | OUTPATIENT
Start: 2021-11-22 | End: 2021-12-20 | Stop reason: SDUPTHER

## 2021-11-22 NOTE — TELEPHONE ENCOUNTER
Reviewed last office visit on 10/19/2021. UDS and HOME reviewed and are appropriate. Due to Cecilia Burgos, APRN being out of office, will refill appropriately.

## 2021-11-22 NOTE — TELEPHONE ENCOUNTER
Medication Refill Request    Date of phone call: 11/22/2021    Medication being requested: hydrocodone  mg sig: Take 1 tablet by mouth Every 6 (Six) Hours As Needed for Moderate Pain   Qty: 120    Date of last visit: 10/19/2021    Date of last refill:     HOME up to date?:     Next Follow up?: 12/20/2021    Any new pertinent information? (i.e, new medication allergies, new use of medications, change in patient's health or condition, non-compliance or inconsistency with prescribing agreement?): will you fill for kam?

## 2021-11-23 DIAGNOSIS — Z79.4 TYPE 2 DIABETES MELLITUS WITH DIABETIC AUTONOMIC NEUROPATHY, WITH LONG-TERM CURRENT USE OF INSULIN (HCC): Primary | ICD-10-CM

## 2021-11-23 DIAGNOSIS — E11.43 TYPE 2 DIABETES MELLITUS WITH DIABETIC AUTONOMIC NEUROPATHY, WITH LONG-TERM CURRENT USE OF INSULIN (HCC): Primary | ICD-10-CM

## 2021-11-29 ENCOUNTER — TELEPHONE (OUTPATIENT)
Dept: SPORTS MEDICINE | Facility: CLINIC | Age: 71
End: 2021-11-29

## 2021-11-29 RX ORDER — LEVETIRACETAM 500 MG/1
500 TABLET ORAL 2 TIMES DAILY
Qty: 60 TABLET | Refills: 5 | Status: SHIPPED | OUTPATIENT
Start: 2021-11-29 | End: 2022-05-26

## 2021-11-29 NOTE — TELEPHONE ENCOUNTER
Patient called and wanted to know if he can get a refill on his Keppra sent to the [facility] on [address]

## 2021-12-10 DIAGNOSIS — G62.9 POLYNEUROPATHY: ICD-10-CM

## 2021-12-13 RX ORDER — GABAPENTIN 800 MG/1
TABLET ORAL
Qty: 120 TABLET | Refills: 1 | Status: SHIPPED | OUTPATIENT
Start: 2021-12-13 | End: 2022-02-07 | Stop reason: SDUPTHER

## 2021-12-16 ENCOUNTER — PATIENT ROUNDING (BHMG ONLY) (OUTPATIENT)
Dept: ENDOCRINOLOGY | Age: 71
End: 2021-12-16

## 2021-12-16 ENCOUNTER — OFFICE VISIT (OUTPATIENT)
Dept: ENDOCRINOLOGY | Age: 71
End: 2021-12-16

## 2021-12-16 VITALS
RESPIRATION RATE: 20 BRPM | BODY MASS INDEX: 32.96 KG/M2 | OXYGEN SATURATION: 98 % | HEIGHT: 67 IN | HEART RATE: 74 BPM | DIASTOLIC BLOOD PRESSURE: 68 MMHG | SYSTOLIC BLOOD PRESSURE: 130 MMHG | WEIGHT: 210 LBS

## 2021-12-16 DIAGNOSIS — I10 ESSENTIAL HYPERTENSION: ICD-10-CM

## 2021-12-16 DIAGNOSIS — E11.42 DIABETIC PERIPHERAL NEUROPATHY (HCC): Chronic | ICD-10-CM

## 2021-12-16 DIAGNOSIS — E55.9 VITAMIN D DEFICIENCY: ICD-10-CM

## 2021-12-16 DIAGNOSIS — E53.8 VITAMIN B12 DEFICIENCY: ICD-10-CM

## 2021-12-16 DIAGNOSIS — E78.5 DYSLIPIDEMIA: ICD-10-CM

## 2021-12-16 DIAGNOSIS — IMO0002 DIABETES MELLITUS TYPE 2, UNCONTROLLED, WITH COMPLICATIONS: Primary | ICD-10-CM

## 2021-12-16 PROCEDURE — 99204 OFFICE O/P NEW MOD 45 MIN: CPT | Performed by: INTERNAL MEDICINE

## 2021-12-16 RX ORDER — LISINOPRIL 10 MG/1
10 TABLET ORAL DAILY
Qty: 30 TABLET | Refills: 11 | Status: SHIPPED | OUTPATIENT
Start: 2021-12-16 | End: 2021-12-23

## 2021-12-16 RX ORDER — BLOOD SUGAR DIAGNOSTIC
STRIP MISCELLANEOUS
Qty: 400 EACH | Refills: 3 | Status: SHIPPED | OUTPATIENT
Start: 2021-12-16 | End: 2021-12-23 | Stop reason: SDUPTHER

## 2021-12-16 RX ORDER — ROSUVASTATIN CALCIUM 5 MG/1
5 TABLET, COATED ORAL NIGHTLY
Qty: 30 TABLET | Refills: 11 | Status: SHIPPED | OUTPATIENT
Start: 2021-12-16 | End: 2022-07-20 | Stop reason: ALTCHOICE

## 2021-12-16 RX ORDER — LANCETS 33 GAUGE
EACH MISCELLANEOUS
Qty: 400 EACH | Refills: 3 | Status: SHIPPED | OUTPATIENT
Start: 2021-12-16 | End: 2023-03-09 | Stop reason: SDUPTHER

## 2021-12-16 NOTE — PROGRESS NOTES
December 16, 2021    Hello mr Kelby Peters?    My name is evens lee      I am the practice manager with [facility]  [address] [address] [phone].    I am rounding in office to officially welcome you to our practice and ask about your recent visit. Is this a good time to talk? yes    Tell me about your visit with us. What things went well?  dr srivastava is really smart and beautiful. She took care of me       We're always looking for ways to make our patients' experiences even better. Do you have recommendations on ways we may improve? no ma'am    Overall were you satisfied with your first visit to our practice? Yes         I appreciate you taking the time to speak with me today. Is there anything else I can do for you?no       Thank you, and have a great day.
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
## 2021-12-17 LAB
ALBUMIN SERPL-MCNC: 4.3 G/DL (ref 3.7–4.7)
ALBUMIN/CREAT UR: 209 MG/G CREAT (ref 0–29)
ALBUMIN/GLOB SERPL: 1.5 {RATIO} (ref 1.2–2.2)
ALP SERPL-CCNC: 61 IU/L (ref 44–121)
ALT SERPL-CCNC: 20 IU/L (ref 0–44)
AST SERPL-CCNC: 22 IU/L (ref 0–40)
BILIRUB SERPL-MCNC: 0.2 MG/DL (ref 0–1.2)
BUN SERPL-MCNC: 13 MG/DL (ref 8–27)
BUN/CREAT SERPL: 17 (ref 10–24)
CALCIUM SERPL-MCNC: 9.5 MG/DL (ref 8.6–10.2)
CHLORIDE SERPL-SCNC: 101 MMOL/L (ref 96–106)
CHOLEST SERPL-MCNC: 217 MG/DL (ref 100–199)
CO2 SERPL-SCNC: 27 MMOL/L (ref 20–29)
CREAT SERPL-MCNC: 0.76 MG/DL (ref 0.76–1.27)
CREAT UR-MCNC: 75.6 MG/DL
GLOBULIN SER CALC-MCNC: 2.9 G/DL (ref 1.5–4.5)
GLUCOSE SERPL-MCNC: ABNORMAL MG/DL
HDLC SERPL-MCNC: 40 MG/DL
IMP & REVIEW OF LAB RESULTS: NORMAL
LDLC SERPL CALC-MCNC: 135 MG/DL (ref 0–99)
MICROALBUMIN UR-MCNC: 157.7 UG/ML
POTASSIUM SERPL-SCNC: ABNORMAL MMOL/L
PROT SERPL-MCNC: 7.2 G/DL (ref 6–8.5)
SODIUM SERPL-SCNC: 145 MMOL/L (ref 134–144)
TRIGL SERPL-MCNC: 232 MG/DL (ref 0–149)
VLDLC SERPL CALC-MCNC: 42 MG/DL (ref 5–40)

## 2021-12-20 ENCOUNTER — OFFICE VISIT (OUTPATIENT)
Dept: PAIN MEDICINE | Facility: CLINIC | Age: 71
End: 2021-12-20

## 2021-12-20 VITALS
TEMPERATURE: 96.5 F | BODY MASS INDEX: 34.15 KG/M2 | HEART RATE: 69 BPM | OXYGEN SATURATION: 94 % | SYSTOLIC BLOOD PRESSURE: 150 MMHG | WEIGHT: 217.6 LBS | HEIGHT: 67 IN | DIASTOLIC BLOOD PRESSURE: 74 MMHG

## 2021-12-20 DIAGNOSIS — G89.29 OTHER CHRONIC PAIN: Primary | ICD-10-CM

## 2021-12-20 DIAGNOSIS — G62.9 POLYNEUROPATHY: ICD-10-CM

## 2021-12-20 DIAGNOSIS — Z79.899 ENCOUNTER FOR LONG-TERM (CURRENT) USE OF HIGH-RISK MEDICATION: ICD-10-CM

## 2021-12-20 PROCEDURE — 99213 OFFICE O/P EST LOW 20 MIN: CPT | Performed by: NURSE PRACTITIONER

## 2021-12-20 RX ORDER — HYDROCODONE BITARTRATE AND ACETAMINOPHEN 10; 325 MG/1; MG/1
1 TABLET ORAL EVERY 6 HOURS PRN
Qty: 120 TABLET | Refills: 0 | Status: SHIPPED | OUTPATIENT
Start: 2021-12-20 | End: 2022-01-21 | Stop reason: SDUPTHER

## 2021-12-20 NOTE — PROGRESS NOTES
"CHIEF COMPLAINT  F/U Extremity pain- patient states that his pain has remained the same since his last visit.     Subjective   Kelby Peters is a 71 y.o. male  who presents for follow-up.  He has a history of chronic extremity pain due to neuropathy.. Reports his pain is since pain is unchanged.    Complains of pain in his extremities. Today his pain is 7/10VAS. Describes the pain as continuous burning. Pain increases with certain activity, prolonged position; pain decreases with medication and rest.  Continues with Hydrocodone 7.5/325 4/day and gabapentin 800 mg 4/day(1 in morning, 1 at lunch and 2 at night). Also continues with Keppra 500 mg BID. His pain can interrupt his sleep.  Denies any side effects from the regimen. The regimen helps decrease his pain by 30-40%.   ADL's by self.     \"i'm committing to getting my diabetes under control.\"  11-18-21-- AIC was 12.0. Is working on controlling blood sugars better.     Failed Cymbalta.  Failed Horizant($$$) and Gralise ($$$).  Failed Lyrica---pedal edema  History of IDDM.    Patient remained masked during entire encounter. No cough present. I donned a mask and eye protection throughout entire visit. Prior to donning mask and eye protection, hand hygiene was performed, as well as when it was doffed.  I was closer than 6 feet, but not for an extended period of time. No obvious exposure to any bodily fluids.    Extremity Pain   The pain is present in the left hand, right hand, left lower leg and right lower leg. This is a chronic problem. The current episode started more than 1 year ago. The problem occurs constantly. Progression since onset: unchanged since last office visit. The pain is at a severity of 7/10. The pain is moderate. Associated symptoms include numbness. Pertinent negatives include no fever. He has tried oral narcotics and rest (cymbalta--no help) for the symptoms. The treatment provided moderate relief. His past medical history is significant for " "diabetes.   Knee Pain   Associated symptoms include numbness.        PEG Assessment   What number best describes your pain on average in the past week?7  What number best describes how, during the past week, pain has interfered with your enjoyment of life?6  What number best describes how, during the past week, pain has interfered with your general activity?  8    The following portions of the patient's history were reviewed and updated as appropriate: allergies, current medications, past family history, past medical history, past social history, past surgical history and problem list.    Review of Systems   Constitutional: Negative for activity change, chills, fatigue and fever.   HENT: Negative for congestion.    Eyes: Negative for visual disturbance.   Respiratory: Negative for chest tightness and shortness of breath.    Cardiovascular: Negative for chest pain.   Gastrointestinal: Positive for constipation and diarrhea. Negative for abdominal pain.   Genitourinary: Positive for difficulty urinating. Negative for dysuria and enuresis.   Musculoskeletal: Negative for back pain and neck pain.   Neurological: Positive for weakness and numbness. Negative for dizziness, light-headedness and headaches.   Psychiatric/Behavioral: Positive for agitation. Negative for self-injury, sleep disturbance and suicidal ideas. The patient is nervous/anxious.      I have reviewed and confirmed the accuracy of the ROS as documented by the MA/LPN/RN Cecilia Burgos, ABEBA    Vitals:    12/20/21 0840   BP: 150/74   Pulse: 69   Temp: 96.5 °F (35.8 °C)   SpO2: 94%   Weight: 98.7 kg (217 lb 9.6 oz)   Height: 170.2 cm (67\")   PainSc:   7   PainLoc: Arm       Objective   Physical Exam  Vitals and nursing note reviewed.   Constitutional:       Appearance: He is well-developed.   HENT:      Head: Normocephalic and atraumatic.   Skin:         Neurological:      Mental Status: He is alert.      Gait: Gait abnormal.      Deep Tendon Reflexes:    "   Reflex Scores:       Bicep reflexes are 1+ on the right side and 1+ on the left side.       Brachioradialis reflexes are 1+ on the right side and 1+ on the left side.       Patellar reflexes are 1+ on the right side and 1+ on the left side.     Comments: Patient reports dysthesia of bilateral hands and feet.   Psychiatric:         Speech: Speech normal.         Behavior: Behavior normal.         Assessment/Plan   Diagnoses and all orders for this visit:    1. Other chronic pain (Primary)    2. Polyneuropathy    3. Encounter for long-term (current) use of high-risk medication    Other orders  -     HYDROcodone-acetaminophen (NORCO)  MG per tablet; Take 1 tablet by mouth Every 6 (Six) Hours As Needed for Moderate Pain . DNF until 12-28-21  Dispense: 120 tablet; Refill: 0      --- The urine drug screen confirmation from 8-10-21 has been reviewed and the result is APPROPRIATE based on patient history and HOME report  --- The patient signed an updated copy of the controlled substance agreement on 2-1-21  --- Refill Hydrocodone DNF. Patient appears stable with current regimen. No adverse effects. Regarding continuation of opioids, there is no evidence of aberrant behavior or any red flags.  The patient continues with appropriate response to opioid therapy. ADL's remain intact by self.   --- Follow-up 2 months or sooner if needed.     HOME REPORT  As part of the patient's treatment plan, I am prescribing controlled substances. The patient has been made aware of appropriate use of such medications, including potential risk of somnolence, limited ability to drive and/or work safely, and the potential for dependence or overdose. It has also bee made clear that these medications are for use by this patient only, without concomitant use of alcohol or other substances unless prescribed.     Patient has completed prescribing agreement detailing terms of continued prescribing of controlled substances, including  monitoring HOME reports, urine drug screening, and pill counts if necessary. The patient is aware that inappropriate use will results in cessation of prescribing such medications.    As the clinician, I personally reviewed the HOME from 12-20-21 while the patient was in the office today.    History and physical exam exhibit continued safe and appropriate use of controlled substances.       Dictated utilizing Dragon dictation.     This document is intended for medical expert use only. Reading of this document by patients and/or patient's family without participating medical staff guidance may result in misinterpretation and unintended morbidity.   Any interpretation of such data is the responsibility of the patient and/or family member responsible for the patient in concert with their primary or specialist providers, not to be left for sources of online searches such as [employer], [employer] or similar queries. Relying on these approaches to knowledge may result in misinterpretation, misguided goals of care and even death should patients or family members try recommendations outside of the realm of professional medical care in a supervised way.

## 2021-12-23 ENCOUNTER — OFFICE VISIT (OUTPATIENT)
Dept: ENDOCRINOLOGY | Age: 71
End: 2021-12-23

## 2021-12-23 VITALS
SYSTOLIC BLOOD PRESSURE: 120 MMHG | HEART RATE: 81 BPM | OXYGEN SATURATION: 94 % | BODY MASS INDEX: 33.9 KG/M2 | HEIGHT: 67 IN | WEIGHT: 216 LBS | DIASTOLIC BLOOD PRESSURE: 62 MMHG

## 2021-12-23 DIAGNOSIS — IMO0002 DIABETES MELLITUS TYPE 2, UNCONTROLLED, WITH COMPLICATIONS: Primary | ICD-10-CM

## 2021-12-23 DIAGNOSIS — E55.9 VITAMIN D DEFICIENCY: ICD-10-CM

## 2021-12-23 DIAGNOSIS — I10 ESSENTIAL HYPERTENSION: ICD-10-CM

## 2021-12-23 DIAGNOSIS — E53.8 VITAMIN B12 DEFICIENCY: ICD-10-CM

## 2021-12-23 DIAGNOSIS — R80.9 PROTEINURIA, UNSPECIFIED TYPE: ICD-10-CM

## 2021-12-23 DIAGNOSIS — E78.5 DYSLIPIDEMIA: ICD-10-CM

## 2021-12-23 PROCEDURE — 99214 OFFICE O/P EST MOD 30 MIN: CPT | Performed by: INTERNAL MEDICINE

## 2021-12-23 RX ORDER — LISINOPRIL 10 MG/1
20 TABLET ORAL DAILY
Qty: 90 TABLET | Refills: 11 | Status: SHIPPED | OUTPATIENT
Start: 2021-12-23 | End: 2022-07-20 | Stop reason: ALTCHOICE

## 2021-12-23 RX ORDER — BLOOD SUGAR DIAGNOSTIC
STRIP MISCELLANEOUS
Qty: 400 EACH | Refills: 3 | Status: SHIPPED | OUTPATIENT
Start: 2021-12-23 | End: 2023-03-09 | Stop reason: SDUPTHER

## 2021-12-23 RX ORDER — BLOOD SUGAR DIAGNOSTIC
STRIP MISCELLANEOUS
Qty: 400 EACH | Refills: 3 | Status: SHIPPED | OUTPATIENT
Start: 2021-12-23 | End: 2021-12-23

## 2021-12-23 RX ORDER — PEN NEEDLE, DIABETIC 32 GX 1/4"
NEEDLE, DISPOSABLE MISCELLANEOUS
Qty: 1500 EACH | Refills: 3 | Status: SHIPPED | OUTPATIENT
Start: 2021-12-23

## 2021-12-23 RX ORDER — INSULIN ASPART 100 [IU]/ML
10 INJECTION, SOLUTION INTRAVENOUS; SUBCUTANEOUS
Qty: 27 ML | Refills: 0 | Status: SHIPPED | OUTPATIENT
Start: 2021-12-23 | End: 2022-03-01

## 2021-12-23 RX ORDER — HUMAN INSULIN 100 [IU]/ML
INJECTION, SUSPENSION SUBCUTANEOUS
Qty: 90 ML | Refills: 3 | Status: SHIPPED | OUTPATIENT
Start: 2021-12-23 | End: 2022-03-01
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
## 2021-12-29 NOTE — TELEPHONE ENCOUNTER
Medication Refill Request    Date of phone call: 9-07-18    Medication being requested: Hydrocodone-apap 7.5-325 mg sig: Take 1 tablet by mouth every   Qty: 120 tablets    Date of last visit: 8-06-18    Date of last refill: 8-06-18    HOME up to date?: 8-03-18    Next Follow up?: 10-02-18    Any new pertinent information? (i.e, new medication allergies, new use of medications, change in patient's health or condition, non-compliance or inconsistency with prescribing agreement?): n/a  
Patient

## 2022-01-03 ENCOUNTER — HOSPITAL ENCOUNTER (OUTPATIENT)
Dept: MRI IMAGING | Facility: HOSPITAL | Age: 72
Discharge: HOME | End: 2022-01-03
Admitting: FAMILY MEDICINE

## 2022-01-03 DIAGNOSIS — I67.1 ANEURYSM OF ANTERIOR COMMUNICATING ARTERY: Chronic | ICD-10-CM

## 2022-01-03 PROCEDURE — 70544 MR ANGIOGRAPHY HEAD W/O DYE: CPT

## 2022-01-12 ENCOUNTER — HOSPITAL ENCOUNTER (OUTPATIENT)
Dept: DIABETES SERVICES | Facility: HOSPITAL | Age: 72
Discharge: HOME | End: 2022-01-12
Admitting: INTERNAL MEDICINE

## 2022-01-12 PROCEDURE — 97802 MEDICAL NUTRITION INDIV IN: CPT

## 2022-01-21 NOTE — TELEPHONE ENCOUNTER
Medication Refill Request    Date of phone call: 01/21/2022    Medication being requested: Hydrocodone  mg   sig: Take 1 tablet by mouth Every 6 (Six) Hours As Needed for Moderate Pain   Qty: 120    Date of last visit: 12/20/2021    Date of last refill:    HOME up to date?:     Next Follow up?: 02/21/2022    Any new pertinent information? (i.e, new medication allergies, new use of medications, change in patient's health or condition, non-compliance or inconsistency with prescribing agreement?):

## 2022-01-26 RX ORDER — HYDROCODONE BITARTRATE AND ACETAMINOPHEN 10; 325 MG/1; MG/1
1 TABLET ORAL EVERY 6 HOURS PRN
Qty: 120 TABLET | Refills: 0 | Status: SHIPPED | OUTPATIENT
Start: 2022-01-26 | End: 2022-02-21 | Stop reason: SDUPTHER

## 2022-02-07 DIAGNOSIS — G62.9 POLYNEUROPATHY: ICD-10-CM

## 2022-02-07 RX ORDER — GABAPENTIN 800 MG/1
800 TABLET ORAL 4 TIMES DAILY
Qty: 120 TABLET | Refills: 1 | Status: SHIPPED | OUTPATIENT
Start: 2022-02-07 | End: 2022-04-11

## 2022-02-07 NOTE — TELEPHONE ENCOUNTER
Medication Refill Request    Date of phone call: 02/07/2022    Medication being requested: gabapentin 800 mg sig: TAKE ONE TABLET BY MOUTH FOUR TIMES A DAY  Qty: 120    Date of last visit: 12/20/2021    Date of last refill:     HOME up to date?:     Next Follow up?: 02/21/2022    Any new pertinent information? (i.e, new medication allergies, new use of medications, change in patient's health or condition, non-compliance or inconsistency with prescribing agreement?):

## 2022-02-14 RX ORDER — AMLODIPINE BESYLATE 10 MG/1
TABLET ORAL
Qty: 90 TABLET | Refills: 0 | Status: SHIPPED | OUTPATIENT
Start: 2022-02-14 | End: 2022-05-26

## 2022-02-21 ENCOUNTER — OFFICE VISIT (OUTPATIENT)
Dept: PAIN MEDICINE | Facility: CLINIC | Age: 72
End: 2022-02-21

## 2022-02-21 VITALS
SYSTOLIC BLOOD PRESSURE: 171 MMHG | OXYGEN SATURATION: 99 % | HEIGHT: 67 IN | DIASTOLIC BLOOD PRESSURE: 85 MMHG | RESPIRATION RATE: 16 BRPM | HEART RATE: 59 BPM | WEIGHT: 208 LBS | BODY MASS INDEX: 32.65 KG/M2 | TEMPERATURE: 96.9 F

## 2022-02-21 DIAGNOSIS — M79.609 PAIN IN EXTREMITY, UNSPECIFIED EXTREMITY: ICD-10-CM

## 2022-02-21 DIAGNOSIS — G62.9 POLYNEUROPATHY: Primary | ICD-10-CM

## 2022-02-21 DIAGNOSIS — E11.42 DIABETIC PERIPHERAL NEUROPATHY: ICD-10-CM

## 2022-02-21 DIAGNOSIS — G89.29 OTHER CHRONIC PAIN: ICD-10-CM

## 2022-02-21 DIAGNOSIS — Z79.899 ENCOUNTER FOR LONG-TERM (CURRENT) USE OF HIGH-RISK MEDICATION: ICD-10-CM

## 2022-02-21 PROCEDURE — 80305 DRUG TEST PRSMV DIR OPT OBS: CPT | Performed by: NURSE PRACTITIONER

## 2022-02-21 PROCEDURE — 99213 OFFICE O/P EST LOW 20 MIN: CPT | Performed by: NURSE PRACTITIONER

## 2022-02-21 RX ORDER — HYDROCODONE BITARTRATE AND ACETAMINOPHEN 10; 325 MG/1; MG/1
1 TABLET ORAL EVERY 6 HOURS PRN
Qty: 120 TABLET | Refills: 0 | Status: SHIPPED | OUTPATIENT
Start: 2022-02-21 | End: 2022-03-17 | Stop reason: SDUPTHER

## 2022-02-21 NOTE — PROGRESS NOTES
"CHIEF COMPLAINT  FOLLOW-UP EXTREMITY PAIN.     Pt states his pain has remained the same.     Subjective   Kelby Peters is a 71 y.o. male  who presents for follow-up.  He has a history of chronic extremity pain due to neuropathy.  Today his pain is 7/10VAS in severity. He describes his pain as continuous pain (tingling) that will wax and wane in severity. When his pain is severe he describes it as being \"on fire\"  He states that pain in his hands is worse then the pain in his feet.  He continues with Hydrocodone 10/325 4/day and gabapentin 800 mg 4/day(1 in morning, 1 at lunch and 2 at night). Also continues with Keppra 500 mg BID. This medication regimen decreases his pain by 75%.  He states \"I ran out one time last year, it was not pretty\".     He states he is hovering around 140 on his blood glucose levels.      Failed Cymbalta.  Horizant and Gralise Cost-prohibitive.  Failed Lyrica---pedal edema  History of IDDM.    Patient remained masked during entire encounter. No cough present. I donned a mask and eye protection throughout entire visit. Prior to donning mask and eye protection, hand hygiene was performed, as well as when it was doffed.  I was closer than 6 feet, but not for an extended period of time. No obvious exposure to any bodily fluids.    Extremity Pain   The pain is present in the left hand, right hand, left lower leg and right lower leg. This is a chronic problem. The current episode started more than 1 year ago. The problem occurs constantly. The problem has been unchanged. The pain is at a severity of 7/10. The pain is moderate. Associated symptoms include numbness (hands and feet). Pertinent negatives include no fever. He has tried oral narcotics and rest for the symptoms. The treatment provided moderate relief. His past medical history is significant for diabetes.      PEG Assessment   What number best describes your pain on average in the past week?7  What number best describes how, during the " "past week, pain has interfered with your enjoyment of life?7  What number best describes how, during the past week, pain has interfered with your general activity?  7    The following portions of the patient's history were reviewed and updated as appropriate: allergies, current medications, past family history, past medical history, past social history, past surgical history and problem list.    Review of Systems   Constitutional: Negative for activity change, fatigue and fever.   HENT: Negative for congestion.    Eyes: Negative for visual disturbance.   Respiratory: Negative for cough and chest tightness.    Cardiovascular: Negative for chest pain.   Gastrointestinal: Negative for abdominal pain, constipation and diarrhea.   Genitourinary: Positive for difficulty urinating. Negative for dysuria.   Neurological: Positive for numbness (hands and feet). Negative for dizziness, weakness, light-headedness and headaches.   Psychiatric/Behavioral: Negative for agitation, sleep disturbance and suicidal ideas. The patient is not nervous/anxious.      --  The aforementioned information the Chief Complaint section and above subjective data including any HPI data, and also the Review of Systems data, has been personally reviewed and affirmed.  --    Vitals:    02/21/22 0904   BP: 171/85   Pulse: 59   Resp: 16   Temp: 96.9 °F (36.1 °C)   SpO2: 99%   Weight: 94.3 kg (208 lb)   Height: 170.2 cm (67\")   PainSc:   7   PainLoc: Comment: EXTREMITY     Objective   Physical Exam  Vitals and nursing note reviewed.   Constitutional:       Appearance: Normal appearance. He is well-developed.   Eyes:      General: Lids are normal.   Cardiovascular:      Rate and Rhythm: Normal rate.   Pulmonary:      Effort: Pulmonary effort is normal.   Neurological:      Mental Status: He is alert and oriented to person, place, and time.      Gait: Gait abnormal.   Psychiatric:         Attention and Perception: Attention normal.         Mood and Affect: " Mood normal.         Speech: Speech normal.         Behavior: Behavior normal.         Judgment: Judgment normal.       Assessment/Plan   Diagnoses and all orders for this visit:    1. Polyneuropathy (Primary)    2. Encounter for long-term (current) use of high-risk medication    3. Other chronic pain    4. Diabetic peripheral neuropathy (HCC)    5. Pain in extremity, unspecified extremity    Other orders  -     HYDROcodone-acetaminophen (NORCO)  MG per tablet; Take 1 tablet by mouth Every 6 (Six) Hours As Needed for Moderate Pain . DNF until 3-2-22  Dispense: 120 tablet; Refill: 0        --- Routine UDS in office today as part of monitoring requirements for controlled substances.  The specimen was viewed and the immunoassay result reviewed and is +OPI.  This specimen will be sent to [facility] laboratory for confirmation.     --- CSA updated 2/1/2021  --- Refill [place] 10/325. DNF 3/2/2022 applied. Patient appears stable with current regimen. No adverse effects. Regarding continuation of opioids, there is no evidence of aberrant behavior or any red flags.  The patient continues with appropriate response to opioid therapy. ADL's remain intact by self.    --- Continue Gabapentin, no refill needed.   --- Follow-up 2 months or sooner if needed.      HOME REPORT  As part of the patient's treatment plan, I am prescribing controlled substances. The patient has been made aware of appropriate use of such medications, including potential risk of somnolence, limited ability to drive and/or work safely, and the potential for dependence or overdose. It has also bee made clear that these medications are for use by this patient only, without concomitant use of alcohol or other substances unless prescribed.     Patient has completed prescribing agreement detailing terms of continued prescribing of controlled substances, including monitoring HOME reports, urine drug screening, and pill counts if necessary. The patient is  aware that inappropriate use will results in cessation of prescribing such medications.    As the clinician, I personally reviewed the HOME from 2/21/2022 while the patient was in the office today.    History and physical exam exhibit continued safe and appropriate use of controlled substances.    Dictated utilizing Dragon dictation.     This document is intended for medical expert use only. Reading of this document by patients and/or patient's family without participating medical staff guidance may result in misinterpretation and unintended morbidity.   Any interpretation of such data is the responsibility of the patient and/or family member responsible for the patient in concert with their primary or specialist providers, not to be left for sources of online searches such as [employer], [employer] or similar queries. Relying on these approaches to knowledge may result in misinterpretation, misguided goals of care and even death should patients or family members try recommendations outside of the realm of professional medical care in a supervised way.

## 2022-03-01 ENCOUNTER — OFFICE VISIT (OUTPATIENT)
Dept: ENDOCRINOLOGY | Age: 72
End: 2022-03-01

## 2022-03-01 VITALS
SYSTOLIC BLOOD PRESSURE: 125 MMHG | OXYGEN SATURATION: 98 % | WEIGHT: 207.6 LBS | DIASTOLIC BLOOD PRESSURE: 80 MMHG | HEIGHT: 67 IN | BODY MASS INDEX: 32.58 KG/M2 | HEART RATE: 82 BPM

## 2022-03-01 DIAGNOSIS — R80.9 PROTEINURIA, UNSPECIFIED TYPE: ICD-10-CM

## 2022-03-01 DIAGNOSIS — E55.9 VITAMIN D DEFICIENCY: ICD-10-CM

## 2022-03-01 DIAGNOSIS — E11.42 DIABETIC PERIPHERAL NEUROPATHY: ICD-10-CM

## 2022-03-01 DIAGNOSIS — I10 ESSENTIAL HYPERTENSION: ICD-10-CM

## 2022-03-01 DIAGNOSIS — IMO0002 DIABETES MELLITUS TYPE 2, UNCONTROLLED, WITH COMPLICATIONS: Primary | ICD-10-CM

## 2022-03-01 DIAGNOSIS — E53.8 VITAMIN B12 DEFICIENCY: ICD-10-CM

## 2022-03-01 DIAGNOSIS — E78.5 DYSLIPIDEMIA: ICD-10-CM

## 2022-03-01 PROCEDURE — 99214 OFFICE O/P EST MOD 30 MIN: CPT | Performed by: INTERNAL MEDICINE

## 2022-03-01 RX ORDER — HUMAN INSULIN 100 [IU]/ML
INJECTION, SUSPENSION SUBCUTANEOUS
Qty: 90 ML | Refills: 3 | Status: ON HOLD | OUTPATIENT
Start: 2022-03-01 | End: 2023-01-21 | Stop reason: SDUPTHER

## 2022-03-17 ENCOUNTER — TELEPHONE (OUTPATIENT)
Dept: PAIN MEDICINE | Facility: CLINIC | Age: 72
End: 2022-03-17

## 2022-03-17 NOTE — TELEPHONE ENCOUNTER
Caller: KAI    Relationship: SELF    Best call back number: [phone]    Requested Prescriptions:   Requested Prescriptions     Pending Prescriptions Disp Refills   • HYDROcodone-acetaminophen (NORCO)  MG per tablet 120 tablet 0     Sig: Take 1 tablet by mouth Every 6 (Six) Hours As Needed for Moderate Pain . DNF until 3-2-22        Pharmacy where request should be sent:        Pharmacy:  [address] [phone] [facility] [phone] [facility]        Additional details provided by patient:     Does the patient have less than a 3 day supply:  [] Yes  [x] No    Deepika Cross Rep   03/17/22 13:27 EDT

## 2022-03-18 RX ORDER — HYDROCODONE BITARTRATE AND ACETAMINOPHEN 10; 325 MG/1; MG/1
1 TABLET ORAL EVERY 6 HOURS PRN
Qty: 120 TABLET | Refills: 0 | Status: SHIPPED | OUTPATIENT
Start: 2022-03-18 | End: 2022-04-22 | Stop reason: SDUPTHER

## 2022-04-10 DIAGNOSIS — G62.9 POLYNEUROPATHY: ICD-10-CM

## 2022-04-11 RX ORDER — GABAPENTIN 800 MG/1
TABLET ORAL
Qty: 120 TABLET | Refills: 2 | Status: SHIPPED | OUTPATIENT
Start: 2022-04-11 | End: 2022-06-22 | Stop reason: SDUPTHER

## 2022-04-22 ENCOUNTER — OFFICE VISIT (OUTPATIENT)
Dept: PAIN MEDICINE | Facility: CLINIC | Age: 72
End: 2022-04-22

## 2022-04-22 VITALS
RESPIRATION RATE: 16 BRPM | TEMPERATURE: 96.6 F | SYSTOLIC BLOOD PRESSURE: 163 MMHG | HEART RATE: 72 BPM | HEIGHT: 67 IN | OXYGEN SATURATION: 95 % | BODY MASS INDEX: 33.37 KG/M2 | DIASTOLIC BLOOD PRESSURE: 75 MMHG | WEIGHT: 212.6 LBS

## 2022-04-22 DIAGNOSIS — Z79.899 ENCOUNTER FOR LONG-TERM (CURRENT) USE OF HIGH-RISK MEDICATION: Primary | ICD-10-CM

## 2022-04-22 DIAGNOSIS — G89.29 OTHER CHRONIC PAIN: ICD-10-CM

## 2022-04-22 DIAGNOSIS — E11.42 DIABETIC PERIPHERAL NEUROPATHY: ICD-10-CM

## 2022-04-22 PROCEDURE — 99213 OFFICE O/P EST LOW 20 MIN: CPT | Performed by: NURSE PRACTITIONER

## 2022-04-22 RX ORDER — HYDROCODONE BITARTRATE AND ACETAMINOPHEN 10; 325 MG/1; MG/1
1 TABLET ORAL EVERY 6 HOURS PRN
Qty: 120 TABLET | Refills: 0 | Status: SHIPPED | OUTPATIENT
Start: 2022-04-22 | End: 2022-05-31 | Stop reason: SDUPTHER

## 2022-04-22 NOTE — PROGRESS NOTES
CHIEF COMPLAINT  FOLLOW UP EXTREMITY PAIN   2 Month evaluation- Patient reports his pain level has been fluctuating     Subjective   Kelby Peters is a 71 y.o. male  who presents for follow-up.  He has a history of extremity pain due to neuropathy.  Today his pain is 7/10VAS in severity. He pain is worsened by weather changes, particularly cold weather. He continues with Hydrocodone 10/325 4/day and gabapentin 800 mg 4/day(1 in morning, 1 at lunch and 2 at night). Also continues with Keppra 500 mg BID. This medication regimen decreases his pain by a significant amount.  ADLs by self.  He denies any side effects including somnolence or constipation.     His Blood glucose has been running in the 130s on average.     Failed Cymbalta.  Horizant and Gralise Cost-prohibitive.  Failed Lyrica---pedal edema  History of IDDM.    Patient remained masked during entire encounter. No cough present. I donned a mask and eye protection throughout entire visit. Prior to donning mask and eye protection, hand hygiene was performed, as well as when it was doffed.  I was closer than 6 feet, but not for an extended period of time. No obvious exposure to any bodily fluids.    Extremity Pain   The pain is present in the left hand, right hand, left lower leg and right lower leg. This is a chronic problem. The current episode started more than 1 year ago. The problem occurs constantly. The problem has been waxing and waning. The pain is at a severity of 7/10. The pain is moderate. Associated symptoms include numbness (hands and feet). Pertinent negatives include no fever. He has tried oral narcotics and rest for the symptoms. The treatment provided moderate relief. His past medical history is significant for diabetes.      PEG Assessment   What number best describes your pain on average in the past week?10  What number best describes how, during the past week, pain has interfered with your enjoyment of life?10  What number best describes how,  "during the past week, pain has interfered with your general activity?  10    The following portions of the patient's history were reviewed and updated as appropriate: allergies, current medications, past family history, past medical history, past social history, past surgical history and problem list.    Review of Systems   Constitutional: Negative for activity change, fatigue and fever.   HENT: Negative for congestion.    Eyes: Negative for visual disturbance.   Respiratory: Negative for cough and chest tightness.    Cardiovascular: Negative for chest pain.   Gastrointestinal: Negative for abdominal pain, constipation and diarrhea.   Genitourinary: Positive for difficulty urinating. Negative for dysuria.   Neurological: Positive for weakness (hands and feet), light-headedness and numbness (hands and feet). Negative for dizziness and headaches.   Psychiatric/Behavioral: Positive for agitation. Negative for sleep disturbance and suicidal ideas. The patient is nervous/anxious.      --  The aforementioned information the Chief Complaint section and above subjective data including any HPI data, and also the Review of Systems data, has been personally reviewed and affirmed.  --    Vitals:    04/22/22 0846   BP: 163/75   BP Location: Left arm   Patient Position: Sitting   Pulse: 72   Resp: 16   Temp: 96.6 °F (35.9 °C)   SpO2: 95%   Weight: 96.4 kg (212 lb 9.6 oz)   Height: 170.2 cm (67.01\")   PainSc:   7   PainLoc: Comment: extremity     Objective   Physical Exam  Vitals and nursing note reviewed.   Constitutional:       Appearance: Normal appearance. He is well-developed.   Eyes:      General: Lids are normal.   Cardiovascular:      Rate and Rhythm: Normal rate.   Pulmonary:      Effort: Pulmonary effort is normal.   Neurological:      Mental Status: He is alert and oriented to person, place, and time.   Psychiatric:         Attention and Perception: Attention normal.         Mood and Affect: Mood normal.         Speech: " Speech normal.         Behavior: Behavior normal.         Judgment: Judgment normal.       Assessment/Plan   Diagnoses and all orders for this visit:    1. Encounter for long-term (current) use of high-risk medication (Primary)    2. Other chronic pain    3. Diabetic peripheral neuropathy (HCC)    Other orders  -     HYDROcodone-acetaminophen (NORCO)  MG per tablet; Take 1 tablet by mouth Every 6 (Six) Hours As Needed for Moderate Pain . DNF until 4-29-22  Dispense: 120 tablet; Refill: 0      --- The urine drug screen confirmation from 2/21/2022 has been reviewed and the result is appropriate based on patient history and HOME report  --- CSA updated 2/1/2021  --- Refill [place] 10/325. DNF 4/29/2022 applied. Patient appears stable with current regimen. No adverse effects. Regarding continuation of opioids, there is no evidence of aberrant behavior or any red flags.  The patient continues with appropriate response to opioid therapy. ADL's remain intact by self.   --- Follow-up 2 months or sooner if needed.      HOME REPORT  As part of the patient's treatment plan, I am prescribing controlled substances. The patient has been made aware of appropriate use of such medications, including potential risk of somnolence, limited ability to drive and/or work safely, and the potential for dependence or overdose. It has also bee made clear that these medications are for use by this patient only, without concomitant use of alcohol or other substances unless prescribed.     Patient has completed prescribing agreement detailing terms of continued prescribing of controlled substances, including monitoring HOME reports, urine drug screening, and pill counts if necessary. The patient is aware that inappropriate use will results in cessation of prescribing such medications.    As the clinician, I personally reviewed the HOME from 4/22/2022 while the patient was in the office today.    History and physical exam exhibit  continued safe and appropriate use of controlled substances.    Dictated utilizing Dragon dictation.     This document is intended for medical expert use only. Reading of this document by patients and/or patient's family without participating medical staff guidance may result in misinterpretation and unintended morbidity.   Any interpretation of such data is the responsibility of the patient and/or family member responsible for the patient in concert with their primary or specialist providers, not to be left for sources of online searches such as [employer], [employer] or similar queries. Relying on these approaches to knowledge may result in misinterpretation, misguided goals of care and even death should patients or family members try recommendations outside of the realm of professional medical care in a supervised way.

## 2022-05-23 ENCOUNTER — OFFICE VISIT (OUTPATIENT)
Dept: SPORTS MEDICINE | Facility: CLINIC | Age: 72
End: 2022-05-23

## 2022-05-23 VITALS
DIASTOLIC BLOOD PRESSURE: 70 MMHG | TEMPERATURE: 98 F | OXYGEN SATURATION: 97 % | BODY MASS INDEX: 33.29 KG/M2 | HEART RATE: 67 BPM | SYSTOLIC BLOOD PRESSURE: 150 MMHG | HEIGHT: 67 IN

## 2022-05-23 DIAGNOSIS — L98.9 SKIN LESION OF FACE: Primary | Chronic | ICD-10-CM

## 2022-05-23 DIAGNOSIS — Z23 NEED FOR VACCINATION FOR PNEUMOCOCCUS: ICD-10-CM

## 2022-05-23 PROCEDURE — G0009 ADMIN PNEUMOCOCCAL VACCINE: HCPCS | Performed by: FAMILY MEDICINE

## 2022-05-23 PROCEDURE — 90677 PCV20 VACCINE IM: CPT | Performed by: FAMILY MEDICINE

## 2022-05-23 PROCEDURE — 99214 OFFICE O/P EST MOD 30 MIN: CPT | Performed by: FAMILY MEDICINE

## 2022-05-23 NOTE — PROGRESS NOTES
"Chief Complaint  Suspicious Skin Lesion (Pt states that he has a mole on his face and would like further eval; would like to get pneumonia vaccine as well/)    Subjective          Kelby Peters presents to [facility]  History of Present Illness  1.  Year ago noted a raised red lesion just under the nose on the R upper lip. It has now accelerated in size and bleeds occasionally.  2.  Need pneumococcal vaccine  Objective   Vital Signs:  /70 (BP Location: Left arm, Patient Position: Sitting, Cuff Size: Adult)   Pulse 67   Temp 98 °F (36.7 °C) (Temporal)   Ht 170.2 cm (67.01\")   SpO2 97%   BMI 33.29 kg/m²         Physical Exam  Vitals reviewed.   Constitutional:       Appearance: He is well-developed.   HENT:      Head: Normocephalic and atraumatic.   Eyes:      Conjunctiva/sclera: Conjunctivae normal.      Pupils: Pupils are equal, round, and reactive to light.   Cardiovascular:      Comments: No peripheral edema  Pulmonary:      Effort: Pulmonary effort is normal.   Skin:     General: Skin is warm and dry.      Comments: Right upper lip just under his nose there is a raised round lesion measuring half centimeter to three quarters of a centimeter.  Dark red in color, possible pyogenic granuloma.   Neurological:      Mental Status: He is alert and oriented to person, place, and time.   Psychiatric:         Behavior: Behavior normal.        Result Review :                 Assessment and Plan    Diagnoses and all orders for this visit:    1. Skin lesion of face (Primary)  -     Ambulatory Referral to Dermatology    2. Need for vaccination for pneumococcus  -     Pneumococcal Conjugate Vaccine 20-Valent (PCV20)      Chronic skin lesion as mentioned above worsening in size and occasionally bleeding-refer to dermatology       Follow Up   No follow-ups on file.  Patient was given instructions and counseling regarding his condition or for health maintenance advice. Please see " specific information pulled into the AVS if appropriate.

## 2022-05-24 ENCOUNTER — LAB (OUTPATIENT)
Dept: ENDOCRINOLOGY | Age: 72
End: 2022-05-24

## 2022-05-24 DIAGNOSIS — IMO0002 DIABETES MELLITUS TYPE 2, UNCONTROLLED, WITH COMPLICATIONS: ICD-10-CM

## 2022-05-24 DIAGNOSIS — E53.8 VITAMIN B12 DEFICIENCY: ICD-10-CM

## 2022-05-24 DIAGNOSIS — E55.9 VITAMIN D DEFICIENCY: ICD-10-CM

## 2022-05-24 DIAGNOSIS — E11.42 DIABETIC PERIPHERAL NEUROPATHY: ICD-10-CM

## 2022-05-24 DIAGNOSIS — R80.9 PROTEINURIA, UNSPECIFIED TYPE: ICD-10-CM

## 2022-05-24 DIAGNOSIS — E78.5 DYSLIPIDEMIA: ICD-10-CM

## 2022-05-24 DIAGNOSIS — I10 ESSENTIAL HYPERTENSION: ICD-10-CM

## 2022-05-25 LAB
25(OH)D3+25(OH)D2 SERPL-MCNC: 21 NG/ML (ref 30–100)
ALBUMIN SERPL-MCNC: 4.5 G/DL (ref 3.7–4.7)
ALBUMIN/CREAT UR: 309 MG/G CREAT (ref 0–29)
ALBUMIN/GLOB SERPL: 1.9 {RATIO} (ref 1.2–2.2)
ALP SERPL-CCNC: 57 IU/L (ref 44–121)
ALT SERPL-CCNC: 14 IU/L (ref 0–44)
AST SERPL-CCNC: 14 IU/L (ref 0–40)
BILIRUB SERPL-MCNC: 0.3 MG/DL (ref 0–1.2)
BUN SERPL-MCNC: 14 MG/DL (ref 8–27)
BUN/CREAT SERPL: 17 (ref 10–24)
CALCIUM SERPL-MCNC: 9.5 MG/DL (ref 8.6–10.2)
CHLORIDE SERPL-SCNC: 97 MMOL/L (ref 96–106)
CHOLEST SERPL-MCNC: 148 MG/DL (ref 100–199)
CO2 SERPL-SCNC: 27 MMOL/L (ref 20–29)
CREAT SERPL-MCNC: 0.83 MG/DL (ref 0.76–1.27)
CREAT UR-MCNC: 21.2 MG/DL
EGFRCR SERPLBLD CKD-EPI 2021: 94 ML/MIN/1.73
FOLATE SERPL-MCNC: 10.7 NG/ML
FRUCTOSAMINE SERPL-SCNC: 386 UMOL/L (ref 0–285)
GLOBULIN SER CALC-MCNC: 2.4 G/DL (ref 1.5–4.5)
GLUCOSE SERPL-MCNC: 145 MG/DL (ref 65–99)
HBA1C MFR BLD: 11.5 % (ref 4.8–5.6)
HDLC SERPL-MCNC: 40 MG/DL
IMP & REVIEW OF LAB RESULTS: NORMAL
LDLC SERPL CALC-MCNC: 72 MG/DL (ref 0–99)
MICROALBUMIN UR-MCNC: 65.5 UG/ML
POTASSIUM SERPL-SCNC: 5.1 MMOL/L (ref 3.5–5.2)
PROT SERPL-MCNC: 6.9 G/DL (ref 6–8.5)
SODIUM SERPL-SCNC: 137 MMOL/L (ref 134–144)
TRIGL SERPL-MCNC: 217 MG/DL (ref 0–149)
TSH SERPL DL<=0.005 MIU/L-ACNC: 1.72 UIU/ML (ref 0.45–4.5)
VIT B12 SERPL-MCNC: 545 PG/ML (ref 232–1245)
VLDLC SERPL CALC-MCNC: 36 MG/DL (ref 5–40)

## 2022-05-26 RX ORDER — LEVETIRACETAM 500 MG/1
TABLET ORAL
Qty: 60 TABLET | Refills: 2 | Status: SHIPPED | OUTPATIENT
Start: 2022-05-26 | End: 2022-07-20 | Stop reason: SDUPTHER

## 2022-05-26 RX ORDER — AMLODIPINE BESYLATE 10 MG/1
TABLET ORAL
Qty: 90 TABLET | Refills: 0 | Status: SHIPPED | OUTPATIENT
Start: 2022-05-26 | End: 2022-08-16

## 2022-05-31 RX ORDER — HYDROCODONE BITARTRATE AND ACETAMINOPHEN 10; 325 MG/1; MG/1
1 TABLET ORAL EVERY 6 HOURS PRN
Qty: 120 TABLET | Refills: 0 | Status: SHIPPED | OUTPATIENT
Start: 2022-05-31 | End: 2022-06-22 | Stop reason: SDUPTHER

## 2022-05-31 NOTE — TELEPHONE ENCOUNTER
Reviewed Mary US last office visit on 4/22/22. UDS and HOME reviewed and are appropriate. Due to provider being out of office, will refill appropriately.

## 2022-05-31 NOTE — TELEPHONE ENCOUNTER
Medication Refill Request    Date of phone call: 05/31/2022    MedicatioTake 1 tablet by mouth Every 6 (Six) : Norco  mg  sig: Take 1 tablet by mouth Every 6 (Six) Hours As Needed for Moderate Pain .  Qty: 120    Date of last visit: 04/22/2022    Date of last refill:     HOME up to date?:     Next Follow up?: 06/22/2022    Any new pertinent information? (i.e, new medication allergies, new use of medications, change in patient's health or condition, non-compliance or inconsistency with prescribing agreement?):

## 2022-06-22 ENCOUNTER — OFFICE VISIT (OUTPATIENT)
Dept: PAIN MEDICINE | Facility: CLINIC | Age: 72
End: 2022-06-22

## 2022-06-22 VITALS
BODY MASS INDEX: 32.87 KG/M2 | SYSTOLIC BLOOD PRESSURE: 164 MMHG | HEIGHT: 67 IN | RESPIRATION RATE: 18 BRPM | HEART RATE: 63 BPM | DIASTOLIC BLOOD PRESSURE: 82 MMHG | TEMPERATURE: 97.5 F | WEIGHT: 209.4 LBS | OXYGEN SATURATION: 99 %

## 2022-06-22 DIAGNOSIS — G62.9 POLYNEUROPATHY: ICD-10-CM

## 2022-06-22 DIAGNOSIS — Z79.899 ENCOUNTER FOR LONG-TERM (CURRENT) USE OF HIGH-RISK MEDICATION: Primary | ICD-10-CM

## 2022-06-22 DIAGNOSIS — G89.29 OTHER CHRONIC PAIN: ICD-10-CM

## 2022-06-22 DIAGNOSIS — E11.42 DIABETIC PERIPHERAL NEUROPATHY: ICD-10-CM

## 2022-06-22 DIAGNOSIS — M79.609 PAIN IN EXTREMITY, UNSPECIFIED EXTREMITY: ICD-10-CM

## 2022-06-22 PROCEDURE — 99213 OFFICE O/P EST LOW 20 MIN: CPT | Performed by: NURSE PRACTITIONER

## 2022-06-22 RX ORDER — HYDROCODONE BITARTRATE AND ACETAMINOPHEN 10; 325 MG/1; MG/1
1 TABLET ORAL EVERY 6 HOURS PRN
Qty: 120 TABLET | Refills: 0 | Status: SHIPPED | OUTPATIENT
Start: 2022-06-22 | End: 2022-07-27 | Stop reason: SDUPTHER

## 2022-06-22 RX ORDER — GABAPENTIN 800 MG/1
800 TABLET ORAL 4 TIMES DAILY
Qty: 120 TABLET | Refills: 2 | Status: SHIPPED | OUTPATIENT
Start: 2022-06-22 | End: 2022-09-28 | Stop reason: SDUPTHER

## 2022-06-22 NOTE — PROGRESS NOTES
"CHIEF COMPLAINT  Follow-up for extremity pain.    Subjective   Kelby Peters is a 71 y.o. male  who presents for follow-up.  He has a history of extremity pain due to neuropathy.  Today his pain is 7/10VAS in severity.     He continues with Hydrocodone 10/325 4/day and gabapentin 800 mg 4/day(1-1-2). Also continues with Keppra 500 mg BID (prescribed by PCP). This medication regimen decreases his pain by 80%.  He states \"I ran out one time because I forgot to call it in and it was one of the worst things I've ever been through\". ADLs by self. He denies any side effects including somnolence or constipation.     His Blood glucose has been running in the 140s on average.      Failed Cymbalta.  Horizant and Gralise Cost-prohibitive.  Failed Lyrica---pedal edema  History of IDDM.    Patient remained masked during entire encounter. No cough present. I donned a mask and eye protection throughout entire visit. Prior to donning mask and eye protection, hand hygiene was performed, as well as when it was doffed.  I was closer than 6 feet, but not for an extended period of time. No obvious exposure to any bodily fluids.    Extremity Pain   The pain is present in the left hand, right hand, left lower leg and right lower leg. This is a chronic problem. The current episode started more than 1 year ago. The problem occurs constantly. The problem has been unchanged. The pain is at a severity of 7/10. The pain is moderate. Associated symptoms include numbness (bilateral hands,feet and legs). Pertinent negatives include no fever. He has tried oral narcotics and rest for the symptoms. The treatment provided moderate relief. His past medical history is significant for diabetes.      PEG Assessment   What number best describes your pain on average in the past week?7  What number best describes how, during the past week, pain has interfered with your enjoyment of life?7  What number best describes how, during the past week, pain has " "interfered with your general activity?  7    The following portions of the patient's history were reviewed and updated as appropriate: allergies, current medications, past family history, past medical history, past social history, past surgical history and problem list.    Review of Systems   Constitutional: Positive for fatigue. Negative for fever.   HENT: Negative for congestion.    Eyes: Negative for visual disturbance.   Respiratory: Negative for shortness of breath.    Cardiovascular: Negative for chest pain.   Gastrointestinal: Negative for constipation and diarrhea.   Genitourinary: Negative for difficulty urinating.   Musculoskeletal: Negative for arthralgias.   Neurological: Positive for numbness (bilateral hands,feet and legs). Negative for weakness.   Psychiatric/Behavioral: Negative for sleep disturbance and suicidal ideas. The patient is not nervous/anxious.      --  The aforementioned information the Chief Complaint section and above subjective data including any HPI data, and also the Review of Systems data, has been personally reviewed and affirmed.  --    Vitals:    06/22/22 0930   BP: 164/82   Pulse: 63   Resp: 18   Temp: 97.5 °F (36.4 °C)   SpO2: 99%   Weight: 95 kg (209 lb 6.4 oz)   Height: 170.2 cm (67.01\")   PainSc:   7   PainLoc: Arm     Objective   Physical Exam  Vitals and nursing note reviewed.   Constitutional:       Appearance: Normal appearance. He is well-developed.   Eyes:      General: Lids are normal.   Cardiovascular:      Rate and Rhythm: Normal rate.   Pulmonary:      Effort: Pulmonary effort is normal.   Neurological:      Mental Status: He is alert and oriented to person, place, and time.      Gait: Gait abnormal.   Psychiatric:         Attention and Perception: Attention normal.         Mood and Affect: Mood normal.         Speech: Speech normal.         Behavior: Behavior normal.         Judgment: Judgment normal.       Assessment & Plan   Diagnoses and all orders for this " visit:    1. Encounter for long-term (current) use of high-risk medication (Primary)    2. Diabetic peripheral neuropathy (HCC)    3. Pain in extremity, unspecified extremity    4. Other chronic pain    5. Polyneuropathy  -     gabapentin (NEURONTIN) 800 MG tablet; Take 1 tablet by mouth 4 (Four) Times a Day.  Dispense: 120 tablet; Refill: 2    Other orders  -     HYDROcodone-acetaminophen (NORCO)  MG per tablet; Take 1 tablet by mouth Every 6 (Six) Hours As Needed for Moderate Pain . DNF 6/30/2022  Dispense: 120 tablet; Refill: 0      --- The urine drug screen confirmation from 2/21/2022 has been reviewed and the result is appropriate based on patient history and HOME report  --- CSA updated in office today.   --- Refill [place] 10/325. DNF 6/30/2022 applied. Patient appears stable with current regimen. No adverse effects. Regarding continuation of opioids, there is no evidence of aberrant behavior or any red flags.  The patient continues with appropriate response to opioid therapy. ADL's remain intact by self.   --- Refill Gabapentin.   --- Follow-up 2 month or sooner if needed.      HOME REPORT  As part of the patient's treatment plan, I am prescribing controlled substances. The patient has been made aware of appropriate use of such medications, including potential risk of somnolence, limited ability to drive and/or work safely, and the potential for dependence or overdose. It has also been made clear that these medications are for use by this patient only, without concomitant use of alcohol or other substances unless prescribed.     Patient has completed prescribing agreement detailing terms of continued prescribing of controlled substances, including monitoring HOME reports, urine drug screening, and pill counts if necessary. The patient is aware that inappropriate use will results in cessation of prescribing such medications.    As the clinician, I personally reviewed the HOME from 6/22/2022 while  the patient was in the office today.    History and physical exam exhibit continued safe and appropriate use of controlled substances.    Dictated utilizing Dragon dictation.     This document is intended for medical expert use only. Reading of this document by patients and/or patient's family without participating medical staff guidance may result in misinterpretation and unintended morbidity.   Any interpretation of such data is the responsibility of the patient and/or family member responsible for the patient in concert with their primary or specialist providers, not to be left for sources of online searches such as [employer], [employer] or similar queries. Relying on these approaches to knowledge may result in misinterpretation, misguided goals of care and even death should patients or family members try recommendations outside of the realm of professional medical care in a supervised way.

## 2022-07-20 ENCOUNTER — OFFICE VISIT (OUTPATIENT)
Dept: FAMILY MEDICINE CLINIC | Facility: CLINIC | Age: 72
End: 2022-07-20

## 2022-07-20 VITALS
BODY MASS INDEX: 33.27 KG/M2 | OXYGEN SATURATION: 95 % | HEART RATE: 60 BPM | TEMPERATURE: 97.5 F | HEIGHT: 67 IN | SYSTOLIC BLOOD PRESSURE: 140 MMHG | DIASTOLIC BLOOD PRESSURE: 70 MMHG | WEIGHT: 212 LBS

## 2022-07-20 DIAGNOSIS — E10.43: Primary | ICD-10-CM

## 2022-07-20 DIAGNOSIS — E78.2 MIXED HYPERLIPIDEMIA: ICD-10-CM

## 2022-07-20 DIAGNOSIS — I10 PRIMARY HYPERTENSION: ICD-10-CM

## 2022-07-20 DIAGNOSIS — E10.65: Primary | ICD-10-CM

## 2022-07-20 DIAGNOSIS — Z91.199 NONCOMPLIANCE: ICD-10-CM

## 2022-07-20 DIAGNOSIS — Z87.898 HISTORY OF SEIZURES: ICD-10-CM

## 2022-07-20 PROBLEM — E11.69 DISORDER OF NERVOUS SYSTEM DUE TO TYPE 2 DIABETES MELLITUS: Status: ACTIVE | Noted: 2022-07-20

## 2022-07-20 PROBLEM — R26.9 ABNORMAL GAIT: Status: ACTIVE | Noted: 2022-07-20

## 2022-07-20 PROBLEM — G98.8 DISORDER OF NERVOUS SYSTEM DUE TO TYPE 2 DIABETES MELLITUS: Status: ACTIVE | Noted: 2022-07-20

## 2022-07-20 PROBLEM — M20.42 ACQUIRED HAMMER TOE OF LEFT FOOT: Status: ACTIVE | Noted: 2022-07-20

## 2022-07-20 PROCEDURE — 99214 OFFICE O/P EST MOD 30 MIN: CPT | Performed by: NURSE PRACTITIONER

## 2022-07-20 RX ORDER — LEVETIRACETAM 500 MG/1
500 TABLET ORAL 2 TIMES DAILY
Qty: 60 TABLET | Refills: 2 | Status: SHIPPED | OUTPATIENT
Start: 2022-07-20 | End: 2022-11-23

## 2022-07-20 RX ORDER — DORZOLAMIDE HYDROCHLORIDE AND TIMOLOL MALEATE 20; 5 MG/ML; MG/ML
1 SOLUTION/ DROPS OPHTHALMIC 2 TIMES DAILY
COMMUNITY
Start: 2022-07-14

## 2022-07-20 NOTE — PROGRESS NOTES
Chief Complaint  Establish Care (New pt)    Subjective        Kelby Peters presents to [facility]  History of Present Illness the patient here to establish care.  His PCP is retiring his primary care practice.    Patient has a history of diabetes that appears to be poorly controlled.  He has been referred to Endo and reports that endocrinologist had doubled his medicine and caused him to have hypoglycemia which was very scary.  Since then patient has been managing his own diabetes.  He buys his own NPH over-the-counter and injects once per day.  He has since not had any low blood sugars.  He is not on any other medications for diabetes.  He refuses endocrinology appointment with a different provider.  He feels distrustful at this time due to previous bad experience.  He does not want to take any more medicine and does not want anything that could be more expensive.  He is currently using 50 units of NPH nightly.  He denies signs or symptoms of hypo or hyperglycemia.  Is not on a statin.    He does have a history of peripheral neuropathy for which she is being seen by pain management and is maintained on hydrocodone and gabapentin 800 mg 4 times daily.  He does not think worsening A1c has anything to do with this pain.    He has a history of hypertension and is taking and tolerating amlodipine  Without side effects.  Does not check blood pressure at home often but thinks it is good when checked.  He denies chest pain, palpitations, headache, dizziness, cough, shortness of breath.  No leg swelling.    He has a history of seizures and has been on Keppra which has been managed by his PCP.  Reports his last known seizure was 3 years ago.  His first seizure was in 2010 and he had intermittent seizures until 2014.  He reports them as grand mall seizures.  He has not had a grand mal seizure over the last 3 years however he reports that his wife does think he has some seizures which he  "describes more like absence seizure's.  He has not had a neurologic evaluation recently.  He does not want a neurologic evaluation at this time.    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  He is retired and .    Reports chronic memory problems but does not find it significant and has not had a evaluated.    He has been diagnosed recently with skin cancer of his face and is getting ready to have this removed and has to see a plastic surgeon.  This is currently his biggest concern and does not want to tackle any other health issues until this has been resolved.  He does not like medical intervention unless it is absolutely needed.  He has been told that his skin cancer and healing should be better by December.  He does not want to do anything different until then.      Objective   Vital Signs:  /70   Pulse 60   Temp 97.5 °F (36.4 °C)   Ht 170.2 cm (67\")   Wt 96.2 kg (212 lb)   SpO2 95%   BMI 33.20 kg/m²   Estimated body mass index is 33.2 kg/m² as calculated from the following:    Height as of this encounter: 170.2 cm (67\").    Weight as of this encounter: 96.2 kg (212 lb).    BMI is >= 30 and <35. (Class 1 Obesity). The following options were offered after discussion;: weight loss educational material (shared in after visit summary)      Physical Exam  Vitals and nursing note reviewed.   Constitutional:       General: He is not in acute distress.     Appearance: He is well-developed. He is not ill-appearing or diaphoretic.   HENT:      Head: Normocephalic and atraumatic.   Eyes:      General:         Right eye: No discharge.         Left eye: No discharge.      Conjunctiva/sclera: Conjunctivae normal.   Cardiovascular:      Rate and Rhythm: Normal rate and regular rhythm.      Heart sounds: Normal heart sounds.   Pulmonary:      Effort: Pulmonary effort is normal.      Breath sounds: Normal breath sounds.   Abdominal:      General: Bowel sounds are normal.      " Palpations: Abdomen is soft.      Tenderness: There is no abdominal tenderness.   Musculoskeletal:         General: No deformity.      Comments: Gait smooth and steady   Skin:     General: Skin is warm and dry.   Neurological:      General: No focal deficit present.      Mental Status: He is alert and oriented to person, place, and time.   Psychiatric:         Mood and Affect: Mood normal.         Behavior: Behavior normal.        Result Review :                Assessment and Plan   Diagnoses and all orders for this visit:    1. Poorly controlled type 1 diabetes mellitus with autonomic neuropathy (HCC) (Primary)    2. Primary hypertension    3. Mixed hyperlipidemia    4. History of seizures  -     levETIRAcetam (KEPPRA) 500 MG tablet; Take 1 tablet by mouth 2 (Two) Times a Day.  Dispense: 60 tablet; Refill: 2    5. Noncompliance    Discussed recent A1c done in May that showed an A1c of 11.5, prior 8 months ago was 12.  It looks like his lowest A1c was 8 7 years ago and has been steadily well above 9 since.  Discussed health consequences of uncontrolled diabetes.  I discussed many different medication options including referral to  and diabetic educator to see if we can find a cost effective long-acting insulin which he declined.  I even offered samples which she also declined.  Discussed that NPH is only a 12-hour medication and should be likely administered twice per day which he declines to increase even though he understands it only has a 12-hour duration.  He declines endocrinology referral.  He is up-to-date on his foot exam and eye exam currently.    Reviewed endocrinology note from March of this year.  It was documented that he had had a low blood sugar into the 40s.  Endo documented that patient had then gone back to his NPH 45 at bedtime and was taking amlodipine, but stopped lisinopril and Crestor.  He did not bring his log for review to that visit.  Of note Endo note states that he had been  on different oral regimens including metformin and Jardiance and Januvia.  He did not tolerate metformin and reported that others were too expensive and was put on insulin.  It was also reported that he has a history of diabetic retinopathy and a left corneal transplant.    At that visit the plan was for him to bring his log and increase his Novolin to 48 units every afternoon and to continue staying off Humalog.  He was supposed to bring his log for follow-up in 3 months and apparently has not returned.    Hypertension: His blood pressure is little bit elevated today.  Recommend routine monitoring and let me know if it is not at goal of 130/80 or less.  He is not on an ACE or an ARB and probably should be on 1 but will not add any medication at this time as he is not wanting any medication added.  He is aware of signs and symptoms of hypertension that require emergent evaluation.      I do not see any neuro notes.  He had an MRA of his brain at the start of this year for aneurysm follow-up that showed no change from previous in 2019.  He is requesting refill on his Keppra which I will refill but he has not had a Keppra level that I can see dating back to at least 2019.  I would like him to see neuro and will need a Keppra level at next visit with labs.  He declines neuro eval at this time..    Reviewed pain management notes.  Appears mostly compliant with visits and medication management.         Follow Up   Return in about 3 months (around 10/20/2022) for Medicare Wellness.  Patient was given instructions and counseling regarding his condition or for health maintenance advice. Please see specific information pulled into the AVS if appropriate.

## 2022-07-22 PROBLEM — Z87.898 HISTORY OF SEIZURES: Status: ACTIVE | Noted: 2022-07-22

## 2022-07-22 PROBLEM — E78.2 MIXED HYPERLIPIDEMIA: Status: ACTIVE | Noted: 2022-07-22

## 2022-07-22 PROBLEM — Z91.199 NONCOMPLIANCE: Status: ACTIVE | Noted: 2022-07-22

## 2022-07-27 RX ORDER — HYDROCODONE BITARTRATE AND ACETAMINOPHEN 10; 325 MG/1; MG/1
1 TABLET ORAL EVERY 6 HOURS PRN
Qty: 120 TABLET | Refills: 0 | Status: SHIPPED | OUTPATIENT
Start: 2022-07-27 | End: 2022-08-22 | Stop reason: SDUPTHER

## 2022-07-27 NOTE — TELEPHONE ENCOUNTER
Medication Refill Request    Date of phone call: 7/27/2022    Medication being requested: norco  mg  sig: Take 1 tablet by mouth Every 6 (Six) Hours As Needed for Moderate Pain   Qty: 120    Date of last visit: 6/22/2022    Date of last refill:     HOME up to date?:     Next Follow up?: 8/22/2022    Any new pertinent information? (i.e, new medication allergies, new use of medications, change in patient's health or condition, non-compliance or inconsistency with prescribing agreement?):

## 2022-08-16 RX ORDER — AMLODIPINE BESYLATE 10 MG/1
TABLET ORAL
Qty: 90 TABLET | Refills: 0 | Status: SHIPPED | OUTPATIENT
Start: 2022-08-16 | End: 2022-11-17 | Stop reason: SDUPTHER

## 2022-08-22 ENCOUNTER — OFFICE VISIT (OUTPATIENT)
Dept: PAIN MEDICINE | Facility: CLINIC | Age: 72
End: 2022-08-22

## 2022-08-22 VITALS
HEART RATE: 66 BPM | RESPIRATION RATE: 12 BRPM | HEIGHT: 67 IN | TEMPERATURE: 96.9 F | WEIGHT: 211.4 LBS | DIASTOLIC BLOOD PRESSURE: 88 MMHG | SYSTOLIC BLOOD PRESSURE: 177 MMHG | BODY MASS INDEX: 33.18 KG/M2 | OXYGEN SATURATION: 96 %

## 2022-08-22 DIAGNOSIS — M79.609 PAIN IN EXTREMITY, UNSPECIFIED EXTREMITY: ICD-10-CM

## 2022-08-22 DIAGNOSIS — Z79.899 ENCOUNTER FOR LONG-TERM (CURRENT) USE OF HIGH-RISK MEDICATION: Primary | ICD-10-CM

## 2022-08-22 DIAGNOSIS — G89.29 OTHER CHRONIC PAIN: ICD-10-CM

## 2022-08-22 DIAGNOSIS — E11.42 DIABETIC PERIPHERAL NEUROPATHY: ICD-10-CM

## 2022-08-22 PROCEDURE — 99214 OFFICE O/P EST MOD 30 MIN: CPT | Performed by: NURSE PRACTITIONER

## 2022-08-22 PROCEDURE — 80305 DRUG TEST PRSMV DIR OPT OBS: CPT | Performed by: NURSE PRACTITIONER

## 2022-08-22 RX ORDER — HYDROCODONE BITARTRATE AND ACETAMINOPHEN 10; 325 MG/1; MG/1
1 TABLET ORAL EVERY 6 HOURS PRN
Qty: 120 TABLET | Refills: 0 | Status: SHIPPED | OUTPATIENT
Start: 2022-08-22 | End: 2022-09-28 | Stop reason: SDUPTHER
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
## 2022-09-22 NOTE — TELEPHONE ENCOUNTER
Medication Refill Request    Date of phone call: 9/22/2022    Medication being requested: Norco  mg  sig: Take 1 tablet by mouth Every 6 (Six) Hours As Needed for Moderate Pain   Qty: 120    Date of last visit: 8/22/2022    Date of last refill:     HOME up to date?:     Next Follow up?: 9/28/2022    Any new pertinent information? (i.e, new medication allergies, new use of medications, change in patient's health or condition, non-compliance or inconsistency with prescribing agreement?):

## 2022-09-23 RX ORDER — HYDROCODONE BITARTRATE AND ACETAMINOPHEN 10; 325 MG/1; MG/1
1 TABLET ORAL EVERY 6 HOURS PRN
Qty: 120 TABLET | Refills: 0 | OUTPATIENT
Start: 2022-09-23

## 2022-09-28 ENCOUNTER — OFFICE VISIT (OUTPATIENT)
Dept: PAIN MEDICINE | Facility: CLINIC | Age: 72
End: 2022-09-28

## 2022-09-28 VITALS
HEART RATE: 66 BPM | SYSTOLIC BLOOD PRESSURE: 160 MMHG | DIASTOLIC BLOOD PRESSURE: 60 MMHG | HEIGHT: 67 IN | OXYGEN SATURATION: 98 % | BODY MASS INDEX: 33.24 KG/M2 | RESPIRATION RATE: 12 BRPM | WEIGHT: 211.8 LBS | TEMPERATURE: 96.9 F

## 2022-09-28 DIAGNOSIS — G62.9 POLYNEUROPATHY: ICD-10-CM

## 2022-09-28 DIAGNOSIS — M79.609 PAIN IN EXTREMITY, UNSPECIFIED EXTREMITY: ICD-10-CM

## 2022-09-28 DIAGNOSIS — Z79.899 ENCOUNTER FOR LONG-TERM (CURRENT) USE OF HIGH-RISK MEDICATION: Primary | ICD-10-CM

## 2022-09-28 DIAGNOSIS — E11.42 DIABETIC PERIPHERAL NEUROPATHY: ICD-10-CM

## 2022-09-28 PROCEDURE — 99213 OFFICE O/P EST LOW 20 MIN: CPT | Performed by: NURSE PRACTITIONER

## 2022-09-28 RX ORDER — GABAPENTIN 800 MG/1
800 TABLET ORAL 4 TIMES DAILY
Qty: 120 TABLET | Refills: 2 | Status: SHIPPED | OUTPATIENT
Start: 2022-09-28 | End: 2023-01-06 | Stop reason: SDUPTHER

## 2022-09-28 RX ORDER — HYDROCODONE BITARTRATE AND ACETAMINOPHEN 10; 325 MG/1; MG/1
1 TABLET ORAL EVERY 6 HOURS PRN
Qty: 120 TABLET | Refills: 0 | Status: SHIPPED | OUTPATIENT
Start: 2022-09-28 | End: 2022-10-26 | Stop reason: SDUPTHER

## 2022-09-28 NOTE — PROGRESS NOTES
CHIEF COMPLAINT  1 month extremity pain follow up   Patient in office reports slightly increased pain over the last month .    Subjective   Kelby Peters is a 72 y.o. male  who presents for follow-up.  He has a history of extremity pain due to neuropathy.  Today his pain is 7/10VAS in severity. His pain waxes and wanes in severity.  He states that a few nights ago he had worsening pain at 4 am for two days in a row.  He states he is unsure if it was related to eating too much sugar.  Discussed that high blood sugar can worsen the neuropathy pain.     He continues with Hydrocodone 10/325 4/day and gabapentin 800 mg 4/day(1-1-2). Also continues with Keppra 500 mg BID (prescribed by PCP). His medication regimen decreases his pain by a significant amount. This regimen allows him to continue to meet his functional ADLs and increase his activity level. ADLs by self. He does report mild drowsiness, this does not interfere with his activity level. He denies any other side effects including constipation.     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 He continues to work with [facility] for this.     Failed Cymbalta.  Horizant and Gralise Cost-prohibitive.  Failed Lyrica---pedal edema  History of IDDM.    Extremity Pain   The pain is present in the left hand, right hand, left lower leg and right lower leg. This is a chronic problem. The current episode started more than 1 year ago. The problem occurs constantly. The problem has been unchanged. The pain is at a severity of 7/10. The pain is moderate. Associated symptoms include numbness (hands,feet). Pertinent negatives include no fever. He has tried oral narcotics and rest for the symptoms. The treatment provided significant relief. His past medical history is significant for diabetes.      PEG Assessment   What number best describes your pain on average in the past week?9  What number best describes how, during the past  "week, pain has interfered with your enjoyment of life?9  What number best describes how, during the past week, pain has interfered with your general activity?  9    The following portions of the patient's history were reviewed and updated as appropriate: allergies, current medications, past family history, past medical history, past social history, past surgical history and problem list.    Review of Systems   Constitutional: Negative for activity change, fatigue and fever.   HENT: Negative for congestion.    Eyes: Positive for visual disturbance (burst blood vessel OD eye).   Respiratory: Negative for cough and chest tightness.    Cardiovascular: Negative for chest pain.   Gastrointestinal: Negative for abdominal pain, constipation and diarrhea.   Genitourinary: Negative for difficulty urinating and dysuria.   Neurological: Positive for dizziness, weakness (hands,feet), numbness (hands,feet) and headaches. Negative for light-headedness.   Psychiatric/Behavioral: Positive for agitation and sleep disturbance. Negative for suicidal ideas. The patient is nervous/anxious.      --  The aforementioned information the Chief Complaint section and above subjective data including any HPI data, and also the Review of Systems data, has been personally reviewed and affirmed.  --    Vitals:    09/28/22 0829   BP: 160/60   BP Location: Left arm   Patient Position: Sitting   Pulse: 66   Resp: 12   Temp: 96.9 °F (36.1 °C)   SpO2: 98%   Weight: 96.1 kg (211 lb 12.8 oz)   Height: 170.2 cm (67\")   PainSc:   7   PainLoc: Comment: extremity     Objective   Physical Exam  Vitals and nursing note reviewed.   Constitutional:       Appearance: Normal appearance. He is well-developed.   Eyes:      General: Lids are normal.   Cardiovascular:      Rate and Rhythm: Normal rate.   Pulmonary:      Effort: Pulmonary effort is normal.   Musculoskeletal:      Right foot: Tenderness present.      Left foot: Tenderness present.   Neurological:      " Mental Status: He is alert and oriented to person, place, and time.   Psychiatric:         Attention and Perception: Attention normal.         Mood and Affect: Mood normal.         Speech: Speech normal.         Behavior: Behavior normal.         Judgment: Judgment normal.       Assessment & Plan   Diagnoses and all orders for this visit:    1. Encounter for long-term (current) use of high-risk medication (Primary)    2. Polyneuropathy  -     gabapentin (NEURONTIN) 800 MG tablet; Take 1 tablet by mouth 4 (Four) Times a Day.  Dispense: 120 tablet; Refill: 2    3. Diabetic peripheral neuropathy (HCC)    4. Pain in extremity, unspecified extremity    Other orders  -     HYDROcodone-acetaminophen (NORCO)  MG per tablet; Take 1 tablet by mouth Every 6 (Six) Hours As Needed for Moderate Pain. DNF 9/30/2022  Dispense: 120 tablet; Refill: 0      --- ORT moderate risk  --- The urine drug screen confirmation from 8/22/2022 has been reviewed and the result is appropriate based on patient history and HOME report  --- CSA updated 6/22/2022  --- Refill Candor 10/325. DNF 9/30/2022 applied. Patient appears stable with current regimen. No adverse effects. Regarding continuation of opioids, there is no evidence of aberrant behavior or any red flags.  The patient continues with appropriate response to opioid therapy. ADL's remain intact by self.   --- Refill Gabapentin.   --- OK for post-op pain medication for upcoming Mohs procedure. Instructed to not take the Norco that is prescribed by our clinic while on his post-operative pain medication.   --- Follow-up 2 months or sooner if needed.      HOME REPORT  As part of the patient's treatment plan, I am prescribing controlled substances. The patient has been made aware of appropriate use of such medications, including potential risk of somnolence, limited ability to drive and/or work safely, and the potential for dependence or overdose. It has also been made clear that these  medications are for use by this patient only, without concomitant use of alcohol or other substances unless prescribed.     Patient has completed prescribing agreement detailing terms of continued prescribing of controlled substances, including monitoring HOME reports, urine drug screening, and pill counts if necessary. The patient is aware that inappropriate use will results in cessation of prescribing such medications.    As the clinician, I personally reviewed the HOME from 9/28/2022 while the patient was in the office today.    History and physical exam exhibit continued safe and appropriate use of controlled substances.    Dictated utilizing Dragon dictation.     This document is intended for medical expert use only. Reading of this document by patients and/or patient's family without participating medical staff guidance may result in misinterpretation and unintended morbidity.   Any interpretation of such data is the responsibility of the patient and/or family member responsible for the patient in concert with their primary or specialist providers, not to be left for sources of online searches such as Connectbeam, EndorphMe or similar queries. Relying on these approaches to knowledge may result in misinterpretation, misguided goals of care and even death should patients or family members try recommendations outside of the realm of professional medical care in a supervised way.    Patient remained masked during entire encounter. No cough present. I donned a mask and eye protection throughout entire visit. Prior to donning mask and eye protection, hand hygiene was performed, as well as when it was doffed.  I was closer than 6 feet, but not for an extended period of time. No obvious exposure to any bodily fluids.

## 2022-10-24 DIAGNOSIS — G62.9 POLYNEUROPATHY: ICD-10-CM

## 2022-10-24 RX ORDER — GABAPENTIN 800 MG/1
TABLET ORAL
Qty: 120 TABLET | OUTPATIENT
Start: 2022-10-24

## 2022-10-24 NOTE — TELEPHONE ENCOUNTER
Please disregard. Pt has refills remaining.  Attending MDM: Well appearing 31 day old presents for evaluation of decreased stooling. no fever. tolerating po, benign abdomen here. has since had a normal BM without blood. stable for d/c home, discussed emergent reasons to return.

## 2022-10-26 RX ORDER — HYDROCODONE BITARTRATE AND ACETAMINOPHEN 10; 325 MG/1; MG/1
1 TABLET ORAL EVERY 6 HOURS PRN
Qty: 120 TABLET | Refills: 0 | Status: SHIPPED | OUTPATIENT
Start: 2022-10-26 | End: 2022-11-28 | Stop reason: SDUPTHER

## 2022-10-26 NOTE — TELEPHONE ENCOUNTER
Medication Refill Request    Date of phone call: 10/26/22    Medication being requested: Norco  mg    si tab po q 6 hrs prn  Qty: 120    Date of last visit: 22    Date of last refill:     HOME up to date?:     Next Follow up?: 22    Any new pertinent information? (i.e, new medication allergies, new use of medications, change in patient's health or condition, non-compliance or inconsistency with prescribing agreement?):

## 2022-11-08 RX ORDER — AMLODIPINE BESYLATE 10 MG/1
TABLET ORAL
Qty: 90 TABLET | Refills: 0 | OUTPATIENT
Start: 2022-11-08

## 2022-11-11 RX ORDER — AMLODIPINE BESYLATE 10 MG/1
TABLET ORAL
Qty: 90 TABLET | Refills: 0 | OUTPATIENT
Start: 2022-11-11

## 2022-11-17 RX ORDER — AMLODIPINE BESYLATE 10 MG/1
10 TABLET ORAL DAILY
Qty: 90 TABLET | Refills: 0 | Status: SHIPPED | OUTPATIENT
Start: 2022-11-17 | End: 2023-02-13

## 2022-11-17 RX ORDER — AMLODIPINE BESYLATE 10 MG/1
TABLET ORAL
Qty: 90 TABLET | Refills: 0 | OUTPATIENT
Start: 2022-11-17

## 2022-11-17 NOTE — TELEPHONE ENCOUNTER
Rx Refill Note  Requested Prescriptions     Pending Prescriptions Disp Refills   • amLODIPine (NORVASC) 10 MG tablet 90 tablet 0     Sig: Take 1 tablet by mouth Daily.      Last office visit with prescribing clinician: 7/20/2022      Next office visit with prescribing clinician: Visit date not found            Katerina Lundberg MA  11/17/22, 15:03 EST

## 2022-11-17 NOTE — TELEPHONE ENCOUNTER
Caller: Zina Peterse    Relationship: Emergency Contact    Best call back number: [phone]    Requested Prescriptions:   Requested Prescriptions     Pending Prescriptions Disp Refills   • amLODIPine (NORVASC) 10 MG tablet 90 tablet 0     Sig: Take 1 tablet by mouth Daily.        Pharmacy where request should be sent: [facility] [id number] [facility] [address] [address] AT [facility] [phone] [facility] [phone] FX     Does the patient have less than a 3 day supply:  [x] Yes  [] No    Indira Han, [facility] Rep   11/17/22 11:27 EST

## 2022-11-22 DIAGNOSIS — Z87.898 HISTORY OF SEIZURES: ICD-10-CM

## 2022-11-23 RX ORDER — LEVETIRACETAM 500 MG/1
TABLET ORAL
Qty: 60 TABLET | Refills: 0 | Status: SHIPPED | OUTPATIENT
Start: 2022-11-23 | End: 2022-12-22

## 2022-11-28 NOTE — TELEPHONE ENCOUNTER
Medication Refill Request    Date of phone call: 11/28/2022    Medication being requested: norco  mg sig: Take 1 tablet by mouth Every 6 (Six) Hours As Needed for Moderate Pain  Qty: 120    Date of last visit: 9/28/2022    Date of last refill:     HOME up to date?:     Next Follow up?: 12/21/2022    Any new pertinent information? (i.e, new medication allergies, new use of medications, change in patient's health or condition, non-compliance or inconsistency with prescribing agreement?):

## 2022-11-29 RX ORDER — HYDROCODONE BITARTRATE AND ACETAMINOPHEN 10; 325 MG/1; MG/1
1 TABLET ORAL EVERY 6 HOURS PRN
Qty: 120 TABLET | Refills: 0 | Status: SHIPPED | OUTPATIENT
Start: 2022-11-29 | End: 2022-12-21 | Stop reason: SDUPTHER

## 2022-12-07 ENCOUNTER — OFFICE VISIT (OUTPATIENT)
Dept: FAMILY MEDICINE CLINIC | Facility: CLINIC | Age: 72
End: 2022-12-07

## 2022-12-07 VITALS
WEIGHT: 207.6 LBS | DIASTOLIC BLOOD PRESSURE: 83 MMHG | TEMPERATURE: 97.1 F | BODY MASS INDEX: 32.58 KG/M2 | HEART RATE: 72 BPM | SYSTOLIC BLOOD PRESSURE: 158 MMHG | OXYGEN SATURATION: 96 % | HEIGHT: 67 IN

## 2022-12-07 DIAGNOSIS — E78.2 MIXED HYPERLIPIDEMIA: ICD-10-CM

## 2022-12-07 DIAGNOSIS — Z00.00 ENCOUNTER FOR ANNUAL WELLNESS VISIT (AWV) IN MEDICARE PATIENT: Primary | ICD-10-CM

## 2022-12-07 DIAGNOSIS — Z87.898 HISTORY OF SEIZURES: ICD-10-CM

## 2022-12-07 DIAGNOSIS — E10.65: ICD-10-CM

## 2022-12-07 DIAGNOSIS — Z91.199 NONCOMPLIANCE: ICD-10-CM

## 2022-12-07 DIAGNOSIS — E10.43: ICD-10-CM

## 2022-12-07 DIAGNOSIS — I10 PRIMARY HYPERTENSION: ICD-10-CM

## 2022-12-07 PROCEDURE — G0439 PPPS, SUBSEQ VISIT: HCPCS | Performed by: NURSE PRACTITIONER

## 2022-12-07 PROCEDURE — 1160F RVW MEDS BY RX/DR IN RCRD: CPT | Performed by: NURSE PRACTITIONER

## 2022-12-07 PROCEDURE — 1170F FXNL STATUS ASSESSED: CPT | Performed by: NURSE PRACTITIONER

## 2022-12-07 NOTE — PROGRESS NOTES
The ABCs of the Annual Wellness Visit  Subsequent Medicare Wellness Visit    Subjective      Kelby Peters is a 72 y.o. male who presents for a Subsequent Medicare Wellness Visit.    Pt here with spouse for AWV.  No health concerns or goals.    T2DM: refuses endo-previous bad experience-buying own NPH without Rx and dosing himself once per day although we discussed should be dosed twice per day and A1C is absolutely not controlled.      Followed by pain mgmt and feels like pain is mostly controlled    On keppra for history of seizures and not been to neuro recently.  No side effects on keppra-no level done recently    BP high today, reports it has been fine at home, taking amlodipine, not willing to add another medication.      The following portions of the patient's history were reviewed and   updated as appropriate: allergies, current medications, past family history, past medical history, past social history, past surgical history and problem list.    Compared to one year ago, the patient feels his physical   health is worse.    Compared to one year ago, the patient feels his mental   health is worse.    Recent Hospitalizations:  He was not admitted to the hospital during the last year.       Current Medical Providers:  Patient Care Team:  Susy Germain APRN as PCP - General (Nurse Practitioner)    Outpatient Medications Prior to Visit   Medication Sig Dispense Refill   • amLODIPine (NORVASC) 10 MG tablet Take 1 tablet by mouth Daily. 90 tablet 0   • dorzolamide-timolol (COSOPT) 22.3-6.8 MG/ML ophthalmic solution      • gabapentin (NEURONTIN) 800 MG tablet Take 1 tablet by mouth 4 (Four) Times a Day. 120 tablet 2   • glucose blood (OneTouch Verio) test strip Use to check blood sugar  4 time daily  E11.8 400 each 3   • HCA POTASSIUM GLUCONATE PO Take 650 mg by mouth Daily.     • HYDROcodone-acetaminophen (NORCO)  MG per tablet Take 1 tablet by mouth Every 6 (Six) Hours As Needed for Moderate Pain. DNF  12/3/2022 120 tablet 0   • insulin NPH (NovoLIN N ReliOn) 100 UNIT/ML injection Inject 48 units in pm 90 mL 3   • Insulin Pen Needle (BD Pen Needle Micro U/F) 32G X 6 MM misc Use 5 injections daily 1500 each 3   • [employer] LANCETS MICRO THIN 33G misc      • levETIRAcetam (KEPPRA) 500 MG tablet TAKE ONE TABLET BY MOUTH TWICE A DAY 60 tablet 0   • mupirocin (BACTROBAN) 2 % ointment      • OneTouch Delica Lancets 33G misc Use to check blood sugar 4 times daily  E11.8 400 each 3   • prednisoLONE acetate (PRED FORTE) 1 % ophthalmic suspension        No facility-administered medications prior to visit.       Opioid medication/s are on active medication list.  and I have evaluated his active treatment plan and pain score trends (see table).  There were no vitals filed for this visit.  I have reviewed the chart for potential of high risk medication and harmful drug interactions in the elderly.            Aspirin is not on active medication list.  Aspirin use is not indicated based on review of current medical condition/s. Risk of harm outweighs potential benefits.  .    Patient Active Problem List   Diagnosis   • Autonomic neuropathy   • Chronic pain   • Poorly controlled type 1 diabetes mellitus with autonomic neuropathy (HCC)   • Paraparesis (HCC)   • Neuritis or radiculitis due to rupture of lumbar intervertebral disc   • Muscle pain   • Neck pain   • Pain in thoracic spine   • Polyneuropathy   • Ataxia   • Cholelithiasis   • Dysthymic disorder   • Hypertension   • Syncope and collapse   • Pain in extremity   • Encounter for long-term (current) use of high-risk medication   • Diabetic peripheral neuropathy (HCC)   • Abnormal gait   • Acquired hammer toe of left foot   • 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  ACP discussion was held with the patient during this visit.  "Patient does not have an advance directive, information provided.     Objective    Vitals:    12/07/22 1043   BP: 158/83   Pulse: 72   Temp: 97.1 °F (36.2 °C)   SpO2: 96%   Weight: 94.2 kg (207 lb 9.6 oz)   Height: 170.2 cm (67\")     Estimated body mass index is 32.51 kg/m² as calculated from the following:    Height as of this encounter: 170.2 cm (67\").    Weight as of this encounter: 94.2 kg (207 lb 9.6 oz).    BMI is >= 30 and <35. (Class 1 Obesity). The following options were offered after discussion;: weight loss educational material (shared in after visit summary)      Does the patient have evidence of cognitive impairment?   feels like normal aging process     Physical Exam  Vitals and nursing note reviewed.   Constitutional:       General: He is not in acute distress.     Appearance: He is well-developed. He is ill-appearing (chronically). He is not toxic-appearing.   HENT:      Head: Normocephalic and atraumatic.      Right Ear: Tympanic membrane, ear canal and external ear normal.      Left Ear: Tympanic membrane, ear canal and external ear normal.      Mouth/Throat:      Mouth: Mucous membranes are moist.      Pharynx: Uvula midline. No posterior oropharyngeal erythema.   Eyes:      General: No scleral icterus.        Right eye: No discharge.         Left eye: No discharge.      Conjunctiva/sclera: Conjunctivae normal.      Pupils: Pupils are equal, round, and reactive to light.   Neck:      Thyroid: No thyromegaly.   Cardiovascular:      Rate and Rhythm: Normal rate and regular rhythm.      Heart sounds: Normal heart sounds. No murmur heard.  Pulmonary:      Effort: Pulmonary effort is normal.      Breath sounds: Normal breath sounds.   Abdominal:      General: Bowel sounds are normal.      Palpations: Abdomen is soft.      Tenderness: There is no abdominal tenderness.   Musculoskeletal:         General: No deformity.      Cervical back: Neck supple.      Comments: Gait smooth and steady "   Lymphadenopathy:      Cervical: No cervical adenopathy.   Skin:     General: Skin is warm and dry.   Neurological:      General: No focal deficit present.      Mental Status: He is alert and oriented to person, place, and time.   Psychiatric:         Attention and Perception: Attention and perception normal.         Mood and Affect: Mood and affect normal.         Speech: Speech normal.         Behavior: Behavior normal.         Thought Content: Thought content normal.                       HEALTH RISK ASSESSMENT    Smoking Status:  Social History     Tobacco Use   Smoking Status Former   Smokeless Tobacco Never     Alcohol Consumption:  Social History     Substance and Sexual Activity   Alcohol Use Yes    Comment: social drinker     Fall Risk Screen:    [facility], and patient is at LOW risk for falls.Assessment completed on:12/7/2022    Depression Screening:  PHQ-2/PHQ-9 Depression Screening 12/7/2022   Retired PHQ-9 Total Score -   Retired Total Score -   Little Interest or Pleasure in Doing Things 0-->not at all   Feeling Down, Depressed or Hopeless 0-->not at all   Trouble Falling or Staying Asleep, or Sleeping Too Much 1-->several days   Feeling Tired or Having Little Energy 1-->several days   Trouble Concentrating on Things, Such as Reading the Newspaper or Watching Television -   PHQ-9: Brief Depression Severity Measure Score 2       Health Habits and Functional and Cognitive Screening:  Functional & Cognitive Status 12/7/2022   Do you have difficulty preparing food and eating? No   Do you have difficulty bathing yourself, getting dressed or grooming yourself? No   Do you have difficulty using the toilet? No   Do you have difficulty moving around from place to place? No   Do you have trouble with steps or getting out of a bed or a chair? No   Current Diet Other        Current Diet Comment consumes a lot of dairy, otherwise well balanced   Dental Exam Up to date   Eye Exam Up  to date   Exercise (times per week) 2 times per week   Current Exercises Include Yard Work   Do you need help using the phone?  No   Are you deaf or do you have serious difficulty hearing?  No   Do you need help with transportation? No   Do you need help shopping? No   Do you need help preparing meals?  No   Do you need help with housework?  No   Do you need help with laundry? No   Do you need help taking your medications? No   Do you need help managing money? No   Do you ever drive or ride in a car without wearing a seat belt? No   Have you felt unusual stress, anger or loneliness in the last month? No   Who do you live with? Spouse   If you need help, do you have trouble finding someone available to you? No   Do you have difficulty concentrating, remembering or making decisions? No       Age-appropriate Screening Schedule:  Refer to the list below for future screening recommendations based on patient's age, sex and/or medical conditions. Orders for these recommended tests are listed in the plan section. The patient has been provided with a written plan.    Health Maintenance   Topic Date Due   • HEMOGLOBIN A1C  11/24/2022   • ZOSTER VACCINE (2 of 2) 12/07/2022 (Originally 12/29/2017)   • INFLUENZA VACCINE  03/31/2023 (Originally 8/1/2022)   • DIABETIC FOOT EXAM  03/01/2023   • DIABETIC EYE EXAM  04/15/2023   • LIPID PANEL  05/24/2023   • URINE MICROALBUMIN  05/24/2023   • TDAP/TD VACCINES (2 - Td or Tdap) 11/09/2024                CMS Preventative Services Quick Reference  Risk Factors Identified During Encounter:    Chronic Pain: followed by pain mgmt  Immunizations Discussed/Encouraged: Influenza, Shingrix and COVID19  Noncompliance-discussed risks with noncompliance    The above risks/problems have been discussed with the patient.  Pertinent information has been shared with the patient in the After Visit Summary.    Diagnoses and all orders for this visit:    1. Encounter for annual wellness visit (AWV) in  Medicare patient (Primary)    2. History of seizures  -     Levetiracetam Level (Keppra)    3. Poorly controlled type 1 diabetes mellitus with autonomic neuropathy (HCC)  -     Hemoglobin A1c    4. Primary hypertension  -     Comprehensive Metabolic Panel  -     Microalbumin / Creatinine Urine Ratio - Urine, Clean Catch    5. Mixed hyperlipidemia    6. Noncompliance       Appropriate health maintenance and prevention topics specific for this patient were discussed today.  Additionally, health goals, and health concerns addressed as appropriate.  Pt was encouraged to stay up to date on recommended screenings and vaccines based on USPSTF guidelines.   Pt refuses most health screenings and vaccines.      Pt refuses endo or other options for better A1C control.  See previous notes.  Refuses ACE/ARB for BP control.      Keppra level today-referral to neuro for recommendations for continued keppra-no recent seizure.      HTN:  Poorly controlled.  Goal BP discussed, home monitoring discussed.  Also discussed s/s requiring emergent eval    HLD:  Not on statin-refused despite recommendations.              Follow Up:   Next Medicare Wellness visit to be scheduled in 1 year.      An After Visit Summary and PPPS were made available to the patient.

## 2022-12-07 NOTE — PROGRESS NOTES
"Chief Complaint  Annual Exam (AWV)    Subjective    {Problem List  Visit Diagnosis   Encounters  Notes  Medications  Labs  Result Review Imaging  Media :23}    Kelby Peters presents to [facility]  History of Present Illness    Objective   Vital Signs:  /83   Pulse 72   Temp 97.1 °F (36.2 °C)   Ht 170.2 cm (67\")   Wt 94.2 kg (207 lb 9.6 oz)   SpO2 96%   BMI 32.51 kg/m²   Estimated body mass index is 32.51 kg/m² as calculated from the following:    Height as of this encounter: 170.2 cm (67\").    Weight as of this encounter: 94.2 kg (207 lb 9.6 oz).    {BMI is >= 30 and <35. (Class 1 Obesity). The following options were offered after discussion; (Optional):17239}      Physical Exam   Result Review :{Labs  Result Review  Imaging  Med Tab  Media  Procedures  :23}  {The following data was reviewed by (Optional):80335}  {Ambulatory Labs (Optional):91491}  {Data reviewed (Optional):11046:::1}          Assessment and Plan {CC Problem List  Visit Diagnosis   ROS  Review (Popup)  Health Maintenance  Quality  BestPractice  Medications  SmartSets  SnapShot Encounters  Media :23}  There are no diagnoses linked to this encounter.       {Time Spent (Optional):14505}  Follow Up {Instructions Charge Capture  Follow-up Communications :23}  No follow-ups on file.  Patient was given instructions and counseling regarding his condition or for health maintenance advice. Please see specific information pulled into the AVS if appropriate.       "

## 2022-12-09 LAB
ALBUMIN SERPL-MCNC: 4.1 G/DL (ref 3.5–5.2)
ALBUMIN/CREAT UR: 3770 MG/G CREAT (ref 0–29)
ALBUMIN/GLOB SERPL: 1.3 G/DL
ALP SERPL-CCNC: 96 U/L (ref 39–117)
ALT SERPL-CCNC: 13 U/L (ref 1–41)
AST SERPL-CCNC: 15 U/L (ref 1–40)
BILIRUB SERPL-MCNC: 0.3 MG/DL (ref 0–1.2)
BUN SERPL-MCNC: 13 MG/DL (ref 8–23)
BUN/CREAT SERPL: 17.3 (ref 7–25)
CALCIUM SERPL-MCNC: 9.3 MG/DL (ref 8.6–10.5)
CHLORIDE SERPL-SCNC: 96 MMOL/L (ref 98–107)
CO2 SERPL-SCNC: 33.4 MMOL/L (ref 22–29)
CREAT SERPL-MCNC: 0.75 MG/DL (ref 0.76–1.27)
CREAT UR-MCNC: 84.8 MG/DL
EGFRCR SERPLBLD CKD-EPI 2021: 95.9 ML/MIN/1.73
GLOBULIN SER CALC-MCNC: 3.1 GM/DL
GLUCOSE SERPL-MCNC: 110 MG/DL (ref 65–99)
HBA1C MFR BLD: 10.4 % (ref 4.8–5.6)
LEVETIRACETAM SERPL-MCNC: 16 UG/ML (ref 10–40)
MICROALBUMIN UR-MCNC: 3196.8 UG/ML
POTASSIUM SERPL-SCNC: 4.3 MMOL/L (ref 3.5–5.2)
PROT SERPL-MCNC: 7.2 G/DL (ref 6–8.5)
SODIUM SERPL-SCNC: 139 MMOL/L (ref 136–145)

## 2022-12-12 DIAGNOSIS — R80.9 MICROALBUMINURIA: Primary | ICD-10-CM

## 2022-12-20 ENCOUNTER — TELEPHONE (OUTPATIENT)
Dept: PAIN MEDICINE | Facility: CLINIC | Age: 72
End: 2022-12-20

## 2022-12-20 NOTE — TELEPHONE ENCOUNTER
Caller: KAI SOLARES    Relationship: SELF    Best call back number:  [phone]    Requested Prescriptions:   HYDROCODONE      Pharmacy where request should be sent:    [facility] [id number] - [facility] [address] [facility] [address] AT [facility] - [phone]  - [phone] FX     Additional details provided by patient: PT HAD TO CANCEL TOMORROW'S APPT (12-21-22) DUE TO COUGH, DIARRHEA & VOMITTING    Does the patient have less than a 3 day supply:  [x] Yes  [] No    Would you like a call back once the refill request has been completed: [x] Yes [] No    If the office needs to give you a call back, can they leave a voicemail: [x] Yes [] No    Deepika Fontanez Rep   12/20/22 14:36 EST

## 2022-12-21 RX ORDER — HYDROCODONE BITARTRATE AND ACETAMINOPHEN 10; 325 MG/1; MG/1
1 TABLET ORAL EVERY 6 HOURS PRN
Qty: 120 TABLET | Refills: 0 | Status: SHIPPED | OUTPATIENT
Start: 2022-12-21 | End: 2023-01-06 | Stop reason: SDUPTHER

## 2022-12-21 NOTE — TELEPHONE ENCOUNTER
Medication Refill Request    Date of phone call: 22    Medication being requested: Norco  mg    si tab po q 6 hrs prn  Qty: 120    Date of last visit: 22    Date of last refill:     HOME up to date?:     Next Follow up?: 23    Any new pertinent information? (i.e, new medication allergies, new use of medications, change in patient's health or condition, non-compliance or inconsistency with prescribing agreement?):

## 2022-12-22 DIAGNOSIS — Z87.898 HISTORY OF SEIZURES: ICD-10-CM

## 2022-12-22 RX ORDER — LEVETIRACETAM 500 MG/1
TABLET ORAL
Qty: 60 TABLET | Refills: 0 | Status: SHIPPED | OUTPATIENT
Start: 2022-12-22 | End: 2023-01-23

## 2023-01-06 ENCOUNTER — OFFICE VISIT (OUTPATIENT)
Dept: PAIN MEDICINE | Facility: CLINIC | Age: 73
End: 2023-01-06
Payer: MEDICARE

## 2023-01-06 VITALS
OXYGEN SATURATION: 98 % | TEMPERATURE: 96.9 F | HEIGHT: 67 IN | RESPIRATION RATE: 18 BRPM | BODY MASS INDEX: 31.67 KG/M2 | SYSTOLIC BLOOD PRESSURE: 164 MMHG | WEIGHT: 201.8 LBS | HEART RATE: 69 BPM | DIASTOLIC BLOOD PRESSURE: 80 MMHG

## 2023-01-06 DIAGNOSIS — Z79.899 ENCOUNTER FOR LONG-TERM (CURRENT) USE OF HIGH-RISK MEDICATION: ICD-10-CM

## 2023-01-06 DIAGNOSIS — G62.9 POLYNEUROPATHY: Primary | ICD-10-CM

## 2023-01-06 DIAGNOSIS — E11.42 DIABETIC PERIPHERAL NEUROPATHY: ICD-10-CM

## 2023-01-06 DIAGNOSIS — M79.609 PAIN IN EXTREMITY, UNSPECIFIED EXTREMITY: ICD-10-CM

## 2023-01-06 PROCEDURE — 80305 DRUG TEST PRSMV DIR OPT OBS: CPT | Performed by: NURSE PRACTITIONER

## 2023-01-06 PROCEDURE — 99213 OFFICE O/P EST LOW 20 MIN: CPT | Performed by: NURSE PRACTITIONER

## 2023-01-06 RX ORDER — HYDROCODONE BITARTRATE AND ACETAMINOPHEN 10; 325 MG/1; MG/1
1 TABLET ORAL EVERY 6 HOURS PRN
Qty: 120 TABLET | Refills: 0 | Status: SHIPPED | OUTPATIENT
Start: 2023-01-06 | End: 2023-01-21 | Stop reason: HOSPADM

## 2023-01-06 RX ORDER — GABAPENTIN 800 MG/1
800 TABLET ORAL 4 TIMES DAILY
Qty: 120 TABLET | Refills: 2 | Status: SHIPPED | OUTPATIENT
Start: 2023-01-06

## 2023-01-06 NOTE — PROGRESS NOTES
CHIEF COMPLAINT  Follow-up for extremity pain.    Subjective   Kelby Peters is a 72 y.o. male  who presents for follow-up.  He has a history of extremity pain due to neuropathy.  Today his pain is 6/10VAS in severity. He continues with Hydrocodone 10/325 4/day and gabapentin 800 mg 4/day(1-1-2). Also continues with Keppra 500 mg BID (prescribed by PCP). This medication regimen decreases his pain by significant amount. ADLs by self. He denies any side effects including somnolence or constipation.     He continues to work with Dr. 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     Failed Cymbalta.  Horizant and Gralise Cost-prohibitive.  Failed Lyrica---pedal edema  History of IDDM.    Extremity Pain   The pain is present in the left hand, right hand, left lower leg and right lower leg. This is a chronic problem. The current episode started more than 1 year ago. The problem occurs constantly. The problem has been unchanged. The pain is at a severity of 6/10. The pain is moderate. Associated symptoms include numbness. Pertinent negatives include no fever. He has tried oral narcotics and rest for the symptoms. The treatment provided significant relief. His past medical history is significant for diabetes.      PEG Assessment   What number best describes your pain on average in the past week?7  What number best describes how, during the past week, pain has interfered with your enjoyment of life?6  What number best describes how, during the past week, pain has interfered with your general activity?  7    The following portions of the patient's history were reviewed and updated as appropriate: allergies, current medications, past family history, past medical history, past social history, past surgical history and problem list.    Review of Systems   Constitutional: Positive for fatigue. Negative for fever.   HENT: Negative for congestion.    Eyes: Positive for visual disturbance (right eye).    Respiratory: Negative for shortness of breath.    Cardiovascular: Negative for chest pain.   Gastrointestinal: Negative for constipation and diarrhea.   Genitourinary: Negative for difficulty urinating.   Musculoskeletal: Negative for arthralgias.   Neurological: Positive for weakness and numbness.   Psychiatric/Behavioral: Positive for sleep disturbance. Negative for suicidal ideas. The patient is not nervous/anxious.      --  The aforementioned information the Chief Complaint section and above subjective data including any HPI data, and also the Review of Systems data, has been personally reviewed and affirmed.  --    Vitals:    01/06/23 0832   BP: 164/80   Pulse: 69   Resp: 18   Temp: 96.9 °F (36.1 °C)   SpO2: 98%   Weight: 91.5 kg (201 lb 12.8 oz)   Height: 170.2 cm (67\")   PainSc:   6   PainLoc: Generalized     Objective   Physical Exam  Vitals and nursing note reviewed.   Constitutional:       Appearance: Normal appearance. He is well-developed.   Eyes:      General: Lids are normal.   Cardiovascular:      Rate and Rhythm: Normal rate.   Pulmonary:      Effort: Pulmonary effort is normal.   Musculoskeletal:      Right foot: Tenderness present.      Left foot: Tenderness present.   Neurological:      Mental Status: He is alert and oriented to person, place, and time.   Psychiatric:         Attention and Perception: Attention normal.         Mood and Affect: Mood normal.         Speech: Speech normal.         Behavior: Behavior normal.         Judgment: Judgment normal.       Assessment & Plan   Diagnoses and all orders for this visit:    1. Polyneuropathy (Primary)  -     gabapentin (NEURONTIN) 800 MG tablet; Take 1 tablet by mouth 4 (Four) Times a Day.  Dispense: 120 tablet; Refill: 2  -     Urine Drug Screen Confirmation - Urine, Clean Catch; Future  -     POC Urine Drug Screen, Triage    2. Encounter for long-term (current) use of high-risk medication  -     Urine Drug Screen Confirmation - Urine, Clean  Catch; Future  -     POC Urine Drug Screen, Triage    3. Diabetic peripheral neuropathy (HCC)  -     Urine Drug Screen Confirmation - Urine, Clean Catch; Future  -     POC Urine Drug Screen, Triage    4. Pain in extremity, unspecified extremity  -     Urine Drug Screen Confirmation - Urine, Clean Catch; Future  -     POC Urine Drug Screen, Triage    Other orders  -     HYDROcodone-acetaminophen (NORCO)  MG per tablet; Take 1 tablet by mouth Every 6 (Six) Hours As Needed for Moderate Pain. DNF until 1/30/2023  Dispense: 120 tablet; Refill: 0      --- Routine UDS in office today as part of monitoring requirements for controlled substances.  The specimen was viewed and the immunoassay result reviewed and is +OPI.  This specimen will be sent to [facility] laboratory for confirmation.     --- CSA updated 6/22/2022  --- Refill Warren 10/325.  DNF 1/30/2023 applied.  Patient appears stable with current regimen. No adverse effects. Regarding continuation of opioids, there is no evidence of aberrant behavior or any red flags.  The patient continues with appropriate response to opioid therapy. ADL's remain intact by self.   --- Follow-up 10 weeks or sooner if needed (to better align refill dates).      HOME REPORT  As part of the patient's treatment plan, I am prescribing controlled substances. The patient has been made aware of appropriate use of such medications, including potential risk of somnolence, limited ability to drive and/or work safely, and the potential for dependence or overdose. It has also been made clear that these medications are for use by this patient only, without concomitant use of alcohol or other substances unless prescribed.     Patient has completed prescribing agreement detailing terms of continued prescribing of controlled substances, including monitoring HOME reports, urine drug screening, and pill counts if necessary. The patient is aware that inappropriate use will results in cessation  of prescribing such medications.    As the clinician, I personally reviewed the HOME from 1/6/2023 while the patient was in the office today.    History and physical exam exhibit continued safe and appropriate use of controlled substances.    Dictated utilizing Dragon dictation.     Patient remained masked during entire encounter. No cough present. I donned a mask and eye protection throughout entire visit. Prior to donning mask and eye protection, hand hygiene was performed, as well as when it was doffed.  I was closer than 6 feet, but not for an extended period of time. No obvious exposure to any bodily fluids.

## 2023-01-11 ENCOUNTER — TELEPHONE (OUTPATIENT)
Dept: FAMILY MEDICINE CLINIC | Facility: CLINIC | Age: 73
End: 2023-01-11
Payer: MEDICARE

## 2023-01-11 DIAGNOSIS — L98.9 SKIN LESION: Primary | ICD-10-CM

## 2023-01-11 NOTE — TELEPHONE ENCOUNTER
AMBER WITH DR. GHAZAL DIAZ OFFICE CALLED AND STATED THIS PT NEEDS A REFERRAL PLACED ASAP. DR GHAZAL MARTINEZ IS A VASCULAR SURGEON AND WANTS TO DO AN ULTRASOUND ON THE PTS FEET AND LEGS. AMBER STATED THE PT HAS MULTIPLE WOUNDS ON HIS LEGS AND AN OPEN WOUND THAT IS EXPOSING HIS BONE ON HIS LEFT 3RD TOE. SHE STATED HE HAS A PVD DIAGNOSIS.     PLEASE ADVISE AND ONCE ORDER IS PLACED THEY WILL SEE IT IN Epic. THANK YOU.

## 2023-01-15 ENCOUNTER — HOSPITAL ENCOUNTER (INPATIENT)
Facility: HOSPITAL | Age: 73
LOS: 6 days | Discharge: HOME HEALTH | DRG: 617 | End: 2023-01-21
Attending: EMERGENCY MEDICINE | Admitting: STUDENT IN AN ORGANIZED HEALTH CARE EDUCATION/TRAINING PROGRAM
Payer: MEDICARE

## 2023-01-15 ENCOUNTER — APPOINTMENT (OUTPATIENT)
Dept: CARDIOLOGY | Facility: HOSPITAL | Age: 73
DRG: 617 | End: 2023-01-15
Payer: MEDICARE

## 2023-01-15 ENCOUNTER — APPOINTMENT (OUTPATIENT)
Dept: MRI IMAGING | Facility: HOSPITAL | Age: 73
DRG: 617 | End: 2023-01-15
Payer: MEDICARE

## 2023-01-15 ENCOUNTER — APPOINTMENT (OUTPATIENT)
Dept: GENERAL RADIOLOGY | Facility: HOSPITAL | Age: 73
DRG: 617 | End: 2023-01-15
Payer: MEDICARE

## 2023-01-15 DIAGNOSIS — G89.18 ACUTE POST-OPERATIVE PAIN: ICD-10-CM

## 2023-01-15 DIAGNOSIS — E10.621 DIABETIC ULCER OF FOOT ASSOCIATED WITH TYPE 1 DIABETES MELLITUS, WITH FAT LAYER EXPOSED, UNSPECIFIED LATERALITY, UNSPECIFIED PART OF FOOT: Primary | ICD-10-CM

## 2023-01-15 DIAGNOSIS — G62.9 POLYNEUROPATHY: ICD-10-CM

## 2023-01-15 DIAGNOSIS — E10.43: ICD-10-CM

## 2023-01-15 DIAGNOSIS — E10.65: ICD-10-CM

## 2023-01-15 DIAGNOSIS — E11.65 TYPE 2 DIABETES MELLITUS WITH HYPERGLYCEMIA, WITH LONG-TERM CURRENT USE OF INSULIN: ICD-10-CM

## 2023-01-15 DIAGNOSIS — L97.502 DIABETIC ULCER OF FOOT ASSOCIATED WITH TYPE 1 DIABETES MELLITUS, WITH FAT LAYER EXPOSED, UNSPECIFIED LATERALITY, UNSPECIFIED PART OF FOOT: Primary | ICD-10-CM

## 2023-01-15 DIAGNOSIS — Z79.4 TYPE 2 DIABETES MELLITUS WITH HYPERGLYCEMIA, WITH LONG-TERM CURRENT USE OF INSULIN: ICD-10-CM

## 2023-01-15 DIAGNOSIS — I96 NECROTIC TOES: ICD-10-CM

## 2023-01-15 DIAGNOSIS — M86.9 OSTEOMYELITIS: ICD-10-CM

## 2023-01-15 PROBLEM — L97.509 DIABETIC FOOT ULCER: Status: ACTIVE | Noted: 2023-01-15

## 2023-01-15 PROBLEM — E11.621 DIABETIC FOOT ULCER: Status: ACTIVE | Noted: 2023-01-15

## 2023-01-15 PROBLEM — I73.9 PERIPHERAL VASCULAR DISEASE (HCC): Status: ACTIVE | Noted: 2023-01-15

## 2023-01-15 PROBLEM — E11.9 TYPE 2 DIABETES MELLITUS (HCC): Status: ACTIVE | Noted: 2023-01-15

## 2023-01-15 LAB
ANION GAP SERPL CALCULATED.3IONS-SCNC: 10.8 MMOL/L (ref 5–15)
BASOPHILS # BLD AUTO: 0.03 10*3/MM3 (ref 0–0.2)
BASOPHILS NFR BLD AUTO: 0.3 % (ref 0–1.5)
BH CV LOWER ARTERIAL LEFT ABI RATIO: 1.06
BH CV LOWER ARTERIAL LEFT CALF RATIO: 1.26
BH CV LOWER ARTERIAL LEFT DORSALIS PEDIS SYS MAX: 129
BH CV LOWER ARTERIAL LEFT GREAT TOE SYS MAX: 51
BH CV LOWER ARTERIAL LEFT HIGH THIGH RATIO: 1.39
BH CV LOWER ARTERIAL LEFT HIGH THIGH SYS MAX: 224
BH CV LOWER ARTERIAL LEFT LOW THIGH RATIO: 1.52
BH CV LOWER ARTERIAL LEFT LOW THIGH SYS MAX: 244
BH CV LOWER ARTERIAL LEFT POPLITEAL SYS MAX: 203
BH CV LOWER ARTERIAL LEFT POST TIBIAL SYS MAX: 171
BH CV LOWER ARTERIAL LEFT TBI RATIO: 0.32
BH CV LOWER ARTERIAL RIGHT ABI RATIO: 1.17
BH CV LOWER ARTERIAL RIGHT CALF RATIO: 1.22
BH CV LOWER ARTERIAL RIGHT DORSALIS PEDIS SYS MAX: 189
BH CV LOWER ARTERIAL RIGHT GREAT TOE SYS MAX: 81
BH CV LOWER ARTERIAL RIGHT HIGH THIGH RATIO: 1.44
BH CV LOWER ARTERIAL RIGHT HIGH THIGH SYS MAX: 232
BH CV LOWER ARTERIAL RIGHT LOW THIGH RATIO: 1.45
BH CV LOWER ARTERIAL RIGHT LOW THIGH SYS MAX: 234
BH CV LOWER ARTERIAL RIGHT POPLITEAL SYS MAX: 197
BH CV LOWER ARTERIAL RIGHT POST TIBIAL SYS MAX: 156
BH CV LOWER ARTERIAL RIGHT TBI RATIO: 0.5
BUN SERPL-MCNC: 17 MG/DL (ref 8–23)
BUN/CREAT SERPL: 24.6 (ref 7–25)
CALCIUM SPEC-SCNC: 9.5 MG/DL (ref 8.6–10.5)
CHLORIDE SERPL-SCNC: 97 MMOL/L (ref 98–107)
CO2 SERPL-SCNC: 27.2 MMOL/L (ref 22–29)
CREAT SERPL-MCNC: 0.69 MG/DL (ref 0.76–1.27)
D-LACTATE SERPL-SCNC: 0.8 MMOL/L (ref 0.5–2)
DEPRECATED RDW RBC AUTO: 39.6 FL (ref 37–54)
EGFRCR SERPLBLD CKD-EPI 2021: 98.3 ML/MIN/1.73
EOSINOPHIL # BLD AUTO: 0.19 10*3/MM3 (ref 0–0.4)
EOSINOPHIL NFR BLD AUTO: 2.2 % (ref 0.3–6.2)
ERYTHROCYTE [DISTWIDTH] IN BLOOD BY AUTOMATED COUNT: 13 % (ref 12.3–15.4)
ERYTHROCYTE [SEDIMENTATION RATE] IN BLOOD: >130 MM/HR (ref 0–20)
GLUCOSE BLDC GLUCOMTR-MCNC: 120 MG/DL (ref 70–130)
GLUCOSE SERPL-MCNC: 79 MG/DL (ref 65–99)
HCT VFR BLD AUTO: 36.4 % (ref 37.5–51)
HGB BLD-MCNC: 12 G/DL (ref 13–17.7)
IMM GRANULOCYTES # BLD AUTO: 0.03 10*3/MM3 (ref 0–0.05)
IMM GRANULOCYTES NFR BLD AUTO: 0.3 % (ref 0–0.5)
LYMPHOCYTES # BLD AUTO: 1.3 10*3/MM3 (ref 0.7–3.1)
LYMPHOCYTES NFR BLD AUTO: 14.9 % (ref 19.6–45.3)
MAXIMAL PREDICTED HEART RATE: 148 BPM
MCH RBC QN AUTO: 27.3 PG (ref 26.6–33)
MCHC RBC AUTO-ENTMCNC: 33 G/DL (ref 31.5–35.7)
MCV RBC AUTO: 82.9 FL (ref 79–97)
MONOCYTES # BLD AUTO: 0.78 10*3/MM3 (ref 0.1–0.9)
MONOCYTES NFR BLD AUTO: 9 % (ref 5–12)
NEUTROPHILS NFR BLD AUTO: 6.38 10*3/MM3 (ref 1.7–7)
NEUTROPHILS NFR BLD AUTO: 73.3 % (ref 42.7–76)
NRBC BLD AUTO-RTO: 0 /100 WBC (ref 0–0.2)
PLATELET # BLD AUTO: 389 10*3/MM3 (ref 140–450)
PMV BLD AUTO: 10.2 FL (ref 6–12)
POTASSIUM SERPL-SCNC: 4 MMOL/L (ref 3.5–5.2)
PROCALCITONIN SERPL-MCNC: 0.08 NG/ML (ref 0–0.25)
QT INTERVAL: 417 MS
RBC # BLD AUTO: 4.39 10*6/MM3 (ref 4.14–5.8)
SODIUM SERPL-SCNC: 135 MMOL/L (ref 136–145)
STRESS TARGET HR: 126 BPM
TROPONIN T SERPL-MCNC: <0.01 NG/ML (ref 0–0.03)
UPPER ARTERIAL LEFT ARM BRACHIAL SYS MAX: 161 MMHG
UPPER ARTERIAL RIGHT ARM BRACHIAL SYS MAX: 160 MMHG
WBC NRBC COR # BLD: 8.71 10*3/MM3 (ref 3.4–10.8)

## 2023-01-15 PROCEDURE — A9577 INJ MULTIHANCE: HCPCS | Performed by: STUDENT IN AN ORGANIZED HEALTH CARE EDUCATION/TRAINING PROGRAM

## 2023-01-15 PROCEDURE — 84145 PROCALCITONIN (PCT): CPT | Performed by: EMERGENCY MEDICINE

## 2023-01-15 PROCEDURE — 85025 COMPLETE CBC W/AUTO DIFF WBC: CPT | Performed by: EMERGENCY MEDICINE

## 2023-01-15 PROCEDURE — 0 GADOBENATE DIMEGLUMINE 529 MG/ML SOLUTION: Performed by: STUDENT IN AN ORGANIZED HEALTH CARE EDUCATION/TRAINING PROGRAM

## 2023-01-15 PROCEDURE — 25010000002 PIPERACILLIN SOD-TAZOBACTAM PER 1 G: Performed by: EMERGENCY MEDICINE

## 2023-01-15 PROCEDURE — 84484 ASSAY OF TROPONIN QUANT: CPT | Performed by: EMERGENCY MEDICINE

## 2023-01-15 PROCEDURE — 93005 ELECTROCARDIOGRAM TRACING: CPT | Performed by: EMERGENCY MEDICINE

## 2023-01-15 PROCEDURE — 93010 ELECTROCARDIOGRAM REPORT: CPT | Performed by: INTERNAL MEDICINE

## 2023-01-15 PROCEDURE — 80048 BASIC METABOLIC PNL TOTAL CA: CPT | Performed by: EMERGENCY MEDICINE

## 2023-01-15 PROCEDURE — 82962 GLUCOSE BLOOD TEST: CPT

## 2023-01-15 PROCEDURE — 73620 X-RAY EXAM OF FOOT: CPT

## 2023-01-15 PROCEDURE — 73720 MRI LWR EXTREMITY W/O&W/DYE: CPT

## 2023-01-15 PROCEDURE — 85652 RBC SED RATE AUTOMATED: CPT | Performed by: EMERGENCY MEDICINE

## 2023-01-15 PROCEDURE — 93923 UPR/LXTR ART STDY 3+ LVLS: CPT

## 2023-01-15 PROCEDURE — 87040 BLOOD CULTURE FOR BACTERIA: CPT | Performed by: EMERGENCY MEDICINE

## 2023-01-15 PROCEDURE — 83605 ASSAY OF LACTIC ACID: CPT | Performed by: EMERGENCY MEDICINE

## 2023-01-15 PROCEDURE — 99284 EMERGENCY DEPT VISIT MOD MDM: CPT

## 2023-01-15 RX ORDER — ONDANSETRON 4 MG/1
4 TABLET, FILM COATED ORAL EVERY 6 HOURS PRN
Status: DISCONTINUED | OUTPATIENT
Start: 2023-01-15 | End: 2023-01-20

## 2023-01-15 RX ORDER — HYDROCODONE BITARTRATE AND ACETAMINOPHEN 10; 325 MG/1; MG/1
1 TABLET ORAL EVERY 6 HOURS PRN
Status: DISCONTINUED | OUTPATIENT
Start: 2023-01-15 | End: 2023-01-17

## 2023-01-15 RX ORDER — ACETAMINOPHEN 325 MG/1
650 TABLET ORAL EVERY 4 HOURS PRN
Status: DISCONTINUED | OUTPATIENT
Start: 2023-01-15 | End: 2023-01-19

## 2023-01-15 RX ORDER — AMLODIPINE BESYLATE 10 MG/1
10 TABLET ORAL DAILY
Status: DISCONTINUED | OUTPATIENT
Start: 2023-01-16 | End: 2023-01-21 | Stop reason: HOSPADM

## 2023-01-15 RX ORDER — SODIUM CHLORIDE 0.9 % (FLUSH) 0.9 %
10 SYRINGE (ML) INJECTION AS NEEDED
Status: DISCONTINUED | OUTPATIENT
Start: 2023-01-15 | End: 2023-01-21 | Stop reason: HOSPADM

## 2023-01-15 RX ORDER — NICOTINE POLACRILEX 4 MG
15 LOZENGE BUCCAL
Status: DISCONTINUED | OUTPATIENT
Start: 2023-01-15 | End: 2023-01-21 | Stop reason: HOSPADM

## 2023-01-15 RX ORDER — DEXTROSE MONOHYDRATE 25 G/50ML
25 INJECTION, SOLUTION INTRAVENOUS
Status: DISCONTINUED | OUTPATIENT
Start: 2023-01-15 | End: 2023-01-21 | Stop reason: HOSPADM

## 2023-01-15 RX ORDER — DORZOLAMIDE HYDROCHLORIDE AND TIMOLOL MALEATE 20; 5 MG/ML; MG/ML
SOLUTION/ DROPS OPHTHALMIC DAILY
Status: DISCONTINUED | OUTPATIENT
Start: 2023-01-16 | End: 2023-01-21 | Stop reason: HOSPADM

## 2023-01-15 RX ORDER — INSULIN LISPRO 100 [IU]/ML
0-9 INJECTION, SOLUTION INTRAVENOUS; SUBCUTANEOUS
Status: DISCONTINUED | OUTPATIENT
Start: 2023-01-16 | End: 2023-01-21 | Stop reason: HOSPADM

## 2023-01-15 RX ORDER — UREA 10 %
3 LOTION (ML) TOPICAL NIGHTLY PRN
Status: DISCONTINUED | OUTPATIENT
Start: 2023-01-15 | End: 2023-01-21 | Stop reason: HOSPADM

## 2023-01-15 RX ORDER — GABAPENTIN 400 MG/1
800 CAPSULE ORAL 4 TIMES DAILY
Status: DISCONTINUED | OUTPATIENT
Start: 2023-01-15 | End: 2023-01-20

## 2023-01-15 RX ORDER — PREDNISOLONE ACETATE 10 MG/ML
1 SUSPENSION/ DROPS OPHTHALMIC DAILY
Status: DISCONTINUED | OUTPATIENT
Start: 2023-01-16 | End: 2023-01-21 | Stop reason: HOSPADM

## 2023-01-15 RX ORDER — ONDANSETRON 2 MG/ML
4 INJECTION INTRAMUSCULAR; INTRAVENOUS EVERY 6 HOURS PRN
Status: DISCONTINUED | OUTPATIENT
Start: 2023-01-15 | End: 2023-01-20

## 2023-01-15 RX ORDER — LEVETIRACETAM 500 MG/1
500 TABLET ORAL 2 TIMES DAILY
Status: DISCONTINUED | OUTPATIENT
Start: 2023-01-15 | End: 2023-01-21 | Stop reason: HOSPADM

## 2023-01-15 RX ADMIN — TAZOBACTAM SODIUM AND PIPERACILLIN SODIUM 3.38 G: 375; 3 INJECTION, SOLUTION INTRAVENOUS at 15:02

## 2023-01-15 RX ADMIN — GADOBENATE DIMEGLUMINE 20 ML: 529 INJECTION, SOLUTION INTRAVENOUS at 20:17

## 2023-01-16 ENCOUNTER — TELEPHONE (OUTPATIENT)
Dept: FAMILY MEDICINE CLINIC | Facility: CLINIC | Age: 73
End: 2023-01-16
Payer: MEDICARE

## 2023-01-16 ENCOUNTER — APPOINTMENT (OUTPATIENT)
Dept: MRI IMAGING | Facility: HOSPITAL | Age: 73
DRG: 617 | End: 2023-01-16
Payer: MEDICARE

## 2023-01-16 ENCOUNTER — APPOINTMENT (OUTPATIENT)
Dept: CARDIOLOGY | Facility: HOSPITAL | Age: 73
DRG: 617 | End: 2023-01-16
Payer: MEDICARE

## 2023-01-16 PROBLEM — M86.072 ACUTE HEMATOGENOUS OSTEOMYELITIS OF LEFT FOOT: Status: ACTIVE | Noted: 2023-01-16

## 2023-01-16 PROBLEM — M00.9 SEPTIC ARTHRITIS OF LEFT FOOT: Status: ACTIVE | Noted: 2023-01-16

## 2023-01-16 PROBLEM — E11.42 TYPE 2 DIABETES MELLITUS WITH DIABETIC POLYNEUROPATHY: Status: ACTIVE | Noted: 2023-01-15

## 2023-01-16 PROBLEM — E66.9 OBESE: Status: ACTIVE | Noted: 2023-01-16

## 2023-01-16 PROBLEM — H40.9 GLAUCOMA: Status: ACTIVE | Noted: 2023-01-16

## 2023-01-16 LAB
ANION GAP SERPL CALCULATED.3IONS-SCNC: 7.3 MMOL/L (ref 5–15)
AORTIC DIMENSIONLESS INDEX: 0.7 (DI)
ASCENDING AORTA: 3.2 CM
BH CV ECHO MEAS - ACS: 2.04 CM
BH CV ECHO MEAS - AO MAX PG: 6.1 MMHG
BH CV ECHO MEAS - AO MEAN PG: 3 MMHG
BH CV ECHO MEAS - AO ROOT DIAM: 3.5 CM
BH CV ECHO MEAS - AO V2 MAX: 123.4 CM/SEC
BH CV ECHO MEAS - AO V2 VTI: 24.1 CM
BH CV ECHO MEAS - AVA(I,D): 3 CM2
BH CV ECHO MEAS - EDV(CUBED): 114.6 ML
BH CV ECHO MEAS - EDV(MOD-SP2): 104 ML
BH CV ECHO MEAS - EDV(MOD-SP4): 121 ML
BH CV ECHO MEAS - EF(MOD-BP): 61.7 %
BH CV ECHO MEAS - EF(MOD-SP2): 62.5 %
BH CV ECHO MEAS - EF(MOD-SP4): 62.8 %
BH CV ECHO MEAS - ESV(CUBED): 36 ML
BH CV ECHO MEAS - ESV(MOD-SP2): 39 ML
BH CV ECHO MEAS - ESV(MOD-SP4): 45 ML
BH CV ECHO MEAS - FS: 32 %
BH CV ECHO MEAS - IVS/LVPW: 1.14 CM
BH CV ECHO MEAS - IVSD: 1.34 CM
BH CV ECHO MEAS - LAT PEAK E' VEL: 8.8 CM/SEC
BH CV ECHO MEAS - LV DIASTOLIC VOL/BSA (35-75): 59.7 CM2
BH CV ECHO MEAS - LV MASS(C)D: 238.3 GRAMS
BH CV ECHO MEAS - LV MAX PG: 3.7 MMHG
BH CV ECHO MEAS - LV MEAN PG: 1.67 MMHG
BH CV ECHO MEAS - LV SYSTOLIC VOL/BSA (12-30): 22.2 CM2
BH CV ECHO MEAS - LV V1 MAX: 95.6 CM/SEC
BH CV ECHO MEAS - LV V1 VTI: 17.6 CM
BH CV ECHO MEAS - LVIDD: 4.9 CM
BH CV ECHO MEAS - LVIDS: 3.3 CM
BH CV ECHO MEAS - LVOT AREA: 4 CM2
BH CV ECHO MEAS - LVOT DIAM: 2.27 CM
BH CV ECHO MEAS - LVPWD: 1.17 CM
BH CV ECHO MEAS - MED PEAK E' VEL: 7.8 CM/SEC
BH CV ECHO MEAS - MV A DUR: 0.14 SEC
BH CV ECHO MEAS - MV A MAX VEL: 90.7 CM/SEC
BH CV ECHO MEAS - MV DEC SLOPE: 498.6 CM/SEC2
BH CV ECHO MEAS - MV DEC TIME: 202 MSEC
BH CV ECHO MEAS - MV E MAX VEL: 119 CM/SEC
BH CV ECHO MEAS - MV E/A: 1.31
BH CV ECHO MEAS - MV MAX PG: 4.5 MMHG
BH CV ECHO MEAS - MV MEAN PG: 1.77 MMHG
BH CV ECHO MEAS - MV P1/2T: 67 MSEC
BH CV ECHO MEAS - MV V2 VTI: 32.5 CM
BH CV ECHO MEAS - MVA(P1/2T): 3.3 CM2
BH CV ECHO MEAS - MVA(VTI): 2.19 CM2
BH CV ECHO MEAS - PA ACC TIME: 0.06 SEC
BH CV ECHO MEAS - PA PR(ACCEL): 52.9 MMHG
BH CV ECHO MEAS - PA V2 MAX: 134.4 CM/SEC
BH CV ECHO MEAS - PULM A REVS DUR: 0.13 SEC
BH CV ECHO MEAS - PULM A REVS VEL: 33.5 CM/SEC
BH CV ECHO MEAS - PULM DIAS VEL: 32.6 CM/SEC
BH CV ECHO MEAS - PULM S/D: 1.66
BH CV ECHO MEAS - PULM SYS VEL: 54.3 CM/SEC
BH CV ECHO MEAS - QP/QS: 1.08
BH CV ECHO MEAS - RV MAX PG: 4.6 MMHG
BH CV ECHO MEAS - RV V1 MAX: 106.7 CM/SEC
BH CV ECHO MEAS - RV V1 VTI: 22.5 CM
BH CV ECHO MEAS - RVOT DIAM: 2.08 CM
BH CV ECHO MEAS - SI(MOD-SP2): 32.1 ML/M2
BH CV ECHO MEAS - SI(MOD-SP4): 37.5 ML/M2
BH CV ECHO MEAS - SV(LVOT): 71.1 ML
BH CV ECHO MEAS - SV(MOD-SP2): 65 ML
BH CV ECHO MEAS - SV(MOD-SP4): 76 ML
BH CV ECHO MEAS - SV(RVOT): 76.7 ML
BH CV ECHO MEAS - TAPSE (>1.6): 2.5 CM
BH CV ECHO MEASUREMENTS AVERAGE E/E' RATIO: 14.34
BH CV XLRA - TDI S': 13.2 CM/SEC
BH CV XLRA MEAS LEFT DIST CCA EDV: -11 CM/SEC
BH CV XLRA MEAS LEFT DIST CCA PSV: -93.3 CM/SEC
BH CV XLRA MEAS LEFT DIST ICA EDV: -22.4 CM/SEC
BH CV XLRA MEAS LEFT DIST ICA PSV: -69.4 CM/SEC
BH CV XLRA MEAS LEFT ICA/CCA RATIO: 1.09
BH CV XLRA MEAS LEFT MID ICA EDV: -24.1 CM/SEC
BH CV XLRA MEAS LEFT MID ICA PSV: -102.1 CM/SEC
BH CV XLRA MEAS LEFT PROX CCA EDV: 9 CM/SEC
BH CV XLRA MEAS LEFT PROX CCA PSV: 86.5 CM/SEC
BH CV XLRA MEAS LEFT PROX ECA PSV: -145 CM/SEC
BH CV XLRA MEAS LEFT PROX ICA EDV: -21.7 CM/SEC
BH CV XLRA MEAS LEFT PROX ICA PSV: -98.2 CM/SEC
BH CV XLRA MEAS LEFT PROX SCLA PSV: 148.2 CM/SEC
BH CV XLRA MEAS LEFT VERTEBRAL A EDV: -7.1 CM/SEC
BH CV XLRA MEAS LEFT VERTEBRAL A PSV: -90 CM/SEC
BH CV XLRA MEAS RIGHT DIST CCA EDV: -16.8 CM/SEC
BH CV XLRA MEAS RIGHT DIST CCA PSV: -111.8 CM/SEC
BH CV XLRA MEAS RIGHT DIST ICA EDV: -19.3 CM/SEC
BH CV XLRA MEAS RIGHT DIST ICA PSV: -67.9 CM/SEC
BH CV XLRA MEAS RIGHT ICA/CCA RATIO: 1.03
BH CV XLRA MEAS RIGHT MID ICA EDV: -22.1 CM/SEC
BH CV XLRA MEAS RIGHT MID ICA PSV: -76.2 CM/SEC
BH CV XLRA MEAS RIGHT PROX CCA EDV: -13 CM/SEC
BH CV XLRA MEAS RIGHT PROX CCA PSV: -95.1 CM/SEC
BH CV XLRA MEAS RIGHT PROX ECA PSV: -163.8 CM/SEC
BH CV XLRA MEAS RIGHT PROX ICA EDV: -23.8 CM/SEC
BH CV XLRA MEAS RIGHT PROX ICA PSV: -115.6 CM/SEC
BH CV XLRA MEAS RIGHT PROX SCLA PSV: 147.1 CM/SEC
BH CV XLRA MEAS RIGHT VERTEBRAL A EDV: -24.2 CM/SEC
BH CV XLRA MEAS RIGHT VERTEBRAL A PSV: -118 CM/SEC
BUN SERPL-MCNC: 17 MG/DL (ref 8–23)
BUN/CREAT SERPL: 20.7 (ref 7–25)
CALCIUM SPEC-SCNC: 9.5 MG/DL (ref 8.6–10.5)
CHLORIDE SERPL-SCNC: 98 MMOL/L (ref 98–107)
CO2 SERPL-SCNC: 31.7 MMOL/L (ref 22–29)
CREAT SERPL-MCNC: 0.82 MG/DL (ref 0.76–1.27)
CRP SERPL-MCNC: 7.06 MG/DL (ref 0–0.5)
DEPRECATED RDW RBC AUTO: 40.3 FL (ref 37–54)
EGFRCR SERPLBLD CKD-EPI 2021: 93.3 ML/MIN/1.73
ERYTHROCYTE [DISTWIDTH] IN BLOOD BY AUTOMATED COUNT: 13 % (ref 12.3–15.4)
GLUCOSE BLDC GLUCOMTR-MCNC: 104 MG/DL (ref 70–130)
GLUCOSE BLDC GLUCOMTR-MCNC: 140 MG/DL (ref 70–130)
GLUCOSE BLDC GLUCOMTR-MCNC: 197 MG/DL (ref 70–130)
GLUCOSE BLDC GLUCOMTR-MCNC: 277 MG/DL (ref 70–130)
GLUCOSE BLDC GLUCOMTR-MCNC: 306 MG/DL (ref 70–130)
GLUCOSE BLDC GLUCOMTR-MCNC: 57 MG/DL (ref 70–130)
GLUCOSE SERPL-MCNC: 249 MG/DL (ref 65–99)
HBA1C MFR BLD: 9.1 % (ref 4.8–5.6)
HCT VFR BLD AUTO: 34.8 % (ref 37.5–51)
HGB BLD-MCNC: 11 G/DL (ref 13–17.7)
LEFT ATRIUM VOLUME INDEX: 19.8 ML/M2
MAXIMAL PREDICTED HEART RATE: 148 BPM
MAXIMAL PREDICTED HEART RATE: 148 BPM
MCH RBC QN AUTO: 27.4 PG (ref 26.6–33)
MCHC RBC AUTO-ENTMCNC: 31.6 G/DL (ref 31.5–35.7)
MCV RBC AUTO: 86.8 FL (ref 79–97)
PLATELET # BLD AUTO: 419 10*3/MM3 (ref 140–450)
PMV BLD AUTO: 9.9 FL (ref 6–12)
POTASSIUM SERPL-SCNC: 5.2 MMOL/L (ref 3.5–5.2)
RBC # BLD AUTO: 4.01 10*6/MM3 (ref 4.14–5.8)
SINUS: 3 CM
SODIUM SERPL-SCNC: 137 MMOL/L (ref 136–145)
STRESS TARGET HR: 126 BPM
STRESS TARGET HR: 126 BPM
WBC NRBC COR # BLD: 8.78 10*3/MM3 (ref 3.4–10.8)

## 2023-01-16 PROCEDURE — 86140 C-REACTIVE PROTEIN: CPT | Performed by: INTERNAL MEDICINE

## 2023-01-16 PROCEDURE — 63710000001 INSULIN LISPRO (HUMAN) PER 5 UNITS: Performed by: STUDENT IN AN ORGANIZED HEALTH CARE EDUCATION/TRAINING PROGRAM

## 2023-01-16 PROCEDURE — 87186 SC STD MICRODIL/AGAR DIL: CPT | Performed by: SURGERY

## 2023-01-16 PROCEDURE — 87205 SMEAR GRAM STAIN: CPT | Performed by: SURGERY

## 2023-01-16 PROCEDURE — 87070 CULTURE OTHR SPECIMN AEROBIC: CPT | Performed by: SURGERY

## 2023-01-16 PROCEDURE — 93306 TTE W/DOPPLER COMPLETE: CPT | Performed by: INTERNAL MEDICINE

## 2023-01-16 PROCEDURE — 83036 HEMOGLOBIN GLYCOSYLATED A1C: CPT | Performed by: STUDENT IN AN ORGANIZED HEALTH CARE EDUCATION/TRAINING PROGRAM

## 2023-01-16 PROCEDURE — 80048 BASIC METABOLIC PNL TOTAL CA: CPT | Performed by: STUDENT IN AN ORGANIZED HEALTH CARE EDUCATION/TRAINING PROGRAM

## 2023-01-16 PROCEDURE — 73720 MRI LWR EXTREMITY W/O&W/DYE: CPT

## 2023-01-16 PROCEDURE — 99222 1ST HOSP IP/OBS MODERATE 55: CPT | Performed by: STUDENT IN AN ORGANIZED HEALTH CARE EDUCATION/TRAINING PROGRAM

## 2023-01-16 PROCEDURE — 25010000002 PERFLUTREN (DEFINITY) 8.476 MG IN SODIUM CHLORIDE (PF) 0.9 % 10 ML INJECTION: Performed by: STUDENT IN AN ORGANIZED HEALTH CARE EDUCATION/TRAINING PROGRAM

## 2023-01-16 PROCEDURE — 82962 GLUCOSE BLOOD TEST: CPT

## 2023-01-16 PROCEDURE — 93306 TTE W/DOPPLER COMPLETE: CPT

## 2023-01-16 PROCEDURE — 87077 CULTURE AEROBIC IDENTIFY: CPT | Performed by: SURGERY

## 2023-01-16 PROCEDURE — 63710000001 INSULIN LISPRO (HUMAN) PER 5 UNITS: Performed by: INTERNAL MEDICINE

## 2023-01-16 PROCEDURE — 99223 1ST HOSP IP/OBS HIGH 75: CPT | Performed by: INTERNAL MEDICINE

## 2023-01-16 PROCEDURE — 36415 COLL VENOUS BLD VENIPUNCTURE: CPT | Performed by: STUDENT IN AN ORGANIZED HEALTH CARE EDUCATION/TRAINING PROGRAM

## 2023-01-16 PROCEDURE — 93880 EXTRACRANIAL BILAT STUDY: CPT

## 2023-01-16 PROCEDURE — 0 GADOBENATE DIMEGLUMINE 529 MG/ML SOLUTION: Performed by: INTERNAL MEDICINE

## 2023-01-16 PROCEDURE — A9577 INJ MULTIHANCE: HCPCS | Performed by: INTERNAL MEDICINE

## 2023-01-16 PROCEDURE — 85027 COMPLETE CBC AUTOMATED: CPT | Performed by: STUDENT IN AN ORGANIZED HEALTH CARE EDUCATION/TRAINING PROGRAM

## 2023-01-16 RX ORDER — ASPIRIN 81 MG/1
81 TABLET ORAL DAILY
Status: DISCONTINUED | OUTPATIENT
Start: 2023-01-16 | End: 2023-01-21 | Stop reason: HOSPADM

## 2023-01-16 RX ORDER — ROSUVASTATIN CALCIUM 10 MG/1
10 TABLET, COATED ORAL NIGHTLY
Status: DISCONTINUED | OUTPATIENT
Start: 2023-01-16 | End: 2023-01-21 | Stop reason: HOSPADM

## 2023-01-16 RX ORDER — INSULIN LISPRO 100 [IU]/ML
3 INJECTION, SOLUTION INTRAVENOUS; SUBCUTANEOUS
Status: DISCONTINUED | OUTPATIENT
Start: 2023-01-16 | End: 2023-01-18

## 2023-01-16 RX ADMIN — GABAPENTIN 800 MG: 400 CAPSULE ORAL at 09:02

## 2023-01-16 RX ADMIN — HYDROCODONE BITARTRATE AND ACETAMINOPHEN 1 TABLET: 10; 325 TABLET ORAL at 09:08

## 2023-01-16 RX ADMIN — PERFLUTREN 2 ML: 6.52 INJECTION, SUSPENSION INTRAVENOUS at 15:23

## 2023-01-16 RX ADMIN — AMLODIPINE BESYLATE 10 MG: 10 TABLET ORAL at 09:02

## 2023-01-16 RX ADMIN — TIMOLOL MALEATE: 5 SOLUTION OPHTHALMIC at 09:08

## 2023-01-16 RX ADMIN — LEVETIRACETAM 500 MG: 500 TABLET, FILM COATED ORAL at 09:02

## 2023-01-16 RX ADMIN — INSULIN LISPRO 2 UNITS: 100 INJECTION, SOLUTION INTRAVENOUS; SUBCUTANEOUS at 17:38

## 2023-01-16 RX ADMIN — GABAPENTIN 800 MG: 400 CAPSULE ORAL at 21:56

## 2023-01-16 RX ADMIN — INSULIN LISPRO 6 UNITS: 100 INJECTION, SOLUTION INTRAVENOUS; SUBCUTANEOUS at 12:05

## 2023-01-16 RX ADMIN — HYDROCODONE BITARTRATE AND ACETAMINOPHEN 1 TABLET: 10; 325 TABLET ORAL at 18:23

## 2023-01-16 RX ADMIN — INSULIN LISPRO 3 UNITS: 100 INJECTION, SOLUTION INTRAVENOUS; SUBCUTANEOUS at 17:38

## 2023-01-16 RX ADMIN — GADOBENATE DIMEGLUMINE 19 ML: 529 INJECTION, SOLUTION INTRAVENOUS at 20:28

## 2023-01-16 RX ADMIN — PREDNISOLONE ACETATE 1 DROP: 10 SUSPENSION/ DROPS OPHTHALMIC at 15:32

## 2023-01-16 RX ADMIN — GABAPENTIN 800 MG: 400 CAPSULE ORAL at 11:41

## 2023-01-16 RX ADMIN — LEVETIRACETAM 500 MG: 500 TABLET, FILM COATED ORAL at 21:56

## 2023-01-16 RX ADMIN — ROSUVASTATIN CALCIUM 10 MG: 10 TABLET, FILM COATED ORAL at 21:56

## 2023-01-16 RX ADMIN — GABAPENTIN 800 MG: 400 CAPSULE ORAL at 18:17

## 2023-01-16 RX ADMIN — ASPIRIN 81 MG: 81 TABLET, COATED ORAL at 15:32

## 2023-01-16 NOTE — TELEPHONE ENCOUNTER
YANELY WITH [facility] CALLED FOR VERBAL ORDERS FOR NURSING AND PHYSICAL THERAPY    CALL BACK NUMBER  [phone]

## 2023-01-17 PROBLEM — I96 DRY GANGRENE: Status: ACTIVE | Noted: 2023-01-17

## 2023-01-17 LAB
ANION GAP SERPL CALCULATED.3IONS-SCNC: 8.6 MMOL/L (ref 5–15)
BUN SERPL-MCNC: 13 MG/DL (ref 8–23)
BUN/CREAT SERPL: 18.3 (ref 7–25)
CALCIUM SPEC-SCNC: 8.9 MG/DL (ref 8.6–10.5)
CHLORIDE SERPL-SCNC: 101 MMOL/L (ref 98–107)
CO2 SERPL-SCNC: 32.4 MMOL/L (ref 22–29)
CREAT SERPL-MCNC: 0.71 MG/DL (ref 0.76–1.27)
DEPRECATED RDW RBC AUTO: 39.5 FL (ref 37–54)
EGFRCR SERPLBLD CKD-EPI 2021: 97.5 ML/MIN/1.73
ERYTHROCYTE [DISTWIDTH] IN BLOOD BY AUTOMATED COUNT: 12.9 % (ref 12.3–15.4)
GLUCOSE BLDC GLUCOMTR-MCNC: 118 MG/DL (ref 70–130)
GLUCOSE BLDC GLUCOMTR-MCNC: 209 MG/DL (ref 70–130)
GLUCOSE BLDC GLUCOMTR-MCNC: 219 MG/DL (ref 70–130)
GLUCOSE BLDC GLUCOMTR-MCNC: 239 MG/DL (ref 70–130)
GLUCOSE SERPL-MCNC: 132 MG/DL (ref 65–99)
HCT VFR BLD AUTO: 32.2 % (ref 37.5–51)
HGB BLD-MCNC: 10.7 G/DL (ref 13–17.7)
MCH RBC QN AUTO: 27.7 PG (ref 26.6–33)
MCHC RBC AUTO-ENTMCNC: 33.2 G/DL (ref 31.5–35.7)
MCV RBC AUTO: 83.4 FL (ref 79–97)
PLATELET # BLD AUTO: 371 10*3/MM3 (ref 140–450)
PMV BLD AUTO: 9.7 FL (ref 6–12)
POTASSIUM SERPL-SCNC: 4.5 MMOL/L (ref 3.5–5.2)
RBC # BLD AUTO: 3.86 10*6/MM3 (ref 4.14–5.8)
SODIUM SERPL-SCNC: 142 MMOL/L (ref 136–145)
WBC NRBC COR # BLD: 8.99 10*3/MM3 (ref 3.4–10.8)

## 2023-01-17 PROCEDURE — 63710000001 INSULIN LISPRO (HUMAN) PER 5 UNITS: Performed by: STUDENT IN AN ORGANIZED HEALTH CARE EDUCATION/TRAINING PROGRAM

## 2023-01-17 PROCEDURE — 63710000001 INSULIN ISOPHANE HUMAN PER 5 UNITS: Performed by: INTERNAL MEDICINE

## 2023-01-17 PROCEDURE — 99232 SBSQ HOSP IP/OBS MODERATE 35: CPT | Performed by: INTERNAL MEDICINE

## 2023-01-17 PROCEDURE — 82962 GLUCOSE BLOOD TEST: CPT

## 2023-01-17 PROCEDURE — 80048 BASIC METABOLIC PNL TOTAL CA: CPT | Performed by: INTERNAL MEDICINE

## 2023-01-17 PROCEDURE — 85027 COMPLETE CBC AUTOMATED: CPT | Performed by: INTERNAL MEDICINE

## 2023-01-17 PROCEDURE — 63710000001 INSULIN LISPRO (HUMAN) PER 5 UNITS: Performed by: INTERNAL MEDICINE

## 2023-01-17 RX ORDER — HYDROCHLOROTHIAZIDE 12.5 MG/1
12.5 TABLET ORAL DAILY
Status: DISCONTINUED | OUTPATIENT
Start: 2023-01-17 | End: 2023-01-18

## 2023-01-17 RX ORDER — HYDROCODONE BITARTRATE AND ACETAMINOPHEN 10; 325 MG/1; MG/1
1 TABLET ORAL EVERY 4 HOURS PRN
Status: DISCONTINUED | OUTPATIENT
Start: 2023-01-17 | End: 2023-01-19

## 2023-01-17 RX ADMIN — INSULIN LISPRO 3 UNITS: 100 INJECTION, SOLUTION INTRAVENOUS; SUBCUTANEOUS at 17:09

## 2023-01-17 RX ADMIN — PREDNISOLONE ACETATE 1 DROP: 10 SUSPENSION/ DROPS OPHTHALMIC at 08:57

## 2023-01-17 RX ADMIN — INSULIN LISPRO 4 UNITS: 100 INJECTION, SOLUTION INTRAVENOUS; SUBCUTANEOUS at 12:37

## 2023-01-17 RX ADMIN — INSULIN LISPRO 3 UNITS: 100 INJECTION, SOLUTION INTRAVENOUS; SUBCUTANEOUS at 08:58

## 2023-01-17 RX ADMIN — HYDROCODONE BITARTRATE AND ACETAMINOPHEN 1 TABLET: 10; 325 TABLET ORAL at 17:09

## 2023-01-17 RX ADMIN — ASPIRIN 81 MG: 81 TABLET, COATED ORAL at 08:53

## 2023-01-17 RX ADMIN — GABAPENTIN 800 MG: 400 CAPSULE ORAL at 12:36

## 2023-01-17 RX ADMIN — GABAPENTIN 800 MG: 400 CAPSULE ORAL at 17:09

## 2023-01-17 RX ADMIN — INSULIN LISPRO 4 UNITS: 100 INJECTION, SOLUTION INTRAVENOUS; SUBCUTANEOUS at 17:09

## 2023-01-17 RX ADMIN — ROSUVASTATIN CALCIUM 10 MG: 10 TABLET, FILM COATED ORAL at 20:56

## 2023-01-17 RX ADMIN — LEVETIRACETAM 500 MG: 500 TABLET, FILM COATED ORAL at 08:53

## 2023-01-17 RX ADMIN — LEVETIRACETAM 500 MG: 500 TABLET, FILM COATED ORAL at 20:56

## 2023-01-17 RX ADMIN — HYDROCODONE BITARTRATE AND ACETAMINOPHEN 1 TABLET: 10; 325 TABLET ORAL at 02:44

## 2023-01-17 RX ADMIN — AMLODIPINE BESYLATE 10 MG: 10 TABLET ORAL at 08:53

## 2023-01-17 RX ADMIN — INSULIN LISPRO 3 UNITS: 100 INJECTION, SOLUTION INTRAVENOUS; SUBCUTANEOUS at 12:37

## 2023-01-17 RX ADMIN — HYDROCHLOROTHIAZIDE 12.5 MG: 12.5 TABLET ORAL at 17:09

## 2023-01-17 RX ADMIN — HYDROCODONE BITARTRATE AND ACETAMINOPHEN 1 TABLET: 10; 325 TABLET ORAL at 20:56

## 2023-01-17 RX ADMIN — HYDROCODONE BITARTRATE AND ACETAMINOPHEN 1 TABLET: 10; 325 TABLET ORAL at 12:36

## 2023-01-17 RX ADMIN — GABAPENTIN 800 MG: 400 CAPSULE ORAL at 20:56

## 2023-01-17 RX ADMIN — HYDROCODONE BITARTRATE AND ACETAMINOPHEN 1 TABLET: 10; 325 TABLET ORAL at 08:53

## 2023-01-17 RX ADMIN — GABAPENTIN 800 MG: 400 CAPSULE ORAL at 08:53

## 2023-01-17 RX ADMIN — INSULIN HUMAN 12 UNITS: 100 INJECTION, SUSPENSION SUBCUTANEOUS at 20:56

## 2023-01-17 RX ADMIN — TIMOLOL MALEATE: 5 SOLUTION OPHTHALMIC at 08:57

## 2023-01-18 LAB
GLUCOSE BLDC GLUCOMTR-MCNC: 124 MG/DL (ref 70–130)
GLUCOSE BLDC GLUCOMTR-MCNC: 185 MG/DL (ref 70–130)
GLUCOSE BLDC GLUCOMTR-MCNC: 187 MG/DL (ref 70–130)
GLUCOSE BLDC GLUCOMTR-MCNC: 197 MG/DL (ref 70–130)

## 2023-01-18 PROCEDURE — 63710000001 INSULIN ISOPHANE HUMAN PER 5 UNITS: Performed by: INTERNAL MEDICINE

## 2023-01-18 PROCEDURE — 63710000001 INSULIN LISPRO (HUMAN) PER 5 UNITS: Performed by: STUDENT IN AN ORGANIZED HEALTH CARE EDUCATION/TRAINING PROGRAM

## 2023-01-18 PROCEDURE — 25010000002 CEFAZOLIN IN DEXTROSE 2-4 GM/100ML-% SOLUTION: Performed by: INTERNAL MEDICINE

## 2023-01-18 PROCEDURE — 63710000001 INSULIN LISPRO (HUMAN) PER 5 UNITS: Performed by: INTERNAL MEDICINE

## 2023-01-18 PROCEDURE — 99232 SBSQ HOSP IP/OBS MODERATE 35: CPT | Performed by: INTERNAL MEDICINE

## 2023-01-18 PROCEDURE — 82962 GLUCOSE BLOOD TEST: CPT

## 2023-01-18 RX ORDER — INSULIN LISPRO 100 [IU]/ML
4 INJECTION, SOLUTION INTRAVENOUS; SUBCUTANEOUS
Status: DISCONTINUED | OUTPATIENT
Start: 2023-01-18 | End: 2023-01-20

## 2023-01-18 RX ORDER — CEFAZOLIN SODIUM 2 G/100ML
2 INJECTION, SOLUTION INTRAVENOUS EVERY 8 HOURS
Status: DISCONTINUED | OUTPATIENT
Start: 2023-01-18 | End: 2023-01-21 | Stop reason: HOSPADM

## 2023-01-18 RX ORDER — HYDROCHLOROTHIAZIDE 25 MG/1
25 TABLET ORAL DAILY
Status: DISCONTINUED | OUTPATIENT
Start: 2023-01-19 | End: 2023-01-21 | Stop reason: HOSPADM

## 2023-01-18 RX ADMIN — INSULIN LISPRO 4 UNITS: 100 INJECTION, SOLUTION INTRAVENOUS; SUBCUTANEOUS at 12:39

## 2023-01-18 RX ADMIN — INSULIN HUMAN 12 UNITS: 100 INJECTION, SUSPENSION SUBCUTANEOUS at 09:24

## 2023-01-18 RX ADMIN — INSULIN LISPRO 4 UNITS: 100 INJECTION, SOLUTION INTRAVENOUS; SUBCUTANEOUS at 17:30

## 2023-01-18 RX ADMIN — PREDNISOLONE ACETATE 1 DROP: 10 SUSPENSION/ DROPS OPHTHALMIC at 09:26

## 2023-01-18 RX ADMIN — HYDROCHLOROTHIAZIDE 12.5 MG: 12.5 TABLET ORAL at 09:25

## 2023-01-18 RX ADMIN — INSULIN LISPRO 2 UNITS: 100 INJECTION, SOLUTION INTRAVENOUS; SUBCUTANEOUS at 12:39

## 2023-01-18 RX ADMIN — LEVETIRACETAM 500 MG: 500 TABLET, FILM COATED ORAL at 20:03

## 2023-01-18 RX ADMIN — CEFAZOLIN SODIUM 2 G: 2 INJECTION, SOLUTION INTRAVENOUS at 12:38

## 2023-01-18 RX ADMIN — ROSUVASTATIN CALCIUM 10 MG: 10 TABLET, FILM COATED ORAL at 20:03

## 2023-01-18 RX ADMIN — CEFAZOLIN SODIUM 2 G: 2 INJECTION, SOLUTION INTRAVENOUS at 21:46

## 2023-01-18 RX ADMIN — AMLODIPINE BESYLATE 10 MG: 10 TABLET ORAL at 09:25

## 2023-01-18 RX ADMIN — INSULIN HUMAN 12 UNITS: 100 INJECTION, SUSPENSION SUBCUTANEOUS at 20:13

## 2023-01-18 RX ADMIN — ASPIRIN 81 MG: 81 TABLET, COATED ORAL at 09:24

## 2023-01-18 RX ADMIN — HYDROCODONE BITARTRATE AND ACETAMINOPHEN 1 TABLET: 10; 325 TABLET ORAL at 09:48

## 2023-01-18 RX ADMIN — HYDROCODONE BITARTRATE AND ACETAMINOPHEN 1 TABLET: 10; 325 TABLET ORAL at 00:57

## 2023-01-18 RX ADMIN — INSULIN LISPRO 3 UNITS: 100 INJECTION, SOLUTION INTRAVENOUS; SUBCUTANEOUS at 09:24

## 2023-01-18 RX ADMIN — GABAPENTIN 800 MG: 400 CAPSULE ORAL at 09:24

## 2023-01-18 RX ADMIN — TIMOLOL MALEATE: 5 SOLUTION OPHTHALMIC at 09:26

## 2023-01-18 RX ADMIN — GABAPENTIN 800 MG: 400 CAPSULE ORAL at 20:03

## 2023-01-18 RX ADMIN — GABAPENTIN 800 MG: 400 CAPSULE ORAL at 12:39

## 2023-01-18 RX ADMIN — HYDROCODONE BITARTRATE AND ACETAMINOPHEN 1 TABLET: 10; 325 TABLET ORAL at 20:03

## 2023-01-18 RX ADMIN — HYDROCODONE BITARTRATE AND ACETAMINOPHEN 1 TABLET: 10; 325 TABLET ORAL at 15:21

## 2023-01-18 RX ADMIN — LEVETIRACETAM 500 MG: 500 TABLET, FILM COATED ORAL at 09:25

## 2023-01-18 RX ADMIN — GABAPENTIN 800 MG: 400 CAPSULE ORAL at 17:23

## 2023-01-18 RX ADMIN — INSULIN LISPRO 2 UNITS: 100 INJECTION, SOLUTION INTRAVENOUS; SUBCUTANEOUS at 17:23

## 2023-01-18 RX ADMIN — HYDROCODONE BITARTRATE AND ACETAMINOPHEN 1 TABLET: 10; 325 TABLET ORAL at 05:40

## 2023-01-19 ENCOUNTER — APPOINTMENT (OUTPATIENT)
Dept: GENERAL RADIOLOGY | Facility: HOSPITAL | Age: 73
DRG: 617 | End: 2023-01-19
Payer: MEDICARE

## 2023-01-19 ENCOUNTER — ANESTHESIA EVENT (OUTPATIENT)
Dept: PERIOP | Facility: HOSPITAL | Age: 73
DRG: 617 | End: 2023-01-19
Payer: MEDICARE

## 2023-01-19 ENCOUNTER — ANESTHESIA (OUTPATIENT)
Dept: PERIOP | Facility: HOSPITAL | Age: 73
DRG: 617 | End: 2023-01-19
Payer: MEDICARE

## 2023-01-19 LAB
BACTERIA SPEC AEROBE CULT: ABNORMAL
GLUCOSE BLDC GLUCOMTR-MCNC: 129 MG/DL (ref 70–130)
GLUCOSE BLDC GLUCOMTR-MCNC: 151 MG/DL (ref 70–130)
GLUCOSE BLDC GLUCOMTR-MCNC: 169 MG/DL (ref 70–130)
GLUCOSE BLDC GLUCOMTR-MCNC: 173 MG/DL (ref 70–130)
GLUCOSE BLDC GLUCOMTR-MCNC: 184 MG/DL (ref 70–130)
GRAM STN SPEC: ABNORMAL
GRAM STN SPEC: ABNORMAL

## 2023-01-19 PROCEDURE — C1894 INTRO/SHEATH, NON-LASER: HCPCS | Performed by: STUDENT IN AN ORGANIZED HEALTH CARE EDUCATION/TRAINING PROGRAM

## 2023-01-19 PROCEDURE — 047S3ZZ DILATION OF LEFT POSTERIOR TIBIAL ARTERY, PERCUTANEOUS APPROACH: ICD-10-PCS | Performed by: STUDENT IN AN ORGANIZED HEALTH CARE EDUCATION/TRAINING PROGRAM

## 2023-01-19 PROCEDURE — 87176 TISSUE HOMOGENIZATION CULTR: CPT | Performed by: STUDENT IN AN ORGANIZED HEALTH CARE EDUCATION/TRAINING PROGRAM

## 2023-01-19 PROCEDURE — 63710000001 INSULIN ISOPHANE HUMAN PER 5 UNITS: Performed by: INTERNAL MEDICINE

## 2023-01-19 PROCEDURE — 0 LIDOCAINE 1 % SOLUTION 20 ML VIAL: Performed by: STUDENT IN AN ORGANIZED HEALTH CARE EDUCATION/TRAINING PROGRAM

## 2023-01-19 PROCEDURE — 88311 DECALCIFY TISSUE: CPT | Performed by: STUDENT IN AN ORGANIZED HEALTH CARE EDUCATION/TRAINING PROGRAM

## 2023-01-19 PROCEDURE — 25010000002 ROPIVACAINE PER 1 MG: Performed by: ANESTHESIOLOGY

## 2023-01-19 PROCEDURE — 88305 TISSUE EXAM BY PATHOLOGIST: CPT | Performed by: STUDENT IN AN ORGANIZED HEALTH CARE EDUCATION/TRAINING PROGRAM

## 2023-01-19 PROCEDURE — 87075 CULTR BACTERIA EXCEPT BLOOD: CPT | Performed by: STUDENT IN AN ORGANIZED HEALTH CARE EDUCATION/TRAINING PROGRAM

## 2023-01-19 PROCEDURE — 25010000002 CEFAZOLIN IN DEXTROSE 2-4 GM/100ML-% SOLUTION: Performed by: INTERNAL MEDICINE

## 2023-01-19 PROCEDURE — 25010000002 HYDRALAZINE PER 20 MG: Performed by: NURSE ANESTHETIST, CERTIFIED REGISTERED

## 2023-01-19 PROCEDURE — 0Y6U0Z0 DETACHMENT AT LEFT 3RD TOE, COMPLETE, OPEN APPROACH: ICD-10-PCS | Performed by: STUDENT IN AN ORGANIZED HEALTH CARE EDUCATION/TRAINING PROGRAM

## 2023-01-19 PROCEDURE — 25010000002 HEPARIN (PORCINE) PER 1000 UNITS: Performed by: NURSE ANESTHETIST, CERTIFIED REGISTERED

## 2023-01-19 PROCEDURE — C1760 CLOSURE DEV, VASC: HCPCS | Performed by: STUDENT IN AN ORGANIZED HEALTH CARE EDUCATION/TRAINING PROGRAM

## 2023-01-19 PROCEDURE — 25010000002 HEPARIN (PORCINE) PER 1000 UNITS: Performed by: STUDENT IN AN ORGANIZED HEALTH CARE EDUCATION/TRAINING PROGRAM

## 2023-01-19 PROCEDURE — 87070 CULTURE OTHR SPECIMN AEROBIC: CPT | Performed by: STUDENT IN AN ORGANIZED HEALTH CARE EDUCATION/TRAINING PROGRAM

## 2023-01-19 PROCEDURE — 63710000001 INSULIN LISPRO (HUMAN) PER 5 UNITS: Performed by: STUDENT IN AN ORGANIZED HEALTH CARE EDUCATION/TRAINING PROGRAM

## 2023-01-19 PROCEDURE — 25010000002 ONDANSETRON PER 1 MG: Performed by: ANESTHESIOLOGY

## 2023-01-19 PROCEDURE — 99232 SBSQ HOSP IP/OBS MODERATE 35: CPT | Performed by: INTERNAL MEDICINE

## 2023-01-19 PROCEDURE — 82962 GLUCOSE BLOOD TEST: CPT

## 2023-01-19 PROCEDURE — C1887 CATHETER, GUIDING: HCPCS | Performed by: STUDENT IN AN ORGANIZED HEALTH CARE EDUCATION/TRAINING PROGRAM

## 2023-01-19 PROCEDURE — 25010000002 PROTAMINE SULFATE PER 10 MG: Performed by: NURSE ANESTHETIST, CERTIFIED REGISTERED

## 2023-01-19 PROCEDURE — 63710000001 ONDANSETRON PER 8 MG: Performed by: STUDENT IN AN ORGANIZED HEALTH CARE EDUCATION/TRAINING PROGRAM

## 2023-01-19 PROCEDURE — 87205 SMEAR GRAM STAIN: CPT | Performed by: STUDENT IN AN ORGANIZED HEALTH CARE EDUCATION/TRAINING PROGRAM

## 2023-01-19 PROCEDURE — 25010000002 PROPOFOL 10 MG/ML EMULSION: Performed by: NURSE ANESTHETIST, CERTIFIED REGISTERED

## 2023-01-19 PROCEDURE — C1769 GUIDE WIRE: HCPCS | Performed by: STUDENT IN AN ORGANIZED HEALTH CARE EDUCATION/TRAINING PROGRAM

## 2023-01-19 PROCEDURE — 0 IOPAMIDOL PER 1 ML: Performed by: INTERNAL MEDICINE

## 2023-01-19 PROCEDURE — 25010000002 FENTANYL CITRATE (PF) 50 MCG/ML SOLUTION: Performed by: NURSE ANESTHETIST, CERTIFIED REGISTERED

## 2023-01-19 PROCEDURE — 25010000002 ONDANSETRON PER 1 MG: Performed by: NURSE ANESTHETIST, CERTIFIED REGISTERED

## 2023-01-19 PROCEDURE — 047Q3ZZ DILATION OF LEFT ANTERIOR TIBIAL ARTERY, PERCUTANEOUS APPROACH: ICD-10-PCS | Performed by: STUDENT IN AN ORGANIZED HEALTH CARE EDUCATION/TRAINING PROGRAM

## 2023-01-19 PROCEDURE — 0LBW0ZZ EXCISION OF LEFT FOOT TENDON, OPEN APPROACH: ICD-10-PCS | Performed by: STUDENT IN AN ORGANIZED HEALTH CARE EDUCATION/TRAINING PROGRAM

## 2023-01-19 PROCEDURE — 75710 ARTERY X-RAYS ARM/LEG: CPT

## 2023-01-19 PROCEDURE — 25010000002 CEFAZOLIN IN DEXTROSE 2-4 GM/100ML-% SOLUTION: Performed by: STUDENT IN AN ORGANIZED HEALTH CARE EDUCATION/TRAINING PROGRAM

## 2023-01-19 PROCEDURE — C1725 CATH, TRANSLUMIN NON-LASER: HCPCS | Performed by: STUDENT IN AN ORGANIZED HEALTH CARE EDUCATION/TRAINING PROGRAM

## 2023-01-19 RX ORDER — ONDANSETRON 2 MG/ML
4 INJECTION INTRAMUSCULAR; INTRAVENOUS ONCE AS NEEDED
Status: COMPLETED | OUTPATIENT
Start: 2023-01-19 | End: 2023-01-19

## 2023-01-19 RX ORDER — HEPARIN SODIUM 1000 [USP'U]/ML
INJECTION, SOLUTION INTRAVENOUS; SUBCUTANEOUS AS NEEDED
Status: DISCONTINUED | OUTPATIENT
Start: 2023-01-19 | End: 2023-01-19 | Stop reason: SURG

## 2023-01-19 RX ORDER — OXYCODONE HYDROCHLORIDE 10 MG/1
10 TABLET ORAL EVERY 4 HOURS PRN
Status: DISCONTINUED | OUTPATIENT
Start: 2023-01-19 | End: 2023-01-21 | Stop reason: HOSPADM

## 2023-01-19 RX ORDER — SODIUM CHLORIDE, SODIUM LACTATE, POTASSIUM CHLORIDE, CALCIUM CHLORIDE 600; 310; 30; 20 MG/100ML; MG/100ML; MG/100ML; MG/100ML
9 INJECTION, SOLUTION INTRAVENOUS CONTINUOUS
Status: DISCONTINUED | OUTPATIENT
Start: 2023-01-19 | End: 2023-01-19

## 2023-01-19 RX ORDER — EPHEDRINE SULFATE 50 MG/ML
5 INJECTION, SOLUTION INTRAVENOUS ONCE AS NEEDED
Status: DISCONTINUED | OUTPATIENT
Start: 2023-01-19 | End: 2023-01-19 | Stop reason: HOSPADM

## 2023-01-19 RX ORDER — LIDOCAINE HYDROCHLORIDE 20 MG/ML
INJECTION, SOLUTION INFILTRATION; PERINEURAL AS NEEDED
Status: DISCONTINUED | OUTPATIENT
Start: 2023-01-19 | End: 2023-01-19 | Stop reason: SURG

## 2023-01-19 RX ORDER — HYDROMORPHONE HYDROCHLORIDE 1 MG/ML
0.5 INJECTION, SOLUTION INTRAMUSCULAR; INTRAVENOUS; SUBCUTANEOUS
Status: DISCONTINUED | OUTPATIENT
Start: 2023-01-19 | End: 2023-01-20

## 2023-01-19 RX ORDER — OXYCODONE AND ACETAMINOPHEN 7.5; 325 MG/1; MG/1
1 TABLET ORAL EVERY 4 HOURS PRN
Status: DISCONTINUED | OUTPATIENT
Start: 2023-01-19 | End: 2023-01-19 | Stop reason: HOSPADM

## 2023-01-19 RX ORDER — SODIUM CHLORIDE 0.9 % (FLUSH) 0.9 %
10 SYRINGE (ML) INJECTION EVERY 12 HOURS SCHEDULED
Status: DISCONTINUED | OUTPATIENT
Start: 2023-01-19 | End: 2023-01-19 | Stop reason: HOSPADM

## 2023-01-19 RX ORDER — CLOPIDOGREL BISULFATE 75 MG/1
75 TABLET ORAL DAILY
Status: DISCONTINUED | OUTPATIENT
Start: 2023-01-20 | End: 2023-01-21 | Stop reason: HOSPADM

## 2023-01-19 RX ORDER — HYDRALAZINE HYDROCHLORIDE 20 MG/ML
5 INJECTION INTRAMUSCULAR; INTRAVENOUS
Status: DISCONTINUED | OUTPATIENT
Start: 2023-01-19 | End: 2023-01-19 | Stop reason: HOSPADM

## 2023-01-19 RX ORDER — FENTANYL CITRATE 50 UG/ML
50 INJECTION, SOLUTION INTRAMUSCULAR; INTRAVENOUS
Status: DISCONTINUED | OUTPATIENT
Start: 2023-01-19 | End: 2023-01-19 | Stop reason: HOSPADM

## 2023-01-19 RX ORDER — FENTANYL CITRATE 50 UG/ML
INJECTION, SOLUTION INTRAMUSCULAR; INTRAVENOUS AS NEEDED
Status: DISCONTINUED | OUTPATIENT
Start: 2023-01-19 | End: 2023-01-19 | Stop reason: SURG

## 2023-01-19 RX ORDER — ROPIVACAINE HYDROCHLORIDE 5 MG/ML
INJECTION, SOLUTION EPIDURAL; INFILTRATION; PERINEURAL
Status: COMPLETED | OUTPATIENT
Start: 2023-01-19 | End: 2023-01-19

## 2023-01-19 RX ORDER — DIPHENHYDRAMINE HYDROCHLORIDE 50 MG/ML
12.5 INJECTION INTRAMUSCULAR; INTRAVENOUS
Status: DISCONTINUED | OUTPATIENT
Start: 2023-01-19 | End: 2023-01-19 | Stop reason: HOSPADM

## 2023-01-19 RX ORDER — SODIUM CHLORIDE 9 MG/ML
40 INJECTION, SOLUTION INTRAVENOUS AS NEEDED
Status: DISCONTINUED | OUTPATIENT
Start: 2023-01-19 | End: 2023-01-19 | Stop reason: HOSPADM

## 2023-01-19 RX ORDER — LABETALOL HYDROCHLORIDE 5 MG/ML
5 INJECTION, SOLUTION INTRAVENOUS
Status: DISCONTINUED | OUTPATIENT
Start: 2023-01-19 | End: 2023-01-19 | Stop reason: HOSPADM

## 2023-01-19 RX ORDER — SODIUM CHLORIDE 0.9 % (FLUSH) 0.9 %
10 SYRINGE (ML) INJECTION AS NEEDED
Status: DISCONTINUED | OUTPATIENT
Start: 2023-01-19 | End: 2023-01-19 | Stop reason: HOSPADM

## 2023-01-19 RX ORDER — CLOPIDOGREL BISULFATE 75 MG/1
300 TABLET ORAL ONCE
Status: COMPLETED | OUTPATIENT
Start: 2023-01-19 | End: 2023-01-19

## 2023-01-19 RX ORDER — DIPHENHYDRAMINE HCL 25 MG
25 CAPSULE ORAL
Status: DISCONTINUED | OUTPATIENT
Start: 2023-01-19 | End: 2023-01-19 | Stop reason: HOSPADM

## 2023-01-19 RX ORDER — HYDROCODONE BITARTRATE AND ACETAMINOPHEN 7.5; 325 MG/1; MG/1
1 TABLET ORAL ONCE AS NEEDED
Status: DISCONTINUED | OUTPATIENT
Start: 2023-01-19 | End: 2023-01-19 | Stop reason: HOSPADM

## 2023-01-19 RX ORDER — NALOXONE HCL 0.4 MG/ML
0.2 VIAL (ML) INJECTION AS NEEDED
Status: DISCONTINUED | OUTPATIENT
Start: 2023-01-19 | End: 2023-01-19 | Stop reason: HOSPADM

## 2023-01-19 RX ORDER — HYDROMORPHONE HYDROCHLORIDE 1 MG/ML
0.5 INJECTION, SOLUTION INTRAMUSCULAR; INTRAVENOUS; SUBCUTANEOUS
Status: DISCONTINUED | OUTPATIENT
Start: 2023-01-19 | End: 2023-01-19 | Stop reason: HOSPADM

## 2023-01-19 RX ORDER — ACETAMINOPHEN 500 MG
1000 TABLET ORAL EVERY 8 HOURS SCHEDULED
Status: DISCONTINUED | OUTPATIENT
Start: 2023-01-19 | End: 2023-01-21 | Stop reason: HOSPADM

## 2023-01-19 RX ORDER — OXYCODONE HYDROCHLORIDE 5 MG/1
5 TABLET ORAL EVERY 4 HOURS PRN
Status: DISCONTINUED | OUTPATIENT
Start: 2023-01-19 | End: 2023-01-21 | Stop reason: HOSPADM

## 2023-01-19 RX ORDER — MIDAZOLAM HYDROCHLORIDE 1 MG/ML
0.5 INJECTION INTRAMUSCULAR; INTRAVENOUS
Status: DISCONTINUED | OUTPATIENT
Start: 2023-01-19 | End: 2023-01-19 | Stop reason: HOSPADM

## 2023-01-19 RX ORDER — FAMOTIDINE 10 MG/ML
20 INJECTION, SOLUTION INTRAVENOUS ONCE
Status: COMPLETED | OUTPATIENT
Start: 2023-01-19 | End: 2023-01-19

## 2023-01-19 RX ORDER — FLUMAZENIL 0.1 MG/ML
0.2 INJECTION INTRAVENOUS AS NEEDED
Status: DISCONTINUED | OUTPATIENT
Start: 2023-01-19 | End: 2023-01-19 | Stop reason: HOSPADM

## 2023-01-19 RX ORDER — PROTAMINE SULFATE 10 MG/ML
INJECTION, SOLUTION INTRAVENOUS AS NEEDED
Status: DISCONTINUED | OUTPATIENT
Start: 2023-01-19 | End: 2023-01-19 | Stop reason: SURG

## 2023-01-19 RX ADMIN — ONDANSETRON 4 MG: 2 INJECTION INTRAMUSCULAR; INTRAVENOUS at 12:36

## 2023-01-19 RX ADMIN — GABAPENTIN 800 MG: 400 CAPSULE ORAL at 21:11

## 2023-01-19 RX ADMIN — INSULIN LISPRO 4 UNITS: 100 INJECTION, SOLUTION INTRAVENOUS; SUBCUTANEOUS at 17:09

## 2023-01-19 RX ADMIN — GABAPENTIN 800 MG: 400 CAPSULE ORAL at 17:09

## 2023-01-19 RX ADMIN — ACETAMINOPHEN 1000 MG: 500 TABLET, FILM COATED ORAL at 21:12

## 2023-01-19 RX ADMIN — ROSUVASTATIN CALCIUM 10 MG: 10 TABLET, FILM COATED ORAL at 21:12

## 2023-01-19 RX ADMIN — IOPAMIDOL 90 ML: 510 INJECTION, SOLUTION INTRAVASCULAR at 09:52

## 2023-01-19 RX ADMIN — PROTAMINE SULFATE 40 MG: 10 INJECTION, SOLUTION INTRAVENOUS at 09:30

## 2023-01-19 RX ADMIN — HEPARIN SODIUM 10000 UNITS: 1000 INJECTION INTRAVENOUS; SUBCUTANEOUS at 08:35

## 2023-01-19 RX ADMIN — ONDANSETRON 4 MG: 2 INJECTION INTRAMUSCULAR; INTRAVENOUS at 06:57

## 2023-01-19 RX ADMIN — LIDOCAINE HYDROCHLORIDE 60 MG: 20 INJECTION, SOLUTION INFILTRATION; PERINEURAL at 07:43

## 2023-01-19 RX ADMIN — ONDANSETRON HYDROCHLORIDE 4 MG: 4 TABLET, FILM COATED ORAL at 17:09

## 2023-01-19 RX ADMIN — ACETAMINOPHEN 1000 MG: 500 TABLET, FILM COATED ORAL at 14:20

## 2023-01-19 RX ADMIN — OXYCODONE HYDROCHLORIDE 10 MG: 10 TABLET ORAL at 11:33

## 2023-01-19 RX ADMIN — ROPIVACAINE HYDROCHLORIDE 10 ML: 5 INJECTION, SOLUTION EPIDURAL; INFILTRATION; PERINEURAL at 07:54

## 2023-01-19 RX ADMIN — INSULIN HUMAN 13 UNITS: 100 INJECTION, SUSPENSION SUBCUTANEOUS at 21:13

## 2023-01-19 RX ADMIN — GABAPENTIN 800 MG: 400 CAPSULE ORAL at 14:20

## 2023-01-19 RX ADMIN — FENTANYL CITRATE 50 MCG: 50 INJECTION, SOLUTION INTRAMUSCULAR; INTRAVENOUS at 10:27

## 2023-01-19 RX ADMIN — CEFAZOLIN SODIUM 2 G: 2 INJECTION, SOLUTION INTRAVENOUS at 14:20

## 2023-01-19 RX ADMIN — FAMOTIDINE 20 MG: 10 INJECTION INTRAVENOUS at 06:57

## 2023-01-19 RX ADMIN — FENTANYL CITRATE 50 MCG: 50 INJECTION, SOLUTION INTRAMUSCULAR; INTRAVENOUS at 07:57

## 2023-01-19 RX ADMIN — SODIUM CHLORIDE, POTASSIUM CHLORIDE, SODIUM LACTATE AND CALCIUM CHLORIDE: 600; 310; 30; 20 INJECTION, SOLUTION INTRAVENOUS at 07:43

## 2023-01-19 RX ADMIN — LEVETIRACETAM 500 MG: 500 TABLET, FILM COATED ORAL at 19:19

## 2023-01-19 RX ADMIN — CEFAZOLIN SODIUM 2 G: 2 INJECTION, SOLUTION INTRAVENOUS at 04:35

## 2023-01-19 RX ADMIN — PROPOFOL 300 MCG/KG/MIN: 10 INJECTION, EMULSION INTRAVENOUS at 07:43

## 2023-01-19 RX ADMIN — INSULIN LISPRO 2 UNITS: 100 INJECTION, SOLUTION INTRAVENOUS; SUBCUTANEOUS at 17:09

## 2023-01-19 RX ADMIN — HYDRALAZINE HYDROCHLORIDE 5 MG: 20 INJECTION INTRAMUSCULAR; INTRAVENOUS at 11:53

## 2023-01-19 RX ADMIN — CEFAZOLIN SODIUM 2 G: 2 INJECTION, SOLUTION INTRAVENOUS at 21:33

## 2023-01-19 RX ADMIN — CLOPIDOGREL 300 MG: 75 TABLET, FILM COATED ORAL at 11:33

## 2023-01-19 RX ADMIN — OXYCODONE HYDROCHLORIDE 10 MG: 10 TABLET ORAL at 17:09

## 2023-01-19 NOTE — ANESTHESIA POSTPROCEDURE EVALUATION
Patient: Kelby Peters    Procedure Summary     Date: 01/19/23 [facility] / [facility]    Anesthesia Start: 0743 Anesthesia Stop: 1018    Procedures:       LEFT LEG ANGIOGRAM (Left: Thigh)      3RD LEFT TOE AMPUTATION AND LEFT FOOT DEBRIDEMENT (Left) Diagnosis:     Surgeons: Ivonne Martin MD Provider: Oliver Dove MD    Anesthesia Type: MAC ASA Status: 3          Anesthesia Type: MAC    Vitals  Vitals Value Taken Time   /84 01/19/23 1101   Temp 36.7 °C (98 °F) 01/19/23 1015   Pulse 66 01/19/23 1110   Resp 16 01/19/23 1101   SpO2 98 % 01/19/23 1110   Vitals shown include unvalidated device data.        Post Anesthesia Care and Evaluation    Patient location during evaluation: PACU  Patient participation: complete - patient participated  Level of consciousness: awake  Pain score: 0  Pain management: adequate  Anesthetic complications: No anesthetic complications  PONV Status: none  Cardiovascular status: acceptable  Respiratory status: acceptable  Hydration status: acceptable

## 2023-01-19 NOTE — ANESTHESIA PROCEDURE NOTES
Peripheral Block      Patient reassessed immediately prior to procedure    Stop time: 1/19/2023 7:54 AM  Performed by  Anesthesiologist: Oliver Dove MD  Preanesthetic Checklist  Completed: patient identified, IV checked, site marked, risks and benefits discussed, surgical consent, monitors and equipment checked, pre-op evaluation and timeout performed  Prep:  Pt Position: supine  Sterile barriers:cap, gloves and sterile barriers  Prep: ChloraPrep  Patient monitoring: blood pressure monitoring, continuous pulse oximetry and EKG  Procedure    Sedation: yes  Performed under: local infiltration  Guidance:landmark technique    Laterality:left  Block Type:ankle  Injection Technique:single-shot    Medications Used: ropivacaine (NAROPIN) 0.5 % injection - Injection   10 mL - 1/19/2023 7:54:00 AM      Post Assessment  Patient Tolerance:comfortable throughout block  Complications:no

## 2023-01-20 ENCOUNTER — APPOINTMENT (OUTPATIENT)
Dept: GENERAL RADIOLOGY | Facility: HOSPITAL | Age: 73
DRG: 617 | End: 2023-01-20
Payer: MEDICARE

## 2023-01-20 PROBLEM — A49.01 STAPHYLOCOCCUS AUREUS INFECTION: Status: ACTIVE | Noted: 2023-01-20

## 2023-01-20 LAB
ANION GAP SERPL CALCULATED.3IONS-SCNC: 7.5 MMOL/L (ref 5–15)
BACTERIA SPEC AEROBE CULT: NORMAL
BACTERIA SPEC AEROBE CULT: NORMAL
BASOPHILS # BLD AUTO: 0.03 10*3/MM3 (ref 0–0.2)
BASOPHILS NFR BLD AUTO: 0.3 % (ref 0–1.5)
BUN SERPL-MCNC: 15 MG/DL (ref 8–23)
BUN/CREAT SERPL: 17.9 (ref 7–25)
CALCIUM SPEC-SCNC: 9.1 MG/DL (ref 8.6–10.5)
CHLORIDE SERPL-SCNC: 94 MMOL/L (ref 98–107)
CO2 SERPL-SCNC: 33.5 MMOL/L (ref 22–29)
CREAT SERPL-MCNC: 0.84 MG/DL (ref 0.76–1.27)
DEPRECATED RDW RBC AUTO: 40.4 FL (ref 37–54)
EGFRCR SERPLBLD CKD-EPI 2021: 92.7 ML/MIN/1.73
EOSINOPHIL # BLD AUTO: 0.07 10*3/MM3 (ref 0–0.4)
EOSINOPHIL NFR BLD AUTO: 0.8 % (ref 0.3–6.2)
ERYTHROCYTE [DISTWIDTH] IN BLOOD BY AUTOMATED COUNT: 13 % (ref 12.3–15.4)
GLUCOSE BLDC GLUCOMTR-MCNC: 123 MG/DL (ref 70–130)
GLUCOSE BLDC GLUCOMTR-MCNC: 137 MG/DL (ref 70–130)
GLUCOSE BLDC GLUCOMTR-MCNC: 142 MG/DL (ref 70–130)
GLUCOSE BLDC GLUCOMTR-MCNC: 159 MG/DL (ref 70–130)
GLUCOSE BLDC GLUCOMTR-MCNC: 243 MG/DL (ref 70–130)
GLUCOSE SERPL-MCNC: 167 MG/DL (ref 65–99)
HCT VFR BLD AUTO: 35.4 % (ref 37.5–51)
HGB BLD-MCNC: 11.1 G/DL (ref 13–17.7)
IMM GRANULOCYTES # BLD AUTO: 0.05 10*3/MM3 (ref 0–0.05)
IMM GRANULOCYTES NFR BLD AUTO: 0.6 % (ref 0–0.5)
LYMPHOCYTES # BLD AUTO: 0.97 10*3/MM3 (ref 0.7–3.1)
LYMPHOCYTES NFR BLD AUTO: 11 % (ref 19.6–45.3)
MCH RBC QN AUTO: 27.1 PG (ref 26.6–33)
MCHC RBC AUTO-ENTMCNC: 31.4 G/DL (ref 31.5–35.7)
MCV RBC AUTO: 86.3 FL (ref 79–97)
MONOCYTES # BLD AUTO: 0.51 10*3/MM3 (ref 0.1–0.9)
MONOCYTES NFR BLD AUTO: 5.8 % (ref 5–12)
NEUTROPHILS NFR BLD AUTO: 7.17 10*3/MM3 (ref 1.7–7)
NEUTROPHILS NFR BLD AUTO: 81.5 % (ref 42.7–76)
NRBC BLD AUTO-RTO: 0 /100 WBC (ref 0–0.2)
NT-PROBNP SERPL-MCNC: 314 PG/ML (ref 0–900)
PLATELET # BLD AUTO: 421 10*3/MM3 (ref 140–450)
PMV BLD AUTO: 9.8 FL (ref 6–12)
POTASSIUM SERPL-SCNC: 3.6 MMOL/L (ref 3.5–5.2)
RBC # BLD AUTO: 4.1 10*6/MM3 (ref 4.14–5.8)
SODIUM SERPL-SCNC: 135 MMOL/L (ref 136–145)
WBC NRBC COR # BLD: 8.8 10*3/MM3 (ref 3.4–10.8)

## 2023-01-20 PROCEDURE — 63710000001 INSULIN ISOPHANE HUMAN PER 5 UNITS: Performed by: INTERNAL MEDICINE

## 2023-01-20 PROCEDURE — 82962 GLUCOSE BLOOD TEST: CPT

## 2023-01-20 PROCEDURE — 63710000001 INSULIN LISPRO (HUMAN) PER 5 UNITS: Performed by: STUDENT IN AN ORGANIZED HEALTH CARE EDUCATION/TRAINING PROGRAM

## 2023-01-20 PROCEDURE — 97161 PT EVAL LOW COMPLEX 20 MIN: CPT

## 2023-01-20 PROCEDURE — 85025 COMPLETE CBC W/AUTO DIFF WBC: CPT | Performed by: STUDENT IN AN ORGANIZED HEALTH CARE EDUCATION/TRAINING PROGRAM

## 2023-01-20 PROCEDURE — 97530 THERAPEUTIC ACTIVITIES: CPT

## 2023-01-20 PROCEDURE — 94799 UNLISTED PULMONARY SVC/PX: CPT

## 2023-01-20 PROCEDURE — 71046 X-RAY EXAM CHEST 2 VIEWS: CPT

## 2023-01-20 PROCEDURE — 94761 N-INVAS EAR/PLS OXIMETRY MLT: CPT

## 2023-01-20 PROCEDURE — 83880 ASSAY OF NATRIURETIC PEPTIDE: CPT | Performed by: INTERNAL MEDICINE

## 2023-01-20 PROCEDURE — 99232 SBSQ HOSP IP/OBS MODERATE 35: CPT | Performed by: INTERNAL MEDICINE

## 2023-01-20 PROCEDURE — 94640 AIRWAY INHALATION TREATMENT: CPT

## 2023-01-20 PROCEDURE — 80048 BASIC METABOLIC PNL TOTAL CA: CPT | Performed by: INTERNAL MEDICINE

## 2023-01-20 PROCEDURE — 25010000002 ONDANSETRON PER 1 MG: Performed by: STUDENT IN AN ORGANIZED HEALTH CARE EDUCATION/TRAINING PROGRAM

## 2023-01-20 PROCEDURE — 25010000002 CEFAZOLIN IN DEXTROSE 2-4 GM/100ML-% SOLUTION: Performed by: STUDENT IN AN ORGANIZED HEALTH CARE EDUCATION/TRAINING PROGRAM

## 2023-01-20 PROCEDURE — 63710000001 INSULIN LISPRO (HUMAN) PER 5 UNITS: Performed by: INTERNAL MEDICINE

## 2023-01-20 RX ORDER — SODIUM CHLORIDE 0.9 % (FLUSH) 0.9 %
10 SYRINGE (ML) INJECTION EVERY 12 HOURS SCHEDULED
Status: CANCELLED | OUTPATIENT
Start: 2023-01-20

## 2023-01-20 RX ORDER — PROCHLORPERAZINE 25 MG
25 SUPPOSITORY, RECTAL RECTAL EVERY 12 HOURS PRN
Status: DISCONTINUED | OUTPATIENT
Start: 2023-01-20 | End: 2023-01-21 | Stop reason: HOSPADM

## 2023-01-20 RX ORDER — ONDANSETRON 4 MG/1
4 TABLET, FILM COATED ORAL EVERY 4 HOURS PRN
Status: DISCONTINUED | OUTPATIENT
Start: 2023-01-20 | End: 2023-01-21 | Stop reason: HOSPADM

## 2023-01-20 RX ORDER — SODIUM CHLORIDE 0.9 % (FLUSH) 0.9 %
20 SYRINGE (ML) INJECTION AS NEEDED
Status: CANCELLED | OUTPATIENT
Start: 2023-01-20

## 2023-01-20 RX ORDER — INSULIN LISPRO 100 [IU]/ML
3 INJECTION, SOLUTION INTRAVENOUS; SUBCUTANEOUS
Status: DISCONTINUED | OUTPATIENT
Start: 2023-01-20 | End: 2023-01-21 | Stop reason: HOSPADM

## 2023-01-20 RX ORDER — GABAPENTIN 400 MG/1
800 CAPSULE ORAL EVERY 6 HOURS
Status: DISCONTINUED | OUTPATIENT
Start: 2023-01-20 | End: 2023-01-21 | Stop reason: HOSPADM

## 2023-01-20 RX ORDER — LISINOPRIL 10 MG/1
10 TABLET ORAL
Status: DISCONTINUED | OUTPATIENT
Start: 2023-01-20 | End: 2023-01-21 | Stop reason: HOSPADM

## 2023-01-20 RX ORDER — ONDANSETRON 2 MG/ML
4 INJECTION INTRAMUSCULAR; INTRAVENOUS EVERY 4 HOURS PRN
Status: DISCONTINUED | OUTPATIENT
Start: 2023-01-20 | End: 2023-01-21 | Stop reason: HOSPADM

## 2023-01-20 RX ORDER — SODIUM CHLORIDE 0.9 % (FLUSH) 0.9 %
10 SYRINGE (ML) INJECTION AS NEEDED
Status: CANCELLED | OUTPATIENT
Start: 2023-01-20

## 2023-01-20 RX ORDER — IPRATROPIUM BROMIDE AND ALBUTEROL SULFATE 2.5; .5 MG/3ML; MG/3ML
3 SOLUTION RESPIRATORY (INHALATION)
Status: DISCONTINUED | OUTPATIENT
Start: 2023-01-20 | End: 2023-01-21 | Stop reason: HOSPADM

## 2023-01-20 RX ORDER — PROCHLORPERAZINE MALEATE 5 MG/1
5 TABLET ORAL EVERY 6 HOURS PRN
Status: DISCONTINUED | OUTPATIENT
Start: 2023-01-20 | End: 2023-01-21 | Stop reason: HOSPADM

## 2023-01-20 RX ORDER — PROCHLORPERAZINE EDISYLATE 5 MG/ML
5 INJECTION INTRAMUSCULAR; INTRAVENOUS EVERY 6 HOURS PRN
Status: DISCONTINUED | OUTPATIENT
Start: 2023-01-20 | End: 2023-01-21 | Stop reason: HOSPADM

## 2023-01-20 RX ORDER — SODIUM HYPOCHLORITE 1.25 MG/ML
SOLUTION TOPICAL 2 TIMES DAILY
Status: DISCONTINUED | OUTPATIENT
Start: 2023-01-20 | End: 2023-01-21 | Stop reason: HOSPADM

## 2023-01-20 RX ADMIN — ROSUVASTATIN CALCIUM 10 MG: 10 TABLET, FILM COATED ORAL at 21:25

## 2023-01-20 RX ADMIN — GABAPENTIN 800 MG: 400 CAPSULE ORAL at 14:13

## 2023-01-20 RX ADMIN — OXYCODONE HYDROCHLORIDE 10 MG: 10 TABLET ORAL at 17:32

## 2023-01-20 RX ADMIN — ASPIRIN 81 MG: 81 TABLET, COATED ORAL at 08:06

## 2023-01-20 RX ADMIN — LISINOPRIL 10 MG: 10 TABLET ORAL at 14:13

## 2023-01-20 RX ADMIN — INSULIN LISPRO 4 UNITS: 100 INJECTION, SOLUTION INTRAVENOUS; SUBCUTANEOUS at 08:07

## 2023-01-20 RX ADMIN — INSULIN LISPRO 3 UNITS: 100 INJECTION, SOLUTION INTRAVENOUS; SUBCUTANEOUS at 17:32

## 2023-01-20 RX ADMIN — ACETAMINOPHEN 1000 MG: 500 TABLET, FILM COATED ORAL at 21:25

## 2023-01-20 RX ADMIN — CEFAZOLIN SODIUM 2 G: 2 INJECTION, SOLUTION INTRAVENOUS at 05:59

## 2023-01-20 RX ADMIN — ACETAMINOPHEN 1000 MG: 500 TABLET, FILM COATED ORAL at 05:59

## 2023-01-20 RX ADMIN — HYDROCHLOROTHIAZIDE 25 MG: 25 TABLET ORAL at 08:06

## 2023-01-20 RX ADMIN — AMLODIPINE BESYLATE 10 MG: 10 TABLET ORAL at 08:06

## 2023-01-20 RX ADMIN — ONDANSETRON 4 MG: 2 INJECTION INTRAMUSCULAR; INTRAVENOUS at 01:46

## 2023-01-20 RX ADMIN — CLOPIDOGREL 75 MG: 75 TABLET, FILM COATED ORAL at 08:06

## 2023-01-20 RX ADMIN — OXYCODONE HYDROCHLORIDE 10 MG: 10 TABLET ORAL at 01:46

## 2023-01-20 RX ADMIN — CEFAZOLIN SODIUM 2 G: 2 INJECTION, SOLUTION INTRAVENOUS at 21:30

## 2023-01-20 RX ADMIN — INSULIN LISPRO 2 UNITS: 100 INJECTION, SOLUTION INTRAVENOUS; SUBCUTANEOUS at 08:06

## 2023-01-20 RX ADMIN — ACETAMINOPHEN 1000 MG: 500 TABLET, FILM COATED ORAL at 14:13

## 2023-01-20 RX ADMIN — GABAPENTIN 800 MG: 400 CAPSULE ORAL at 08:06

## 2023-01-20 RX ADMIN — INSULIN HUMAN 13 UNITS: 100 INJECTION, SUSPENSION SUBCUTANEOUS at 21:25

## 2023-01-20 RX ADMIN — CEFAZOLIN SODIUM 2 G: 2 INJECTION, SOLUTION INTRAVENOUS at 14:14

## 2023-01-20 RX ADMIN — INSULIN HUMAN 13 UNITS: 100 INJECTION, SUSPENSION SUBCUTANEOUS at 09:01

## 2023-01-20 RX ADMIN — IPRATROPIUM BROMIDE AND ALBUTEROL SULFATE 3 ML: 2.5; .5 SOLUTION RESPIRATORY (INHALATION) at 21:54

## 2023-01-20 RX ADMIN — TIMOLOL MALEATE: 5 SOLUTION OPHTHALMIC at 08:08

## 2023-01-20 RX ADMIN — PREDNISOLONE ACETATE 1 DROP: 10 SUSPENSION/ DROPS OPHTHALMIC at 08:08

## 2023-01-20 RX ADMIN — INSULIN LISPRO 3 UNITS: 100 INJECTION, SOLUTION INTRAVENOUS; SUBCUTANEOUS at 12:11

## 2023-01-20 RX ADMIN — OXYCODONE HYDROCHLORIDE 10 MG: 10 TABLET ORAL at 08:11

## 2023-01-20 RX ADMIN — OXYCODONE HYDROCHLORIDE 10 MG: 10 TABLET ORAL at 21:28

## 2023-01-20 RX ADMIN — DAKIN'S SOLUTION 0.125% (QUARTER STRENGTH): 0.12 SOLUTION at 21:42

## 2023-01-20 RX ADMIN — GABAPENTIN 800 MG: 400 CAPSULE ORAL at 21:25

## 2023-01-20 RX ADMIN — OXYCODONE HYDROCHLORIDE 10 MG: 10 TABLET ORAL at 12:12

## 2023-01-20 RX ADMIN — LEVETIRACETAM 500 MG: 500 TABLET, FILM COATED ORAL at 08:06

## 2023-01-20 RX ADMIN — LEVETIRACETAM 500 MG: 500 TABLET, FILM COATED ORAL at 21:25

## 2023-01-20 RX ADMIN — INSULIN LISPRO 4 UNITS: 100 INJECTION, SOLUTION INTRAVENOUS; SUBCUTANEOUS at 12:11

## 2023-01-21 ENCOUNTER — READMISSION MANAGEMENT (OUTPATIENT)
Dept: CALL CENTER | Facility: HOSPITAL | Age: 73
End: 2023-01-21
Payer: MEDICARE

## 2023-01-21 ENCOUNTER — TRANSCRIBE ORDERS (OUTPATIENT)
Dept: HOME HEALTH SERVICES | Facility: HOME HEALTHCARE | Age: 73
End: 2023-01-21
Payer: MEDICARE

## 2023-01-21 ENCOUNTER — APPOINTMENT (OUTPATIENT)
Dept: CT IMAGING | Facility: HOSPITAL | Age: 73
DRG: 617 | End: 2023-01-21
Payer: MEDICARE

## 2023-01-21 ENCOUNTER — HOME HEALTH ADMISSION (OUTPATIENT)
Dept: HOME HEALTH SERVICES | Facility: HOME HEALTHCARE | Age: 73
End: 2023-01-21
Payer: COMMERCIAL

## 2023-01-21 VITALS
WEIGHT: 201 LBS | TEMPERATURE: 98.8 F | HEART RATE: 66 BPM | RESPIRATION RATE: 20 BRPM | BODY MASS INDEX: 31.55 KG/M2 | HEIGHT: 67 IN | DIASTOLIC BLOOD PRESSURE: 87 MMHG | OXYGEN SATURATION: 98 % | SYSTOLIC BLOOD PRESSURE: 147 MMHG

## 2023-01-21 DIAGNOSIS — E11.621 DIABETIC FOOT ULCER ASSOCIATED WITH TYPE 2 DIABETES MELLITUS, UNSPECIFIED LATERALITY, UNSPECIFIED PART OF FOOT, UNSPECIFIED ULCER STAGE: ICD-10-CM

## 2023-01-21 DIAGNOSIS — M86.9 OSTEOMYELITIS OF THIRD TOE OF LEFT FOOT: Primary | ICD-10-CM

## 2023-01-21 DIAGNOSIS — L97.509 DIABETIC FOOT ULCER ASSOCIATED WITH TYPE 2 DIABETES MELLITUS, UNSPECIFIED LATERALITY, UNSPECIFIED PART OF FOOT, UNSPECIFIED ULCER STAGE: ICD-10-CM

## 2023-01-21 DIAGNOSIS — S98.132A AMPUTATION OF TOE OF LEFT FOOT: ICD-10-CM

## 2023-01-21 DIAGNOSIS — I73.9 PAD (PERIPHERAL ARTERY DISEASE): ICD-10-CM

## 2023-01-21 LAB
ANION GAP SERPL CALCULATED.3IONS-SCNC: 9.1 MMOL/L (ref 5–15)
BACTERIA SPEC AEROBE CULT: NORMAL
BASOPHILS # BLD AUTO: 0.04 10*3/MM3 (ref 0–0.2)
BASOPHILS NFR BLD AUTO: 0.5 % (ref 0–1.5)
BUN SERPL-MCNC: 20 MG/DL (ref 8–23)
BUN/CREAT SERPL: 21.5 (ref 7–25)
CALCIUM SPEC-SCNC: 8.7 MG/DL (ref 8.6–10.5)
CHLORIDE SERPL-SCNC: 97 MMOL/L (ref 98–107)
CO2 SERPL-SCNC: 32.9 MMOL/L (ref 22–29)
CREAT SERPL-MCNC: 0.93 MG/DL (ref 0.76–1.27)
DEPRECATED RDW RBC AUTO: 40.9 FL (ref 37–54)
EGFRCR SERPLBLD CKD-EPI 2021: 87.2 ML/MIN/1.73
EOSINOPHIL # BLD AUTO: 0.28 10*3/MM3 (ref 0–0.4)
EOSINOPHIL NFR BLD AUTO: 3.5 % (ref 0.3–6.2)
ERYTHROCYTE [DISTWIDTH] IN BLOOD BY AUTOMATED COUNT: 13 % (ref 12.3–15.4)
GLUCOSE BLDC GLUCOMTR-MCNC: 147 MG/DL (ref 70–130)
GLUCOSE BLDC GLUCOMTR-MCNC: 171 MG/DL (ref 70–130)
GLUCOSE SERPL-MCNC: 119 MG/DL (ref 65–99)
GRAM STN SPEC: NORMAL
GRAM STN SPEC: NORMAL
HCT VFR BLD AUTO: 32.9 % (ref 37.5–51)
HGB BLD-MCNC: 10.5 G/DL (ref 13–17.7)
IMM GRANULOCYTES # BLD AUTO: 0.03 10*3/MM3 (ref 0–0.05)
IMM GRANULOCYTES NFR BLD AUTO: 0.4 % (ref 0–0.5)
LYMPHOCYTES # BLD AUTO: 1.75 10*3/MM3 (ref 0.7–3.1)
LYMPHOCYTES NFR BLD AUTO: 21.8 % (ref 19.6–45.3)
MCH RBC QN AUTO: 27.8 PG (ref 26.6–33)
MCHC RBC AUTO-ENTMCNC: 31.9 G/DL (ref 31.5–35.7)
MCV RBC AUTO: 87 FL (ref 79–97)
MONOCYTES # BLD AUTO: 0.8 10*3/MM3 (ref 0.1–0.9)
MONOCYTES NFR BLD AUTO: 10 % (ref 5–12)
NEUTROPHILS NFR BLD AUTO: 5.13 10*3/MM3 (ref 1.7–7)
NEUTROPHILS NFR BLD AUTO: 63.8 % (ref 42.7–76)
NRBC BLD AUTO-RTO: 0 /100 WBC (ref 0–0.2)
PLATELET # BLD AUTO: 358 10*3/MM3 (ref 140–450)
PMV BLD AUTO: 9.7 FL (ref 6–12)
POTASSIUM SERPL-SCNC: 3.4 MMOL/L (ref 3.5–5.2)
RBC # BLD AUTO: 3.78 10*6/MM3 (ref 4.14–5.8)
SODIUM SERPL-SCNC: 139 MMOL/L (ref 136–145)
WBC NRBC COR # BLD: 8.03 10*3/MM3 (ref 3.4–10.8)

## 2023-01-21 PROCEDURE — C1751 CATH, INF, PER/CENT/MIDLINE: HCPCS

## 2023-01-21 PROCEDURE — 80048 BASIC METABOLIC PNL TOTAL CA: CPT | Performed by: INTERNAL MEDICINE

## 2023-01-21 PROCEDURE — 63710000001 INSULIN ISOPHANE HUMAN PER 5 UNITS: Performed by: INTERNAL MEDICINE

## 2023-01-21 PROCEDURE — 97530 THERAPEUTIC ACTIVITIES: CPT

## 2023-01-21 PROCEDURE — 94799 UNLISTED PULMONARY SVC/PX: CPT

## 2023-01-21 PROCEDURE — 63710000001 INSULIN LISPRO (HUMAN) PER 5 UNITS: Performed by: INTERNAL MEDICINE

## 2023-01-21 PROCEDURE — 94760 N-INVAS EAR/PLS OXIMETRY 1: CPT

## 2023-01-21 PROCEDURE — 82962 GLUCOSE BLOOD TEST: CPT

## 2023-01-21 PROCEDURE — 02HV33Z INSERTION OF INFUSION DEVICE INTO SUPERIOR VENA CAVA, PERCUTANEOUS APPROACH: ICD-10-PCS | Performed by: STUDENT IN AN ORGANIZED HEALTH CARE EDUCATION/TRAINING PROGRAM

## 2023-01-21 PROCEDURE — 63710000001 INSULIN LISPRO (HUMAN) PER 5 UNITS: Performed by: STUDENT IN AN ORGANIZED HEALTH CARE EDUCATION/TRAINING PROGRAM

## 2023-01-21 PROCEDURE — 85025 COMPLETE CBC W/AUTO DIFF WBC: CPT | Performed by: INTERNAL MEDICINE

## 2023-01-21 PROCEDURE — 25010000002 ONDANSETRON PER 1 MG: Performed by: INTERNAL MEDICINE

## 2023-01-21 PROCEDURE — 25010000002 CEFAZOLIN IN DEXTROSE 2-4 GM/100ML-% SOLUTION: Performed by: STUDENT IN AN ORGANIZED HEALTH CARE EDUCATION/TRAINING PROGRAM

## 2023-01-21 PROCEDURE — 94761 N-INVAS EAR/PLS OXIMETRY MLT: CPT

## 2023-01-21 RX ORDER — HYDROCHLOROTHIAZIDE 25 MG/1
25 TABLET ORAL DAILY
Qty: 30 TABLET | Refills: 3 | Status: SHIPPED | OUTPATIENT
Start: 2023-01-22

## 2023-01-21 RX ORDER — SODIUM CHLORIDE 0.9 % (FLUSH) 0.9 %
10 SYRINGE (ML) INJECTION AS NEEDED
Status: CANCELLED | OUTPATIENT
Start: 2023-01-21

## 2023-01-21 RX ORDER — CLOPIDOGREL BISULFATE 75 MG/1
75 TABLET ORAL DAILY
Qty: 30 TABLET | Refills: 3 | Status: SHIPPED | OUTPATIENT
Start: 2023-01-22

## 2023-01-21 RX ORDER — LISINOPRIL 10 MG/1
10 TABLET ORAL
Qty: 30 TABLET | Refills: 2 | Status: SHIPPED | OUTPATIENT
Start: 2023-01-22

## 2023-01-21 RX ORDER — INSULIN LISPRO 100 [IU]/ML
3 INJECTION, SOLUTION INTRAVENOUS; SUBCUTANEOUS
Qty: 10 ML | Refills: 12 | Status: SHIPPED | OUTPATIENT
Start: 2023-01-21 | End: 2023-01-31

## 2023-01-21 RX ORDER — ROSUVASTATIN CALCIUM 10 MG/1
10 TABLET, COATED ORAL NIGHTLY
Qty: 90 TABLET | Refills: 1 | Status: SHIPPED | OUTPATIENT
Start: 2023-01-21

## 2023-01-21 RX ORDER — SODIUM CHLORIDE 9 MG/ML
40 INJECTION, SOLUTION INTRAVENOUS AS NEEDED
Status: CANCELLED | OUTPATIENT
Start: 2023-01-21

## 2023-01-21 RX ORDER — INSULIN ASPART 100 [IU]/ML
0-9 INJECTION, SOLUTION INTRAVENOUS; SUBCUTANEOUS
Qty: 10 ML | Refills: 3 | Status: SHIPPED | OUTPATIENT
Start: 2023-01-21 | End: 2023-01-31

## 2023-01-21 RX ORDER — ACETAMINOPHEN 500 MG
1000 TABLET ORAL EVERY 8 HOURS SCHEDULED
Qty: 30 TABLET | Refills: 0 | Status: SHIPPED | OUTPATIENT
Start: 2023-01-21

## 2023-01-21 RX ORDER — HUMAN INSULIN 100 [IU]/ML
13 INJECTION, SUSPENSION SUBCUTANEOUS
Qty: 90 ML | Refills: 1 | Status: SHIPPED | OUTPATIENT
Start: 2023-01-21 | End: 2023-03-09 | Stop reason: ALTCHOICE

## 2023-01-21 RX ORDER — SODIUM HYPOCHLORITE 1.25 MG/ML
SOLUTION TOPICAL
Qty: 473 ML | Refills: 0 | Status: SHIPPED | OUTPATIENT
Start: 2023-01-21

## 2023-01-21 RX ORDER — SODIUM CHLORIDE 0.9 % (FLUSH) 0.9 %
20 SYRINGE (ML) INJECTION AS NEEDED
Status: CANCELLED | OUTPATIENT
Start: 2023-01-21

## 2023-01-21 RX ORDER — OXYCODONE HYDROCHLORIDE 10 MG/1
10 TABLET ORAL EVERY 4 HOURS PRN
Qty: 10 TABLET | Refills: 0 | Status: SHIPPED | OUTPATIENT
Start: 2023-01-21 | End: 2023-01-23

## 2023-01-21 RX ORDER — SODIUM CHLORIDE 0.9 % (FLUSH) 0.9 %
10 SYRINGE (ML) INJECTION EVERY 12 HOURS SCHEDULED
Status: CANCELLED | OUTPATIENT
Start: 2023-01-21

## 2023-01-21 RX ORDER — CEFAZOLIN SODIUM 2 G/100ML
2 INJECTION, SOLUTION INTRAVENOUS EVERY 8 HOURS
Qty: 9000 ML | Refills: 1 | Status: SHIPPED | OUTPATIENT
Start: 2023-01-21 | End: 2023-03-01

## 2023-01-21 RX ORDER — ASPIRIN 81 MG/1
81 TABLET ORAL DAILY
Qty: 30 TABLET | Refills: 3 | Status: SHIPPED | OUTPATIENT
Start: 2023-01-22

## 2023-01-21 RX ADMIN — OXYCODONE HYDROCHLORIDE 10 MG: 10 TABLET ORAL at 12:23

## 2023-01-21 RX ADMIN — CLOPIDOGREL 75 MG: 75 TABLET, FILM COATED ORAL at 08:21

## 2023-01-21 RX ADMIN — LEVETIRACETAM 500 MG: 500 TABLET, FILM COATED ORAL at 08:21

## 2023-01-21 RX ADMIN — LISINOPRIL 10 MG: 10 TABLET ORAL at 08:21

## 2023-01-21 RX ADMIN — GABAPENTIN 800 MG: 400 CAPSULE ORAL at 06:32

## 2023-01-21 RX ADMIN — TIMOLOL MALEATE: 5 SOLUTION OPHTHALMIC at 08:22

## 2023-01-21 RX ADMIN — CEFAZOLIN SODIUM 2 G: 2 INJECTION, SOLUTION INTRAVENOUS at 06:32

## 2023-01-21 RX ADMIN — HYDROCHLOROTHIAZIDE 25 MG: 25 TABLET ORAL at 08:21

## 2023-01-21 RX ADMIN — ONDANSETRON 4 MG: 2 INJECTION INTRAMUSCULAR; INTRAVENOUS at 06:35

## 2023-01-21 RX ADMIN — INSULIN HUMAN 13 UNITS: 100 INJECTION, SUSPENSION SUBCUTANEOUS at 08:21

## 2023-01-21 RX ADMIN — DAKIN'S SOLUTION 0.125% (QUARTER STRENGTH): 0.12 SOLUTION at 08:21

## 2023-01-21 RX ADMIN — ACETAMINOPHEN 1000 MG: 500 TABLET, FILM COATED ORAL at 13:37

## 2023-01-21 RX ADMIN — ASPIRIN 81 MG: 81 TABLET, COATED ORAL at 08:21

## 2023-01-21 RX ADMIN — INSULIN LISPRO 3 UNITS: 100 INJECTION, SOLUTION INTRAVENOUS; SUBCUTANEOUS at 08:21

## 2023-01-21 RX ADMIN — OXYCODONE HYDROCHLORIDE 10 MG: 10 TABLET ORAL at 06:31

## 2023-01-21 RX ADMIN — CEFAZOLIN SODIUM 2 G: 2 INJECTION, SOLUTION INTRAVENOUS at 13:37

## 2023-01-21 RX ADMIN — ACETAMINOPHEN 1000 MG: 500 TABLET, FILM COATED ORAL at 06:32

## 2023-01-21 RX ADMIN — AMLODIPINE BESYLATE 10 MG: 10 TABLET ORAL at 08:21

## 2023-01-21 RX ADMIN — INSULIN LISPRO 2 UNITS: 100 INJECTION, SOLUTION INTRAVENOUS; SUBCUTANEOUS at 12:24

## 2023-01-21 RX ADMIN — PREDNISOLONE ACETATE 1 DROP: 10 SUSPENSION/ DROPS OPHTHALMIC at 08:22

## 2023-01-21 RX ADMIN — GABAPENTIN 800 MG: 400 CAPSULE ORAL at 12:23

## 2023-01-21 RX ADMIN — INSULIN LISPRO 3 UNITS: 100 INJECTION, SOLUTION INTRAVENOUS; SUBCUTANEOUS at 12:23

## 2023-01-21 NOTE — OUTREACH NOTE
Prep Survey    Flowsheet Row Responses   [facility] [place] patient discharged from? [place]   Is LACE score < 7 ? No   Eligibility [facility]   Date of Admission 01/15/23   Date of Discharge 01/21/23   Discharge Disposition Home or Self Care   Discharge diagnosis Diabetic foot ulcer,    3RD LEFT TOE AMPUTATION AND LEFT FOOT DEBRIDEMENT   Does the patient have one of the following disease processes/diagnoses(primary or secondary)? General Surgery   Does the patient have Home health ordered? Yes   What is the Home health agency?  [facility]    Is there a DME ordered? No   Prep survey completed? Yes          ALE RAMSEY - Registered Nurse

## 2023-01-22 ENCOUNTER — HOME CARE VISIT (OUTPATIENT)
Dept: HOME HEALTH SERVICES | Facility: HOME HEALTHCARE | Age: 73
End: 2023-01-22
Payer: COMMERCIAL

## 2023-01-22 VITALS
DIASTOLIC BLOOD PRESSURE: 80 MMHG | RESPIRATION RATE: 18 BRPM | HEART RATE: 64 BPM | TEMPERATURE: 97.1 F | SYSTOLIC BLOOD PRESSURE: 148 MMHG | OXYGEN SATURATION: 90 %

## 2023-01-22 LAB
BACTERIA SPEC AEROBE CULT: NORMAL
GRAM STN SPEC: NORMAL

## 2023-01-22 PROCEDURE — 25010000002 CEFAZOLIN IN DEXTROSE 2-4 GM/100ML-% SOLUTION

## 2023-01-22 PROCEDURE — G0299 HHS/HOSPICE OF RN EA 15 MIN: HCPCS

## 2023-01-22 RX ADMIN — CEFAZOLIN SODIUM 2 G: 2 INJECTION, SOLUTION INTRAVENOUS at 10:45

## 2023-01-22 NOTE — HOME HEALTH
72M with history of HTN and DM.  Recent hospitalization due to osteomyelitis. Spouse present for teaching.  Wound care performed to bilateral feet.  Patient denies feeling in both feet.  Measurements and pictures taken.  RUE PICC placed yesterday.  Dressing intact.  T/I pt/spouse IV push antibiotic, flush, and heparin lock.  Spouse able to teach back.  Ambulates with walker assist.  SN for weekly PICC line care, labs, and wound assessment.

## 2023-01-23 ENCOUNTER — HOME CARE VISIT (OUTPATIENT)
Dept: HOME HEALTH SERVICES | Facility: HOME HEALTHCARE | Age: 73
End: 2023-01-23
Payer: COMMERCIAL

## 2023-01-23 ENCOUNTER — TRANSITIONAL CARE MANAGEMENT TELEPHONE ENCOUNTER (OUTPATIENT)
Dept: CALL CENTER | Facility: HOSPITAL | Age: 73
End: 2023-01-23
Payer: MEDICARE

## 2023-01-23 ENCOUNTER — LAB REQUISITION (OUTPATIENT)
Dept: LAB | Facility: HOSPITAL | Age: 73
End: 2023-01-23
Payer: MEDICARE

## 2023-01-23 ENCOUNTER — TELEPHONE (OUTPATIENT)
Dept: PAIN MEDICINE | Facility: CLINIC | Age: 73
End: 2023-01-23
Payer: MEDICARE

## 2023-01-23 VITALS
DIASTOLIC BLOOD PRESSURE: 70 MMHG | HEART RATE: 68 BPM | RESPIRATION RATE: 16 BRPM | SYSTOLIC BLOOD PRESSURE: 138 MMHG | TEMPERATURE: 97.9 F

## 2023-01-23 VITALS
RESPIRATION RATE: 18 BRPM | TEMPERATURE: 98.5 F | SYSTOLIC BLOOD PRESSURE: 142 MMHG | HEART RATE: 74 BPM | OXYGEN SATURATION: 97 % | DIASTOLIC BLOOD PRESSURE: 66 MMHG

## 2023-01-23 DIAGNOSIS — Z00.00 ENCOUNTER FOR GENERAL ADULT MEDICAL EXAMINATION WITHOUT ABNORMAL FINDINGS: ICD-10-CM

## 2023-01-23 DIAGNOSIS — E11.42 DIABETIC PERIPHERAL NEUROPATHY: ICD-10-CM

## 2023-01-23 DIAGNOSIS — Z87.898 HISTORY OF SEIZURES: ICD-10-CM

## 2023-01-23 DIAGNOSIS — M79.609 PAIN IN EXTREMITY, UNSPECIFIED EXTREMITY: ICD-10-CM

## 2023-01-23 DIAGNOSIS — G62.9 POLYNEUROPATHY: Primary | ICD-10-CM

## 2023-01-23 LAB
BASOPHILS # BLD AUTO: 0.05 10*3/MM3 (ref 0–0.2)
BASOPHILS NFR BLD AUTO: 0.6 % (ref 0–1.5)
CREAT SERPL-MCNC: 1.04 MG/DL (ref 0.76–1.27)
CRP SERPL-MCNC: 1.35 MG/DL (ref 0–0.5)
DEPRECATED RDW RBC AUTO: 40.4 FL (ref 37–54)
EGFRCR SERPLBLD CKD-EPI 2021: 76.3 ML/MIN/1.73
EOSINOPHIL # BLD AUTO: 0.25 10*3/MM3 (ref 0–0.4)
EOSINOPHIL NFR BLD AUTO: 3.2 % (ref 0.3–6.2)
ERYTHROCYTE [DISTWIDTH] IN BLOOD BY AUTOMATED COUNT: 13.2 % (ref 12.3–15.4)
HCT VFR BLD AUTO: 33.2 % (ref 37.5–51)
HGB BLD-MCNC: 10.4 G/DL (ref 13–17.7)
IMM GRANULOCYTES # BLD AUTO: 0.03 10*3/MM3 (ref 0–0.05)
IMM GRANULOCYTES NFR BLD AUTO: 0.4 % (ref 0–0.5)
LAB AP CASE REPORT: NORMAL
LYMPHOCYTES # BLD AUTO: 1.5 10*3/MM3 (ref 0.7–3.1)
LYMPHOCYTES NFR BLD AUTO: 19 % (ref 19.6–45.3)
MCH RBC QN AUTO: 26.9 PG (ref 26.6–33)
MCHC RBC AUTO-ENTMCNC: 31.3 G/DL (ref 31.5–35.7)
MCV RBC AUTO: 85.8 FL (ref 79–97)
MONOCYTES # BLD AUTO: 0.55 10*3/MM3 (ref 0.1–0.9)
MONOCYTES NFR BLD AUTO: 7 % (ref 5–12)
NEUTROPHILS NFR BLD AUTO: 5.52 10*3/MM3 (ref 1.7–7)
NEUTROPHILS NFR BLD AUTO: 69.8 % (ref 42.7–76)
NRBC BLD AUTO-RTO: 0 /100 WBC (ref 0–0.2)
PATH REPORT.FINAL DX SPEC: NORMAL
PATH REPORT.GROSS SPEC: NORMAL
PLATELET # BLD AUTO: 411 10*3/MM3 (ref 140–450)
PMV BLD AUTO: 10.2 FL (ref 6–12)
RBC # BLD AUTO: 3.87 10*6/MM3 (ref 4.14–5.8)
WBC NRBC COR # BLD: 7.9 10*3/MM3 (ref 3.4–10.8)

## 2023-01-23 PROCEDURE — G0151 HHCP-SERV OF PT,EA 15 MIN: HCPCS

## 2023-01-23 PROCEDURE — 82565 ASSAY OF CREATININE: CPT | Performed by: INTERNAL MEDICINE

## 2023-01-23 PROCEDURE — G0299 HHS/HOSPICE OF RN EA 15 MIN: HCPCS

## 2023-01-23 PROCEDURE — 85025 COMPLETE CBC W/AUTO DIFF WBC: CPT | Performed by: INTERNAL MEDICINE

## 2023-01-23 PROCEDURE — 86140 C-REACTIVE PROTEIN: CPT | Performed by: INTERNAL MEDICINE

## 2023-01-23 RX ORDER — HYDROCODONE BITARTRATE AND ACETAMINOPHEN 10; 325 MG/1; MG/1
1 TABLET ORAL EVERY 6 HOURS PRN
Qty: 120 TABLET | Refills: 0 | Status: SHIPPED | OUTPATIENT
Start: 2023-01-23 | End: 2023-03-02 | Stop reason: SDUPTHER

## 2023-01-23 RX ORDER — LEVETIRACETAM 500 MG/1
TABLET ORAL
Qty: 60 TABLET | Refills: 0 | Status: SHIPPED | OUTPATIENT
Start: 2023-01-23 | End: 2023-02-24

## 2023-01-23 NOTE — OUTREACH NOTE
Call Center TCM Note    Flowsheet Row Responses   [facility] patient discharged from? [place]   Does the patient have one of the following disease processes/diagnoses(primary or secondary)? General Surgery   TCM attempt successful? Yes   Call start time 1618   Call end time 1627   Discharge diagnosis Diabetic foot ulcer,   Peripheral vascular disease, Left leg angiogram   Person spoke with today (if not patient) and relationship patient   Medication alerts for this patient Home IV antibiotic per PICC line   Meds reviewed with patient/caregiver? Yes   Does the patient have all medications related to this admission filled (includes all antibiotics, pain medications, etc.) Yes   Is the patient taking all medications as directed (includes completed medication regime)? Yes   Medication comments patient denies any questions or issues with meds.    Comments PCP Susy US. Patient has HFU appt in place for 2/8/23  1145am. This appt in place prior to call.    Does the patient have an appointment with their PCP within 7 days of discharge? No  [Current PCP HFU in place for 2/8/23]   Nursing Interventions Confirmed date/time of appointment, Routed TCM call to PCP office   What is the Home health agency?  [facility]    Has home health visited the patient within 72 hours of discharge? Yes   Psychosocial issues? No   Did the patient receive a copy of their discharge instructions? Yes   Nursing interventions Reviewed instructions with patient   What is the patient's perception of their health status since discharge? Improving   Nursing interventions Nurse provided patient education   Is the patient/caregiver able to teach back signs and symptoms of incisional infection? Increased redness, swelling or pain at the incisonal site, Increased drainage or bleeding, Fever   Is the patient/caregiver able to teach back steps to recovery at home? Set small, achievable goals for return to baseline health, Rest and  rebuild strength, gradually increase activity   If the patient is a current smoker, are they able to teach back resources for cessation? Not a smoker   Is the patient/caregiver able to teach back the hierarchy of who to call/visit for symptoms/problems? PCP, Specialist, Home health nurse, Urgent Care, ED, 911 Yes   TCM call completed? Yes   Call end time 1627   Would this patient benefit from a Referral to [facility] Social Work? No   Is the patient interested in additional calls from an ambulatory ?  NOTE:  applies to high risk patients requiring additional follow-up. No          Melba Crow RN    1/23/2023, 16:28 EST

## 2023-01-23 NOTE — HOME HEALTH
REASON FOR REFERRAL:  Decreased ability to ambulate in and out of the home following recent hospital stay for 3rd digit amputation of L feet due to diabetic foot sore resulting in functional decline and LE weakness.    MEDICAL HISTORY: Basal cell carcinoma, Brain injury, Diabetes mellitus, Gallbladder obstruction, Hearing disorder, Hiatal hernia, Hypercholesterolemia, Peripheral neuropathy, Prostate enlargement, Seizures, Shingles, Sleep apnea.    SKILLED PHYSICAL THERAPY IS MEDICALLY NECESSARY FOR: Instruction/education in lower extremity strengthening HEP, bed mobility/transfers training, gait training, balance training, fall prevention, and activity tolerance training to enable patient to safely exit home for medical appointments.

## 2023-01-23 NOTE — HOME HEALTH
PT AMBULATES WITH A WALKER. SN REMOVED DRESSINGS FROM BLE'S AND WD CARE AS ORDERED AFTER MEASURING ALL AND TAKING PICTURES. SN THEN REMOVED PICC LINE DRESSING, CLEANED AND REDRESSED PER STERILE TECHNIQUE. PT TOLERATED WELL. SN FLUSHED PICC LINE, ODILIA OUT 10 ML BLOOD FOR WASTE, OBTAINED BLOOD FOR CBC, SERUM CREATININE, AND CRP. FLUSHED AFTER WITH 10 ML NS. PT TOLERATED WELL. LABS TRANSPORTED TO [facility].         SN for weekly PICC line care, labs, and wound assessment.

## 2023-01-24 ENCOUNTER — HOME CARE VISIT (OUTPATIENT)
Dept: HOME HEALTH SERVICES | Facility: HOME HEALTHCARE | Age: 73
End: 2023-01-24
Payer: COMMERCIAL

## 2023-01-24 ENCOUNTER — TELEPHONE (OUTPATIENT)
Dept: FAMILY MEDICINE CLINIC | Facility: CLINIC | Age: 73
End: 2023-01-24
Payer: MEDICARE

## 2023-01-24 LAB
BACTERIA SPEC ANAEROBE CULT: NORMAL
BACTERIA SPEC ANAEROBE CULT: NORMAL

## 2023-01-24 NOTE — TELEPHONE ENCOUNTER
THE MANAGER WITH [facility] CALLED TODAY REGARDING A MESSAGE SHE RECEIVED YESTERDAY. SHE STATED SHE RECEIVED A VOICEMAIL THAT [facility]GAS WAS NOT COMFORTABLE SIGNING ORDERS ANY FURTHER FOR THIS PT DUE TO HIM BEING ON AN IV. THE MANAGER STATED THAT THE PTS INFECTIOUS DISEASE DR DR HARRISON IS TAKING CARE OF ALL THINGS THAT HAVE TO DUE WITH HIS IV. SHE WOULD JUST NEED WILBER VEGAS TO SIGN ANY FURTHER ORDERS FOR THE PT THAT DOES NOT HAVE TO DO WITH HIS IV AS THEY WOULD HAVE NO ONE ELSE TO SIGN SAID ORDERS AND WOULD NOT BE ABLE TO CONTINUE CARE. SHE STATED THEY DO NOT HAVE ANY CURRENT OUTSTANDING ORDERS THEY NEED SIGNED SHE JUST WANTED TO RESPOND TO THE MESSAGE FROM YESTERDAY.     PLEASE ADVISE. THANK YOU.

## 2023-01-30 ENCOUNTER — LAB REQUISITION (OUTPATIENT)
Dept: LAB | Facility: HOSPITAL | Age: 73
End: 2023-01-30
Payer: MEDICARE

## 2023-01-30 ENCOUNTER — HOME CARE VISIT (OUTPATIENT)
Dept: HOME HEALTH SERVICES | Facility: HOME HEALTHCARE | Age: 73
End: 2023-01-30
Payer: COMMERCIAL

## 2023-01-30 VITALS
HEART RATE: 74 BPM | SYSTOLIC BLOOD PRESSURE: 120 MMHG | DIASTOLIC BLOOD PRESSURE: 60 MMHG | OXYGEN SATURATION: 98 % | TEMPERATURE: 96.9 F | RESPIRATION RATE: 16 BRPM

## 2023-01-30 DIAGNOSIS — S98.132A COMPLETE TRAUMATIC AMPUTATION OF ONE LEFT LESSER TOE, INITIAL ENCOUNTER: ICD-10-CM

## 2023-01-30 LAB
BASOPHILS # BLD AUTO: 0.06 10*3/MM3 (ref 0–0.2)
BASOPHILS NFR BLD AUTO: 0.8 % (ref 0–1.5)
CREAT SERPL-MCNC: 0.9 MG/DL (ref 0.76–1.27)
CRP SERPL-MCNC: 0.79 MG/DL (ref 0–0.5)
DEPRECATED RDW RBC AUTO: 44.3 FL (ref 37–54)
EGFRCR SERPLBLD CKD-EPI 2021: 90.7 ML/MIN/1.73
EOSINOPHIL # BLD AUTO: 0.33 10*3/MM3 (ref 0–0.4)
EOSINOPHIL NFR BLD AUTO: 4.3 % (ref 0.3–6.2)
ERYTHROCYTE [DISTWIDTH] IN BLOOD BY AUTOMATED COUNT: 13.8 % (ref 12.3–15.4)
HCT VFR BLD AUTO: 35.2 % (ref 37.5–51)
HGB BLD-MCNC: 10.8 G/DL (ref 13–17.7)
IMM GRANULOCYTES # BLD AUTO: 0.03 10*3/MM3 (ref 0–0.05)
IMM GRANULOCYTES NFR BLD AUTO: 0.4 % (ref 0–0.5)
LYMPHOCYTES # BLD AUTO: 1.75 10*3/MM3 (ref 0.7–3.1)
LYMPHOCYTES NFR BLD AUTO: 22.8 % (ref 19.6–45.3)
MCH RBC QN AUTO: 27 PG (ref 26.6–33)
MCHC RBC AUTO-ENTMCNC: 30.7 G/DL (ref 31.5–35.7)
MCV RBC AUTO: 88 FL (ref 79–97)
MONOCYTES # BLD AUTO: 0.64 10*3/MM3 (ref 0.1–0.9)
MONOCYTES NFR BLD AUTO: 8.4 % (ref 5–12)
NEUTROPHILS NFR BLD AUTO: 4.85 10*3/MM3 (ref 1.7–7)
NEUTROPHILS NFR BLD AUTO: 63.3 % (ref 42.7–76)
NRBC BLD AUTO-RTO: 0 /100 WBC (ref 0–0.2)
PLATELET # BLD AUTO: 344 10*3/MM3 (ref 140–450)
PMV BLD AUTO: 10.8 FL (ref 6–12)
RBC # BLD AUTO: 4 10*6/MM3 (ref 4.14–5.8)
WBC NRBC COR # BLD: 7.66 10*3/MM3 (ref 3.4–10.8)

## 2023-01-30 PROCEDURE — 85025 COMPLETE CBC W/AUTO DIFF WBC: CPT | Performed by: INTERNAL MEDICINE

## 2023-01-30 PROCEDURE — G0299 HHS/HOSPICE OF RN EA 15 MIN: HCPCS

## 2023-01-30 PROCEDURE — 86140 C-REACTIVE PROTEIN: CPT | Performed by: INTERNAL MEDICINE

## 2023-01-30 PROCEDURE — G0180 MD CERTIFICATION HHA PATIENT: HCPCS | Performed by: NURSE PRACTITIONER

## 2023-01-30 PROCEDURE — 82565 ASSAY OF CREATININE: CPT | Performed by: INTERNAL MEDICINE

## 2023-01-30 NOTE — HOME HEALTH
PLAN FOR NEXT SNV- PICC DRSG CHANGE, WEEKLY LABS, DM ASSESS/INSTRUCT--- BILAT FEET WOUND ASSESS, PHOTOS  F/U MED CHANGE ORDERS FROM PCP APPT 1/31 1/30- SN OBTAINED LAB FROM PICC AFTER FLUSHED W/ 10 CC NS THEN WAITED 10CC BLOOD, OBTAINED SPECIMEN THEN FLUSHED W/ 10CC NS, EXTENTION TUBING ATTACHED AND INSTURCT PT HOW TO PERFORM OWN S-A-S IVAB IV PUSHES INDEPENDENTLY , NO REQUIRING WIFE ASSIST W/ ACTUAL INFUSION.- PT COMPETBNELTY DEMONSTRATES FLUSHING. SN CHANGED PICC DRESSING CLEANSED W/ CHLORAPREP SAB X1, BIOPATCH APPLIED. SITE W/O S/S COMPLCIATIONS- SEE PHOTO        HX- 72M with history of HTN and DM.  Recent hospitalization due to osteomyelitis. Diabetic ulcer to bilat feet and ampuations 3d toe LT foot.  Patient denies feeling in both feet..  T/I pt/spouse IV push antibiotic, flush.  Spouse able to teach back.  Ambulates with walker assist.

## 2023-01-31 ENCOUNTER — OFFICE VISIT (OUTPATIENT)
Dept: FAMILY MEDICINE CLINIC | Facility: CLINIC | Age: 73
End: 2023-01-31
Payer: MEDICARE

## 2023-01-31 VITALS
HEART RATE: 72 BPM | TEMPERATURE: 96.8 F | OXYGEN SATURATION: 96 % | WEIGHT: 201 LBS | DIASTOLIC BLOOD PRESSURE: 63 MMHG | HEIGHT: 67 IN | BODY MASS INDEX: 31.55 KG/M2 | SYSTOLIC BLOOD PRESSURE: 128 MMHG

## 2023-01-31 DIAGNOSIS — L97.509 DIABETIC FOOT ULCER WITH OSTEOMYELITIS: ICD-10-CM

## 2023-01-31 DIAGNOSIS — E10.43: ICD-10-CM

## 2023-01-31 DIAGNOSIS — E11.69 DIABETIC FOOT ULCER WITH OSTEOMYELITIS: ICD-10-CM

## 2023-01-31 DIAGNOSIS — E11.621 DIABETIC FOOT ULCER WITH OSTEOMYELITIS: ICD-10-CM

## 2023-01-31 DIAGNOSIS — I73.9 PERIPHERAL VASCULAR DISEASE, UNSPECIFIED: ICD-10-CM

## 2023-01-31 DIAGNOSIS — M86.9 DIABETIC FOOT ULCER WITH OSTEOMYELITIS: ICD-10-CM

## 2023-01-31 DIAGNOSIS — E10.65: ICD-10-CM

## 2023-01-31 DIAGNOSIS — Z09 HOSPITAL DISCHARGE FOLLOW-UP: Primary | ICD-10-CM

## 2023-01-31 PROCEDURE — 1111F DSCHRG MED/CURRENT MED MERGE: CPT | Performed by: NURSE PRACTITIONER

## 2023-01-31 PROCEDURE — 99495 TRANSJ CARE MGMT MOD F2F 14D: CPT | Performed by: NURSE PRACTITIONER

## 2023-01-31 PROCEDURE — 3046F HEMOGLOBIN A1C LEVEL >9.0%: CPT | Performed by: NURSE PRACTITIONER

## 2023-01-31 RX ORDER — SULFAMETHOXAZOLE AND TRIMETHOPRIM 800; 160 MG/1; MG/1
TABLET ORAL
COMMUNITY
Start: 2022-10-25 | End: 2023-03-06

## 2023-01-31 RX ORDER — AMITRIPTYLINE HYDROCHLORIDE 10 MG/1
10 TABLET, FILM COATED ORAL DAILY
COMMUNITY

## 2023-01-31 RX ORDER — COLLAGENASE SANTYL 250 [ARB'U]/G
OINTMENT TOPICAL
COMMUNITY
Start: 2023-01-16 | End: 2023-03-17

## 2023-01-31 RX ORDER — VALSARTAN AND HYDROCHLOROTHIAZIDE 160; 12.5 MG/1; MG/1
1 TABLET, FILM COATED ORAL DAILY
COMMUNITY

## 2023-02-01 ENCOUNTER — READMISSION MANAGEMENT (OUTPATIENT)
Dept: CALL CENTER | Facility: HOSPITAL | Age: 73
End: 2023-02-01
Payer: MEDICARE

## 2023-02-01 NOTE — OUTREACH NOTE
General Surgery Week 2 Survey    Flowsheet Row Responses   [facility] patient discharged from? [place]   Does the patient have one of the following disease processes/diagnoses(primary or secondary)? General Surgery   Week 2 attempt successful? Yes   Call start time 1040   Call end time 1053   Discharge diagnosis Diabetic foot ulcer,   Peripheral vascular disease, Left leg angiogram, S/P  arteriogram and foot debridement in the OR on 1/19/23 along with 3rd toe amputation.   Person spoke with today (if not patient) and relationship patient   Medication alerts for this patient Home IV antibiotic per PICC line   Meds reviewed with patient/caregiver? Yes   Does the patient have all medications related to this admission filled (includes all antibiotics, pain medications, etc.) Yes   Is the patient taking all medications as directed (includes completed medication regime)? Yes   Does the patient have a follow up appointment scheduled with their surgeon? Yes   Has the patient kept scheduled appointments due by today? Yes   What is the Home health agency?  [facility]    Has home health visited the patient within 72 hours of discharge? Yes   Psychosocial issues? No   Comments Wife reports that his wife is doing his wound care on days that  nurse is not coming for visit. Patient reports he and wife managing the IV antibiotics and  nurse does the weekly PICC line care.    Did the patient receive a copy of their discharge instructions? Yes   Nursing interventions Reviewed instructions with patient   What is the patient's perception of their health status since discharge? Improving   Is the patient/caregiver able to teach back signs and symptoms of incisional infection? Increased redness, swelling or pain at the incisonal site, Increased drainage or bleeding, Fever   Is the patient/caregiver able to teach back steps to recovery at home? Set small, achievable goals for return to baseline health, Rest and rebuild  strength, gradually increase activity, Eat a well-balance diet   If the patient is a current smoker, are they able to teach back resources for cessation? Not a smoker   Is the patient/caregiver able to teach back the hierarchy of who to call/visit for symptoms/problems? PCP, Specialist, Home health nurse, Urgent Care, ED, 911 Yes   Week 2 call completed? Yes          TRUDY RESENDIZ - Registered Nurse

## 2023-02-06 ENCOUNTER — HOME CARE VISIT (OUTPATIENT)
Dept: HOME HEALTH SERVICES | Facility: HOME HEALTHCARE | Age: 73
End: 2023-02-06
Payer: COMMERCIAL

## 2023-02-06 ENCOUNTER — LAB REQUISITION (OUTPATIENT)
Dept: LAB | Facility: HOSPITAL | Age: 73
End: 2023-02-06
Payer: MEDICARE

## 2023-02-06 VITALS
SYSTOLIC BLOOD PRESSURE: 124 MMHG | RESPIRATION RATE: 16 BRPM | TEMPERATURE: 97.8 F | HEART RATE: 76 BPM | DIASTOLIC BLOOD PRESSURE: 76 MMHG | OXYGEN SATURATION: 98 %

## 2023-02-06 DIAGNOSIS — Z00.00 ENCOUNTER FOR GENERAL ADULT MEDICAL EXAMINATION WITHOUT ABNORMAL FINDINGS: ICD-10-CM

## 2023-02-06 LAB
BASOPHILS # BLD AUTO: 0.05 10*3/MM3 (ref 0–0.2)
BASOPHILS NFR BLD AUTO: 0.7 % (ref 0–1.5)
CREAT SERPL-MCNC: 0.84 MG/DL (ref 0.76–1.27)
CRP SERPL-MCNC: 0.36 MG/DL (ref 0–0.5)
DEPRECATED RDW RBC AUTO: 43.3 FL (ref 37–54)
EGFRCR SERPLBLD CKD-EPI 2021: 92.7 ML/MIN/1.73
EOSINOPHIL # BLD AUTO: 0.3 10*3/MM3 (ref 0–0.4)
EOSINOPHIL NFR BLD AUTO: 4.2 % (ref 0.3–6.2)
ERYTHROCYTE [DISTWIDTH] IN BLOOD BY AUTOMATED COUNT: 13.8 % (ref 12.3–15.4)
HCT VFR BLD AUTO: 33.4 % (ref 37.5–51)
HGB BLD-MCNC: 11.1 G/DL (ref 13–17.7)
IMM GRANULOCYTES # BLD AUTO: 0.02 10*3/MM3 (ref 0–0.05)
IMM GRANULOCYTES NFR BLD AUTO: 0.3 % (ref 0–0.5)
LYMPHOCYTES # BLD AUTO: 1.71 10*3/MM3 (ref 0.7–3.1)
LYMPHOCYTES NFR BLD AUTO: 23.7 % (ref 19.6–45.3)
MCH RBC QN AUTO: 28.1 PG (ref 26.6–33)
MCHC RBC AUTO-ENTMCNC: 33.2 G/DL (ref 31.5–35.7)
MCV RBC AUTO: 84.6 FL (ref 79–97)
MONOCYTES # BLD AUTO: 0.63 10*3/MM3 (ref 0.1–0.9)
MONOCYTES NFR BLD AUTO: 8.7 % (ref 5–12)
NEUTROPHILS NFR BLD AUTO: 4.5 10*3/MM3 (ref 1.7–7)
NEUTROPHILS NFR BLD AUTO: 62.4 % (ref 42.7–76)
NRBC BLD AUTO-RTO: 0 /100 WBC (ref 0–0.2)
PLATELET # BLD AUTO: 282 10*3/MM3 (ref 140–450)
PMV BLD AUTO: 10.8 FL (ref 6–12)
RBC # BLD AUTO: 3.95 10*6/MM3 (ref 4.14–5.8)
WBC NRBC COR # BLD: 7.21 10*3/MM3 (ref 3.4–10.8)

## 2023-02-06 PROCEDURE — 86140 C-REACTIVE PROTEIN: CPT | Performed by: INTERNAL MEDICINE

## 2023-02-06 PROCEDURE — G0493 RN CARE EA 15 MIN HH/HOSPICE: HCPCS

## 2023-02-06 PROCEDURE — 82565 ASSAY OF CREATININE: CPT | Performed by: INTERNAL MEDICINE

## 2023-02-06 PROCEDURE — 85025 COMPLETE CBC W/AUTO DIFF WBC: CPT | Performed by: INTERNAL MEDICINE

## 2023-02-06 NOTE — HOME HEALTH
PLAN FOR NEXT SNV- PICC DRSG CHANGE, WEEKLY LABS, DM ASSESS/INSTRUCT--- BILAT FEET WOUND ASSESS, PHOTOS      2/6- SN CHANGED PICC DRESSING CLEANSED W/ CHLORAPREP SAB X1, BIOPATCH APPLIED. SITE W/O S/S COMPLCIATIONS- SN ASSESSED AND DRESSED BLE FEET WOUNDS- SEE  PHOTOS- PT TOLERATED W/O C/O DISCOMFORT. PT GOES TO SEE SURGEON TOMORROW, REMOVE STITCHES FROM RT FT 3D TOE APUTATION STUMP. AND F/U FOOT WOUND CHANGE ORDERS FROM MD APPT       HX- 72M with history of HTN and DM.  Recent hospitalization due to osteomyelitis. Diabetic ulcer to bilat feet and ampuations 3d toe LT foot.  Patient denies feeling in both feet..  T/I pt/spouse IV push antibiotic, flush.  Spouse able to teach back.  Ambulates with walker assist.

## 2023-02-13 ENCOUNTER — HOME CARE VISIT (OUTPATIENT)
Dept: HOME HEALTH SERVICES | Facility: HOME HEALTHCARE | Age: 73
End: 2023-02-13
Payer: COMMERCIAL

## 2023-02-13 ENCOUNTER — LAB REQUISITION (OUTPATIENT)
Dept: LAB | Facility: HOSPITAL | Age: 73
End: 2023-02-13
Payer: MEDICARE

## 2023-02-13 DIAGNOSIS — Z00.00 ENCOUNTER FOR GENERAL ADULT MEDICAL EXAMINATION WITHOUT ABNORMAL FINDINGS: ICD-10-CM

## 2023-02-13 LAB
BASOPHILS # BLD AUTO: 0.05 10*3/MM3 (ref 0–0.2)
BASOPHILS NFR BLD AUTO: 0.8 % (ref 0–1.5)
CREAT SERPL-MCNC: 0.66 MG/DL (ref 0.76–1.27)
CRP SERPL-MCNC: <0.3 MG/DL (ref 0–0.5)
DEPRECATED RDW RBC AUTO: 46 FL (ref 37–54)
EGFRCR SERPLBLD CKD-EPI 2021: 99.7 ML/MIN/1.73
EOSINOPHIL # BLD AUTO: 0.37 10*3/MM3 (ref 0–0.4)
EOSINOPHIL NFR BLD AUTO: 5.6 % (ref 0.3–6.2)
ERYTHROCYTE [DISTWIDTH] IN BLOOD BY AUTOMATED COUNT: 14.4 % (ref 12.3–15.4)
HCT VFR BLD AUTO: 34.8 % (ref 37.5–51)
HGB BLD-MCNC: 11.4 G/DL (ref 13–17.7)
IMM GRANULOCYTES # BLD AUTO: 0.01 10*3/MM3 (ref 0–0.05)
IMM GRANULOCYTES NFR BLD AUTO: 0.2 % (ref 0–0.5)
LYMPHOCYTES # BLD AUTO: 1.33 10*3/MM3 (ref 0.7–3.1)
LYMPHOCYTES NFR BLD AUTO: 20 % (ref 19.6–45.3)
MCH RBC QN AUTO: 28.6 PG (ref 26.6–33)
MCHC RBC AUTO-ENTMCNC: 32.8 G/DL (ref 31.5–35.7)
MCV RBC AUTO: 87.2 FL (ref 79–97)
MONOCYTES # BLD AUTO: 0.4 10*3/MM3 (ref 0.1–0.9)
MONOCYTES NFR BLD AUTO: 6 % (ref 5–12)
NEUTROPHILS NFR BLD AUTO: 4.48 10*3/MM3 (ref 1.7–7)
NEUTROPHILS NFR BLD AUTO: 67.4 % (ref 42.7–76)
NRBC BLD AUTO-RTO: 0 /100 WBC (ref 0–0.2)
PLATELET # BLD AUTO: 230 10*3/MM3 (ref 140–450)
PMV BLD AUTO: 11.2 FL (ref 6–12)
RBC # BLD AUTO: 3.99 10*6/MM3 (ref 4.14–5.8)
WBC NRBC COR # BLD: 6.64 10*3/MM3 (ref 3.4–10.8)

## 2023-02-13 PROCEDURE — 86140 C-REACTIVE PROTEIN: CPT | Performed by: INTERNAL MEDICINE

## 2023-02-13 PROCEDURE — 82565 ASSAY OF CREATININE: CPT | Performed by: INTERNAL MEDICINE

## 2023-02-13 PROCEDURE — 85025 COMPLETE CBC W/AUTO DIFF WBC: CPT | Performed by: INTERNAL MEDICINE

## 2023-02-13 PROCEDURE — G0299 HHS/HOSPICE OF RN EA 15 MIN: HCPCS

## 2023-02-13 RX ORDER — AMLODIPINE BESYLATE 10 MG/1
TABLET ORAL
Qty: 90 TABLET | Refills: 0 | Status: SHIPPED | OUTPATIENT
Start: 2023-02-13

## 2023-02-13 NOTE — HOME HEALTH
PLAN FOR NEXT SNV- PICC DRSG CHANGE, WEEKLY LABS, DM ASSESS/INSTRUCT--- BILAT FEET WOUND ASSESS, PHOTOS      2/13- PT SAW SURGEON SINCE LAST SNV AND HAS STAPLES REMOEVED FROM LT 3D TOE STUMP- NO S/S CVOMPLICATIONS NOTED. SN ASSESSED BILAT FEET WOUNDS- SEE PHOTOS- WIFE IS COMPETENT W/ WOUND CARES, WOUNDS W/O S/S INFECTION, WOUND EDGES W/O S/S EPITHELAIL TISSUE GROWTH, WOUND BASES ARE PINK, DRNG IS SCANT AS DRESSINGS STICKS TO WOUND BASES. PICC SITE W/O S/S COMPLICATIONS. PT W/ HYPOGLYCEMIA THIS AM - BS=54- PT/WIFE RECOGNISED HIS SYMTOMS AND TOOK APPROPRIATE CORRECTIVE ACTIONS. PT/WIFE REPORT PT HAS GLUCOSE TBAS IN MOST ROOMS AROUND THE HOUSE     HX- 72M with history of HTN and DM.  Recent hospitalization due to osteomyelitis. Diabetic ulcer to bilat feet and ampuations 3d toe LT foot.  Patient denies feeling in both feet..  T/I pt/spouse IV push antibiotic, flush.  Spouse able to teach back.  Ambulates with walker assist.

## 2023-02-14 VITALS
DIASTOLIC BLOOD PRESSURE: 70 MMHG | HEART RATE: 80 BPM | TEMPERATURE: 97.8 F | SYSTOLIC BLOOD PRESSURE: 133 MMHG | OXYGEN SATURATION: 98 % | RESPIRATION RATE: 16 BRPM

## 2023-02-20 ENCOUNTER — LAB REQUISITION (OUTPATIENT)
Dept: LAB | Facility: HOSPITAL | Age: 73
End: 2023-02-20
Payer: MEDICARE

## 2023-02-20 ENCOUNTER — HOME CARE VISIT (OUTPATIENT)
Dept: HOME HEALTH SERVICES | Facility: HOME HEALTHCARE | Age: 73
End: 2023-02-20
Payer: COMMERCIAL

## 2023-02-20 VITALS
OXYGEN SATURATION: 98 % | DIASTOLIC BLOOD PRESSURE: 74 MMHG | SYSTOLIC BLOOD PRESSURE: 140 MMHG | HEART RATE: 70 BPM | TEMPERATURE: 98.7 F | RESPIRATION RATE: 16 BRPM

## 2023-02-20 DIAGNOSIS — Z00.00 ENCOUNTER FOR GENERAL ADULT MEDICAL EXAMINATION WITHOUT ABNORMAL FINDINGS: ICD-10-CM

## 2023-02-20 LAB
BASOPHILS # BLD AUTO: 0.04 10*3/MM3 (ref 0–0.2)
BASOPHILS NFR BLD AUTO: 0.7 % (ref 0–1.5)
CREAT SERPL-MCNC: 0.88 MG/DL (ref 0.76–1.27)
CRP SERPL-MCNC: <0.3 MG/DL (ref 0–0.5)
DEPRECATED RDW RBC AUTO: 45 FL (ref 37–54)
EGFRCR SERPLBLD CKD-EPI 2021: 91.4 ML/MIN/1.73
EOSINOPHIL # BLD AUTO: 0.32 10*3/MM3 (ref 0–0.4)
EOSINOPHIL NFR BLD AUTO: 5.7 % (ref 0.3–6.2)
ERYTHROCYTE [DISTWIDTH] IN BLOOD BY AUTOMATED COUNT: 14.5 % (ref 12.3–15.4)
HCT VFR BLD AUTO: 32.7 % (ref 37.5–51)
HGB BLD-MCNC: 11.1 G/DL (ref 13–17.7)
IMM GRANULOCYTES # BLD AUTO: 0.02 10*3/MM3 (ref 0–0.05)
IMM GRANULOCYTES NFR BLD AUTO: 0.4 % (ref 0–0.5)
LYMPHOCYTES # BLD AUTO: 1.41 10*3/MM3 (ref 0.7–3.1)
LYMPHOCYTES NFR BLD AUTO: 25.1 % (ref 19.6–45.3)
MCH RBC QN AUTO: 29 PG (ref 26.6–33)
MCHC RBC AUTO-ENTMCNC: 33.9 G/DL (ref 31.5–35.7)
MCV RBC AUTO: 85.4 FL (ref 79–97)
MONOCYTES # BLD AUTO: 0.48 10*3/MM3 (ref 0.1–0.9)
MONOCYTES NFR BLD AUTO: 8.5 % (ref 5–12)
NEUTROPHILS NFR BLD AUTO: 3.35 10*3/MM3 (ref 1.7–7)
NEUTROPHILS NFR BLD AUTO: 59.6 % (ref 42.7–76)
NRBC BLD AUTO-RTO: 0 /100 WBC (ref 0–0.2)
PLATELET # BLD AUTO: 210 10*3/MM3 (ref 140–450)
PMV BLD AUTO: 11 FL (ref 6–12)
RBC # BLD AUTO: 3.83 10*6/MM3 (ref 4.14–5.8)
WBC NRBC COR # BLD: 5.62 10*3/MM3 (ref 3.4–10.8)

## 2023-02-20 PROCEDURE — 82565 ASSAY OF CREATININE: CPT | Performed by: INTERNAL MEDICINE

## 2023-02-20 PROCEDURE — 86140 C-REACTIVE PROTEIN: CPT | Performed by: INTERNAL MEDICINE

## 2023-02-20 PROCEDURE — G0299 HHS/HOSPICE OF RN EA 15 MIN: HCPCS

## 2023-02-20 PROCEDURE — 85025 COMPLETE CBC W/AUTO DIFF WBC: CPT | Performed by: INTERNAL MEDICINE

## 2023-02-20 NOTE — HOME HEALTH
PLAN FOR NEXT SNV- PICC DRSG CHANGE, WEEKLY LABS, DM ASSESS/INSTRUCT--- BILAT FEET WOUND ASSESS, PHOTOS      2/20- WIFE ADHERANT W/ DAILY WOUND CARES, WOUNDS W/O S/S COMPLCIATIONS, EDGES CLOSED/ROLLED/CALLOUSED- WOUND BASES PINK, SCANT DRNG- PERIWOUND AREA IS CALLOUSED. SN CHANGED PICC DRESSING RUE- CLEANSED W/ CHLORASEPTIC SWAB X3, BIOPTACH APPLIED. PT TOLERATED W/O C/O DISCOMFORT. PICC  SITE W/O S/S COMPLCIATIONS. SN OBTAINED LAB FROM PICC AFTER FOLUSHED W/ 10CC NS THEN WAISTED 10CC BLOOD, OBTAINED SPECIMEN THEN BFLUSHED W/10CC NS, SN CHANGED INJECTION CAP- PT TOLERATED W/O C/O DISCOMFORT     HX- 72M with history of HTN and DM.  Recent hospitalization due to osteomyelitis. Diabetic ulcer to bilat feet and ampuations 3d toe LT foot.  Patient denies feeling in both feet..  T/I pt/spouse IV push antibiotic, flush.  Spouse able to teach back.  Ambulates with walker assist.

## 2023-02-21 ENCOUNTER — HOME CARE VISIT (OUTPATIENT)
Dept: HOME HEALTH SERVICES | Facility: HOME HEALTHCARE | Age: 73
End: 2023-02-21
Payer: COMMERCIAL

## 2023-02-21 DIAGNOSIS — Z87.898 HISTORY OF SEIZURES: ICD-10-CM

## 2023-02-22 NOTE — CASE COMMUNICATION
THE FOLLOWING SUPPLIES WERE ORDERED 2/21/23:    2X2 GAUZE  4X4 GAUZE  KERLIX    TOO EARLY TO ORDER TAPE    ORDERED THROUGH [employer] 4:30PM 2/21/23 (MARVIN)

## 2023-02-24 RX ORDER — LEVETIRACETAM 500 MG/1
TABLET ORAL
Qty: 60 TABLET | Refills: 0 | Status: SHIPPED | OUTPATIENT
Start: 2023-02-24 | End: 2023-03-27

## 2023-02-27 ENCOUNTER — LAB REQUISITION (OUTPATIENT)
Dept: LAB | Facility: HOSPITAL | Age: 73
End: 2023-02-27
Payer: MEDICARE

## 2023-02-27 ENCOUNTER — HOME CARE VISIT (OUTPATIENT)
Dept: HOME HEALTH SERVICES | Facility: HOME HEALTHCARE | Age: 73
End: 2023-02-27
Payer: COMMERCIAL

## 2023-02-27 VITALS
SYSTOLIC BLOOD PRESSURE: 133 MMHG | DIASTOLIC BLOOD PRESSURE: 69 MMHG | RESPIRATION RATE: 16 BRPM | HEART RATE: 72 BPM | OXYGEN SATURATION: 96 % | TEMPERATURE: 98.2 F

## 2023-02-27 DIAGNOSIS — Z00.00 ENCOUNTER FOR GENERAL ADULT MEDICAL EXAMINATION WITHOUT ABNORMAL FINDINGS: ICD-10-CM

## 2023-02-27 LAB
BASOPHILS # BLD AUTO: 0.05 10*3/MM3 (ref 0–0.2)
BASOPHILS NFR BLD AUTO: 1 % (ref 0–1.5)
CK SERPL-CCNC: 117 U/L (ref 20–200)
CREAT SERPL-MCNC: 0.66 MG/DL (ref 0.76–1.27)
CRP SERPL-MCNC: <0.3 MG/DL (ref 0–0.5)
DEPRECATED RDW RBC AUTO: 47.2 FL (ref 37–54)
EGFRCR SERPLBLD CKD-EPI 2021: 99.7 ML/MIN/1.73
EOSINOPHIL # BLD AUTO: 0.26 10*3/MM3 (ref 0–0.4)
EOSINOPHIL NFR BLD AUTO: 5.1 % (ref 0.3–6.2)
ERYTHROCYTE [DISTWIDTH] IN BLOOD BY AUTOMATED COUNT: 15 % (ref 12.3–15.4)
HCT VFR BLD AUTO: 34.1 % (ref 37.5–51)
HGB BLD-MCNC: 11.3 G/DL (ref 13–17.7)
IMM GRANULOCYTES # BLD AUTO: 0.02 10*3/MM3 (ref 0–0.05)
IMM GRANULOCYTES NFR BLD AUTO: 0.4 % (ref 0–0.5)
LYMPHOCYTES # BLD AUTO: 1.28 10*3/MM3 (ref 0.7–3.1)
LYMPHOCYTES NFR BLD AUTO: 25 % (ref 19.6–45.3)
MCH RBC QN AUTO: 28.9 PG (ref 26.6–33)
MCHC RBC AUTO-ENTMCNC: 33.1 G/DL (ref 31.5–35.7)
MCV RBC AUTO: 87.2 FL (ref 79–97)
MONOCYTES # BLD AUTO: 0.43 10*3/MM3 (ref 0.1–0.9)
MONOCYTES NFR BLD AUTO: 8.4 % (ref 5–12)
NEUTROPHILS NFR BLD AUTO: 3.09 10*3/MM3 (ref 1.7–7)
NEUTROPHILS NFR BLD AUTO: 60.1 % (ref 42.7–76)
NRBC BLD AUTO-RTO: 0 /100 WBC (ref 0–0.2)
PLATELET # BLD AUTO: 220 10*3/MM3 (ref 140–450)
PMV BLD AUTO: 11.1 FL (ref 6–12)
RBC # BLD AUTO: 3.91 10*6/MM3 (ref 4.14–5.8)
WBC NRBC COR # BLD: 5.13 10*3/MM3 (ref 3.4–10.8)

## 2023-02-27 PROCEDURE — 82550 ASSAY OF CK (CPK): CPT | Performed by: INTERNAL MEDICINE

## 2023-02-27 PROCEDURE — G0299 HHS/HOSPICE OF RN EA 15 MIN: HCPCS

## 2023-02-27 PROCEDURE — 86140 C-REACTIVE PROTEIN: CPT | Performed by: INTERNAL MEDICINE

## 2023-02-27 PROCEDURE — 85025 COMPLETE CBC W/AUTO DIFF WBC: CPT | Performed by: INTERNAL MEDICINE

## 2023-02-27 PROCEDURE — 82565 ASSAY OF CREATININE: CPT | Performed by: INTERNAL MEDICINE

## 2023-03-02 DIAGNOSIS — M79.609 PAIN IN EXTREMITY, UNSPECIFIED EXTREMITY: ICD-10-CM

## 2023-03-02 DIAGNOSIS — E11.42 DIABETIC PERIPHERAL NEUROPATHY: ICD-10-CM

## 2023-03-02 RX ORDER — HYDROCODONE BITARTRATE AND ACETAMINOPHEN 10; 325 MG/1; MG/1
1 TABLET ORAL EVERY 6 HOURS PRN
Qty: 60 TABLET | Refills: 0 | Status: SHIPPED | OUTPATIENT
Start: 2023-03-02 | End: 2023-03-17 | Stop reason: SDUPTHER

## 2023-03-02 NOTE — TELEPHONE ENCOUNTER
Medication Refill Request    Date of phone call: 3/2/23    Medication being requested: Norco  mg    si tab po q 6 hrs prn  Qty: 120    Date of last visit: 23    Date of last refill:     HOME up to date?:     Next Follow up?: 3/17/23    Any new pertinent information? (i.e, new medication allergies, new use of medications, change in patient's health or condition, non-compliance or inconsistency with prescribing agreement?): Can you refill for Mary?

## 2023-03-06 ENCOUNTER — OFFICE VISIT (OUTPATIENT)
Dept: INFECTIOUS DISEASES | Facility: CLINIC | Age: 73
End: 2023-03-06
Payer: MEDICARE

## 2023-03-06 VITALS
TEMPERATURE: 97.1 F | BODY MASS INDEX: 32.73 KG/M2 | HEART RATE: 70 BPM | WEIGHT: 209 LBS | SYSTOLIC BLOOD PRESSURE: 135 MMHG | RESPIRATION RATE: 20 BRPM | DIASTOLIC BLOOD PRESSURE: 71 MMHG

## 2023-03-06 DIAGNOSIS — I96 GANGRENE OF FOOT: ICD-10-CM

## 2023-03-06 DIAGNOSIS — M86.60 CHRONIC OSTEOMYELITIS: Primary | ICD-10-CM

## 2023-03-06 DIAGNOSIS — E11.65 UNCONTROLLED TYPE 2 DIABETES MELLITUS WITH HYPERGLYCEMIA: ICD-10-CM

## 2023-03-06 DIAGNOSIS — E66.9 OBESITY (BMI 30-39.9): ICD-10-CM

## 2023-03-06 DIAGNOSIS — Z79.2 LONG TERM (CURRENT) USE OF ANTIBIOTICS: ICD-10-CM

## 2023-03-06 DIAGNOSIS — I73.9 PERIPHERAL VASCULAR DISEASE: ICD-10-CM

## 2023-03-06 PROCEDURE — 99214 OFFICE O/P EST MOD 30 MIN: CPT | Performed by: INTERNAL MEDICINE

## 2023-03-06 NOTE — PROGRESS NOTES
ID CLINIC NOTE    CC: f/u chronic osteomyelitis of both feet    History is from pt and his wife who adds helpful details    HPI: Kelby Peters is a 72 y.o. male here for f/u chronic osteomyelitis of both feet. He is s/p arteriogram and B foot debridement in the OR on 1/19/23. He also underwent left 3rd toe amputation. Cultures only grew normal skin joseph; however, his pre-op wound culture grew MSSA and Strep. Therefore I treated him with 6 weeks of IV cefazolin. He has tolerated it well. No rashes or diarrhea. No issues w/ his PICC line. He has an open wound on the base of the left great toe and lateral portion of the R 5th toe. There is no erythema or drainage. It is dry. He continues to follow with Dr NICHOLAS Alvarado (vascular) and they are considering synthetic skin grafts over these sites.      Past Medical History:   Diagnosis Date   • Basal cell carcinoma 06/2022   • Brain injury    • Broken toes    • Diabetes mellitus (HCC)    • Finger injury    • Gallbladder obstruction    • Hearing disorder    • Hiatal hernia    • Hypercholesterolemia    • Leg injury    • Migraine    • Peripheral neuropathy    • Prostate enlargement    • Seizures (HCC)    • Shingles    • Sleep apnea        Past Surgical History:   Procedure Laterality Date   • AMPUTATION DIGIT Left 1/19/2023    Procedure: 3RD LEFT TOE AMPUTATION AND LEFT FOOT DEBRIDEMENT;  Surgeon: Ivonne Martin MD;  Location: [facility] 18/19;  Service: Vascular;  Laterality: Left;   • ANGIOPLASTY FEMORAL ARTERY Left 1/19/2023    Procedure: LEFT LEG ANGIOGRAM;  Surgeon: Ivonne Martin MD;  Location: [facility] 18/19;  Service: Vascular;  Laterality: Left;   • BASAL CELL CARCINOMA EXCISION  10/13/2022   • CATARACT EXTRACTION Right    • CORNEAL TRANSPLANT Left 03/31/2021   • EYE SURGERY Left    • GALLBLADDER SURGERY     • INNER EAR SURGERY       Social History:    Lives in [facility] w/ his wife     Antibiotic allergies and intolerances:   None    Medications:     Current Outpatient Medications:   •  acetaminophen (TYLENOL) 500 MG tablet, Take 2 tablets by mouth Every 8 (Eight) Hours., Disp: 30 tablet, Rfl: 0  •  amitriptyline (ELAVIL) 10 MG tablet, Take 1 tablet by mouth Daily., Disp: , Rfl:   •  amLODIPine (NORVASC) 10 MG tablet, TAKE ONE TABLET BY MOUTH DAILY , NEEDS FOLLOW UP APPOINTMENT FOR REFILLS, Disp: 90 tablet, Rfl: 0  •  aspirin 81 MG EC tablet, Take 1 tablet by mouth Daily., Disp: 30 tablet, Rfl: 3  •  clopidogrel (PLAVIX) 75 MG tablet, Take 1 tablet by mouth Daily., Disp: 30 tablet, Rfl: 3  •  dorzolamide-timolol (COSOPT) 22.3-6.8 MG/ML ophthalmic solution, Administer 1 drop to both eyes 2 (Two) Times a Day. Indications: Wide-Angle Glaucoma, Disp: , Rfl:   •  gabapentin (NEURONTIN) 800 MG tablet, Take 1 tablet by mouth 4 (Four) Times a Day., Disp: 120 tablet, Rfl: 2  •  glucose blood (OneTouch Verio) test strip, Use to check blood sugar  4 time daily  E11.8, Disp: 400 each, Rfl: 3  •  hydroCHLOROthiazide (HYDRODIURIL) 25 MG tablet, Take 1 tablet by mouth Daily., Disp: 30 tablet, Rfl: 3  •  HYDROcodone-acetaminophen (NORCO)  MG per tablet, Take 1 tablet by mouth Every 6 (Six) Hours As Needed for Severe Pain. Indications: Pain, Disp: 60 tablet, Rfl: 0  •  insulin NPH (NovoLIN N ReliOn) 100 UNIT/ML injection, Inject 13 Units under the skin into the appropriate area as directed 2 (Two) Times a Day Before Meals., Disp: 90 mL, Rfl: 1  •  Insulin Pen Needle (BD Pen Needle Micro U/F) 32G X 6 MM misc, Use 5 injections daily, Disp: 1500 each, Rfl: 3  •  insulin regular (humuLIN R,novoLIN R) 100 UNIT/ML injection, Inject 12 Units under the skin into the appropriate area as directed 3 (Three) Times a Day Before Meals., Disp: , Rfl:   •  [employer] LANCETS MICRO THIN 33G misc, , Disp: , Rfl:   •  levETIRAcetam (KEPPRA) 500 MG tablet, TAKE ONE TABLET BY MOUTH TWICE A DAY, Disp: 60 tablet, Rfl: 0  •  lisinopril (PRINIVIL,ZESTRIL) 10 MG tablet, Take 1  tablet by mouth Daily., Disp: 30 tablet, Rfl: 2  •  mupirocin (BACTROBAN) 2 % ointment, , Disp: , Rfl:   •  OneTouch Delica Lancets 33G misc, Use to check blood sugar 4 times daily  E11.8, Disp: 400 each, Rfl: 3  •  povidone-iodine (BETADINE) 10 % external solution, Apply 1 application topically to the appropriate area as directed Daily. Indications: Wound, Disp: , Rfl:   •  prednisoLONE acetate (PRED FORTE) 1 % ophthalmic suspension, Administer 1 drop to both eyes 3 (Three) Times a Day. Indications: Conjunctivitis, Disp: , Rfl:   •  rosuvastatin (CRESTOR) 10 MG tablet, Take 1 tablet by mouth Every Night., Disp: 90 tablet, Rfl: 1  •  Santyl 250 UNIT/GM ointment, , Disp: , Rfl:   •  sodium hypochlorite (DAKIN'S 1/4 STRENGTH) 0.125 % solution topical solution 0.125%, Apply topical to affected areas two times daily, Disp: 473 mL, Rfl: 0  •  sulfamethoxazole-trimethoprim (BACTRIM DS,SEPTRA DS) 800-160 MG per tablet, , Disp: , Rfl:   •  valsartan-hydrochlorothiazide (DIOVAN-HCT) 160-12.5 MG per tablet, Take 1 tablet by mouth Daily., Disp: , Rfl:       OBJECTIVE:  Blood pressure 135/71, pulse 70, temperature 97.1 °F (36.2 °C), resp. rate 20, weight 94.8 kg (209 lb).    General: NAD, very nice  Eyes: no scleral icterus  ENT: wearing mask  Cardiovascular: NR  Respiratory: normal work of breathing on RA  GI: Abdomen is soft, not tender or distended  MSK:  feet are dry; no erythema; open wounds as mentioned above in HPI are dry  Vasc: PICC in RUE w/o erythema    DIAGNOSTICS:  CBC, Crt, CRP, and wound culture reviewed today  Lab Results   Component Value Date    WBC 5.13 02/27/2023    HGB 11.3 (L) 02/27/2023    HCT 34.1 (L) 02/27/2023     02/27/2023     Lab Results   Component Value Date    CREATININE 0.66 (L) 02/27/2023       Lab Results   Component Value Date    CRP <0.30 02/27/2023     Lab Results   Component Value Date    HGBA1C 9.10 (H) 01/16/2023       Microbiology:  1/15 BCx: negative  1/16 L foot Wound Cx: light  "growth Strep constellatus (susc penicillin) and MSSA  1/19 OR L Foot Cx: rare skin joseph     Prior Radiology:  -MRI R foot shows osteomyelitis 5th metatarsal and septic arthritis at MTP joint     -MRI L Foot: \"Multiple open wounds at the left forefoot with osteomyelitis involving the phalanges of the 3rd toe, the 5th metatarsal head, and the medial great toe sesamoid as well as the plantar lateral side of the 1st metatarsal head. Septic arthritis at the 1st metatarsophalangeal joint. \"     -Left ALEIDA showed moderate digital ischemia     -Right ALEIDA showed minimal digital ischemia      ASSESSMENT/PLAN:  1. Gangrene and chronic osteomyelitis of left 3rd toe  2. Chronic osteomyelitis left 5th metatarsal, 1st metatarsal, and great toe  3. Chronic osteomyelitis right 5th metatarsal and septic arthritis MTP joint  4. Peripheral vascular disease  5. Peripheral neuropathy  6. Uncontrolled DM2 - complicated by foot ulcer - A1c 9.1%  7. Obesity BMI 32  8. Long term use of antibiotics - new    He has had a good clinical and laboratory response (WBC and CRP) to revascularization, surgical debridement, and 6 weeks of IV cefazolin. DC PICC line and cefazolin today. Continue follow-up w/ vascular surgeon. We discussed signs and symptoms of recurrent infection, and he will call me if those occur.   "

## 2023-03-07 ENCOUNTER — HOME CARE VISIT (OUTPATIENT)
Dept: HOME HEALTH SERVICES | Facility: HOME HEALTHCARE | Age: 73
End: 2023-03-07
Payer: COMMERCIAL

## 2023-03-07 VITALS
DIASTOLIC BLOOD PRESSURE: 80 MMHG | RESPIRATION RATE: 16 BRPM | SYSTOLIC BLOOD PRESSURE: 150 MMHG | TEMPERATURE: 99 F | OXYGEN SATURATION: 97 % | HEART RATE: 72 BPM

## 2023-03-07 PROCEDURE — G0162 HHC RN E&M PLAN SVS, 15 MIN: HCPCS

## 2023-03-09 ENCOUNTER — OFFICE VISIT (OUTPATIENT)
Dept: ENDOCRINOLOGY | Age: 73
End: 2023-03-09
Payer: MEDICARE

## 2023-03-09 VITALS
WEIGHT: 212.4 LBS | HEART RATE: 76 BPM | BODY MASS INDEX: 33.34 KG/M2 | OXYGEN SATURATION: 96 % | DIASTOLIC BLOOD PRESSURE: 70 MMHG | TEMPERATURE: 97.1 F | SYSTOLIC BLOOD PRESSURE: 132 MMHG | HEIGHT: 67 IN

## 2023-03-09 DIAGNOSIS — Z79.4 CONTROLLED TYPE 2 DIABETES MELLITUS WITHOUT COMPLICATION, WITH LONG-TERM CURRENT USE OF INSULIN: ICD-10-CM

## 2023-03-09 DIAGNOSIS — Z79.4 TYPE 2 DIABETES MELLITUS WITH HYPERGLYCEMIA, WITH LONG-TERM CURRENT USE OF INSULIN: Primary | ICD-10-CM

## 2023-03-09 DIAGNOSIS — E11.9 CONTROLLED TYPE 2 DIABETES MELLITUS WITHOUT COMPLICATION, WITH LONG-TERM CURRENT USE OF INSULIN: ICD-10-CM

## 2023-03-09 DIAGNOSIS — E11.65 TYPE 2 DIABETES MELLITUS WITH HYPERGLYCEMIA, WITH LONG-TERM CURRENT USE OF INSULIN: Primary | ICD-10-CM

## 2023-03-09 PROCEDURE — 3046F HEMOGLOBIN A1C LEVEL >9.0%: CPT | Performed by: INTERNAL MEDICINE

## 2023-03-09 PROCEDURE — 1160F RVW MEDS BY RX/DR IN RCRD: CPT | Performed by: INTERNAL MEDICINE

## 2023-03-09 PROCEDURE — 99213 OFFICE O/P EST LOW 20 MIN: CPT | Performed by: INTERNAL MEDICINE

## 2023-03-09 PROCEDURE — 3075F SYST BP GE 130 - 139MM HG: CPT | Performed by: INTERNAL MEDICINE

## 2023-03-09 PROCEDURE — 3078F DIAST BP <80 MM HG: CPT | Performed by: INTERNAL MEDICINE

## 2023-03-09 PROCEDURE — 1159F MED LIST DOCD IN RCRD: CPT | Performed by: INTERNAL MEDICINE

## 2023-03-09 RX ORDER — HUMAN INSULIN 100 [IU]/ML
40 INJECTION, SUSPENSION SUBCUTANEOUS 2 TIMES DAILY WITH MEALS
Qty: 20 ML | Refills: 6 | Status: SHIPPED | OUTPATIENT
Start: 2023-03-09

## 2023-03-09 RX ORDER — LANCETS 33 GAUGE
EACH MISCELLANEOUS
Qty: 400 EACH | Refills: 3 | Status: SHIPPED | OUTPATIENT
Start: 2023-03-09

## 2023-03-09 NOTE — PATIENT INSTRUCTIONS
Your new treatment for diabetes is:  Relion 70/30 insulin as 40 units before Breakfast and 40 units before Supper.

## 2023-03-09 NOTE — PROGRESS NOTES
New Patient      Chief Complaint    Chief Complaint   Patient presents with   • Diabetes     Type 2: Pt one touch meter is attached, is up to date on eye exam, does have retinopathy, sever neuropathy.         HPI:   Kelby Peters is a 72 y.o. male usually seen in the Endocrinology clinic for consultative follow up and management of uncontrolled diabetes.  He was last seen in endocrinology on March 1, 2022.  He has type 2 diabetes diagnosed more than 30 years ago, currently with complications of diabetic polyneuropathy.  He is insensate.  His last dilated eye examination was about 3 weeks ago and he has had significant proliferative diabetic retinopathy, requiring laser treatment and injections.  He does not have any history or symptoms suggestive of coronary heart disease he does not have any evidence of chronic kidney disease but he has nephropathy.  His last creatinine on February 20, 2023, was 0.88 mg/dL with an estimated GFR of 91.4.  His albumin creatinine ratio on May 24, 2022, was 309 mg/g.  For the treatment of his diabetes, he is currently on Humulin NPH as 30 units 2 times daily and prandial regular insulin as anywhere from 17 to 20 units 2-3 times daily.  He does self-monitoring blood glucose about 4 times daily and he has had occasional low blood sugars down to the 50s, usually occurring around lunchtime.  With what he is doing, his A1c on January 16, 2023, was 9.1%, down from 10.4% on December 7, 2022 and compared to 11.5% on May 24, 2022 and to 12.0% on November 18, 2021.  He was pleased with the improvement.  On today's visit he specifically wished and requested for a freestyle jeremy for continuous glucose monitoring.  He does not have any history of thyroid disease and is TSH on May 24, 2022, was 1.720 uIU/mL.        Past Medical History:   Diagnosis Date   • Basal cell carcinoma 06/2022   • Brain injury    • Broken toes    • Diabetes mellitus (HCC)    • Finger injury    • Gallbladder obstruction    •  Hearing disorder    • Hiatal hernia    • Hypercholesterolemia    • Leg injury    • Migraine    • Peripheral neuropathy    • Prostate enlargement    • Seizures (HCC)    • Shingles    • Sleep apnea           ROS:  Pertinent to this visit, only as mentioned above.  The rest was negative.    Social History     Socioeconomic History   • Marital status:    Tobacco Use   • Smoking status: Former   • Smokeless tobacco: Never   Vaping Use   • Vaping Use: Never used   Substance and Sexual Activity   • Alcohol use: Yes     Comment: social drinker   • Drug use: Yes     Types: Marijuana   • Sexual activity: Yes     Partners: Female     Physical Exam:  GENERAL: He looked well.  Accompanied by his wife.  Hard of hearing.  HEENT: Normal examination.      Assessment:  1.  Uncontrolled type 2 diabetes with complications as mentioned above.    Diagnoses and all orders for this visit:    1. Type 2 diabetes mellitus with hyperglycemia, with long-term current use of insulin (HCC) (Primary)    2. Controlled type 2 diabetes mellitus without complication, with long-term current use of insulin (HCC)  -     glucose blood (OneTouch Verio) test strip; Use to check blood sugar  4 time daily  E11.8  Dispense: 400 each; Refill: 3    Other orders  -     insulin NPH-insulin regular (NovoLIN 70/30 ReliOn) (70-30) 100 UNIT/ML injection; Inject 40 Units under the skin into the appropriate area as directed 2 (Two) Times a Day With Meals. Inject  Units in the AM &  Units in the PM, and titrate as directed up to 50 units twice a day.  Dispense: 20 mL; Refill: 6  -     OneTouch Delica Lancets 33G misc; Use to check blood sugar 4 times daily  E11.8  Dispense: 400 each; Refill: 3  -     glucagon (GLUCAGEN) 1 MG injection; For emergency use for severe low glucose: first inject, then call 911, once awake try to give oral treatment. Kit expires in 6-12 months.  Dispense: 1 kit; Refill: 6    Recommendations:  1.  We reviewed with Mr. Peters and his wife,  his diabetic treatment, insulin doses and talked about the slight improvement in his glycemic control.  2.  However, his A1c is still not at goal.  3.  We reviewed options to simplify his treatment and our recommendation today was to switch him to premixed ReliOn 70/30 as a twice daily injection.  4.  He was in agreement with that and so we switched him to Humulin 70/30, initially as 40 units 2 times daily with instructions to increase it to 50 units 2 times daily if his blood sugars remain rather high.  5.  We also gave them a prescription for glucagon and instructed the wife to know its use in emergency situations of unresponsiveness due to a low blood sugar while calling the emergency services.  6.  We were able to give him a sample of freestyle jeremy for continuous glucose monitoring along with the necessary prescriptions.  7.  He will come back for follow-up with a repeat A1c in 3 months but he is aware to contact us before then if he should have any problems.  8.  We gave him and his wife the opportunity to ask questions, which we answered and we addressed their concerns.

## 2023-03-10 ENCOUNTER — PATIENT ROUNDING (BHMG ONLY) (OUTPATIENT)
Dept: ENDOCRINOLOGY | Age: 73
End: 2023-03-10
Payer: MEDICARE

## 2023-03-15 ENCOUNTER — PRIOR AUTHORIZATION (OUTPATIENT)
Dept: ENDOCRINOLOGY | Age: 73
End: 2023-03-15
Payer: MEDICARE

## 2023-03-15 ENCOUNTER — TELEPHONE (OUTPATIENT)
Dept: ENDOCRINOLOGY | Age: 73
End: 2023-03-15
Payer: MEDICARE

## 2023-03-17 ENCOUNTER — OFFICE VISIT (OUTPATIENT)
Dept: PAIN MEDICINE | Facility: CLINIC | Age: 73
End: 2023-03-17
Payer: MEDICARE

## 2023-03-17 VITALS
BODY MASS INDEX: 33.74 KG/M2 | TEMPERATURE: 97.3 F | HEART RATE: 72 BPM | OXYGEN SATURATION: 98 % | RESPIRATION RATE: 18 BRPM | DIASTOLIC BLOOD PRESSURE: 76 MMHG | WEIGHT: 215 LBS | HEIGHT: 67 IN | SYSTOLIC BLOOD PRESSURE: 175 MMHG

## 2023-03-17 DIAGNOSIS — G62.9 POLYNEUROPATHY: ICD-10-CM

## 2023-03-17 DIAGNOSIS — M79.609 PAIN IN EXTREMITY, UNSPECIFIED EXTREMITY: ICD-10-CM

## 2023-03-17 DIAGNOSIS — G89.29 OTHER CHRONIC PAIN: ICD-10-CM

## 2023-03-17 DIAGNOSIS — Z79.899 ENCOUNTER FOR LONG-TERM (CURRENT) USE OF HIGH-RISK MEDICATION: ICD-10-CM

## 2023-03-17 DIAGNOSIS — E11.42 DIABETIC PERIPHERAL NEUROPATHY: Primary | ICD-10-CM

## 2023-03-17 PROCEDURE — 1159F MED LIST DOCD IN RCRD: CPT | Performed by: NURSE PRACTITIONER

## 2023-03-17 PROCEDURE — 3077F SYST BP >= 140 MM HG: CPT | Performed by: NURSE PRACTITIONER

## 2023-03-17 PROCEDURE — 1125F AMNT PAIN NOTED PAIN PRSNT: CPT | Performed by: NURSE PRACTITIONER

## 2023-03-17 PROCEDURE — 3078F DIAST BP <80 MM HG: CPT | Performed by: NURSE PRACTITIONER

## 2023-03-17 PROCEDURE — 99214 OFFICE O/P EST MOD 30 MIN: CPT | Performed by: NURSE PRACTITIONER

## 2023-03-17 PROCEDURE — 1160F RVW MEDS BY RX/DR IN RCRD: CPT | Performed by: NURSE PRACTITIONER

## 2023-03-17 RX ORDER — HYDROCODONE BITARTRATE AND ACETAMINOPHEN 10; 325 MG/1; MG/1
1 TABLET ORAL EVERY 6 HOURS PRN
Qty: 120 TABLET | Refills: 0 | Status: SHIPPED | OUTPATIENT
Start: 2023-03-17

## 2023-03-17 NOTE — PROGRESS NOTES
CHIEF COMPLAINT  Follow-up for extremity pain.    Subjective   Kelby Peters is a 72 y.o. male  who presents for follow-up.  He has a history of extremity pain d/t peripheral neuropathy. Today his pain is 6/10VAS in severity. He continues with Hydrocodone 10/325 4/day and gabapentin 800 mg 4/day(1-1-2). Also continues with Keppra 500 mg BID (prescribed by PCP). This medication regimen decreases his pain by a significant amount and allows him to remain active. He denies any side effects including somnolence or constipation.     He continues to work with Dr. 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      He is currently working with ID and vascular surgery due to diabetic ulcers with osteomyelitis in his bilateral feet.     Failed Cymbalta.  Horizant and Gralise Cost-prohibitive.  Failed Lyrica---pedal edema  History of IDDM.    Extremity Pain   The pain is present in the left hand, right hand, left lower leg and right lower leg. This is a chronic problem. The current episode started more than 1 year ago. The problem occurs constantly. The problem has been waxing and waning. The pain is at a severity of 6/10. The pain is moderate. Associated symptoms include numbness. Pertinent negatives include no fever. He has tried oral narcotics and rest for the symptoms. The treatment provided significant relief. His past medical history is significant for diabetes.      PEG Assessment   What number best describes your pain on average in the past week?7  What number best describes how, during the past week, pain has interfered with your enjoyment of life?6  What number best describes how, during the past week, pain has interfered with your general activity?  6      Review of Pertinent Medical Data ---  Hospital admit from 1/15/2023 to 1/21/2023 reviewed.  Patient presents with multiple diabetic foot wounds and was admitted for further evaluation.  He was diagnosed with gangrene, chronic  "osteomyelitis of left third toe, chronic osteomyelitis of left fifth metatarsal, first metatarsal, and great toe, chronic osteomyelitis of right fifth metatarsal and septic arthritis MTP joint as well as bacteremia secondary to streptococcal constellatus and MSSA.  ID and vascular surgery consulted.  He underwent an arteriogram, foot debridement, and third toe amputation on 1/19/2023.  He was discharged with a PICC line with plans to continue cefazolin every 8 hours x6 weeks.  Patient was discharged home.    The following portions of the patient's history were reviewed and updated as appropriate: allergies, current medications, past family history, past medical history, past social history, past surgical history and problem list.    Review of Systems   Constitutional: Negative for fatigue and fever.   HENT: Negative for congestion.    Eyes: Negative for visual disturbance.   Gastrointestinal: Negative for constipation and diarrhea.   Genitourinary: Negative for difficulty urinating.   Musculoskeletal: Negative for arthralgias.   Neurological: Positive for weakness and numbness.   Psychiatric/Behavioral: Negative for sleep disturbance and suicidal ideas. The patient is nervous/anxious.      --  The aforementioned information the Chief Complaint section and above subjective data including any HPI data, and also the Review of Systems data, has been personally reviewed and affirmed.  --    Vitals:    03/17/23 0924   BP: 175/76   Pulse: 72   Resp: 18   Temp: 97.3 °F (36.3 °C)   SpO2: 98%   Weight: 97.5 kg (215 lb)   Height: 170.2 cm (67.01\")   PainSc:   6   PainLoc: Hand     Objective   Physical Exam  Vitals and nursing note reviewed.   Constitutional:       Appearance: Normal appearance. He is well-developed.   Eyes:      General: Lids are normal.   Cardiovascular:      Rate and Rhythm: Normal rate.   Pulmonary:      Effort: Pulmonary effort is normal.   Musculoskeletal:      Right hand: Tenderness present.      Left " hand: Tenderness present.      Right foot: Tenderness present.      Left foot: Tenderness present.      Comments: Post-operative shoe to bilateral feet   Neurological:      Mental Status: He is alert and oriented to person, place, and time.   Psychiatric:         Attention and Perception: Attention normal.         Mood and Affect: Mood normal.         Speech: Speech normal.         Behavior: Behavior normal.         Judgment: Judgment normal.       Assessment & Plan   Diagnoses and all orders for this visit:    1. Diabetic peripheral neuropathy (HCC) (Primary)    2. Pain in extremity, unspecified extremity    3. Polyneuropathy    4. Encounter for long-term (current) use of high-risk medication    5. Other chronic pain      --- Routine UDS in office today as part of monitoring requirements for controlled substances.  The specimen was viewed and the immunoassay result reviewed and is 1/6/2023.  This specimen will be sent to [facility] for confirmation.     --- CSA updated 6/22/2022  --- Refill Odin 10/325. Patient appears stable with current regimen. No adverse effects. Regarding continuation of opioids, there is no evidence of aberrant behavior or any red flags.  The patient continues with appropriate response to opioid therapy. ADL's remain intact by self.   --- Follow-up 2 months or sooner if needed.      HOME REPORT  As part of the patient's treatment plan, I am prescribing controlled substances. The patient has been made aware of appropriate use of such medications, including potential risk of somnolence, limited ability to drive and/or work safely, and the potential for dependence or overdose. It has also been made clear that these medications are for use by this patient only, without concomitant use of alcohol or other substances unless prescribed.     Patient has completed prescribing agreement detailing terms of continued prescribing of controlled substances, including monitoring HOME reports,  urine drug screening, and pill counts if necessary. The patient is aware that inappropriate use will results in cessation of prescribing such medications.    As the clinician, I personally reviewed the HOME from 3/17/2023 while the patient was in the office today.    History and physical exam exhibit continued safe and appropriate use of controlled substances.    Dictated utilizing Dragon dictation.     Patient remained masked during entire encounter. No cough present. I donned a mask and eye protection throughout entire visit. Prior to donning mask and eye protection, hand hygiene was performed, as well as when it was doffed.  I was closer than 6 feet, but not for an extended period of time. No obvious exposure to any bodily fluids.

## 2023-03-21 ENCOUNTER — TRANSCRIBE ORDERS (OUTPATIENT)
Dept: ADMINISTRATIVE | Facility: HOSPITAL | Age: 73
End: 2023-03-21
Payer: MEDICARE

## 2023-03-21 ENCOUNTER — LAB (OUTPATIENT)
Dept: LAB | Facility: HOSPITAL | Age: 73
End: 2023-03-21
Payer: MEDICARE

## 2023-03-21 DIAGNOSIS — N18.6 TYPE 2 DIABETES MELLITUS WITH ESRD (END-STAGE RENAL DISEASE): ICD-10-CM

## 2023-03-21 DIAGNOSIS — R80.0 ISOLATED NON-NEPHROTIC PROTEINURIA: Primary | ICD-10-CM

## 2023-03-21 DIAGNOSIS — I12.9 HYPERTENSIVE NEPHROPATHY: ICD-10-CM

## 2023-03-21 DIAGNOSIS — E11.22 TYPE 2 DIABETES MELLITUS WITH ESRD (END-STAGE RENAL DISEASE): ICD-10-CM

## 2023-03-21 DIAGNOSIS — R80.0 ISOLATED NON-NEPHROTIC PROTEINURIA: ICD-10-CM

## 2023-03-21 LAB
ALBUMIN SERPL-MCNC: 4.1 G/DL (ref 3.5–5.2)
ALBUMIN UR-MCNC: 24.4 MG/DL
ANION GAP SERPL CALCULATED.3IONS-SCNC: 9.2 MMOL/L (ref 5–15)
BACTERIA UR QL AUTO: NORMAL /HPF
BILIRUB UR QL STRIP: NEGATIVE
BUN SERPL-MCNC: 16 MG/DL (ref 8–23)
BUN/CREAT SERPL: 23.5 (ref 7–25)
C3 SERPL-MCNC: 190 MG/DL (ref 82–167)
C4 SERPL-MCNC: 35 MG/DL (ref 14–44)
CALCIUM SPEC-SCNC: 9.2 MG/DL (ref 8.6–10.5)
CHLORIDE SERPL-SCNC: 99 MMOL/L (ref 98–107)
CLARITY UR: CLEAR
CO2 SERPL-SCNC: 28.8 MMOL/L (ref 22–29)
COLOR UR: YELLOW
CREAT SERPL-MCNC: 0.68 MG/DL (ref 0.76–1.27)
CREAT UR-MCNC: 12.6 MG/DL
CREAT UR-MCNC: 12.6 MG/DL
EGFRCR SERPLBLD CKD-EPI 2021: 98.8 ML/MIN/1.73
GLUCOSE SERPL-MCNC: 97 MG/DL (ref 65–99)
GLUCOSE UR STRIP-MCNC: NEGATIVE MG/DL
HBV SURFACE AB SER RIA-ACNC: NORMAL
HBV SURFACE AG SERPL QL IA: NORMAL
HCV AB SER DONR QL: NORMAL
HGB UR QL STRIP.AUTO: ABNORMAL
HIV1+2 AB SER QL: NORMAL
HYALINE CASTS UR QL AUTO: NORMAL /LPF
KETONES UR QL STRIP: NEGATIVE
LEUKOCYTE ESTERASE UR QL STRIP.AUTO: NEGATIVE
MICROALBUMIN/CREAT UR: 1936.5 MG/G
NITRITE UR QL STRIP: NEGATIVE
PH UR STRIP.AUTO: 7.5 [PH] (ref 5–8)
PHOSPHATE SERPL-MCNC: 3.6 MG/DL (ref 2.5–4.5)
POTASSIUM SERPL-SCNC: 4 MMOL/L (ref 3.5–5.2)
PROT ?TM UR-MCNC: 38.1 MG/DL
PROT UR QL STRIP: ABNORMAL
PROT/CREAT UR: 3023.8 MG/G CREA (ref 0–200)
RBC # UR STRIP: NORMAL /HPF
REF LAB TEST METHOD: NORMAL
SODIUM SERPL-SCNC: 137 MMOL/L (ref 136–145)
SP GR UR STRIP: 1.01 (ref 1–1.03)
SQUAMOUS #/AREA URNS HPF: NORMAL /HPF
UROBILINOGEN UR QL STRIP: ABNORMAL
WBC # UR STRIP: NORMAL /HPF

## 2023-03-21 PROCEDURE — 83516 IMMUNOASSAY NONANTIBODY: CPT

## 2023-03-21 PROCEDURE — 86160 COMPLEMENT ANTIGEN: CPT

## 2023-03-21 PROCEDURE — 86803 HEPATITIS C AB TEST: CPT

## 2023-03-21 PROCEDURE — 82043 UR ALBUMIN QUANTITATIVE: CPT

## 2023-03-21 PROCEDURE — 86037 ANCA TITER EACH ANTIBODY: CPT

## 2023-03-21 PROCEDURE — 86704 HEP B CORE ANTIBODY TOTAL: CPT

## 2023-03-21 PROCEDURE — 84156 ASSAY OF PROTEIN URINE: CPT

## 2023-03-21 PROCEDURE — 86334 IMMUNOFIX E-PHORESIS SERUM: CPT

## 2023-03-21 PROCEDURE — 87340 HEPATITIS B SURFACE AG IA: CPT

## 2023-03-21 PROCEDURE — 86706 HEP B SURFACE ANTIBODY: CPT

## 2023-03-21 PROCEDURE — 81001 URINALYSIS AUTO W/SCOPE: CPT

## 2023-03-21 PROCEDURE — 82595 ASSAY OF CRYOGLOBULIN: CPT

## 2023-03-21 PROCEDURE — 82784 ASSAY IGA/IGD/IGG/IGM EACH: CPT

## 2023-03-21 PROCEDURE — G0432 EIA HIV-1/HIV-2 SCREEN: HCPCS

## 2023-03-21 PROCEDURE — 86225 DNA ANTIBODY NATIVE: CPT

## 2023-03-21 PROCEDURE — 84155 ASSAY OF PROTEIN SERUM: CPT

## 2023-03-21 PROCEDURE — 84165 PROTEIN E-PHORESIS SERUM: CPT

## 2023-03-21 PROCEDURE — 36415 COLL VENOUS BLD VENIPUNCTURE: CPT

## 2023-03-21 PROCEDURE — 83521 IG LIGHT CHAINS FREE EACH: CPT

## 2023-03-21 PROCEDURE — 82570 ASSAY OF URINE CREATININE: CPT

## 2023-03-21 PROCEDURE — 80069 RENAL FUNCTION PANEL: CPT

## 2023-03-22 LAB
ALBUMIN SERPL ELPH-MCNC: 3.7 G/DL (ref 2.9–4.4)
ALBUMIN/GLOB SERPL: 1.1 {RATIO} (ref 0.7–1.7)
ALPHA1 GLOB SERPL ELPH-MCNC: 0.2 G/DL (ref 0–0.4)
ALPHA2 GLOB SERPL ELPH-MCNC: 1 G/DL (ref 0.4–1)
B-GLOBULIN SERPL ELPH-MCNC: 1.2 G/DL (ref 0.7–1.3)
C-ANCA TITR SER IF: NORMAL TITER
DSDNA AB SER-ACNC: <1 IU/ML (ref 0–9)
GAMMA GLOB SERPL ELPH-MCNC: 0.9 G/DL (ref 0.4–1.8)
GLOBULIN SER-MCNC: 3.4 G/DL (ref 2.2–3.9)
HBV CORE AB SERPL QL IA: NEGATIVE
IGA SERPL-MCNC: 281 MG/DL (ref 61–437)
IGG SERPL-MCNC: 1026 MG/DL (ref 603–1613)
IGM SERPL-MCNC: 56 MG/DL (ref 15–143)
INTERPRETATION SERPL IEP-IMP: NORMAL
LABORATORY COMMENT REPORT: NORMAL
M PROTEIN SERPL ELPH-MCNC: NORMAL G/DL
MYELOPEROXIDASE AB SER IA-ACNC: <0.2 UNITS (ref 0–0.9)
P-ANCA ATYPICAL TITR SER IF: NORMAL TITER
P-ANCA TITR SER IF: NORMAL TITER
PROT SERPL-MCNC: 7.1 G/DL (ref 6–8.5)
PROTEINASE3 AB SER IA-ACNC: <0.2 UNITS (ref 0–0.9)

## 2023-03-22 RX ORDER — GLUCAGON INJECTION, SOLUTION 1 MG/.2ML
1 INJECTION, SOLUTION SUBCUTANEOUS ONCE
Qty: 0.4 ML | Refills: 1 | Status: SHIPPED | OUTPATIENT
Start: 2023-03-22 | End: 2023-03-22

## 2023-03-23 LAB
KAPPA LC FREE UR-MCNC: 15.09 MG/L (ref 1.17–86.46)
KAPPA LC FREE/LAMBDA FREE UR: 4.44 (ref 1.83–14.26)
LAMBDA LC FREE UR-MCNC: 3.4 MG/L (ref 0.27–15.21)

## 2023-03-27 DIAGNOSIS — Z87.898 HISTORY OF SEIZURES: ICD-10-CM

## 2023-03-27 LAB
CRYOGLOB SER QL 1D COLD INC: NORMAL
PLA2R IGG SER IA-ACNC: <1.8 RU/ML (ref 0–19.9)

## 2023-03-27 RX ORDER — LEVETIRACETAM 500 MG/1
TABLET ORAL
Qty: 60 TABLET | Refills: 0 | Status: SHIPPED | OUTPATIENT
Start: 2023-03-27

## 2023-03-28 ENCOUNTER — TREATMENT (OUTPATIENT)
Dept: ENDOCRINOLOGY | Age: 73
End: 2023-03-28
Payer: MEDICARE

## 2023-03-28 DIAGNOSIS — Z79.4 TYPE 2 DIABETES MELLITUS WITH HYPERGLYCEMIA, WITH LONG-TERM CURRENT USE OF INSULIN: Primary | ICD-10-CM

## 2023-03-28 DIAGNOSIS — E11.65 TYPE 2 DIABETES MELLITUS WITH HYPERGLYCEMIA, WITH LONG-TERM CURRENT USE OF INSULIN: Primary | ICD-10-CM

## 2023-03-28 RX ORDER — BLOOD-GLUCOSE SENSOR
1 EACH MISCELLANEOUS
Qty: 6 EACH | Refills: 1 | Status: SHIPPED | OUTPATIENT
Start: 2023-03-28

## 2023-03-30 ENCOUNTER — HOSPITAL ENCOUNTER (INPATIENT)
Facility: HOSPITAL | Age: 73
LOS: 5 days | Discharge: HOME HEALTH | End: 2023-04-04
Attending: HOSPITALIST | Admitting: HOSPITALIST
Payer: MEDICARE

## 2023-03-30 ENCOUNTER — APPOINTMENT (OUTPATIENT)
Dept: MRI IMAGING | Facility: HOSPITAL | Age: 73
End: 2023-03-30
Payer: MEDICARE

## 2023-03-30 DIAGNOSIS — M86.679 CHRONIC OSTEOMYELITIS OF FOOT: ICD-10-CM

## 2023-03-30 DIAGNOSIS — E11.21 DIABETES MELLITUS WITH NEPHROPATHY: Primary | ICD-10-CM

## 2023-03-30 LAB
ALBUMIN SERPL-MCNC: 3.4 G/DL (ref 3.5–5.2)
ALBUMIN/GLOB SERPL: 1 G/DL
ALP SERPL-CCNC: 59 U/L (ref 39–117)
ALT SERPL W P-5'-P-CCNC: 7 U/L (ref 1–41)
ANION GAP SERPL CALCULATED.3IONS-SCNC: 10.1 MMOL/L (ref 5–15)
AST SERPL-CCNC: 11 U/L (ref 1–40)
BASOPHILS # BLD AUTO: 0.04 10*3/MM3 (ref 0–0.2)
BASOPHILS NFR BLD AUTO: 0.4 % (ref 0–1.5)
BILIRUB SERPL-MCNC: 0.2 MG/DL (ref 0–1.2)
BUN SERPL-MCNC: 20 MG/DL (ref 8–23)
BUN/CREAT SERPL: 25.6 (ref 7–25)
CALCIUM SPEC-SCNC: 9.1 MG/DL (ref 8.6–10.5)
CHLORIDE SERPL-SCNC: 92 MMOL/L (ref 98–107)
CO2 SERPL-SCNC: 30.9 MMOL/L (ref 22–29)
CREAT SERPL-MCNC: 0.78 MG/DL (ref 0.76–1.27)
CRP SERPL-MCNC: 10.54 MG/DL (ref 0–0.5)
DEPRECATED RDW RBC AUTO: 48.7 FL (ref 37–54)
EGFRCR SERPLBLD CKD-EPI 2021: 94.8 ML/MIN/1.73
EOSINOPHIL # BLD AUTO: 0.18 10*3/MM3 (ref 0–0.4)
EOSINOPHIL NFR BLD AUTO: 1.7 % (ref 0.3–6.2)
ERYTHROCYTE [DISTWIDTH] IN BLOOD BY AUTOMATED COUNT: 14.9 % (ref 12.3–15.4)
GLOBULIN UR ELPH-MCNC: 3.5 GM/DL
GLUCOSE BLDC GLUCOMTR-MCNC: 188 MG/DL (ref 70–130)
GLUCOSE SERPL-MCNC: 193 MG/DL (ref 65–99)
HCT VFR BLD AUTO: 29.4 % (ref 37.5–51)
HGB BLD-MCNC: 9.7 G/DL (ref 13–17.7)
IMM GRANULOCYTES # BLD AUTO: 0.03 10*3/MM3 (ref 0–0.05)
IMM GRANULOCYTES NFR BLD AUTO: 0.3 % (ref 0–0.5)
LYMPHOCYTES # BLD AUTO: 1.03 10*3/MM3 (ref 0.7–3.1)
LYMPHOCYTES NFR BLD AUTO: 9.8 % (ref 19.6–45.3)
MCH RBC QN AUTO: 29.2 PG (ref 26.6–33)
MCHC RBC AUTO-ENTMCNC: 33 G/DL (ref 31.5–35.7)
MCV RBC AUTO: 88.6 FL (ref 79–97)
MONOCYTES # BLD AUTO: 0.97 10*3/MM3 (ref 0.1–0.9)
MONOCYTES NFR BLD AUTO: 9.2 % (ref 5–12)
NEUTROPHILS NFR BLD AUTO: 78.6 % (ref 42.7–76)
NEUTROPHILS NFR BLD AUTO: 8.31 10*3/MM3 (ref 1.7–7)
NRBC BLD AUTO-RTO: 0 /100 WBC (ref 0–0.2)
PLATELET # BLD AUTO: 323 10*3/MM3 (ref 140–450)
PMV BLD AUTO: 9.7 FL (ref 6–12)
POTASSIUM SERPL-SCNC: 3.5 MMOL/L (ref 3.5–5.2)
PROCALCITONIN SERPL-MCNC: 0.08 NG/ML (ref 0–0.25)
PROT SERPL-MCNC: 6.9 G/DL (ref 6–8.5)
QT INTERVAL: 415 MS
RBC # BLD AUTO: 3.32 10*6/MM3 (ref 4.14–5.8)
SODIUM SERPL-SCNC: 133 MMOL/L (ref 136–145)
WBC NRBC COR # BLD: 10.56 10*3/MM3 (ref 3.4–10.8)

## 2023-03-30 PROCEDURE — 85025 COMPLETE CBC W/AUTO DIFF WBC: CPT | Performed by: HOSPITALIST

## 2023-03-30 PROCEDURE — 63710000001 INSULIN LISPRO (HUMAN) PER 5 UNITS: Performed by: NURSE PRACTITIONER

## 2023-03-30 PROCEDURE — 82962 GLUCOSE BLOOD TEST: CPT

## 2023-03-30 PROCEDURE — 25010000002 VANCOMYCIN 10 G RECONSTITUTED SOLUTION: Performed by: HOSPITALIST

## 2023-03-30 PROCEDURE — 25010000002 PIPERACILLIN SOD-TAZOBACTAM PER 1 G: Performed by: HOSPITALIST

## 2023-03-30 PROCEDURE — A9577 INJ MULTIHANCE: HCPCS | Performed by: HOSPITALIST

## 2023-03-30 PROCEDURE — 84145 PROCALCITONIN (PCT): CPT | Performed by: HOSPITALIST

## 2023-03-30 PROCEDURE — 87040 BLOOD CULTURE FOR BACTERIA: CPT | Performed by: HOSPITALIST

## 2023-03-30 PROCEDURE — 73720 MRI LWR EXTREMITY W/O&W/DYE: CPT

## 2023-03-30 PROCEDURE — 93005 ELECTROCARDIOGRAM TRACING: CPT | Performed by: HOSPITALIST

## 2023-03-30 PROCEDURE — 80053 COMPREHEN METABOLIC PANEL: CPT | Performed by: HOSPITALIST

## 2023-03-30 PROCEDURE — 0 GADOBENATE DIMEGLUMINE 529 MG/ML SOLUTION: Performed by: HOSPITALIST

## 2023-03-30 PROCEDURE — 86140 C-REACTIVE PROTEIN: CPT | Performed by: HOSPITALIST

## 2023-03-30 PROCEDURE — 93010 ELECTROCARDIOGRAM REPORT: CPT | Performed by: STUDENT IN AN ORGANIZED HEALTH CARE EDUCATION/TRAINING PROGRAM

## 2023-03-30 RX ORDER — DEXTROSE MONOHYDRATE 25 G/50ML
25 INJECTION, SOLUTION INTRAVENOUS
Status: DISCONTINUED | OUTPATIENT
Start: 2023-03-30 | End: 2023-04-04 | Stop reason: HOSPADM

## 2023-03-30 RX ORDER — VANCOMYCIN HYDROCHLORIDE 1 G/200ML
1000 INJECTION, SOLUTION INTRAVENOUS EVERY 12 HOURS
Status: DISCONTINUED | OUTPATIENT
Start: 2023-03-31 | End: 2023-03-31

## 2023-03-30 RX ORDER — ACETAMINOPHEN 325 MG/1
650 TABLET ORAL EVERY 4 HOURS PRN
Status: DISCONTINUED | OUTPATIENT
Start: 2023-03-30 | End: 2023-04-04 | Stop reason: HOSPADM

## 2023-03-30 RX ORDER — IBUPROFEN 600 MG/1
1 TABLET ORAL
Status: DISCONTINUED | OUTPATIENT
Start: 2023-03-30 | End: 2023-04-04 | Stop reason: HOSPADM

## 2023-03-30 RX ORDER — ASPIRIN 81 MG/1
81 TABLET ORAL DAILY
Status: DISCONTINUED | OUTPATIENT
Start: 2023-03-31 | End: 2023-04-04 | Stop reason: HOSPADM

## 2023-03-30 RX ORDER — GABAPENTIN 400 MG/1
800 CAPSULE ORAL EVERY 8 HOURS SCHEDULED
Status: DISCONTINUED | OUTPATIENT
Start: 2023-03-30 | End: 2023-04-04 | Stop reason: HOSPADM

## 2023-03-30 RX ORDER — ROSUVASTATIN CALCIUM 10 MG/1
10 TABLET, COATED ORAL NIGHTLY
Status: DISCONTINUED | OUTPATIENT
Start: 2023-03-30 | End: 2023-04-04 | Stop reason: HOSPADM

## 2023-03-30 RX ORDER — HYDROCHLOROTHIAZIDE 25 MG/1
25 TABLET ORAL DAILY
Status: DISCONTINUED | OUTPATIENT
Start: 2023-03-31 | End: 2023-04-04 | Stop reason: HOSPADM

## 2023-03-30 RX ORDER — ACETAMINOPHEN 160 MG/5ML
650 SOLUTION ORAL EVERY 4 HOURS PRN
Status: DISCONTINUED | OUTPATIENT
Start: 2023-03-30 | End: 2023-04-04 | Stop reason: HOSPADM

## 2023-03-30 RX ORDER — HYDROCHLOROTHIAZIDE 25 MG/1
25 TABLET ORAL DAILY
Status: CANCELLED | OUTPATIENT
Start: 2023-03-31

## 2023-03-30 RX ORDER — LEVETIRACETAM 500 MG/1
500 TABLET ORAL 2 TIMES DAILY
Status: DISCONTINUED | OUTPATIENT
Start: 2023-03-30 | End: 2023-04-04 | Stop reason: HOSPADM

## 2023-03-30 RX ORDER — AMLODIPINE BESYLATE 10 MG/1
10 TABLET ORAL
Status: DISCONTINUED | OUTPATIENT
Start: 2023-03-31 | End: 2023-04-04 | Stop reason: HOSPADM

## 2023-03-30 RX ORDER — CLOPIDOGREL BISULFATE 75 MG/1
75 TABLET ORAL DAILY
Status: DISCONTINUED | OUTPATIENT
Start: 2023-03-31 | End: 2023-04-04 | Stop reason: HOSPADM

## 2023-03-30 RX ORDER — CLOPIDOGREL BISULFATE 75 MG/1
75 TABLET ORAL DAILY
Status: CANCELLED | OUTPATIENT
Start: 2023-03-31

## 2023-03-30 RX ORDER — ONDANSETRON 4 MG/1
4 TABLET, FILM COATED ORAL EVERY 6 HOURS PRN
Status: DISCONTINUED | OUTPATIENT
Start: 2023-03-30 | End: 2023-04-04 | Stop reason: HOSPADM

## 2023-03-30 RX ORDER — INSULIN LISPRO 100 [IU]/ML
0-14 INJECTION, SOLUTION INTRAVENOUS; SUBCUTANEOUS
Status: DISCONTINUED | OUTPATIENT
Start: 2023-03-30 | End: 2023-04-04 | Stop reason: HOSPADM

## 2023-03-30 RX ORDER — GABAPENTIN 400 MG/1
800 CAPSULE ORAL 4 TIMES DAILY
Status: CANCELLED | OUTPATIENT
Start: 2023-03-30

## 2023-03-30 RX ORDER — HYDROCODONE BITARTRATE AND ACETAMINOPHEN 10; 325 MG/1; MG/1
1 TABLET ORAL EVERY 6 HOURS PRN
Status: DISCONTINUED | OUTPATIENT
Start: 2023-03-30 | End: 2023-04-04 | Stop reason: HOSPADM

## 2023-03-30 RX ORDER — NICOTINE POLACRILEX 4 MG
15 LOZENGE BUCCAL
Status: DISCONTINUED | OUTPATIENT
Start: 2023-03-30 | End: 2023-04-04 | Stop reason: HOSPADM

## 2023-03-30 RX ORDER — AMLODIPINE BESYLATE 5 MG/1
5 TABLET ORAL
Status: CANCELLED | OUTPATIENT
Start: 2023-03-31

## 2023-03-30 RX ORDER — DORZOLAMIDE HYDROCHLORIDE AND TIMOLOL MALEATE 20; 5 MG/ML; MG/ML
SOLUTION/ DROPS OPHTHALMIC 2 TIMES DAILY
Status: DISCONTINUED | OUTPATIENT
Start: 2023-03-30 | End: 2023-04-04 | Stop reason: HOSPADM

## 2023-03-30 RX ORDER — LISINOPRIL 20 MG/1
20 TABLET ORAL DAILY
Status: DISCONTINUED | OUTPATIENT
Start: 2023-03-31 | End: 2023-04-04 | Stop reason: HOSPADM

## 2023-03-30 RX ORDER — ONDANSETRON 2 MG/ML
4 INJECTION INTRAMUSCULAR; INTRAVENOUS EVERY 6 HOURS PRN
Status: DISCONTINUED | OUTPATIENT
Start: 2023-03-30 | End: 2023-04-04 | Stop reason: HOSPADM

## 2023-03-30 RX ORDER — PREDNISOLONE ACETATE 10 MG/ML
1 SUSPENSION/ DROPS OPHTHALMIC 3 TIMES DAILY
Status: DISCONTINUED | OUTPATIENT
Start: 2023-03-30 | End: 2023-04-04 | Stop reason: HOSPADM

## 2023-03-30 RX ORDER — DORZOLAMIDE HYDROCHLORIDE AND TIMOLOL MALEATE 20; 5 MG/ML; MG/ML
SOLUTION/ DROPS OPHTHALMIC 2 TIMES DAILY
Status: CANCELLED | OUTPATIENT
Start: 2023-03-30

## 2023-03-30 RX ORDER — ACETAMINOPHEN 650 MG/1
650 SUPPOSITORY RECTAL EVERY 4 HOURS PRN
Status: DISCONTINUED | OUTPATIENT
Start: 2023-03-30 | End: 2023-04-04 | Stop reason: HOSPADM

## 2023-03-30 RX ORDER — ASPIRIN 81 MG/1
81 TABLET ORAL DAILY
Status: CANCELLED | OUTPATIENT
Start: 2023-03-31

## 2023-03-30 RX ORDER — VANCOMYCIN 2 GRAM/500 ML IN 0.9 % SODIUM CHLORIDE INTRAVENOUS
20 ONCE
Status: COMPLETED | OUTPATIENT
Start: 2023-03-30 | End: 2023-03-30

## 2023-03-30 RX ADMIN — HYDROCODONE BITARTRATE AND ACETAMINOPHEN 1 TABLET: 10; 325 TABLET ORAL at 17:04

## 2023-03-30 RX ADMIN — ACETAMINOPHEN 650 MG: 325 TABLET, FILM COATED ORAL at 17:04

## 2023-03-30 RX ADMIN — TAZOBACTAM SODIUM AND PIPERACILLIN SODIUM 3.38 G: 375; 3 INJECTION, SOLUTION INTRAVENOUS at 18:46

## 2023-03-30 RX ADMIN — GABAPENTIN 800 MG: 400 CAPSULE ORAL at 23:23

## 2023-03-30 RX ADMIN — PREDNISOLONE ACETATE 1 DROP: 10 SUSPENSION/ DROPS OPHTHALMIC at 23:24

## 2023-03-30 RX ADMIN — VANCOMYCIN HYDROCHLORIDE 2000 MG: 10 INJECTION, POWDER, LYOPHILIZED, FOR SOLUTION INTRAVENOUS at 23:29

## 2023-03-30 RX ADMIN — GADOBENATE DIMEGLUMINE 20 ML: 529 INJECTION, SOLUTION INTRAVENOUS at 22:12

## 2023-03-30 RX ADMIN — ROSUVASTATIN CALCIUM 10 MG: 10 TABLET, FILM COATED ORAL at 23:23

## 2023-03-30 RX ADMIN — HYDROCODONE BITARTRATE AND ACETAMINOPHEN 1 TABLET: 10; 325 TABLET ORAL at 23:23

## 2023-03-30 RX ADMIN — TIMOLOL MALEATE: 5 SOLUTION OPHTHALMIC at 23:24

## 2023-03-30 RX ADMIN — INSULIN LISPRO 3 UNITS: 100 INJECTION, SOLUTION INTRAVENOUS; SUBCUTANEOUS at 17:05

## 2023-03-30 RX ADMIN — LEVETIRACETAM 500 MG: 500 TABLET, FILM COATED ORAL at 23:23

## 2023-03-30 NOTE — NURSING NOTE
CWOCN- noted consult- patient admitted with foot wound as photographed by RN. Await plan from Vascular.

## 2023-03-30 NOTE — PLAN OF CARE
Problem: Adult Inpatient Plan of Care  Goal: Plan of Care Review  Outcome: Ongoing, Progressing  Flowsheets (Taken 3/30/2023 1528)  Progress: no change  Plan of Care Reviewed With: patient  Outcome Evaluation: vss. pt on 1.5L of oxygen. consults to vascular surgery and ID. bilateral ulcers on feet   Goal Outcome Evaluation:  Plan of Care Reviewed With: patient        Progress: no change  Outcome Evaluation: vss. pt on 1.5L of oxygen. consults to vascular surgery and ID. bilateral ulcers on feet

## 2023-03-30 NOTE — H&P
Patient Name:  Kelby Peters  YOB: 1950  MRN:  [id number]  Admit Date:  3/30/2023  Patient Care Team:  Susy Germain APRN as PCP - General (Nurse Practitioner)      Subjective   History Present Illness     No chief complaint on file.      Mr. Peters is a 72 y.o. male with a history of chronic osteomyelitis chetan feet, MSSA & strep wound infections, PVD, DM w/neuropathy & nephropathy, Sleep apnea, seizures, HTN,  that presents to [facility] complaining of chetan foot wounds. He was directly admitted to [facility] service by Dr. Alvarado for diabetic foot ulcers chetan feet. He completed 6 weeks IV cefazolin on March 6th for treatment of OM, followed by Dr. Villarreal. He has wounds beneath lt great toe and lateral aspect rt foot w/concern for infection. Denies fever, chest pain, soa, n/v/d, dysuria. He has neuropathy and decreased sensation to his feet. He has required prior debridements and toe amputation.       Review of Systems   Constitutional: Negative for chills and fever.   HENT: Positive for hearing loss. Negative for congestion.    Respiratory: Negative for shortness of breath.    Cardiovascular: Negative for chest pain.   Gastrointestinal: Negative for diarrhea, nausea and vomiting.   Genitourinary: Negative for dysuria.   Musculoskeletal: Negative for arthralgias.   Skin: Positive for wound.   Neurological: Positive for numbness.   Psychiatric/Behavioral: Negative for sleep disturbance.        Personal History     Past Medical History:   Diagnosis Date   • Basal cell carcinoma 06/2022   • Brain injury    • Broken toes    • Diabetes mellitus (HCC)    • Finger injury    • Gallbladder obstruction    • Hearing disorder    • Hiatal hernia    • Hypercholesterolemia    • Leg injury    • Migraine    • Osteomyelitis (HCC)    • Peripheral neuropathy    • Prostate enlargement    • Seizures (HCC)    • Shingles    • Sleep apnea      Past Surgical History:   Procedure Laterality Date   • AMPUTATION  DIGIT Left 1/19/2023    Procedure: 3RD LEFT TOE AMPUTATION AND LEFT FOOT DEBRIDEMENT;  Surgeon: Ivonne Martin MD;  Location: [facility] OR 18/19;  Service: Vascular;  Laterality: Left;   • ANGIOPLASTY FEMORAL ARTERY Left 1/19/2023    Procedure: LEFT LEG ANGIOGRAM;  Surgeon: Ivonne Martin MD;  Location: [facility] OR 18/19;  Service: Vascular;  Laterality: Left;   • BASAL CELL CARCINOMA EXCISION  10/13/2022   • CATARACT EXTRACTION Right    • CORNEAL TRANSPLANT Left 03/31/2021   • EYE SURGERY Left    • GALLBLADDER SURGERY     • INNER EAR SURGERY       Family History   Problem Relation Age of Onset   • Cancer Mother    • Transient ischemic attack Mother    • Diabetes Father    • Heart disease Father    • COPD Paternal Aunt    • Diabetes Paternal Aunt    • Alzheimer's disease Maternal Grandmother    • Stroke Maternal Grandfather    • Emphysema Paternal Grandfather      Social History     Tobacco Use   • Smoking status: Former   • Smokeless tobacco: Never   Vaping Use   • Vaping Use: Never used   Substance Use Topics   • Alcohol use: Yes     Comment: social drinker   • Drug use: Yes     Types: Marijuana     No current facility-administered medications on file prior to encounter.     Current Outpatient Medications on File Prior to Encounter   Medication Sig Dispense Refill   • amLODIPine (NORVASC) 10 MG tablet TAKE ONE TABLET BY MOUTH DAILY , NEEDS FOLLOW UP APPOINTMENT FOR REFILLS 90 tablet 0   • aspirin 81 MG EC tablet Take 1 tablet by mouth Daily. 30 tablet 3   • clopidogrel (PLAVIX) 75 MG tablet Take 1 tablet by mouth Daily. 30 tablet 3   • Continuous Blood Gluc Sensor (FreeStyle Milka 3 Sensor) misc 1 each Every 14 (Fourteen) Days. 6 each 1   • dorzolamide-timolol (COSOPT) 22.3-6.8 MG/ML ophthalmic solution Administer 1 drop to both eyes 2 (Two) Times a Day. Indications: Wide-Angle Glaucoma     • gabapentin (NEURONTIN) 800 MG tablet Take 1 tablet by mouth 4 (Four) Times a Day. 120 tablet 2   •  glucose blood (OneTouch Verio) test strip Use to check blood sugar  4 time daily  E11.8 400 each 3   • hydroCHLOROthiazide (HYDRODIURIL) 25 MG tablet Take 1 tablet by mouth Daily. 30 tablet 3   • HYDROcodone-acetaminophen (NORCO)  MG per tablet Take 1 tablet by mouth Every 6 (Six) Hours As Needed for Severe Pain. Indications: Pain 120 tablet 0   • insulin NPH-insulin regular (NovoLIN 70/30 ReliOn) (70-30) 100 UNIT/ML injection Inject 40 Units under the skin into the appropriate area as directed 2 (Two) Times a Day With Meals. Inject  Units in the AM &  Units in the PM, and titrate as directed up to 50 units twice a day. 20 mL 6   • Insulin Pen Needle (BD Pen Needle Micro U/F) 32G X 6 MM misc Use 5 injections daily 1500 each 3   • levETIRAcetam (KEPPRA) 500 MG tablet TAKE ONE TABLET BY MOUTH TWICE A DAY 60 tablet 0   • lisinopril (PRINIVIL,ZESTRIL) 10 MG tablet Take 1 tablet by mouth Daily. (Patient taking differently: Take 2 tablets by mouth Daily. Indications: High Blood Pressure Disorder) 30 tablet 2   • OneTouch Delica Lancets 33G misc Use to check blood sugar 4 times daily  E11.8 400 each 3   • prednisoLONE acetate (PRED FORTE) 1 % ophthalmic suspension Administer 1 drop to both eyes 3 (Three) Times a Day. Indications: Conjunctivitis     • rosuvastatin (CRESTOR) 10 MG tablet Take 1 tablet by mouth Every Night. 90 tablet 1   • sodium hypochlorite (DAKIN'S 1/4 STRENGTH) 0.125 % solution topical solution 0.125% Apply topical to affected areas two times daily 473 mL 0   • valsartan-hydrochlorothiazide (DIOVAN-HCT) 160-12.5 MG per tablet Take 1 tablet by mouth Daily.     • acetaminophen (TYLENOL) 500 MG tablet Take 2 tablets by mouth Every 8 (Eight) Hours. 30 tablet 0   • amitriptyline (ELAVIL) 10 MG tablet Take 1 tablet by mouth Daily.     • glucagon (GLUCAGEN) 1 MG injection For emergency use for severe low glucose: first inject, then call 911, once awake try to give oral treatment. Kit expires in 6-12  months. 1 kit 6   • [employer] LANCETS MICRO THIN 33G misc        No Known Allergies    Objective    Objective     Vital Signs  Temp:  [99.9 °F (37.7 °C)-100.6 °F (38.1 °C)] 100.6 °F (38.1 °C)  Heart Rate:  [71-79] 71  Resp:  [18] 18  BP: (155-169)/(74-85) 155/74  SpO2:  [90 %-95 %] 95 %  on  Flow (L/min):  [1.5] 1.5;   Device (Oxygen Therapy): nasal cannula  Body mass index is 33.66 kg/m².    Physical Exam  Vitals and nursing note reviewed.   Constitutional:       General: He is not in acute distress.     Appearance: He is ill-appearing. He is not toxic-appearing.      Comments: Chronically ill appearance   HENT:      Head: Normocephalic.      Mouth/Throat:      Mouth: Mucous membranes are moist.   Eyes:      Conjunctiva/sclera: Conjunctivae normal.   Cardiovascular:      Rate and Rhythm: Normal rate and regular rhythm.   Pulmonary:      Effort: Pulmonary effort is normal. No respiratory distress.      Breath sounds: No wheezing or rales.   Abdominal:      General: Bowel sounds are normal.      Palpations: Abdomen is soft.   Musculoskeletal:      Cervical back: Neck supple.      Right lower leg: No edema.      Left lower leg: No edema.   Skin:     General: Skin is warm and dry.      Comments: Dressings in place to chetan feet; wound pictures in Epic reviewed   Neurological:      Mental Status: He is alert and oriented to person, place, and time.   Psychiatric:         Mood and Affect: Mood normal.         Behavior: Behavior normal.         Results Review:  I reviewed the patient's new clinical results.  I reviewed the patient's new imaging results and agree with the interpretation.  I reviewed the patient's other test results and agree with the interpretation  I personally viewed and interpreted the patient's EKG/Telemetry data    Lab Results (last 24 hours)     Procedure Component Value Units Date/Time    CBC & Differential [[id number]]  (Abnormal) Collected: 03/30/23 1529    Specimen: Blood Updated: 03/30/23 6479     Narrative:      The following orders were created for panel order CBC & Differential.  Procedure                               Abnormality         Status                     ---------                               -----------         ------                     CBC Auto Differential[[id number]]        Abnormal            Final result                 Please view results for these tests on the individual orders.    Comprehensive Metabolic Panel [[id number]]  (Abnormal) Collected: 03/30/23 1529    Specimen: Blood Updated: 03/30/23 1610     Glucose 193 mg/dL      BUN 20 mg/dL      Creatinine 0.78 mg/dL      Sodium 133 mmol/L      Potassium 3.5 mmol/L      Chloride 92 mmol/L      CO2 30.9 mmol/L      Calcium 9.1 mg/dL      Total Protein 6.9 g/dL      Albumin 3.4 g/dL      ALT (SGPT) 7 U/L      AST (SGOT) 11 U/L      Alkaline Phosphatase 59 U/L      Total Bilirubin 0.2 mg/dL      Globulin 3.5 gm/dL      A/G Ratio 1.0 g/dL      BUN/Creatinine Ratio 25.6     Anion Gap 10.1 mmol/L      eGFR 94.8 mL/min/1.73     Narrative:      GFR Normal >60  Chronic Kidney Disease <60  Kidney Failure <15    The GFR formula is only valid for adults with stable renal function between ages 18 and 70.    C-reactive Protein [[id number]]  (Abnormal) Collected: 03/30/23 1529    Specimen: Blood Updated: 03/30/23 1610     C-Reactive Protein 10.54 mg/dL     Procalcitonin [[id number]]  (Normal) Collected: 03/30/23 1529    Specimen: Blood Updated: 03/30/23 1616     Procalcitonin 0.08 ng/mL     Narrative:      As a Marker for Sepsis (Non-Neonates):    1. <0.5 ng/mL represents a low risk of severe sepsis and/or septic shock.  2. >2 ng/mL represents a high risk of severe sepsis and/or septic shock.    As a Marker for Lower Respiratory Tract Infections that require antibiotic therapy:    PCT on Admission    Antibiotic Therapy       6-12 Hrs later    >0.5                Strongly Recommended  >0.25 - <0.5        Recommended   0.1 - 0.25          Discouraged        "       Remeasure/reassess PCT  <0.1                Strongly Discouraged     Remeasure/reassess PCT    As 28 day mortality risk marker: \"Change in Procalcitonin Result\" (>80% or <=80%) if Day 0 (or Day 1) and Day 4 values are available. Refer to http://www.[email or web address]-pct-calculator.com    Change in PCT <=80%  A decrease of PCT levels below or equal to 80% defines a positive change in PCT test result representing a higher risk for 28-day all-cause mortality of patients diagnosed with severe sepsis for septic shock.    Change in PCT >80%  A decrease of PCT levels of more than 80% defines a negative change in PCT result representing a lower risk for 28-day all-cause mortality of patients diagnosed with severe sepsis or septic shock.       Blood Culture - Blood, Arm, Right [[id number]] Collected: 03/30/23 1529    Specimen: Blood from Arm, Right Updated: 03/30/23 1541    CBC Auto Differential [[id number]]  (Abnormal) Collected: 03/30/23 1529    Specimen: Blood Updated: 03/30/23 1548     WBC 10.56 10*3/mm3      RBC 3.32 10*6/mm3      Hemoglobin 9.7 g/dL      Hematocrit 29.4 %      MCV 88.6 fL      MCH 29.2 pg      MCHC 33.0 g/dL      RDW 14.9 %      RDW-SD 48.7 fl      MPV 9.7 fL      Platelets 323 10*3/mm3      Neutrophil % 78.6 %      Lymphocyte % 9.8 %      Monocyte % 9.2 %      Eosinophil % 1.7 %      Basophil % 0.4 %      Immature Grans % 0.3 %      Neutrophils, Absolute 8.31 10*3/mm3      Lymphocytes, Absolute 1.03 10*3/mm3      Monocytes, Absolute 0.97 10*3/mm3      Eosinophils, Absolute 0.18 10*3/mm3      Basophils, Absolute 0.04 10*3/mm3      Immature Grans, Absolute 0.03 10*3/mm3      nRBC 0.0 /100 WBC     Blood Culture - Blood, Arm, Left [[id number]] Collected: 03/30/23 1530    Specimen: Blood from Arm, Left Updated: 03/30/23 1541    POC Glucose Once [[id number]]  (Abnormal) Collected: 03/30/23 1625    Specimen: Blood Updated: 03/30/23 1626     Glucose 188 mg/dL      Comment: Meter: [id number] : [id number] " Chioma Rodrigues RN             Imaging Results (Last 24 Hours)     ** No results found for the last 24 hours. **          Results for orders placed during the hospital encounter of 01/15/23    Adult Transthoracic Echo Complete W/ Cont if Necessary Per Protocol    Interpretation Summary  •  Left ventricular systolic function is normal. Left ventricular ejection fraction appears to be 61 - 65%.  •  Left ventricular wall thickness is consistent with mild to moderate concentric hypertrophy.  •  No clear evidence for valvular heart disease.      ECG 12 Lead Other; baseline   Final Result   HEART RATE= 74  bpm   RR Interval= 811  ms   AR Interval= 157  ms   P Horizontal Axis= 8  deg   P Front Axis= 37  deg   QRSD Interval= 110  ms   QT Interval= 415  ms   QRS Axis= -16  deg   T Wave Axis= 0  deg   - NORMAL ECG -   Sinus rhythm   Left ventricular hypertrophy   When compared with ECG of 15-Michael-2023 12:55:56,   No significant change   Electronically Signed By: Koby Irwin ([facility]) 30-Mar-2023 15:41:26   Date and Time of Study: 2023-03-30 14:47:54           Assessment/Plan     Active Hospital Problems    Diagnosis  POA   • **Diabetic foot ulcer (HCC) [E11.621, L97.509]  Yes   • Chronic osteomyelitis of foot (HCC) [M86.679]  Yes   • Diabetes mellitus with nephropathy (HCC) [E11.21]  Yes   • Type 2 diabetes mellitus with diabetic polyneuropathy (HCC) [E11.42]  Yes   • Peripheral vascular disease (HCC) [I73.9]  Yes   • History of seizures [Z87.898]  Yes   • Diabetic peripheral neuropathy (HCC) [E11.42]  Yes   • Hypertension [I10]  Yes   • Chronic pain [G89.29]  Yes      Resolved Hospital Problems   No resolved problems to display.       Mr. Peters is a 72 y.o. male with a history of chronic osteomyelitis cheatn feet, MSSA & strep wound infections, PVD, DM w/neuropathy & nephropathy, Sleep apnea, seizures, HTN who is admitted for diabetic foot ulcers    Diabetic foot ulcers/Chronic OM/PVD:  -Vascular surgery and ID consulted. Check  blood cultures, CBC, CMP, procal, CRP Completed 6 weeks IV cefazolin 3/6/23. Defer imaging to vascular surgery    DM/Nephropathy/Neuropathy:  -Monitor BG trends. A1C 9.10%. Correctional scale insulin. Restart home regimen pending glucose trends    Chronic pain:  -Restart hydrocodone, gabapentin    Hx seizures:  -Keppra    HTN:  -Followed by Nephrology. Increased lisinopril to 20 mg at last visit. Continue amlodipine, HCTZ    Hold plavix for now    I discussed the patient's findings and my recommendations with patient, family, nursing staff and Dr. Lombardo.    VTE Prophylaxis - SCDs.  Code Status - Full code.       ABEBA Gan  [facility]  03/30/23  16:28 EDT

## 2023-03-30 NOTE — PROGRESS NOTES
Patient seen in the office today.  Direct admitted for mal perforans ulcer of the left first metatarsal phalangeal joint with likely septic joint.  Will order MRI of the right and left foot is the right fifth toe also has an open wound.  We will keep patient n.p.o. after midnight and plan to see in the morning and determine if debridement in the OR is needed.  Appreciate LHA and infectious disease treatment.  Full consult to follow

## 2023-03-31 ENCOUNTER — TELEPHONE (OUTPATIENT)
Dept: FAMILY MEDICINE CLINIC | Facility: CLINIC | Age: 73
End: 2023-03-31

## 2023-03-31 ENCOUNTER — ANESTHESIA EVENT (OUTPATIENT)
Dept: PERIOP | Facility: HOSPITAL | Age: 73
End: 2023-03-31
Payer: MEDICARE

## 2023-03-31 ENCOUNTER — ANESTHESIA (OUTPATIENT)
Dept: PERIOP | Facility: HOSPITAL | Age: 73
End: 2023-03-31
Payer: MEDICARE

## 2023-03-31 ENCOUNTER — TELEPHONE (OUTPATIENT)
Dept: ENDOCRINOLOGY | Age: 73
End: 2023-03-31
Payer: MEDICARE

## 2023-03-31 LAB
ANION GAP SERPL CALCULATED.3IONS-SCNC: 10.7 MMOL/L (ref 5–15)
BUN SERPL-MCNC: 13 MG/DL (ref 8–23)
BUN/CREAT SERPL: 19.7 (ref 7–25)
CALCIUM SPEC-SCNC: 8.9 MG/DL (ref 8.6–10.5)
CHLORIDE SERPL-SCNC: 96 MMOL/L (ref 98–107)
CO2 SERPL-SCNC: 30.3 MMOL/L (ref 22–29)
CREAT SERPL-MCNC: 0.66 MG/DL (ref 0.76–1.27)
DEPRECATED RDW RBC AUTO: 45.3 FL (ref 37–54)
EGFRCR SERPLBLD CKD-EPI 2021: 99.7 ML/MIN/1.73
ERYTHROCYTE [DISTWIDTH] IN BLOOD BY AUTOMATED COUNT: 14.4 % (ref 12.3–15.4)
GLUCOSE BLDC GLUCOMTR-MCNC: 135 MG/DL (ref 70–130)
GLUCOSE BLDC GLUCOMTR-MCNC: 145 MG/DL (ref 70–130)
GLUCOSE BLDC GLUCOMTR-MCNC: 155 MG/DL (ref 70–130)
GLUCOSE BLDC GLUCOMTR-MCNC: 197 MG/DL (ref 70–130)
GLUCOSE BLDC GLUCOMTR-MCNC: 278 MG/DL (ref 70–130)
GLUCOSE SERPL-MCNC: 149 MG/DL (ref 65–99)
HBA1C MFR BLD: 7.7 % (ref 4.8–5.6)
HCT VFR BLD AUTO: 30.7 % (ref 37.5–51)
HGB BLD-MCNC: 10.3 G/DL (ref 13–17.7)
MCH RBC QN AUTO: 28.6 PG (ref 26.6–33)
MCHC RBC AUTO-ENTMCNC: 33.6 G/DL (ref 31.5–35.7)
MCV RBC AUTO: 85.3 FL (ref 79–97)
PLATELET # BLD AUTO: 375 10*3/MM3 (ref 140–450)
PMV BLD AUTO: 9.6 FL (ref 6–12)
POTASSIUM SERPL-SCNC: 3.7 MMOL/L (ref 3.5–5.2)
RBC # BLD AUTO: 3.6 10*6/MM3 (ref 4.14–5.8)
SODIUM SERPL-SCNC: 137 MMOL/L (ref 136–145)
WBC NRBC COR # BLD: 8.94 10*3/MM3 (ref 3.4–10.8)

## 2023-03-31 PROCEDURE — 25010000002 DEXAMETHASONE SODIUM PHOSPHATE 20 MG/5ML SOLUTION: Performed by: NURSE ANESTHETIST, CERTIFIED REGISTERED

## 2023-03-31 PROCEDURE — 80048 BASIC METABOLIC PNL TOTAL CA: CPT | Performed by: NURSE PRACTITIONER

## 2023-03-31 PROCEDURE — 88305 TISSUE EXAM BY PATHOLOGIST: CPT | Performed by: SURGERY

## 2023-03-31 PROCEDURE — 25010000002 CEFAZOLIN PER 500 MG: Performed by: SURGERY

## 2023-03-31 PROCEDURE — 88311 DECALCIFY TISSUE: CPT | Performed by: SURGERY

## 2023-03-31 PROCEDURE — 25010000002 VANCOMYCIN PER 500 MG: Performed by: INTERNAL MEDICINE

## 2023-03-31 PROCEDURE — 87070 CULTURE OTHR SPECIMN AEROBIC: CPT | Performed by: SURGERY

## 2023-03-31 PROCEDURE — 94664 DEMO&/EVAL PT USE INHALER: CPT

## 2023-03-31 PROCEDURE — 94799 UNLISTED PULMONARY SVC/PX: CPT

## 2023-03-31 PROCEDURE — 25010000002 CEFAZOLIN IN DEXTROSE 2-4 GM/100ML-% SOLUTION: Performed by: SURGERY

## 2023-03-31 PROCEDURE — 25010000002 ONDANSETRON PER 1 MG: Performed by: NURSE ANESTHETIST, CERTIFIED REGISTERED

## 2023-03-31 PROCEDURE — 0Y6M0ZD DETACHMENT AT RIGHT FOOT, PARTIAL 4TH RAY, OPEN APPROACH: ICD-10-PCS | Performed by: SURGERY

## 2023-03-31 PROCEDURE — 25010000002 DROPERIDOL PER 5 MG: Performed by: NURSE ANESTHETIST, CERTIFIED REGISTERED

## 2023-03-31 PROCEDURE — 63710000001 INSULIN LISPRO (HUMAN) PER 5 UNITS: Performed by: NURSE PRACTITIONER

## 2023-03-31 PROCEDURE — 25010000002 PIPERACILLIN SOD-TAZOBACTAM PER 1 G: Performed by: HOSPITALIST

## 2023-03-31 PROCEDURE — 25010000002 ONDANSETRON PER 1 MG: Performed by: NURSE PRACTITIONER

## 2023-03-31 PROCEDURE — 85027 COMPLETE CBC AUTOMATED: CPT | Performed by: NURSE PRACTITIONER

## 2023-03-31 PROCEDURE — 87077 CULTURE AEROBIC IDENTIFY: CPT | Performed by: SURGERY

## 2023-03-31 PROCEDURE — 87176 TISSUE HOMOGENIZATION CULTR: CPT | Performed by: SURGERY

## 2023-03-31 PROCEDURE — 82962 GLUCOSE BLOOD TEST: CPT

## 2023-03-31 PROCEDURE — 87147 CULTURE TYPE IMMUNOLOGIC: CPT | Performed by: SURGERY

## 2023-03-31 PROCEDURE — 25010000002 PROPOFOL 10 MG/ML EMULSION: Performed by: NURSE ANESTHETIST, CERTIFIED REGISTERED

## 2023-03-31 PROCEDURE — 94640 AIRWAY INHALATION TREATMENT: CPT

## 2023-03-31 PROCEDURE — 99223 1ST HOSP IP/OBS HIGH 75: CPT | Performed by: INTERNAL MEDICINE

## 2023-03-31 PROCEDURE — 0Y6N0Z9 DETACHMENT AT LEFT FOOT, PARTIAL 1ST RAY, OPEN APPROACH: ICD-10-PCS | Performed by: SURGERY

## 2023-03-31 PROCEDURE — 83036 HEMOGLOBIN GLYCOSYLATED A1C: CPT | Performed by: INTERNAL MEDICINE

## 2023-03-31 PROCEDURE — 25010000002 CEFTRIAXONE PER 250 MG: Performed by: INTERNAL MEDICINE

## 2023-03-31 PROCEDURE — 87205 SMEAR GRAM STAIN: CPT | Performed by: SURGERY

## 2023-03-31 PROCEDURE — 87075 CULTR BACTERIA EXCEPT BLOOD: CPT | Performed by: SURGERY

## 2023-03-31 PROCEDURE — 25010000002 FENTANYL CITRATE (PF) 50 MCG/ML SOLUTION: Performed by: NURSE ANESTHETIST, CERTIFIED REGISTERED

## 2023-03-31 RX ORDER — DROPERIDOL 2.5 MG/ML
0.62 INJECTION, SOLUTION INTRAMUSCULAR; INTRAVENOUS
Status: DISCONTINUED | OUTPATIENT
Start: 2023-03-31 | End: 2023-03-31 | Stop reason: HOSPADM

## 2023-03-31 RX ORDER — NITROGLYCERIN 0.4 MG/1
0.4 TABLET SUBLINGUAL
Status: DISCONTINUED | OUTPATIENT
Start: 2023-03-31 | End: 2023-04-04 | Stop reason: HOSPADM

## 2023-03-31 RX ORDER — CEFAZOLIN SODIUM 2 G/100ML
2 INJECTION, SOLUTION INTRAVENOUS ONCE
Status: COMPLETED | OUTPATIENT
Start: 2023-03-31 | End: 2023-03-31

## 2023-03-31 RX ORDER — INSULIN LISPRO 100 [IU]/ML
4 INJECTION, SOLUTION INTRAVENOUS; SUBCUTANEOUS
Status: DISCONTINUED | OUTPATIENT
Start: 2023-03-31 | End: 2023-04-01

## 2023-03-31 RX ORDER — CEFAZOLIN SODIUM 2 G/100ML
2 INJECTION, SOLUTION INTRAVENOUS ONCE
Status: DISCONTINUED | OUTPATIENT
Start: 2023-03-31 | End: 2023-03-31 | Stop reason: HOSPADM

## 2023-03-31 RX ORDER — VANCOMYCIN HYDROCHLORIDE 1 G/200ML
1000 INJECTION, SOLUTION INTRAVENOUS EVERY 12 HOURS
Status: DISCONTINUED | OUTPATIENT
Start: 2023-03-31 | End: 2023-04-02

## 2023-03-31 RX ORDER — IPRATROPIUM BROMIDE AND ALBUTEROL SULFATE 2.5; .5 MG/3ML; MG/3ML
3 SOLUTION RESPIRATORY (INHALATION)
Status: DISPENSED | OUTPATIENT
Start: 2023-03-31 | End: 2023-04-02

## 2023-03-31 RX ORDER — HEPARIN SODIUM 5000 [USP'U]/ML
5000 INJECTION, SOLUTION INTRAVENOUS; SUBCUTANEOUS EVERY 12 HOURS SCHEDULED
Status: DISCONTINUED | OUTPATIENT
Start: 2023-04-01 | End: 2023-04-04 | Stop reason: HOSPADM

## 2023-03-31 RX ORDER — NALOXONE HCL 0.4 MG/ML
0.2 VIAL (ML) INJECTION AS NEEDED
Status: DISCONTINUED | OUTPATIENT
Start: 2023-03-31 | End: 2023-03-31 | Stop reason: HOSPADM

## 2023-03-31 RX ORDER — CEFAZOLIN SODIUM IN 0.9 % NACL 2 G/100 ML
2 PLASTIC BAG, INJECTION (ML) INTRAVENOUS ONCE
Status: DISCONTINUED | OUTPATIENT
Start: 2023-03-31 | End: 2023-03-31

## 2023-03-31 RX ORDER — EPHEDRINE SULFATE 50 MG/ML
5 INJECTION, SOLUTION INTRAVENOUS ONCE AS NEEDED
Status: DISCONTINUED | OUTPATIENT
Start: 2023-03-31 | End: 2023-03-31 | Stop reason: HOSPADM

## 2023-03-31 RX ORDER — DROPERIDOL 2.5 MG/ML
INJECTION, SOLUTION INTRAMUSCULAR; INTRAVENOUS AS NEEDED
Status: DISCONTINUED | OUTPATIENT
Start: 2023-03-31 | End: 2023-03-31 | Stop reason: SURG

## 2023-03-31 RX ORDER — MIDAZOLAM HYDROCHLORIDE 1 MG/ML
0.5 INJECTION INTRAMUSCULAR; INTRAVENOUS
Status: DISCONTINUED | OUTPATIENT
Start: 2023-03-31 | End: 2023-03-31 | Stop reason: HOSPADM

## 2023-03-31 RX ORDER — ONDANSETRON 2 MG/ML
4 INJECTION INTRAMUSCULAR; INTRAVENOUS ONCE AS NEEDED
Status: DISCONTINUED | OUTPATIENT
Start: 2023-03-31 | End: 2023-03-31 | Stop reason: HOSPADM

## 2023-03-31 RX ORDER — FENTANYL CITRATE 50 UG/ML
INJECTION, SOLUTION INTRAMUSCULAR; INTRAVENOUS AS NEEDED
Status: DISCONTINUED | OUTPATIENT
Start: 2023-03-31 | End: 2023-03-31 | Stop reason: SURG

## 2023-03-31 RX ORDER — DEXAMETHASONE SODIUM PHOSPHATE 4 MG/ML
INJECTION, SOLUTION INTRA-ARTICULAR; INTRALESIONAL; INTRAMUSCULAR; INTRAVENOUS; SOFT TISSUE AS NEEDED
Status: DISCONTINUED | OUTPATIENT
Start: 2023-03-31 | End: 2023-03-31 | Stop reason: SURG

## 2023-03-31 RX ORDER — HYDROCODONE BITARTRATE AND ACETAMINOPHEN 5; 325 MG/1; MG/1
1 TABLET ORAL EVERY 4 HOURS PRN
Status: DISCONTINUED | OUTPATIENT
Start: 2023-03-31 | End: 2023-04-04 | Stop reason: HOSPADM

## 2023-03-31 RX ORDER — LIDOCAINE HYDROCHLORIDE 20 MG/ML
INJECTION, SOLUTION EPIDURAL; INFILTRATION; INTRACAUDAL; PERINEURAL AS NEEDED
Status: DISCONTINUED | OUTPATIENT
Start: 2023-03-31 | End: 2023-03-31 | Stop reason: SURG

## 2023-03-31 RX ORDER — ONDANSETRON 2 MG/ML
4 INJECTION INTRAMUSCULAR; INTRAVENOUS EVERY 6 HOURS PRN
Status: DISCONTINUED | OUTPATIENT
Start: 2023-03-31 | End: 2023-04-04 | Stop reason: HOSPADM

## 2023-03-31 RX ORDER — FLUMAZENIL 0.1 MG/ML
0.2 INJECTION INTRAVENOUS AS NEEDED
Status: DISCONTINUED | OUTPATIENT
Start: 2023-03-31 | End: 2023-03-31 | Stop reason: HOSPADM

## 2023-03-31 RX ORDER — PROMETHAZINE HYDROCHLORIDE 25 MG/1
25 SUPPOSITORY RECTAL ONCE AS NEEDED
Status: DISCONTINUED | OUTPATIENT
Start: 2023-03-31 | End: 2023-03-31 | Stop reason: HOSPADM

## 2023-03-31 RX ORDER — IPRATROPIUM BROMIDE AND ALBUTEROL SULFATE 2.5; .5 MG/3ML; MG/3ML
3 SOLUTION RESPIRATORY (INHALATION) ONCE AS NEEDED
Status: DISCONTINUED | OUTPATIENT
Start: 2023-03-31 | End: 2023-03-31 | Stop reason: HOSPADM

## 2023-03-31 RX ORDER — PROPOFOL 10 MG/ML
VIAL (ML) INTRAVENOUS AS NEEDED
Status: DISCONTINUED | OUTPATIENT
Start: 2023-03-31 | End: 2023-03-31 | Stop reason: SURG

## 2023-03-31 RX ORDER — LIDOCAINE HYDROCHLORIDE 10 MG/ML
0.5 INJECTION, SOLUTION EPIDURAL; INFILTRATION; INTRACAUDAL; PERINEURAL ONCE AS NEEDED
Status: DISCONTINUED | OUTPATIENT
Start: 2023-03-31 | End: 2023-03-31 | Stop reason: HOSPADM

## 2023-03-31 RX ORDER — SODIUM CHLORIDE 0.9 % (FLUSH) 0.9 %
3-10 SYRINGE (ML) INJECTION AS NEEDED
Status: DISCONTINUED | OUTPATIENT
Start: 2023-03-31 | End: 2023-03-31 | Stop reason: HOSPADM

## 2023-03-31 RX ORDER — HYDRALAZINE HYDROCHLORIDE 20 MG/ML
5 INJECTION INTRAMUSCULAR; INTRAVENOUS
Status: DISCONTINUED | OUTPATIENT
Start: 2023-03-31 | End: 2023-03-31 | Stop reason: HOSPADM

## 2023-03-31 RX ORDER — DIPHENHYDRAMINE HYDROCHLORIDE 50 MG/ML
12.5 INJECTION INTRAMUSCULAR; INTRAVENOUS
Status: DISCONTINUED | OUTPATIENT
Start: 2023-03-31 | End: 2023-03-31 | Stop reason: HOSPADM

## 2023-03-31 RX ORDER — FAMOTIDINE 10 MG/ML
20 INJECTION, SOLUTION INTRAVENOUS ONCE
Status: COMPLETED | OUTPATIENT
Start: 2023-03-31 | End: 2023-03-31

## 2023-03-31 RX ORDER — HYDROCODONE BITARTRATE AND ACETAMINOPHEN 7.5; 325 MG/1; MG/1
1 TABLET ORAL EVERY 4 HOURS PRN
Status: DISCONTINUED | OUTPATIENT
Start: 2023-03-31 | End: 2023-03-31 | Stop reason: HOSPADM

## 2023-03-31 RX ORDER — FENTANYL CITRATE 50 UG/ML
25 INJECTION, SOLUTION INTRAMUSCULAR; INTRAVENOUS
Status: DISCONTINUED | OUTPATIENT
Start: 2023-03-31 | End: 2023-03-31 | Stop reason: HOSPADM

## 2023-03-31 RX ORDER — FENTANYL CITRATE 50 UG/ML
50 INJECTION, SOLUTION INTRAMUSCULAR; INTRAVENOUS
Status: DISCONTINUED | OUTPATIENT
Start: 2023-03-31 | End: 2023-03-31 | Stop reason: HOSPADM

## 2023-03-31 RX ORDER — HYDROCODONE BITARTRATE AND ACETAMINOPHEN 5; 325 MG/1; MG/1
1 TABLET ORAL ONCE AS NEEDED
Status: DISCONTINUED | OUTPATIENT
Start: 2023-03-31 | End: 2023-03-31 | Stop reason: HOSPADM

## 2023-03-31 RX ORDER — SODIUM CHLORIDE, SODIUM LACTATE, POTASSIUM CHLORIDE, CALCIUM CHLORIDE 600; 310; 30; 20 MG/100ML; MG/100ML; MG/100ML; MG/100ML
9 INJECTION, SOLUTION INTRAVENOUS CONTINUOUS
Status: DISCONTINUED | OUTPATIENT
Start: 2023-03-31 | End: 2023-04-03

## 2023-03-31 RX ORDER — ONDANSETRON 2 MG/ML
INJECTION INTRAMUSCULAR; INTRAVENOUS AS NEEDED
Status: DISCONTINUED | OUTPATIENT
Start: 2023-03-31 | End: 2023-03-31 | Stop reason: SURG

## 2023-03-31 RX ORDER — LABETALOL HYDROCHLORIDE 5 MG/ML
5 INJECTION, SOLUTION INTRAVENOUS
Status: DISCONTINUED | OUTPATIENT
Start: 2023-03-31 | End: 2023-03-31 | Stop reason: HOSPADM

## 2023-03-31 RX ORDER — ONDANSETRON 4 MG/1
4 TABLET, FILM COATED ORAL EVERY 6 HOURS PRN
Status: DISCONTINUED | OUTPATIENT
Start: 2023-03-31 | End: 2023-04-04 | Stop reason: HOSPADM

## 2023-03-31 RX ORDER — HYDROMORPHONE HYDROCHLORIDE 1 MG/ML
0.25 INJECTION, SOLUTION INTRAMUSCULAR; INTRAVENOUS; SUBCUTANEOUS
Status: DISCONTINUED | OUTPATIENT
Start: 2023-03-31 | End: 2023-03-31 | Stop reason: HOSPADM

## 2023-03-31 RX ORDER — SODIUM CHLORIDE 0.9 % (FLUSH) 0.9 %
3 SYRINGE (ML) INJECTION EVERY 12 HOURS SCHEDULED
Status: DISCONTINUED | OUTPATIENT
Start: 2023-03-31 | End: 2023-03-31 | Stop reason: HOSPADM

## 2023-03-31 RX ORDER — PROMETHAZINE HYDROCHLORIDE 25 MG/1
25 TABLET ORAL ONCE AS NEEDED
Status: DISCONTINUED | OUTPATIENT
Start: 2023-03-31 | End: 2023-03-31 | Stop reason: HOSPADM

## 2023-03-31 RX ADMIN — PROPOFOL 80 MG: 10 INJECTION, EMULSION INTRAVENOUS at 14:18

## 2023-03-31 RX ADMIN — LABETALOL HYDROCHLORIDE 5 MG: 5 INJECTION, SOLUTION INTRAVENOUS at 16:05

## 2023-03-31 RX ADMIN — TIMOLOL MALEATE: 5 SOLUTION OPHTHALMIC at 20:37

## 2023-03-31 RX ADMIN — FENTANYL CITRATE 25 MCG: 50 INJECTION, SOLUTION INTRAMUSCULAR; INTRAVENOUS at 14:12

## 2023-03-31 RX ADMIN — FENTANYL CITRATE 25 MCG: 50 INJECTION, SOLUTION INTRAMUSCULAR; INTRAVENOUS at 14:31

## 2023-03-31 RX ADMIN — SODIUM CHLORIDE, POTASSIUM CHLORIDE, SODIUM LACTATE AND CALCIUM CHLORIDE 9 ML/HR: 600; 310; 30; 20 INJECTION, SOLUTION INTRAVENOUS at 13:14

## 2023-03-31 RX ADMIN — AMLODIPINE BESYLATE 10 MG: 10 TABLET ORAL at 08:30

## 2023-03-31 RX ADMIN — ASPIRIN 81 MG: 81 TABLET, COATED ORAL at 08:30

## 2023-03-31 RX ADMIN — LEVETIRACETAM 500 MG: 500 TABLET, FILM COATED ORAL at 08:30

## 2023-03-31 RX ADMIN — HYDROCODONE BITARTRATE AND ACETAMINOPHEN 1 TABLET: 10; 325 TABLET ORAL at 18:05

## 2023-03-31 RX ADMIN — CEFAZOLIN SODIUM 2 G: 2 INJECTION, SOLUTION INTRAVENOUS at 14:01

## 2023-03-31 RX ADMIN — DROPERIDOL 0.62 MG: 2.5 INJECTION, SOLUTION INTRAMUSCULAR; INTRAVENOUS at 14:26

## 2023-03-31 RX ADMIN — LIDOCAINE HYDROCHLORIDE 60 MG: 20 INJECTION, SOLUTION EPIDURAL; INFILTRATION; INTRACAUDAL; PERINEURAL at 14:18

## 2023-03-31 RX ADMIN — ONDANSETRON HYDROCHLORIDE 4 MG: 2 SOLUTION INTRAMUSCULAR; INTRAVENOUS at 04:17

## 2023-03-31 RX ADMIN — IPRATROPIUM BROMIDE AND ALBUTEROL SULFATE 3 ML: .5; 2.5 SOLUTION RESPIRATORY (INHALATION) at 23:15

## 2023-03-31 RX ADMIN — ROSUVASTATIN CALCIUM 10 MG: 10 TABLET, FILM COATED ORAL at 20:36

## 2023-03-31 RX ADMIN — TAZOBACTAM SODIUM AND PIPERACILLIN SODIUM 3.38 G: 375; 3 INJECTION, SOLUTION INTRAVENOUS at 01:30

## 2023-03-31 RX ADMIN — HYDROCODONE BITARTRATE AND ACETAMINOPHEN 1 TABLET: 10; 325 TABLET ORAL at 23:26

## 2023-03-31 RX ADMIN — ONDANSETRON 4 MG: 2 INJECTION INTRAMUSCULAR; INTRAVENOUS at 14:56

## 2023-03-31 RX ADMIN — TIMOLOL MALEATE: 5 SOLUTION OPHTHALMIC at 08:34

## 2023-03-31 RX ADMIN — PREDNISOLONE ACETATE 1 DROP: 10 SUSPENSION/ DROPS OPHTHALMIC at 20:36

## 2023-03-31 RX ADMIN — FAMOTIDINE 20 MG: 10 INJECTION, SOLUTION INTRAVENOUS at 13:14

## 2023-03-31 RX ADMIN — HYDROCODONE BITARTRATE AND ACETAMINOPHEN 1 TABLET: 10; 325 TABLET ORAL at 08:30

## 2023-03-31 RX ADMIN — GABAPENTIN 800 MG: 400 CAPSULE ORAL at 20:36

## 2023-03-31 RX ADMIN — LISINOPRIL 20 MG: 20 TABLET ORAL at 08:30

## 2023-03-31 RX ADMIN — CLOPIDOGREL BISULFATE 75 MG: 75 TABLET, FILM COATED ORAL at 08:30

## 2023-03-31 RX ADMIN — VANCOMYCIN HYDROCHLORIDE 1000 MG: 1 INJECTION, SOLUTION INTRAVENOUS at 20:35

## 2023-03-31 RX ADMIN — TAZOBACTAM SODIUM AND PIPERACILLIN SODIUM 3.38 G: 375; 3 INJECTION, SOLUTION INTRAVENOUS at 08:30

## 2023-03-31 RX ADMIN — HYDROCHLOROTHIAZIDE 25 MG: 25 TABLET ORAL at 08:30

## 2023-03-31 RX ADMIN — PREDNISOLONE ACETATE 1 DROP: 10 SUSPENSION/ DROPS OPHTHALMIC at 08:34

## 2023-03-31 RX ADMIN — INSULIN LISPRO 3 UNITS: 100 INJECTION, SOLUTION INTRAVENOUS; SUBCUTANEOUS at 08:30

## 2023-03-31 RX ADMIN — DEXAMETHASONE SODIUM PHOSPHATE 4 MG: 4 INJECTION, SOLUTION INTRAMUSCULAR; INTRAVENOUS at 14:25

## 2023-03-31 RX ADMIN — CEFTRIAXONE 2 G: 2 INJECTION, POWDER, FOR SOLUTION INTRAMUSCULAR; INTRAVENOUS at 18:05

## 2023-03-31 RX ADMIN — GABAPENTIN 800 MG: 400 CAPSULE ORAL at 08:30

## 2023-03-31 RX ADMIN — LEVETIRACETAM 500 MG: 500 TABLET, FILM COATED ORAL at 20:36

## 2023-03-31 RX ADMIN — GABAPENTIN 800 MG: 400 CAPSULE ORAL at 18:05

## 2023-03-31 NOTE — CONSULTS
Referring Provider: Mariano Lombardo MD  [address]  [address]  [place] [address]    Reason for Consultation: diabetic foot ulcer    History of present illness:  Kelby Peters is a 72 y.o. with uncontrolled DM2 with neuropathy and PVD who I am asked to evaluate and give opinion for diabetic foot ulcer. History is obtained from the patient, his wife, and review of the old medical records which I summarize/synthesize as follows: I saw the patient recently in clinic on 3/6/23 at which time he finished a 6-week course of cefazolin for Strep and MSSA chronic osteomyelitis of both feet. He had a good clinical and laboratory response. CRP came down to < 0.30 mg/dL.     He was sent in yesterday from vascular surgery clinic with worsening foot ulcers. He had a CRP done that was up to 10. WBC is normal. No fever at home. 1x temp of 100.6 F here. His HR and BP are acceptable. He had B foot MRIs and both showed osteomyelitis in both feet. He was started on empiric vancomycin and Zosyn. Vascular surgery note reviewed. They are planning L great toe amputation, R 5th toe amputation, and bilateral foot debridement later today.       Past Medical History:   Diagnosis Date   • Basal cell carcinoma 06/2022   • Brain injury    • Broken toes    • Diabetes mellitus (HCC)    • Finger injury    • Gallbladder obstruction    • Hearing disorder    • Hiatal hernia    • Hypercholesterolemia    • Leg injury    • Migraine    • Osteomyelitis (HCC)    • Peripheral neuropathy    • Prostate enlargement    • Seizures ([facility])    • Shingles    • Sleep apnea        Past Surgical History:   Procedure Laterality Date   • AMPUTATION DIGIT Left 1/19/2023    Procedure: 3RD LEFT TOE AMPUTATION AND LEFT FOOT DEBRIDEMENT;  Surgeon: Ivonne Martin MD;  Location: [facility] OR 18/19;  Service: Vascular;  Laterality: Left;   • ANGIOPLASTY FEMORAL ARTERY Left 1/19/2023    Procedure: LEFT LEG ANGIOGRAM;  Surgeon: Ivonne Martin MD;  Location:   [facility] OR 18/19;  Service: Vascular;  Laterality: Left;   • BASAL CELL CARCINOMA EXCISION  10/13/2022   • CATARACT EXTRACTION Right    • CORNEAL TRANSPLANT Left 03/31/2021   • EYE SURGERY Left    • GALLBLADDER SURGERY     • INNER EAR SURGERY       Social History:    Lives in [facility] w/ his wife     Antibiotic allergies and intolerances:  None    Medications:    Current Facility-Administered Medications:   •  acetaminophen (TYLENOL) tablet 650 mg, 650 mg, Oral, Q4H PRN, 650 mg at 03/30/23 1704 **OR** acetaminophen (TYLENOL) 160 MG/5ML solution 650 mg, 650 mg, Oral, Q4H PRN **OR** acetaminophen (TYLENOL) suppository 650 mg, 650 mg, Rectal, Q4H PRN, Yane Marie APRN  •  amLODIPine (NORVASC) tablet 10 mg, 10 mg, Oral, Q24H, Yane Marie APRN  •  aspirin EC tablet 81 mg, 81 mg, Oral, Daily, Yane Marie APRN  •  clopidogrel (PLAVIX) tablet 75 mg, 75 mg, Oral, Daily, Mariano Lombardo MD  •  dextrose (D50W) (25 g/50 mL) IV injection 25 g, 25 g, Intravenous, Q15 Min PRN, Yane Marie APRN  •  dextrose (GLUTOSE) oral gel 15 g, 15 g, Oral, Q15 Min PRN, Yane Marie APRN  •  dorzolamide (TRUSOPT) 2 % 1 drop, timolol (TIMOPTIC) 0.5 % 1 drop for Cosopt 22.3-6.8 mg/mL, , Both Eyes, BID, Yane Marie APRN, Given at 03/30/23 2324  •  gabapentin (NEURONTIN) capsule 800 mg, 800 mg, Oral, Q8H, Mariano Lombardo MD, 800 mg at 03/30/23 2323  •  glucagon (GLUCAGEN) injection 1 mg, 1 mg, Subcutaneous, Q15 Min PRN, Yane Marie APRN  •  hydroCHLOROthiazide (HYDRODIURIL) tablet 25 mg, 25 mg, Oral, Daily, Yane Marie APRN  •  HYDROcodone-acetaminophen (NORCO)  MG per tablet 1 tablet, 1 tablet, Oral, Q6H PRN, Mariano Lombardo MD, 1 tablet at 03/30/23 2323  •  insulin lispro (ADMELOG) injection 0-14 Units, 0-14 Units, Subcutaneous, TID Frank ZAMUDIO Courtney Lynn, APRN, 3 Units at 03/30/23 1705  •  levETIRAcetam (KEPPRA) tablet 500 mg,  500 mg, Oral, BID, Yane Marie APRN, 500 mg at 03/30/23 2323  •  lisinopril (PRINIVIL,ZESTRIL) tablet 20 mg, 20 mg, Oral, Daily, Yane Marie APRN  •  ondansetron (ZOFRAN) tablet 4 mg, 4 mg, Oral, Q6H PRN **OR** ondansetron (ZOFRAN) injection 4 mg, 4 mg, Intravenous, Q6H PRN, Yane Marie APRN, 4 mg at 03/31/23 0417  •  Pharmacy to dose vancomycin, , Does not apply, Continuous PRN, Mariano Lombardo MD  •  piperacillin-tazobactam (ZOSYN) 3.375 g in iso-osmotic dextrose 50 ml (premix), 3.375 g, Intravenous, Q8H, Mariano Lombardo MD, 3.375 g at 03/31/23 0130  •  prednisoLONE acetate (PRED FORTE) 1 % ophthalmic suspension 1 drop, 1 drop, Both Eyes, TID, Yane Marie APRN, 1 drop at 03/30/23 2324  •  rosuvastatin (CRESTOR) tablet 10 mg, 10 mg, Oral, Nightly, Yane Marie APRN, 10 mg at 03/30/23 2323  •  vancomycin (VANCOCIN) 1000 mg/200 mL dextrose 5% IVPB, 1,000 mg, Intravenous, Q12H, Mariano Lombardo MD      Objective   Vital Signs   Temp:  [97.6 °F (36.4 °C)-100.6 °F (38.1 °C)] 98.9 °F (37.2 °C)  Heart Rate:  [61-79] 65  Resp:  [10-23] 14  BP: (155-180)/(55-97) 160/79    Physical Exam:   General: awake, alert, NAD, very nice   Eyes: no scleral icterus  ENT: no thrush  Cardiovascular: NR  Respiratory: normal work of breathing on 2L NC while sleeping  GI: Abdomen is soft, not tender  :  no Hernandez catheter  MSK: L great toe plantar ulcer w/ mild surrounding erythema; R 5th toe lateral ulcer w/o surrounding heat or erythema; no purulent drainage  Skin: No rashes  Neurological: Alert and oriented x 3  Psychiatric: Normal mood and affect   Vasc: PIV w/o erythema    Labs:     Lab Results   Component Value Date    WBC 10.56 03/30/2023    HGB 9.7 (L) 03/30/2023    HCT 29.4 (L) 03/30/2023    MCV 88.6 03/30/2023     03/30/2023     Lab Results   Component Value Date    GLUCOSE 193 (H) 03/30/2023    CALCIUM 9.1 03/30/2023     (L) 03/30/2023    K 3.5 03/30/2023  "   CO2 30.9 (H) 03/30/2023    CL 92 (L) 03/30/2023    BUN 20 03/30/2023    CREATININE 0.78 03/30/2023    EGFRRESULT 95.9 12/07/2022    EGFR 94.8 03/30/2023    BCR 25.6 (H) 03/30/2023    ANIONGAP 10.1 03/30/2023     Lab Results   Component Value Date    ALT 7 03/30/2023     Lab Results   Component Value Date    CRP 10.54 (H) 03/30/2023     Lab Results   Component Value Date    HGBA1C 9.10 (H) 01/16/2023     HIV negative  Hep C negative    Microbiology:  3/30 BCx: pending    Radiology:  MRI L Foot: \"Open wound along the plantar side of the medial left   forefoot with osteomyelitis in the medial great toe sesamoid, first   metatarsal head and neck and proximal phalanx of the great toe. The   flexor hallucis longus tendon is torn with the tendon sheath extending   across the open wound and some abnormal synovitis along the tendon   sheath to the level of the metatarsal base.       Additionally there has been third toe amputation and there is no bone   wound along the lateral side of the foot superficial to the fifth   metatarsal head. However, there is no evidence of osteomyelitis in the   third or the fifth metatarsals. \"    MRI R Foot: \"Open wound along lateral side of the right forefoot with   exposed bone and MRI findings of osteomyelitis in the toe proximal   phalanx and the fifth metatarsal head and neck. The abnormal marrow   signal is more extensive today than on the MRI from earlier this year. \"    ASSESSMENT/PLAN:  1. Chronic osteomyelitis of bilateral feet  2. Uncontrolled DM2  3. History of Strep and MSSA infections  4. Peripheral vascular disease  5. Peripheral neuropathy  6. Uncontrolled DM2 - complicated by foot ulcer - A1c 9.1%  7.  Obesity BMI 32    Noted plans for operative debridement and toe amputations later today. He is stable without any signs of sepsis. Therefore I recommend we hold further antibiotic therapy until after his procedure in order to optimize our culture yield. I will resume " antibiotics after his procedure with vancomycin and ceftriaxone. Vancomycin levels will be ordered and monitored w/ dose adjusted as needed. I anticipate he will need another 6 week course w/ stop date around 5/12/23. I discussed the long-term antibiotic plan w/ the patient and his wife.     I'll repeat an A1c.     ID will follow.

## 2023-03-31 NOTE — CONSULTS
Patient Name: Kelby Peters Account #: [id number]    MRN: [id number] Admission Date: 3/30/2023      Consulting Service: Vascular Surgery Date of Evaluation: 2023    Requesting Provider: Mariano Lombardo MD      BILLIN      CHIEF COMPLAINT: Bilateral lower extremity diabetic foot infections with osteomyelitis of the left first toe and right fifth toe     HPI: Kelby Peters is a 72 y.o. male is being seen for a consultation and evaluation/management of the osteomyelitis of the right fifth toe and left first toe with infected mal perforans ulcer on the left foot and chronic diabetic foot infections bilaterally.  This comes despite adequate reperfusion of the left leg in the recent past.  He has healed a third toe amputation but unfortunately due to severe neuropathy walked a whole through his first toe on the left foot with a mal perforans ulcer.  The right foot has had a small smoldering wound that is now enlarged and gotten the fifth toe grossly infected.  Unfortunately there is very little option here from a vascular surgery standpoint.  I believe amputations of the toes is the best answer.  I do not believe that hyperbaric oxygen will adequately clear the significant bone infection.    PAST MEDICAL HISTORY:   Past Medical History:   Diagnosis Date   • Basal cell carcinoma 2022   • Brain injury    • Broken toes    • Diabetes mellitus (HCC)    • Finger injury    • Gallbladder obstruction    • Hearing disorder    • Hiatal hernia    • Hypercholesterolemia    • Leg injury    • Migraine    • Osteomyelitis (HCC)    • Peripheral neuropathy    • Prostate enlargement    • Seizures (HCC)    • Shingles    • Sleep apnea       PAST SURGICAL HISTORY:   Past Surgical History:   Procedure Laterality Date   • AMPUTATION DIGIT Left 2023    Procedure: 3RD LEFT TOE AMPUTATION AND LEFT FOOT DEBRIDEMENT;  Surgeon: Ivonne Martin MD;  Location: [facility] ;  Service: Vascular;  Laterality: Left;    • ANGIOPLASTY FEMORAL ARTERY Left 1/19/2023    Procedure: LEFT LEG ANGIOGRAM;  Surgeon: Ivonne Martin MD;  Location: [facility] OR 18/19;  Service: Vascular;  Laterality: Left;   • BASAL CELL CARCINOMA EXCISION  10/13/2022   • CATARACT EXTRACTION Right    • CORNEAL TRANSPLANT Left 03/31/2021   • EYE SURGERY Left    • GALLBLADDER SURGERY     • INNER EAR SURGERY        FAMILY HISTORY:   Family History   Problem Relation Age of Onset   • Cancer Mother    • Transient ischemic attack Mother    • Diabetes Father    • Heart disease Father    • COPD Paternal Aunt    • Diabetes Paternal Aunt    • Alzheimer's disease Maternal Grandmother    • Stroke Maternal Grandfather    • Emphysema Paternal Grandfather       SOCIAL HISTORY:   Social History     Tobacco Use   • Smoking status: Former   • Smokeless tobacco: Never   Vaping Use   • Vaping Use: Never used   Substance Use Topics   • Alcohol use: Yes     Comment: social drinker   • Drug use: Yes     Types: Marijuana      MEDICATIONS:   No current facility-administered medications on file prior to encounter.     Current Outpatient Medications on File Prior to Encounter   Medication Sig Dispense Refill   • amLODIPine (NORVASC) 10 MG tablet TAKE ONE TABLET BY MOUTH DAILY , NEEDS FOLLOW UP APPOINTMENT FOR REFILLS 90 tablet 0   • aspirin 81 MG EC tablet Take 1 tablet by mouth Daily. 30 tablet 3   • clopidogrel (PLAVIX) 75 MG tablet Take 1 tablet by mouth Daily. 30 tablet 3   • Continuous Blood Gluc Sensor (FreeStyle Milka 3 Sensor) misc 1 each Every 14 (Fourteen) Days. 6 each 1   • dorzolamide-timolol (COSOPT) 22.3-6.8 MG/ML ophthalmic solution Administer 1 drop to both eyes 2 (Two) Times a Day. Indications: Wide-Angle Glaucoma     • gabapentin (NEURONTIN) 800 MG tablet Take 1 tablet by mouth 4 (Four) Times a Day. 120 tablet 2   • glucose blood (OneTouch Verio) test strip Use to check blood sugar  4 time daily  E11.8 400 each 3   • hydroCHLOROthiazide (HYDRODIURIL) 25 MG  tablet Take 1 tablet by mouth Daily. 30 tablet 3   • HYDROcodone-acetaminophen (NORCO)  MG per tablet Take 1 tablet by mouth Every 6 (Six) Hours As Needed for Severe Pain. Indications: Pain 120 tablet 0   • insulin NPH-insulin regular (NovoLIN 70/30 ReliOn) (70-30) 100 UNIT/ML injection Inject 40 Units under the skin into the appropriate area as directed 2 (Two) Times a Day With Meals. Inject  Units in the AM &  Units in the PM, and titrate as directed up to 50 units twice a day. 20 mL 6   • Insulin Pen Needle (BD Pen Needle Micro U/F) 32G X 6 MM misc Use 5 injections daily 1500 each 3   • levETIRAcetam (KEPPRA) 500 MG tablet TAKE ONE TABLET BY MOUTH TWICE A DAY 60 tablet 0   • lisinopril (PRINIVIL,ZESTRIL) 10 MG tablet Take 1 tablet by mouth Daily. (Patient taking differently: Take 2 tablets by mouth Daily. Indications: High Blood Pressure Disorder) 30 tablet 2   • OneTouch Delica Lancets 33G misc Use to check blood sugar 4 times daily  E11.8 400 each 3   • prednisoLONE acetate (PRED FORTE) 1 % ophthalmic suspension Administer 1 drop to both eyes 3 (Three) Times a Day. Indications: Conjunctivitis     • rosuvastatin (CRESTOR) 10 MG tablet Take 1 tablet by mouth Every Night. 90 tablet 1   • sodium hypochlorite (DAKIN'S 1/4 STRENGTH) 0.125 % solution topical solution 0.125% Apply topical to affected areas two times daily 473 mL 0   • valsartan-hydrochlorothiazide (DIOVAN-HCT) 160-12.5 MG per tablet Take 1 tablet by mouth Daily.     • acetaminophen (TYLENOL) 500 MG tablet Take 2 tablets by mouth Every 8 (Eight) Hours. 30 tablet 0   • amitriptyline (ELAVIL) 10 MG tablet Take 1 tablet by mouth Daily.     • glucagon (GLUCAGEN) 1 MG injection For emergency use for severe low glucose: first inject, then call 911, once awake try to give oral treatment. Kit expires in 6-12 months. 1 kit 6   • [employer] LANCETS MICRO THIN 33G misc                ALLERGIES: Patient has no known allergies.   COMPLETE REVIEW OF SYSTEMS:  "    ENT ROS: negative  Cardiovascular ROS: no chest pain or dyspnea on exertion  Respiratory ROS: no cough, shortness of breath, or wheezing  Gastrointestinal ROS: no abdominal pain, change in bowel habits, or black or bloody stools  Neurological ROS: no TIA or stroke symptoms, dense neuropathy distally  Genito-Urinary ROS: no dysuria, trouble voiding, or hematuria  Musculoskeletal ROS: negative  Dermatological ROS: negative  Psychological ROS: negative      PHYSICAL EXAM:   Patient Vitals for the past 24 hrs:   BP Temp Temp src Pulse Resp SpO2 Height Weight   03/31/23 0700 160/79 98.9 °F (37.2 °C) Oral 65 14 96 % -- --   03/31/23 0600 -- -- -- -- -- -- -- 93.8 kg (206 lb 14.4 oz)   03/31/23 0300 161/71 97.6 °F (36.4 °C) Oral 61 10 96 % -- --   03/30/23 2300 180/97 97.8 °F (36.6 °C) Oral 78 23 98 % -- --   03/30/23 1900 156/55 98.8 °F (37.1 °C) Oral 63 14 96 % -- --   03/30/23 1832 -- 99 °F (37.2 °C) -- -- -- -- -- --   03/30/23 1556 155/74 (!) 100.6 °F (38.1 °C) Oral 71 18 95 % -- --   03/30/23 1320 -- 99.9 °F (37.7 °C) Oral -- 18 90 % 170.2 cm (67.01\") 97.5 kg (215 lb)   03/30/23 1259 169/85 99.9 °F (37.7 °C) Oral 79 18 92 % -- --        General appearance: oriented to person, place, and time, in mild to moderate distress and chronically ill appearing.  Neurological exam reveals alert, oriented, normal speech, no focal findings or movement disorder noted.  ENT exam reveals - ENT exam normal, no neck nodes or sinus tenderness.  CVS exam: normal rate, regular rhythm, normal S1, S2, no murmurs, rubs, clicks or gallops.  Chest: clear to auscultation, no wheezes, rales or rhonchi, symmetric air entry.  Abdominal exam: soft, nontender, nondistended, no masses or organomegaly.  Examination of the feet reveals warm, good capillary refill.  Left first toe with cellulitis surrounding metatarsophalangeal joint.  Draining mal perforans ulcer at the metatarsal head.  Right fifth toe with exposed bone at the right fifth " metatarsal head.  No cellulitis noted there.  Pulses +2 femorals.  +1 popliteals.  Nonpalpable pedal pulses laterally        LABS:      Results Review:       I reviewed the patient's new clinical results.  Results from last 7 days   Lab Units 03/30/23  1529   WBC 10*3/mm3 10.56   HEMOGLOBIN g/dL 9.7*   PLATELETS 10*3/mm3 323     Results from last 7 days   Lab Units 03/30/23  1529   SODIUM mmol/L 133*   POTASSIUM mmol/L 3.5   CHLORIDE mmol/L 92*   CO2 mmol/L 30.9*   BUN mg/dL 20   CREATININE mg/dL 0.78   GLUCOSE mg/dL 193*   Estimated Creatinine Clearance: 93.5 mL/min (by C-G formula based on SCr of 0.78 mg/dL).  Results from last 7 days   Lab Units 03/30/23  1529   CALCIUM mg/dL 9.1   ALBUMIN g/dL 3.4*           The following radiologic or non-invasive studies have been reviewed by me: MRIs reviewed    Active Hospital Problems    Diagnosis  POA   • **Diabetic foot ulcer (HCC) [E11.621, L97.509]  Yes   • Chronic osteomyelitis of foot (HCC) [M86.679]  Yes   • Diabetes mellitus with nephropathy (HCC) [E11.21]  Yes   • Type 2 diabetes mellitus with diabetic polyneuropathy (HCC) [E11.42]  Yes   • Peripheral vascular disease (HCC) [I73.9]  Yes   • History of seizures [Z87.898]  Yes   • Diabetic peripheral neuropathy (HCC) [E11.42]  Yes   • Hypertension [I10]  Yes   • Chronic pain [G89.29]  Yes      Resolved Hospital Problems   No resolved problems to display.         ASSESSMENT/PLAN: 72 y.o. male with recent reperfusion of the left leg and healed third toe resection.  Unfortunately patient has severe neuropathy in his walked a mal perforans ulcer into his left first metatarsal phalangeal joint which is now created significant infection.  Plans for toe amputation at this level and the right fifth toe is chronically infected but is now extended significantly from prior MRI.  Patient understands that an attempt at hyperbaric oxygen would likely not improve his situation enough and would likely cost in his left leg.  As such  I believe the amputations are his best answer and he and his wife agree.  Unfortunately his dense neuropathy is conspired to get him back into the hospital.  He was already wearing a protective Darco shoe and this occurred anyway.  I believe his left leg has adequate perfusion based on recent testing 2 weeks ago in our office.  His right leg will have to be reviewed.  We will try to get him on the schedule today for toe amputations and bilateral foot debridement.  Antibiotic course will be determined by infectious disease      I discussed the plan with the patient and his wife and they are  agreeable to the plan of care at this point. Thank you for this consult.     Jasbir Alvarado MD   03/31/23

## 2023-03-31 NOTE — ANESTHESIA PROCEDURE NOTES
Airway  Urgency: elective    Date/Time: 3/31/2023 2:20 PM    General Information and Staff    Patient location during procedure: OR  Anesthesiologist: Izabela Quiles MD  CRNA/CAA: Joanne Ware CRNA    Indications and Patient Condition  Indications for airway management: airway protection    Preoxygenated: yes  Mask difficulty assessment: 2 - vent by mask + OA or adjuvant +/- NMBA    Final Airway Details  Final airway type: supraglottic airway      Successful airway: LMA  Size 5     Number of attempts at approach: 1  Assessment: lips, teeth, and gum same as pre-op and atraumatic intubation

## 2023-03-31 NOTE — PROGRESS NOTES
"[facility]: Vancomycin Pharmacokinetic Initial Consult Note    Kelby Peters is a 72 y.o. male who is on day 1 of pharmacy to dose vancomycin.    Indication: Skin and Soft Tissue  Consulting Provider: Dr Mariano Lombardo  Planned Duration of Therapy: 10 days  Loading Dose Ordered or Given: 2000 mg on 03/30/23 at 2329  Target: -600 mg/L.hr   Other Antimicrobials: Zosyn    Vitals/Labs  Ht: 170.2 cm (67.01\"); Wt: 97.5 kg (215 lb)  Temp Readings from Last 1 Encounters:   03/30/23 97.8 °F (36.6 °C) (Oral)    Estimated Creatinine Clearance: 95.3 mL/min (by C-G formula based on SCr of 0.78 mg/dL).        Results from last 7 days   Lab Units 03/30/23  1529   CREATININE mg/dL 0.78   WBC 10*3/mm3 10.56     Assessment/Plan:    1. Vancomycin Dose:   1000 mg IV every  12  hours  2. Predictive AUC level for the dose ordered is 449 mg/L.hr, which is within the target of 400-600 mg/L.hr  3. Vanc Trough has been ordered for 04/01/23 at 0900     Pharmacy will follow patient's kidney function and will adjust doses and obtain levels as necessary. Thank you for involving pharmacy in this patient's care. Please contact pharmacy with any questions or concerns.                           Poncho Juarez, [facility]  Clinical Pharmacist      "

## 2023-03-31 NOTE — PROGRESS NOTES
[facility]     LOS: 1 day     Subjective:    CC:No chief complaint on file.    DIET:  Diet Order   Procedures   • NPO Diet NPO Type: Strict NPO       Objective:    Vital Signs:  Temp:  [97.6 °F (36.4 °C)-100.6 °F (38.1 °C)] 98.9 °F (37.2 °C)  Heart Rate:  [61-79] 65  Resp:  [10-23] 14  BP: (155-180)/(55-97) 160/79    SpO2:  [90 %-98 %] 96 %  on  Flow (L/min):  [1.5-2] 2;   Device (Oxygen Therapy): nasal cannula  Body mass index is 32.4 kg/m².    Physical Exam    Results Review:    Glucose   Date Value Ref Range Status   03/30/2023 193 (H) 65 - 99 mg/dL Final     Results from last 7 days   Lab Units 03/31/23  0759   WBC 10*3/mm3 8.94   HEMOGLOBIN g/dL 10.3*   HEMATOCRIT % 30.7*   PLATELETS 10*3/mm3 375     Results from last 7 days   Lab Units 03/30/23  1529   SODIUM mmol/L 133*   POTASSIUM mmol/L 3.5   CHLORIDE mmol/L 92*   CO2 mmol/L 30.9*   BUN mg/dL 20   CREATININE mg/dL 0.78   CALCIUM mg/dL 9.1   BILIRUBIN mg/dL 0.2   ALK PHOS U/L 59   ALT (SGPT) U/L 7   AST (SGOT) U/L 11   GLUCOSE mg/dL 193*                 Cultures:  No results found for: BLOODCX, URINECX, WOUNDCX, MRSACX, RESPCX, STOOLCX    I have reviewed daily medications and changes in CPOE    Scheduled meds  amLODIPine, 10 mg, Oral, Q24H  aspirin, 81 mg, Oral, Daily  clopidogrel, 75 mg, Oral, Daily  dorzolamide-timolol, , Both Eyes, BID  gabapentin, 800 mg, Oral, Q8H  hydroCHLOROthiazide, 25 mg, Oral, Daily  insulin lispro, 0-14 Units, Subcutaneous, TID AC  levETIRAcetam, 500 mg, Oral, BID  lisinopril, 20 mg, Oral, Daily  piperacillin-tazobactam, 3.375 g, Intravenous, Q8H  prednisoLONE acetate, 1 drop, Both Eyes, TID  rosuvastatin, 10 mg, Oral, Nightly  vancomycin, 1,000 mg, Intravenous, Q12H        Pharmacy to dose vancomycin,       PRN meds  •  acetaminophen **OR** acetaminophen **OR** acetaminophen  •  dextrose  •  dextrose  •  glucagon (human recombinant)  •  HYDROcodone-acetaminophen  •  ondansetron **OR** ondansetron  •   Pharmacy to dose vancomycin        Diabetic foot ulcer (HCC)    Chronic pain    Hypertension    Diabetic peripheral neuropathy (HCC)    History of seizures    Peripheral vascular disease (HCC)    Type 2 diabetes mellitus with diabetic polyneuropathy (HCC)    Chronic osteomyelitis of foot (HCC)    Diabetes mellitus with nephropathy (HCC)        Assessment/Plan:    Mr. Peters is a 72 y.o. male with a history of chronic osteomyelitis chetan feet, MSSA & strep wound infections, PVD, DM w/neuropathy & nephropathy, Sleep apnea, seizures, HTN who is admitted for diabetic foot ulcers     Diabetic foot ulcers/Chronic OM/PVD:  -Vascular surgery and ID consulted.  -MRI with osteo of the left great toe and right little toe  -Check blood cultures  -procal normal, CRP 10.54  -Completed 6 weeks IV cefazolin 3/6/23. Defer imaging to vascular surgery  -Dr Christiano lora with continuing plavix  -surgery planned for today  -zosyn and vanc     DM/Nephropathy/Neuropathy:  -Monitor BG trends. A1C 9.10%. Correctional scale insulin.  -home rx: 70/30 45 bid   -while in hospital: lantus 10 and humalog 4 tid, ssi    Chronic pain:  -Restart hydrocodone, gabapentin     Hx seizures:  -Keppra     HTN:  -Followed by Nephrology. Increased lisinopril to 20 mg at last visit. Continue amlodipine, HCTZ                 Mariano Lombardo MD  03/31/23  08:52 EDT

## 2023-03-31 NOTE — SIGNIFICANT NOTE
03/31/23 0820   OTHER   Discipline physical therapist   Rehab Time/Intention   Session Not Performed other (see comments)  (pt plans to undergo L great toe amputation, R 5th toe amputation, and bilateral foot debridement later today. Will f/u tomorrow)   Therapy Assessment/Plan (PT)   Criteria for Skilled Interventions Met (PT) yes   Recommendation   PT - Next Appointment 04/01/23

## 2023-03-31 NOTE — OP NOTE
Operative Note  Date of Admission:  3/30/2023  OR Date: 3/31/2023    Pre-op Diagnosis:   Left great toe osteomyelitis with metatarsal head infection, right fifth toe osteomyelitis with metatarsal head infection    Post-Op Diagnosis Codes:     * Diabetes mellitus with nephropathy (HCC) [E11.21]     * Chronic osteomyelitis of foot (HCC) [M86.679]    Procedure:   1) left great toe amputation with resection of metatarsal phalangeal joint and metatarsal head and shaft.  Right fifth toe amputation with resection of metatarsal phalangeal joint and metatarsal head and shaft.    Surgeon: Derrick Alvarado MD    Assistant: Cynthia Hairston RN and they provided critical assistance during the case including suctioning, exposure, retraction, and reduction of blood loss.    Anesthesia: General    Staff:   Circulator: Latosha Barbosa RN  Scrub Person: Shira Levy; Madonna Rodríguez PCT  Assistant: Cynthia Hairston CST    Estimated Blood Loss: minimal    Specimens:   Order Name Source Comment Collection Info Order Time   ANAEROBIC CULTURE Toe, Left  Collected By: Luis Alexander MD 3/31/2023  2:38 PM     Release to patient   Routine Release        TISSUE / BONE CULTURE Toe, Left  Collected By: Luis Alexander MD 3/31/2023  2:38 PM     Release to patient   Routine Release        ANAEROBIC CULTURE Toe, Right  Collected By: Jasbir Alvarado MD 3/31/2023  2:48 PM     Release to patient   Routine Release        TISSUE / BONE CULTURE Toe, Right  Collected By: Jasbir Alvarado MD 3/31/2023  2:48 PM     Release to patient   Routine Release        TISSUE PATHOLOGY EXAM Toe, Left  Collected By: Jasbir Alvarado MD 3/31/2023  2:37 PM     Release to patient   Routine Release             Complications: None    Findings: See dictation, culture sent from both toes    Indications:    The patient is an 72 y.o. male seen for evaluation osteomyelitis of the metatarsal heads of both the right fifth and left first toes.  Toe amputation with regular  amputation resection of the metatarsal heads planned.  Patient and family understand the risks benefits and complications of the procedure and agrees to procedure.       Procedure:    The patient was prepped and draped sterilely.  Using incision first on the left side at the base of the toe surrounding down to the level of the mal perforans ulcer that was grossly infected with exposed and divided the tissue down to the joint space where the toe was removed rongeur was then used to debride the metatarsal phalangeal joint and head of the metatarsal down to the midshaft.  At the midshaft of the metatarsal of his hard cancellous bone.  Cultures were sent and sesamoid bone was removed under direct vision.  Good bleeding was noted and irrigation and packing with Betadine wet-to-dry gauze was performed.  Attention was then turned to the fifth toe on the right side where similar incision was made at the base of the toe around the lateral ulcer.  The proximal shaft of the toe was fractured due to the osteomyelitis.  The toe was removed and sent to pathology and the metatarsal head and midshaft were debrided back and completely removed with rongeur.  All tendons and both sites were ligated high.  Irrigation and packing with Betadine wet-to-dry gauze was performed again.  Patient tolerated the procedure well.      Active Hospital Problems    Diagnosis  POA   • **Diabetic foot ulcer (HCC) [E11.621, L97.509]  Yes   • Chronic osteomyelitis of foot (HCC) [M86.679]  Yes   • Diabetes mellitus with nephropathy (HCC) [E11.21]  Yes   • Type 2 diabetes mellitus with diabetic polyneuropathy (HCC) [E11.42]  Yes   • Peripheral vascular disease (HCC) [I73.9]  Yes   • History of seizures [Z87.898]  Yes   • Diabetic peripheral neuropathy (HCC) [E11.42]  Yes   • Hypertension [I10]  Yes   • Chronic pain [G89.29]  Yes      Resolved Hospital Problems   No resolved problems to display.      Jasbir Alvarado MD     Date: 3/31/2023  Time: 15:20  EDT

## 2023-03-31 NOTE — PLAN OF CARE
Problem: Adult Inpatient Plan of Care  Goal: Plan of Care Review  Outcome: Ongoing, Progressing  Flowsheets (Taken 3/31/2023 1549)  Progress: no change  Plan of Care Reviewed With: patient  Outcome Evaluation: vss. on oxygen while asleep. off the floor for bilateral toe amputations and debriedment   Goal Outcome Evaluation:  Plan of Care Reviewed With: patient        Progress: no change  Outcome Evaluation: vss. on oxygen while asleep. off the floor for bilateral toe amputations and debriedment

## 2023-03-31 NOTE — PAYOR COMM NOTE
"    Kai Peters (72 y.o. Male)     PLEASE SEE ATTACHED FOR INPT AUTH     REF # [id number]    PLEASE CALL REYNOLD BANKS RN/ DEPT @ [phone]  OR FAX  DEPARTMENT @  [phone]    THANK YOU   REYNOLD BANKS RN  [facility]             Date of Birth   1950    Social Security Number       Address   [address] [place] [address]    Home Phone   [phone]    MRN   [id number]       Mosque   Confucianist    Marital Status                               Admission Date   3/30/23    Admission Type   Urgent    Admitting Provider   Mariano Lombardo MD    Attending Provider   Mariano Lombardo MD    Department, Room/Bed   [facility] 5 EAST, E561/1       Discharge Date       Discharge Disposition       Discharge Destination                               Attending Provider: Mariano Lombardo MD    Allergies: No Known Allergies    Isolation: None   Infection: None   Code Status: CPR    Ht: 170.2 cm (67.01\")   Wt: 93.8 kg (206 lb 14.4 oz)    Admission Cmt: None   Principal Problem: Diabetic foot ulcer (HCC) [E11.621,L97.509]                 Active Insurance as of 3/30/2023     Primary Coverage     Payor Plan Insurance Group Employer/Plan Group    AETNA MEDICARE REPLACEMENT AETNA MEDICARE REPLACEMENT [id number]     Payor Plan Address Payor Plan Phone Number Payor Plan Fax Number Effective Dates    [address] [phone]  1/1/2021 - None Entered    [facility] [id number]       Subscriber Name Subscriber Birth Date Member ID       KAI PETERS 1950 [id number]           Secondary Coverage     Payor Plan Insurance Group Employer/Plan Group    MYNEXUS-CARELON (HOMEHEALTH ONLY) [id number] MEDICARE (HH ONLY) [id number]     Payor Plan Address Payor Plan Phone Number Payor Plan Fax Number Effective Dates    [address] [phone]  1/1/2021 - None Entered    [facility] [id number]       Subscriber Name Subscriber Birth Date Member ID       KAI PETERS " 1950 [id number]                 Emergency Contacts      (Rel.) Home Phone Work Phone Mobile Phone    My Peters (Spouse) [phone] -- --            [place]: [facility] [id number]  Tax ID [id number]     History & Physical      Yane Marie APRN at 03/30/23 1614     Attestation signed by Mariano Lombardo MD at 03/30/23 1823    I have reviewed the history and plan as obtained by the APRN. I have independently examined the patient and I personally performed >50% of the management in this split shared patient. My exam confirms her physical findings and I agree with the plan as listed with exceptions listed below.    72-year-old gentleman with history of osteomyelitis and just finished treatment up approximately 3 weeks ago now comes in because of foot ulcer.  Concern for osteomyelitis.  He was seen at Dr. Perez's office and sent over today.    General patient is awake alert answering questions appropriately heart is regular rate rhythm no murmurs rubs gallops lungs are clear to auscultation bilaterally no wheezes rhonchi abdomen soft nontender distended no organomegaly   Extremity dressing and ulceration on the bottom of the first mtp joint    Blood cultures been drawn empiric antibiotics  Discussed with vascular surgery  Infectious disease to see  MRI    Vascular surgery okay with continuing aspirin and Plavix                      Patient Name:  Kelby Peters  YOB: 1950  MRN:  [id number]  Admit Date:  3/30/2023  Patient Care Team:  Susy Germain APRN as PCP - General (Nurse Practitioner)      Subjective    History Present Illness     No chief complaint on file.      Mr. Petesr is a 72 y.o. male with a history of chronic osteomyelitis chetan feet, MSSA & strep wound infections, PVD, DM w/neuropathy & nephropathy, Sleep apnea, seizures, HTN,  that presents to [facility] complaining of chetan foot wounds. He was directly admitted to [facility] service by Dr. Alvarado for  diabetic foot ulcers chetan feet. He completed 6 weeks IV cefazolin on March 6th for treatment of OM, followed by Dr. Villarreal. He has wounds beneath lt great toe and lateral aspect rt foot w/concern for infection. Denies fever, chest pain, soa, n/v/d, dysuria. He has neuropathy and decreased sensation to his feet. He has required prior debridements and toe amputation.       Review of Systems   Constitutional: Negative for chills and fever.   HENT: Positive for hearing loss. Negative for congestion.    Respiratory: Negative for shortness of breath.    Cardiovascular: Negative for chest pain.   Gastrointestinal: Negative for diarrhea, nausea and vomiting.   Genitourinary: Negative for dysuria.   Musculoskeletal: Negative for arthralgias.   Skin: Positive for wound.   Neurological: Positive for numbness.   Psychiatric/Behavioral: Negative for sleep disturbance.        Personal History     Past Medical History:   Diagnosis Date   • Basal cell carcinoma 06/2022   • Brain injury    • Broken toes    • Diabetes mellitus (HCC)    • Finger injury    • Gallbladder obstruction    • Hearing disorder    • Hiatal hernia    • Hypercholesterolemia    • Leg injury    • Migraine    • Osteomyelitis (HCC)    • Peripheral neuropathy    • Prostate enlargement    • Seizures (HCC)    • Shingles    • Sleep apnea      Past Surgical History:   Procedure Laterality Date   • AMPUTATION DIGIT Left 1/19/2023    Procedure: 3RD LEFT TOE AMPUTATION AND LEFT FOOT DEBRIDEMENT;  Surgeon: Ivonne Martin MD;  Location: [facility] OR 18/19;  Service: Vascular;  Laterality: Left;   • ANGIOPLASTY FEMORAL ARTERY Left 1/19/2023    Procedure: LEFT LEG ANGIOGRAM;  Surgeon: Ivonne Martin MD;  Location: [facility] OR 18/19;  Service: Vascular;  Laterality: Left;   • BASAL CELL CARCINOMA EXCISION  10/13/2022   • CATARACT EXTRACTION Right    • CORNEAL TRANSPLANT Left 03/31/2021   • EYE SURGERY Left    • GALLBLADDER SURGERY     • INNER EAR SURGERY        Family History   Problem Relation Age of Onset   • Cancer Mother    • Transient ischemic attack Mother    • Diabetes Father    • Heart disease Father    • COPD Paternal Aunt    • Diabetes Paternal Aunt    • Alzheimer's disease Maternal Grandmother    • Stroke Maternal Grandfather    • Emphysema Paternal Grandfather      Social History     Tobacco Use   • Smoking status: Former   • Smokeless tobacco: Never   Vaping Use   • Vaping Use: Never used   Substance Use Topics   • Alcohol use: Yes     Comment: social drinker   • Drug use: Yes     Types: Marijuana     No current facility-administered medications on file prior to encounter.     Current Outpatient Medications on File Prior to Encounter   Medication Sig Dispense Refill   • amLODIPine (NORVASC) 10 MG tablet TAKE ONE TABLET BY MOUTH DAILY , NEEDS FOLLOW UP APPOINTMENT FOR REFILLS 90 tablet 0   • aspirin 81 MG EC tablet Take 1 tablet by mouth Daily. 30 tablet 3   • clopidogrel (PLAVIX) 75 MG tablet Take 1 tablet by mouth Daily. 30 tablet 3   • Continuous Blood Gluc Sensor (FreeStyle Milka 3 Sensor) misc 1 each Every 14 (Fourteen) Days. 6 each 1   • dorzolamide-timolol (COSOPT) 22.3-6.8 MG/ML ophthalmic solution Administer 1 drop to both eyes 2 (Two) Times a Day. Indications: Wide-Angle Glaucoma     • gabapentin (NEURONTIN) 800 MG tablet Take 1 tablet by mouth 4 (Four) Times a Day. 120 tablet 2   • glucose blood (OneTouch Verio) test strip Use to check blood sugar  4 time daily  E11.8 400 each 3   • hydroCHLOROthiazide (HYDRODIURIL) 25 MG tablet Take 1 tablet by mouth Daily. 30 tablet 3   • HYDROcodone-acetaminophen (NORCO)  MG per tablet Take 1 tablet by mouth Every 6 (Six) Hours As Needed for Severe Pain. Indications: Pain 120 tablet 0   • insulin NPH-insulin regular (NovoLIN 70/30 ReliOn) (70-30) 100 UNIT/ML injection Inject 40 Units under the skin into the appropriate area as directed 2 (Two) Times a Day With Meals. Inject  Units in the AM &  Units in  the PM, and titrate as directed up to 50 units twice a day. 20 mL 6   • Insulin Pen Needle (BD Pen Needle Micro U/F) 32G X 6 MM misc Use 5 injections daily 1500 each 3   • levETIRAcetam (KEPPRA) 500 MG tablet TAKE ONE TABLET BY MOUTH TWICE A DAY 60 tablet 0   • lisinopril (PRINIVIL,ZESTRIL) 10 MG tablet Take 1 tablet by mouth Daily. (Patient taking differently: Take 2 tablets by mouth Daily. Indications: High Blood Pressure Disorder) 30 tablet 2   • OneTouch Delica Lancets 33G misc Use to check blood sugar 4 times daily  E11.8 400 each 3   • prednisoLONE acetate (PRED FORTE) 1 % ophthalmic suspension Administer 1 drop to both eyes 3 (Three) Times a Day. Indications: Conjunctivitis     • rosuvastatin (CRESTOR) 10 MG tablet Take 1 tablet by mouth Every Night. 90 tablet 1   • sodium hypochlorite (DAKIN'S 1/4 STRENGTH) 0.125 % solution topical solution 0.125% Apply topical to affected areas two times daily 473 mL 0   • valsartan-hydrochlorothiazide (DIOVAN-HCT) 160-12.5 MG per tablet Take 1 tablet by mouth Daily.     • acetaminophen (TYLENOL) 500 MG tablet Take 2 tablets by mouth Every 8 (Eight) Hours. 30 tablet 0   • amitriptyline (ELAVIL) 10 MG tablet Take 1 tablet by mouth Daily.     • glucagon (GLUCAGEN) 1 MG injection For emergency use for severe low glucose: first inject, then call 911, once awake try to give oral treatment. Kit expires in 6-12 months. 1 kit 6   • [employer] LANCETS MICRO THIN 33G misc        No Known Allergies    Objective     Objective     Vital Signs  Temp:  [99.9 °F (37.7 °C)-100.6 °F (38.1 °C)] 100.6 °F (38.1 °C)  Heart Rate:  [71-79] 71  Resp:  [18] 18  BP: (155-169)/(74-85) 155/74  SpO2:  [90 %-95 %] 95 %  on  Flow (L/min):  [1.5] 1.5;   Device (Oxygen Therapy): nasal cannula  Body mass index is 33.66 kg/m².    Physical Exam  Vitals and nursing note reviewed.   Constitutional:       General: He is not in acute distress.     Appearance: He is ill-appearing. He is not toxic-appearing.       Comments: Chronically ill appearance   HENT:      Head: Normocephalic.      Mouth/Throat:      Mouth: Mucous membranes are moist.   Eyes:      Conjunctiva/sclera: Conjunctivae normal.   Cardiovascular:      Rate and Rhythm: Normal rate and regular rhythm.   Pulmonary:      Effort: Pulmonary effort is normal. No respiratory distress.      Breath sounds: No wheezing or rales.   Abdominal:      General: Bowel sounds are normal.      Palpations: Abdomen is soft.   Musculoskeletal:      Cervical back: Neck supple.      Right lower leg: No edema.      Left lower leg: No edema.   Skin:     General: Skin is warm and dry.      Comments: Dressings in place to chetan feet; wound pictures in Epic reviewed   Neurological:      Mental Status: He is alert and oriented to person, place, and time.   Psychiatric:         Mood and Affect: Mood normal.         Behavior: Behavior normal.         Results Review:  I reviewed the patient's new clinical results.  I reviewed the patient's new imaging results and agree with the interpretation.  I reviewed the patient's other test results and agree with the interpretation  I personally viewed and interpreted the patient's EKG/Telemetry data    Lab Results (last 24 hours)     Procedure Component Value Units Date/Time    CBC & Differential [[id number]]  (Abnormal) Collected: 03/30/23 1529    Specimen: Blood Updated: 03/30/23 1548    Narrative:      The following orders were created for panel order CBC & Differential.  Procedure                               Abnormality         Status                     ---------                               -----------         ------                     CBC Auto Differential[[id number]]        Abnormal            Final result                 Please view results for these tests on the individual orders.    Comprehensive Metabolic Panel [[id number]]  (Abnormal) Collected: 03/30/23 1529    Specimen: Blood Updated: 03/30/23 1610     Glucose 193 mg/dL      BUN 20 mg/dL   "    Creatinine 0.78 mg/dL      Sodium 133 mmol/L      Potassium 3.5 mmol/L      Chloride 92 mmol/L      CO2 30.9 mmol/L      Calcium 9.1 mg/dL      Total Protein 6.9 g/dL      Albumin 3.4 g/dL      ALT (SGPT) 7 U/L      AST (SGOT) 11 U/L      Alkaline Phosphatase 59 U/L      Total Bilirubin 0.2 mg/dL      Globulin 3.5 gm/dL      A/G Ratio 1.0 g/dL      BUN/Creatinine Ratio 25.6     Anion Gap 10.1 mmol/L      eGFR 94.8 mL/min/1.73     Narrative:      GFR Normal >60  Chronic Kidney Disease <60  Kidney Failure <15    The GFR formula is only valid for adults with stable renal function between ages 18 and 70.    C-reactive Protein [[id number]]  (Abnormal) Collected: 03/30/23 1529    Specimen: Blood Updated: 03/30/23 1610     C-Reactive Protein 10.54 mg/dL     Procalcitonin [[id number]]  (Normal) Collected: 03/30/23 1529    Specimen: Blood Updated: 03/30/23 1616     Procalcitonin 0.08 ng/mL     Narrative:      As a Marker for Sepsis (Non-Neonates):    1. <0.5 ng/mL represents a low risk of severe sepsis and/or septic shock.  2. >2 ng/mL represents a high risk of severe sepsis and/or septic shock.    As a Marker for Lower Respiratory Tract Infections that require antibiotic therapy:    PCT on Admission    Antibiotic Therapy       6-12 Hrs later    >0.5                Strongly Recommended  >0.25 - <0.5        Recommended   0.1 - 0.25          Discouraged              Remeasure/reassess PCT  <0.1                Strongly Discouraged     Remeasure/reassess PCT    As 28 day mortality risk marker: \"Change in Procalcitonin Result\" (>80% or <=80%) if Day 0 (or Day 1) and Day 4 values are available. Refer to http://www.[employer]-pct-calculator.com    Change in PCT <=80%  A decrease of PCT levels below or equal to 80% defines a positive change in PCT test result representing a higher risk for 28-day all-cause mortality of patients diagnosed with severe sepsis for septic shock.    Change in PCT >80%  A decrease of PCT levels of more than " 80% defines a negative change in PCT result representing a lower risk for 28-day all-cause mortality of patients diagnosed with severe sepsis or septic shock.       Blood Culture - Blood, Arm, Right [[id number]] Collected: 03/30/23 1529    Specimen: Blood from Arm, Right Updated: 03/30/23 1541    CBC Auto Differential [[id number]]  (Abnormal) Collected: 03/30/23 1529    Specimen: Blood Updated: 03/30/23 1548     WBC 10.56 10*3/mm3      RBC 3.32 10*6/mm3      Hemoglobin 9.7 g/dL      Hematocrit 29.4 %      MCV 88.6 fL      MCH 29.2 pg      MCHC 33.0 g/dL      RDW 14.9 %      RDW-SD 48.7 fl      MPV 9.7 fL      Platelets 323 10*3/mm3      Neutrophil % 78.6 %      Lymphocyte % 9.8 %      Monocyte % 9.2 %      Eosinophil % 1.7 %      Basophil % 0.4 %      Immature Grans % 0.3 %      Neutrophils, Absolute 8.31 10*3/mm3      Lymphocytes, Absolute 1.03 10*3/mm3      Monocytes, Absolute 0.97 10*3/mm3      Eosinophils, Absolute 0.18 10*3/mm3      Basophils, Absolute 0.04 10*3/mm3      Immature Grans, Absolute 0.03 10*3/mm3      nRBC 0.0 /100 WBC     Blood Culture - Blood, Arm, Left [[id number]] Collected: 03/30/23 1530    Specimen: Blood from Arm, Left Updated: 03/30/23 1541    POC Glucose Once [[id number]]  (Abnormal) Collected: 03/30/23 1625    Specimen: Blood Updated: 03/30/23 1626     Glucose 188 mg/dL      Comment: Meter: [id number] : [id number] Chioma Rodrigues RN             Imaging Results (Last 24 Hours)     ** No results found for the last 24 hours. **          Results for orders placed during the hospital encounter of 01/15/23    Adult Transthoracic Echo Complete W/ Cont if Necessary Per Protocol    Interpretation Summary  •  Left ventricular systolic function is normal. Left ventricular ejection fraction appears to be 61 - 65%.  •  Left ventricular wall thickness is consistent with mild to moderate concentric hypertrophy.  •  No clear evidence for valvular heart disease.      ECG 12 Lead Other; baseline   Final  Result   HEART RATE= 74  bpm   RR Interval= 811  ms   RI Interval= 157  ms   P Horizontal Axis= 8  deg   P Front Axis= 37  deg   QRSD Interval= 110  ms   QT Interval= 415  ms   QRS Axis= -16  deg   T Wave Axis= 0  deg   - NORMAL ECG -   Sinus rhythm   Left ventricular hypertrophy   When compared with ECG of 15-Michael-2023 12:55:56,   No significant change   Electronically Signed By: Koby Irwin ([facility]) 30-Mar-2023 15:41:26   Date and Time of Study: 2023-03-30 14:47:54          Assessment/Plan     Active Hospital Problems    Diagnosis  POA   • **Diabetic foot ulcer (HCC) [E11.621, L97.509]  Yes   • Chronic osteomyelitis of foot (HCC) [M86.679]  Yes   • Diabetes mellitus with nephropathy (HCC) [E11.21]  Yes   • Type 2 diabetes mellitus with diabetic polyneuropathy (HCC) [E11.42]  Yes   • Peripheral vascular disease (HCC) [I73.9]  Yes   • History of seizures [Z87.898]  Yes   • Diabetic peripheral neuropathy (HCC) [E11.42]  Yes   • Hypertension [I10]  Yes   • Chronic pain [G89.29]  Yes      Resolved Hospital Problems   No resolved problems to display.       Mr. Peters is a 72 y.o. male with a history of chronic osteomyelitis chetan feet, MSSA & strep wound infections, PVD, DM w/neuropathy & nephropathy, Sleep apnea, seizures, HTN who is admitted for diabetic foot ulcers    Diabetic foot ulcers/Chronic OM/PVD:  -Vascular surgery and ID consulted. Check blood cultures, CBC, CMP, procal, CRP Completed 6 weeks IV cefazolin 3/6/23. Defer imaging to vascular surgery    DM/Nephropathy/Neuropathy:  -Monitor BG trends. A1C 9.10%. Correctional scale insulin. Restart home regimen pending glucose trends    Chronic pain:  -Restart hydrocodone, gabapentin    Hx seizures:  -Keppra    HTN:  -Followed by Nephrology. Increased lisinopril to 20 mg at last visit. Continue amlodipine, HCTZ    Hold plavix for now    · I discussed the patient's findings and my recommendations with patient, family, nursing staff and Dr. Lombardo.    VTE Prophylaxis -  SCDs.  Code Status - Full code.       ABEBA Gan  [facility]  03/30/23  16:28 EDT      Electronically signed by Mariano Lombardo MD at 03/30/23 1823            Physician Progress Notes (last 48 hours)      Mariano Lombardo MD at 03/31/23 5341              [facility]     LOS: 1 day     Subjective:    CC:No chief complaint on file.    DIET:  Diet Order   Procedures   • NPO Diet NPO Type: Strict NPO       Objective:    Vital Signs:  Temp:  [97.6 °F (36.4 °C)-100.6 °F (38.1 °C)] 98.9 °F (37.2 °C)  Heart Rate:  [61-79] 65  Resp:  [10-23] 14  BP: (155-180)/(55-97) 160/79    SpO2:  [90 %-98 %] 96 %  on  Flow (L/min):  [1.5-2] 2;   Device (Oxygen Therapy): nasal cannula  Body mass index is 32.4 kg/m².    Physical Exam    Results Review:    Glucose   Date Value Ref Range Status   03/30/2023 193 (H) 65 - 99 mg/dL Final     Results from last 7 days   Lab Units 03/31/23  0759   WBC 10*3/mm3 8.94   HEMOGLOBIN g/dL 10.3*   HEMATOCRIT % 30.7*   PLATELETS 10*3/mm3 375     Results from last 7 days   Lab Units 03/30/23  1529   SODIUM mmol/L 133*   POTASSIUM mmol/L 3.5   CHLORIDE mmol/L 92*   CO2 mmol/L 30.9*   BUN mg/dL 20   CREATININE mg/dL 0.78   CALCIUM mg/dL 9.1   BILIRUBIN mg/dL 0.2   ALK PHOS U/L 59   ALT (SGPT) U/L 7   AST (SGOT) U/L 11   GLUCOSE mg/dL 193*                 Cultures:  No results found for: BLOODCX, URINECX, WOUNDCX, MRSACX, RESPCX, STOOLCX    I have reviewed daily medications and changes in CPOE    Scheduled meds  amLODIPine, 10 mg, Oral, Q24H  aspirin, 81 mg, Oral, Daily  clopidogrel, 75 mg, Oral, Daily  dorzolamide-timolol, , Both Eyes, BID  gabapentin, 800 mg, Oral, Q8H  hydroCHLOROthiazide, 25 mg, Oral, Daily  insulin lispro, 0-14 Units, Subcutaneous, TID AC  levETIRAcetam, 500 mg, Oral, BID  lisinopril, 20 mg, Oral, Daily  piperacillin-tazobactam, 3.375 g, Intravenous, Q8H  prednisoLONE acetate, 1 drop, Both Eyes, TID  rosuvastatin, 10  mg, Oral, Nightly  vancomycin, 1,000 mg, Intravenous, Q12H        Pharmacy to dose vancomycin,       PRN meds  •  acetaminophen **OR** acetaminophen **OR** acetaminophen  •  dextrose  •  dextrose  •  glucagon (human recombinant)  •  HYDROcodone-acetaminophen  •  ondansetron **OR** ondansetron  •  Pharmacy to dose vancomycin        Diabetic foot ulcer (HCC)    Chronic pain    Hypertension    Diabetic peripheral neuropathy (HCC)    History of seizures    Peripheral vascular disease (HCC)    Type 2 diabetes mellitus with diabetic polyneuropathy (HCC)    Chronic osteomyelitis of foot (HCC)    Diabetes mellitus with nephropathy (HCC)        Assessment/Plan:    Mr. Peters is a 72 y.o. male with a history of chronic osteomyelitis chetan feet, MSSA & strep wound infections, PVD, DM w/neuropathy & nephropathy, Sleep apnea, seizures, HTN who is admitted for diabetic foot ulcers     Diabetic foot ulcers/Chronic OM/PVD:  -Vascular surgery and ID consulted.  -MRI with osteo of the left great toe and right little toe  -Check blood cultures  -procal normal, CRP 10.54  -Completed 6 weeks IV cefazolin 3/6/23. Defer imaging to vascular surgery  -Dr Christiano lora with continuing plavix  -surgery planned for today  -zosyn and vanc     DM/Nephropathy/Neuropathy:  -Monitor BG trends. A1C 9.10%. Correctional scale insulin.  -home rx: 70/30 45 bid   -while in hospital: lantus 10 and humalog 4 tid, ssi    Chronic pain:  -Restart hydrocodone, gabapentin     Hx seizures:  -Keppra     HTN:  -Followed by Nephrology. Increased lisinopril to 20 mg at last visit. Continue amlodipine, HCTZ                 Mariano Lombardo MD  03/31/23  08:52 EDT        Electronically signed by Mariano Lombardo MD at 03/31/23 6950     Jasbir Alvarado MD at 03/30/23 5836        Patient seen in the office today.  Direct admitted for mal perforans ulcer of the left first metatarsal phalangeal joint with likely septic joint.  Will order MRI of the right and left foot is the  right fifth toe also has an open wound.  We will keep patient n.p.o. after midnight and plan to see in the morning and determine if debridement in the OR is needed.  Appreciate LHA and infectious disease treatment.  Full consult to follow    Electronically signed by Jasbir Alvarado MD at 03/30/23 1707          Consult Notes (last 48 hours)      Pedro Luis Villarreal MD at 03/31/23 0807      Consult Orders    1. Inpatient Infectious Diseases Consult [[id number]] ordered by Mariano Lombardo MD at 03/30/23 1435               Referring Provider: Mariano Lombardo MD  [address] [facility] [address]  [place] [address]    Reason for Consultation: diabetic foot ulcer    History of present illness:  Kelby Peters is a 72 y.o. with uncontrolled DM2 with neuropathy and PVD who I am asked to evaluate and give opinion for diabetic foot ulcer. History is obtained from the patient, his wife, and review of the old medical records which I summarize/synthesize as follows: I saw the patient recently in clinic on 3/6/23 at which time he finished a 6-week course of cefazolin for Strep and MSSA chronic osteomyelitis of both feet. He had a good clinical and laboratory response. CRP came down to < 0.30 mg/dL.     He was sent in yesterday from vascular surgery clinic with worsening foot ulcers. He had a CRP done that was up to 10. WBC is normal. No fever at home. 1x temp of 100.6 F here. His HR and BP are acceptable. He had B foot MRIs and both showed osteomyelitis in both feet. He was started on empiric vancomycin and Zosyn. Vascular surgery note reviewed. They are planning L great toe amputation, R 5th toe amputation, and bilateral foot debridement later today.       Past Medical History:   Diagnosis Date   • Basal cell carcinoma 06/2022   • Brain injury    • Broken toes    • Diabetes mellitus (HCC)    • Finger injury    • Gallbladder obstruction    • Hearing disorder    • Hiatal hernia    • Hypercholesterolemia    • Leg injury    •  Migraine    • Osteomyelitis ([facility])    • Peripheral neuropathy    • Prostate enlargement    • Seizures (HCC)    • Shingles    • Sleep apnea        Past Surgical History:   Procedure Laterality Date   • AMPUTATION DIGIT Left 1/19/2023    Procedure: 3RD LEFT TOE AMPUTATION AND LEFT FOOT DEBRIDEMENT;  Surgeon: Ivonne Martin MD;  Location: [facility] OR 18/19;  Service: Vascular;  Laterality: Left;   • ANGIOPLASTY FEMORAL ARTERY Left 1/19/2023    Procedure: LEFT LEG ANGIOGRAM;  Surgeon: Ivonne Martin MD;  Location: [facility] OR 18/19;  Service: Vascular;  Laterality: Left;   • BASAL CELL CARCINOMA EXCISION  10/13/2022   • CATARACT EXTRACTION Right    • CORNEAL TRANSPLANT Left 03/31/2021   • EYE SURGERY Left    • GALLBLADDER SURGERY     • INNER EAR SURGERY       Social History:    Lives in [facility] w/ his wife     Antibiotic allergies and intolerances:  None    Medications:    Current Facility-Administered Medications:   •  acetaminophen (TYLENOL) tablet 650 mg, 650 mg, Oral, Q4H PRN, 650 mg at 03/30/23 1704 **OR** acetaminophen (TYLENOL) 160 MG/5ML solution 650 mg, 650 mg, Oral, Q4H PRN **OR** acetaminophen (TYLENOL) suppository 650 mg, 650 mg, Rectal, Q4H PRN, Yane Marie APRN  •  amLODIPine (NORVASC) tablet 10 mg, 10 mg, Oral, Q24H, Yane Marie APRN  •  aspirin EC tablet 81 mg, 81 mg, Oral, Daily, Yane Marie APRN  •  clopidogrel (PLAVIX) tablet 75 mg, 75 mg, Oral, Daily, Mariano Lombardo MD  •  dextrose (D50W) (25 g/50 mL) IV injection 25 g, 25 g, Intravenous, Q15 Min PRN, Yane Marie APRN  •  dextrose (GLUTOSE) oral gel 15 g, 15 g, Oral, Q15 Min PRN, Yane Marie APRN  •  dorzolamide (TRUSOPT) 2 % 1 drop, timolol (TIMOPTIC) 0.5 % 1 drop for Cosopt 22.3-6.8 mg/mL, , Both Eyes, BID, Yane Marie APRN, Given at 03/30/23 1344  •  gabapentin (NEURONTIN) capsule 800 mg, 800 mg, Oral, Q8H, Mariano Lombardo MD, 800 mg at  03/30/23 2323  •  glucagon (GLUCAGEN) injection 1 mg, 1 mg, Subcutaneous, Q15 Min PRN, Yane Marie APRN  •  hydroCHLOROthiazide (HYDRODIURIL) tablet 25 mg, 25 mg, Oral, Daily, Yane Marie APRN  •  HYDROcodone-acetaminophen (NORCO)  MG per tablet 1 tablet, 1 tablet, Oral, Q6H PRN, Mariano Lombardo MD, 1 tablet at 03/30/23 2323  •  insulin lispro (ADMELOG) injection 0-14 Units, 0-14 Units, Subcutaneous, TID AC, Yane Marie APRN, 3 Units at 03/30/23 1705  •  levETIRAcetam (KEPPRA) tablet 500 mg, 500 mg, Oral, BID, Yane Marie APRN, 500 mg at 03/30/23 2323  •  lisinopril (PRINIVIL,ZESTRIL) tablet 20 mg, 20 mg, Oral, Daily, Yane Marie APRN  •  ondansetron (ZOFRAN) tablet 4 mg, 4 mg, Oral, Q6H PRN **OR** ondansetron (ZOFRAN) injection 4 mg, 4 mg, Intravenous, Q6H PRN, Yane Marie APRN, 4 mg at 03/31/23 0417  •  Pharmacy to dose vancomycin, , Does not apply, Continuous PRN, Mariano Lombardo MD  •  piperacillin-tazobactam (ZOSYN) 3.375 g in iso-osmotic dextrose 50 ml (premix), 3.375 g, Intravenous, Q8H, Mariano Lombardo MD, 3.375 g at 03/31/23 0130  •  prednisoLONE acetate (PRED FORTE) 1 % ophthalmic suspension 1 drop, 1 drop, Both Eyes, TID, Yane Marie APRN, 1 drop at 03/30/23 2324  •  rosuvastatin (CRESTOR) tablet 10 mg, 10 mg, Oral, Nightly, Yane Marie APRN, 10 mg at 03/30/23 2323  •  vancomycin (VANCOCIN) 1000 mg/200 mL dextrose 5% IVPB, 1,000 mg, Intravenous, Q12H, Mariano Lombardo MD      Objective   Vital Signs   Temp:  [97.6 °F (36.4 °C)-100.6 °F (38.1 °C)] 98.9 °F (37.2 °C)  Heart Rate:  [61-79] 65  Resp:  [10-23] 14  BP: (155-180)/(55-97) 160/79    Physical Exam:   General: awake, alert, NAD, very nice   Eyes: no scleral icterus  ENT: no thrush  Cardiovascular: NR  Respiratory: normal work of breathing on 2L NC while sleeping  GI: Abdomen is soft, not tender  :  no Hernandez catheter  MSK: L great toe plantar  "ulcer w/ mild surrounding erythema; R 5th toe lateral ulcer w/o surrounding heat or erythema; no purulent drainage  Skin: No rashes  Neurological: Alert and oriented x 3  Psychiatric: Normal mood and affect   Vasc: PIV w/o erythema    Labs:     Lab Results   Component Value Date    WBC 10.56 03/30/2023    HGB 9.7 (L) 03/30/2023    HCT 29.4 (L) 03/30/2023    MCV 88.6 03/30/2023     03/30/2023     Lab Results   Component Value Date    GLUCOSE 193 (H) 03/30/2023    CALCIUM 9.1 03/30/2023     (L) 03/30/2023    K 3.5 03/30/2023    CO2 30.9 (H) 03/30/2023    CL 92 (L) 03/30/2023    BUN 20 03/30/2023    CREATININE 0.78 03/30/2023    EGFRRESULT 95.9 12/07/2022    EGFR 94.8 03/30/2023    BCR 25.6 (H) 03/30/2023    ANIONGAP 10.1 03/30/2023     Lab Results   Component Value Date    ALT 7 03/30/2023     Lab Results   Component Value Date    CRP 10.54 (H) 03/30/2023     Lab Results   Component Value Date    HGBA1C 9.10 (H) 01/16/2023     HIV negative  Hep C negative    Microbiology:  3/30 BCx: pending    Radiology:  MRI L Foot: \"Open wound along the plantar side of the medial left   forefoot with osteomyelitis in the medial great toe sesamoid, first   metatarsal head and neck and proximal phalanx of the great toe. The   flexor hallucis longus tendon is torn with the tendon sheath extending   across the open wound and some abnormal synovitis along the tendon   sheath to the level of the metatarsal base.       Additionally there has been third toe amputation and there is no bone   wound along the lateral side of the foot superficial to the fifth   metatarsal head. However, there is no evidence of osteomyelitis in the   third or the fifth metatarsals. \"    MRI R Foot: \"Open wound along lateral side of the right forefoot with   exposed bone and MRI findings of osteomyelitis in the toe proximal   phalanx and the fifth metatarsal head and neck. The abnormal marrow   signal is more extensive today than on the MRI from " "earlier this year. \"    ASSESSMENT/PLAN:  1. Chronic osteomyelitis of bilateral feet  2. Uncontrolled DM2  3. History of Strep and MSSA infections  4. Peripheral vascular disease  5. Peripheral neuropathy  6. Uncontrolled DM2 - complicated by foot ulcer - A1c 9.1%  7.  Obesity BMI 32    Noted plans for operative debridement and toe amputations later today. He is stable without any signs of sepsis. Therefore I recommend we hold further antibiotic therapy until after his procedure in order to optimize our culture yield. I will resume antibiotics after his procedure with vancomycin and ceftriaxone. Vancomycin levels will be ordered and monitored w/ dose adjusted as needed. I anticipate he will need another 6 week course w/ stop date around 23. I discussed the long-term antibiotic plan w/ the patient and his wife.     I'll repeat an A1c.     ID will follow.       Electronically signed by Pedro Luis Villarreal MD at 23 0916     Jasbir Alvarado MD at 23 0747      Consult Orders    1. Inpatient Vascular Surgery Consult [[id number]] ordered by Mariano Lombardo MD at 23 1437               Patient Name: Kelby Peters Account #: [id number]    MRN: [id number] Admission Date: 3/30/2023      Consulting Service: Vascular Surgery Date of Evaluation: 2023    Requesting Provider: Mariano Lombardo MD      BILLIN      CHIEF COMPLAINT: Bilateral lower extremity diabetic foot infections with osteomyelitis of the left first toe and right fifth toe     HPI: Kelby Peters is a 72 y.o. male is being seen for a consultation and evaluation/management of the osteomyelitis of the right fifth toe and left first toe with infected mal perforans ulcer on the left foot and chronic diabetic foot infections bilaterally.  This comes despite adequate reperfusion of the left leg in the recent past.  He has healed a third toe amputation but unfortunately due to severe neuropathy walked a whole through his " first toe on the left foot with a mal perforans ulcer.  The right foot has had a small smoldering wound that is now enlarged and gotten the fifth toe grossly infected.  Unfortunately there is very little option here from a vascular surgery standpoint.  I believe amputations of the toes is the best answer.  I do not believe that hyperbaric oxygen will adequately clear the significant bone infection.    PAST MEDICAL HISTORY:   Past Medical History:   Diagnosis Date   • Basal cell carcinoma 06/2022   • Brain injury    • Broken toes    • Diabetes mellitus (HCC)    • Finger injury    • Gallbladder obstruction    • Hearing disorder    • Hiatal hernia    • Hypercholesterolemia    • Leg injury    • Migraine    • Osteomyelitis (HCC)    • Peripheral neuropathy    • Prostate enlargement    • Seizures (HCC)    • Shingles    • Sleep apnea       PAST SURGICAL HISTORY:   Past Surgical History:   Procedure Laterality Date   • AMPUTATION DIGIT Left 1/19/2023    Procedure: 3RD LEFT TOE AMPUTATION AND LEFT FOOT DEBRIDEMENT;  Surgeon: Ivonne Martin MD;  Location: [facility] 18/19;  Service: Vascular;  Laterality: Left;   • ANGIOPLASTY FEMORAL ARTERY Left 1/19/2023    Procedure: LEFT LEG ANGIOGRAM;  Surgeon: Ivonne Martin MD;  Location: [facility] 18/19;  Service: Vascular;  Laterality: Left;   • BASAL CELL CARCINOMA EXCISION  10/13/2022   • CATARACT EXTRACTION Right    • CORNEAL TRANSPLANT Left 03/31/2021   • EYE SURGERY Left    • GALLBLADDER SURGERY     • INNER EAR SURGERY        FAMILY HISTORY:   Family History   Problem Relation Age of Onset   • Cancer Mother    • Transient ischemic attack Mother    • Diabetes Father    • Heart disease Father    • COPD Paternal Aunt    • Diabetes Paternal Aunt    • Alzheimer's disease Maternal Grandmother    • Stroke Maternal Grandfather    • Emphysema Paternal Grandfather       SOCIAL HISTORY:   Social History     Tobacco Use   • Smoking status: Former   • Smokeless  tobacco: Never   Vaping Use   • Vaping Use: Never used   Substance Use Topics   • Alcohol use: Yes     Comment: social drinker   • Drug use: Yes     Types: Marijuana      MEDICATIONS:   No current facility-administered medications on file prior to encounter.     Current Outpatient Medications on File Prior to Encounter   Medication Sig Dispense Refill   • amLODIPine (NORVASC) 10 MG tablet TAKE ONE TABLET BY MOUTH DAILY , NEEDS FOLLOW UP APPOINTMENT FOR REFILLS 90 tablet 0   • aspirin 81 MG EC tablet Take 1 tablet by mouth Daily. 30 tablet 3   • clopidogrel (PLAVIX) 75 MG tablet Take 1 tablet by mouth Daily. 30 tablet 3   • Continuous Blood Gluc Sensor (FreeStyle Milka 3 Sensor) misc 1 each Every 14 (Fourteen) Days. 6 each 1   • dorzolamide-timolol (COSOPT) 22.3-6.8 MG/ML ophthalmic solution Administer 1 drop to both eyes 2 (Two) Times a Day. Indications: Wide-Angle Glaucoma     • gabapentin (NEURONTIN) 800 MG tablet Take 1 tablet by mouth 4 (Four) Times a Day. 120 tablet 2   • glucose blood (OneTouch Verio) test strip Use to check blood sugar  4 time daily  E11.8 400 each 3   • hydroCHLOROthiazide (HYDRODIURIL) 25 MG tablet Take 1 tablet by mouth Daily. 30 tablet 3   • HYDROcodone-acetaminophen (NORCO)  MG per tablet Take 1 tablet by mouth Every 6 (Six) Hours As Needed for Severe Pain. Indications: Pain 120 tablet 0   • insulin NPH-insulin regular (NovoLIN 70/30 ReliOn) (70-30) 100 UNIT/ML injection Inject 40 Units under the skin into the appropriate area as directed 2 (Two) Times a Day With Meals. Inject  Units in the AM &  Units in the PM, and titrate as directed up to 50 units twice a day. 20 mL 6   • Insulin Pen Needle (BD Pen Needle Micro U/F) 32G X 6 MM misc Use 5 injections daily 1500 each 3   • levETIRAcetam (KEPPRA) 500 MG tablet TAKE ONE TABLET BY MOUTH TWICE A DAY 60 tablet 0   • lisinopril (PRINIVIL,ZESTRIL) 10 MG tablet Take 1 tablet by mouth Daily. (Patient taking differently: Take 2 tablets by  mouth Daily. Indications: High Blood Pressure Disorder) 30 tablet 2   • OneTouch Delica Lancets 33G misc Use to check blood sugar 4 times daily  E11.8 400 each 3   • prednisoLONE acetate (PRED FORTE) 1 % ophthalmic suspension Administer 1 drop to both eyes 3 (Three) Times a Day. Indications: Conjunctivitis     • rosuvastatin (CRESTOR) 10 MG tablet Take 1 tablet by mouth Every Night. 90 tablet 1   • sodium hypochlorite (DAKIN'S 1/4 STRENGTH) 0.125 % solution topical solution 0.125% Apply topical to affected areas two times daily 473 mL 0   • valsartan-hydrochlorothiazide (DIOVAN-HCT) 160-12.5 MG per tablet Take 1 tablet by mouth Daily.     • acetaminophen (TYLENOL) 500 MG tablet Take 2 tablets by mouth Every 8 (Eight) Hours. 30 tablet 0   • amitriptyline (ELAVIL) 10 MG tablet Take 1 tablet by mouth Daily.     • glucagon (GLUCAGEN) 1 MG injection For emergency use for severe low glucose: first inject, then call 911, once awake try to give oral treatment. Kit expires in 6-12 months. 1 kit 6   • [employer] LANCETS MICRO THIN 33G misc                ALLERGIES: Patient has no known allergies.   COMPLETE REVIEW OF SYSTEMS:     ENT ROS: negative  Cardiovascular ROS: no chest pain or dyspnea on exertion  Respiratory ROS: no cough, shortness of breath, or wheezing  Gastrointestinal ROS: no abdominal pain, change in bowel habits, or black or bloody stools  Neurological ROS: no TIA or stroke symptoms, dense neuropathy distally  Genito-Urinary ROS: no dysuria, trouble voiding, or hematuria  Musculoskeletal ROS: negative  Dermatological ROS: negative  Psychological ROS: negative      PHYSICAL EXAM:   Patient Vitals for the past 24 hrs:   BP Temp Temp src Pulse Resp SpO2 Height Weight   03/31/23 0700 160/79 98.9 °F (37.2 °C) Oral 65 14 96 % -- --   03/31/23 0600 -- -- -- -- -- -- -- 93.8 kg (206 lb 14.4 oz)   03/31/23 0300 161/71 97.6 °F (36.4 °C) Oral 61 10 96 % -- --   03/30/23 2300 180/97 97.8 °F (36.6 °C) Oral 78 23 98 % -- --  "  03/30/23 1900 156/55 98.8 °F (37.1 °C) Oral 63 14 96 % -- --   03/30/23 1832 -- 99 °F (37.2 °C) -- -- -- -- -- --   03/30/23 1556 155/74 (!) 100.6 °F (38.1 °C) Oral 71 18 95 % -- --   03/30/23 1320 -- 99.9 °F (37.7 °C) Oral -- 18 90 % 170.2 cm (67.01\") 97.5 kg (215 lb)   03/30/23 1259 169/85 99.9 °F (37.7 °C) Oral 79 18 92 % -- --        General appearance: oriented to person, place, and time, in mild to moderate distress and chronically ill appearing.  Neurological exam reveals alert, oriented, normal speech, no focal findings or movement disorder noted.  ENT exam reveals - ENT exam normal, no neck nodes or sinus tenderness.  CVS exam: normal rate, regular rhythm, normal S1, S2, no murmurs, rubs, clicks or gallops.  Chest: clear to auscultation, no wheezes, rales or rhonchi, symmetric air entry.  Abdominal exam: soft, nontender, nondistended, no masses or organomegaly.  Examination of the feet reveals warm, good capillary refill.  Left first toe with cellulitis surrounding metatarsophalangeal joint.  Draining mal perforans ulcer at the metatarsal head.  Right fifth toe with exposed bone at the right fifth metatarsal head.  No cellulitis noted there.  Pulses +2 femorals.  +1 popliteals.  Nonpalpable pedal pulses laterally        LABS:      Results Review:       I reviewed the patient's new clinical results.  Results from last 7 days   Lab Units 03/30/23  1529   WBC 10*3/mm3 10.56   HEMOGLOBIN g/dL 9.7*   PLATELETS 10*3/mm3 323     Results from last 7 days   Lab Units 03/30/23  1529   SODIUM mmol/L 133*   POTASSIUM mmol/L 3.5   CHLORIDE mmol/L 92*   CO2 mmol/L 30.9*   BUN mg/dL 20   CREATININE mg/dL 0.78   GLUCOSE mg/dL 193*   Estimated Creatinine Clearance: 93.5 mL/min (by C-G formula based on SCr of 0.78 mg/dL).  Results from last 7 days   Lab Units 03/30/23  1529   CALCIUM mg/dL 9.1   ALBUMIN g/dL 3.4*           The following radiologic or non-invasive studies have been reviewed by me: MRIs reviewed    Active " Hospital Problems    Diagnosis  POA   • **Diabetic foot ulcer (HCC) [E11.621, L97.509]  Yes   • Chronic osteomyelitis of foot (HCC) [M86.679]  Yes   • Diabetes mellitus with nephropathy (HCC) [E11.21]  Yes   • Type 2 diabetes mellitus with diabetic polyneuropathy (HCC) [E11.42]  Yes   • Peripheral vascular disease (HCC) [I73.9]  Yes   • History of seizures [Z87.898]  Yes   • Diabetic peripheral neuropathy (HCC) [E11.42]  Yes   • Hypertension [I10]  Yes   • Chronic pain [G89.29]  Yes      Resolved Hospital Problems   No resolved problems to display.         ASSESSMENT/PLAN: 72 y.o. male with recent reperfusion of the left leg and healed third toe resection.  Unfortunately patient has severe neuropathy in his walked a mal perforans ulcer into his left first metatarsal phalangeal joint which is now created significant infection.  Plans for toe amputation at this level and the right fifth toe is chronically infected but is now extended significantly from prior MRI.  Patient understands that an attempt at hyperbaric oxygen would likely not improve his situation enough and would likely cost in his left leg.  As such I believe the amputations are his best answer and he and his wife agree.  Unfortunately his dense neuropathy is conspired to get him back into the hospital.  He was already wearing a protective Darco shoe and this occurred anyway.  I believe his left leg has adequate perfusion based on recent testing 2 weeks ago in our office.  His right leg will have to be reviewed.  We will try to get him on the schedule today for toe amputations and bilateral foot debridement.  Antibiotic course will be determined by infectious disease      I discussed the plan with the patient and his wife and they are  agreeable to the plan of care at this point. Thank you for this consult.     Jasbir Alvarado MD   03/31/23      Electronically signed by Jasbir Alvarado MD at 03/31/23 9101

## 2023-03-31 NOTE — ANESTHESIA PREPROCEDURE EVALUATION
Anesthesia Evaluation     Patient summary reviewed and Nursing notes reviewed   history of anesthetic complications (pt reports significant PONV with last anesthetic even through it was a propofol TIVA ): PONV  NPO Solid Status: > 8 hours  NPO Liquid Status: > 2 hours           Airway   Mallampati: II  TM distance: >3 FB  Neck ROM: full  no difficulty expected and No difficulty expected  Dental - normal exam   (+) edentulous    Pulmonary - normal exam    breath sounds clear to auscultation  (+) sleep apnea,   (-) rhonchi, decreased breath sounds, wheezes, rales, stridor  Cardiovascular - normal exam  Exercise tolerance: poor (<4 METS)    NYHA Classification: I  Rhythm: regular  Rate: normal    (+) hypertension 2 medications or greater, PVD, hyperlipidemia,   (-) murmur, weak pulses, friction rub, systolic click, carotid bruits, JVD, peripheral edema    ROS comment: •  Left ventricular systolic function is normal. Left ventricular ejection fraction appears to be 61 - 65%.  •  Left ventricular wall thickness is consistent with mild to moderate concentric hypertrophy.  •  No clear evidence for valvular heart disease.      Neuro/Psych  (+) seizures well controlled, headaches, syncope, numbness,      ROS Comment: Ataxia    GI/Hepatic/Renal/Endo    (+) obesity,  hiatal hernia,  diabetes mellitus type 1 using insulin,     Musculoskeletal     (+) back pain, neck pain,   Abdominal  - normal exam    Abdomen: soft.   Substance History - negative use     OB/GYN negative ob/gyn ROS         Other   arthritis,    history of cancer remission                    Anesthesia Plan    ASA 3     general     intravenous induction     Anesthetic plan, risks, benefits, and alternatives have been provided, discussed and informed consent has been obtained with: patient.        CODE STATUS:

## 2023-03-31 NOTE — ANESTHESIA POSTPROCEDURE EVALUATION
"Patient: Kelby Peters    Procedure Summary     Date: 03/31/23 Room / Location: [facility] OR 18 [facility] HYBRID OR 18/19    Anesthesia Start: 1406 Anesthesia Stop: 1516    Procedure: RIGHT FIRST TOE AMPUTATION AND LEFT FIFTH TOE AMPUTATION (Bilateral: Toes) Diagnosis:       Diabetes mellitus with nephropathy (HCC)      Chronic osteomyelitis of foot (HCC)      (Diabetes mellitus with nephropathy (HCC) [E11.21])      (Chronic osteomyelitis of foot (HCC) [M86.679])    Surgeons: Jasbir Alvarado MD Provider: Izabela Quiles MD    Anesthesia Type: general ASA Status: 3          Anesthesia Type: general    Vitals  Vitals Value Taken Time   /84 03/31/23 1605   Temp 36.9 °C (98.4 °F) 03/31/23 1545   Pulse 59 03/31/23 1616   Resp 20 03/31/23 1600   SpO2 94 % 03/31/23 1616   Vitals shown include unvalidated device data.        Post Anesthesia Care and Evaluation    Patient location during evaluation: bedside  Patient participation: complete - patient participated  Level of consciousness: awake  Pain management: adequate    Airway patency: patent  Anesthetic complications: No anesthetic complications    Cardiovascular status: acceptable  Respiratory status: acceptable  Hydration status: acceptable    Comments: *BP (!) 185/84 Comment: Labatelol given  Pulse 66   Temp 36.9 °C (98.4 °F) (Oral)   Resp 20   Ht 170.2 cm (67.01\")   Wt 93.8 kg (206 lb 14.4 oz)   SpO2 96%   BMI 32.40 kg/m²         "

## 2023-03-31 NOTE — DISCHARGE PLACEMENT REQUEST
"Kai Peters (72 y.o. Male)     Date of Birth   1950    Social Security Number       Address   [place] [address]    Home Phone   [phone]    MRN   [id number]       Zoroastrianism   Caodaism    Marital Status                               Admission Date   3/30/23    Admission Type   Urgent    Admitting Provider   Mariano Lombardo MD    Attending Provider   Mariano Lombardo MD    Department, Room/Bed   [address], E561/1       Discharge Date       Discharge Disposition       Discharge Destination                               Attending Provider: Mariano Lombardo MD    Allergies: No Known Allergies    Isolation: None   Infection: None   Code Status: CPR    Ht: 170.2 cm (67.01\")   Wt: 93.8 kg (206 lb 14.4 oz)    Admission Cmt: None   Principal Problem: Diabetic foot ulcer (HCC) [E11.621,L97.509]                 Active Insurance as of 3/30/2023     Primary Coverage     Payor Plan Insurance Group Employer/Plan Group    AETNA MEDICARE REPLACEMENT [place]NA MEDIC[place]AC[place] [id number]     Payor Plan Address Payor Plan Phone Number Payor Plan Fax Number Effective Dates    [address] [phone]  1/1/2021 - None Entered    [facility] [id number]       Subscriber Name Subscriber Birth Date Member ID       KAI PETERS 1950 [id number]           Secondary Coverage     Payor Plan Insurance Group Employer/Plan Group    MYNEXUS-CARELON (HOMEHEALTH ONLY) [id number] MEDICARE ([place] ONLY) [id number]     Payor Plan Address Payor Plan Phone Number Payor Plan Fax Number Effective Dates    [address] [phone]  1/1/2021 - None Entered    [facility] [id number]       Subscriber Name Subscriber Birth Date Member ID       BECKKAI KING 1950 [id number]                 Emergency Contacts      (Rel.) Home Phone Work Phone Mobile Phone    My Peters (Spouse) [phone] -- --              "

## 2023-03-31 NOTE — CASE MANAGEMENT/SOCIAL WORK
Discharge Planning Assessment  [facility]     Patient Name: Kelby Peters  MRN: [id number]  Today's Date: 3/31/2023    Admit Date: 3/30/2023    Plan: Home with wife, formerly [facility] and [facility] for 6 weeks of IV antibiotics, referrals pending   Discharge Needs Assessment     Row Name 03/31/23 1044       Living Environment    People in Home spouse    Name(s) of People in Home My    Current Living Arrangements home    Potentially Unsafe Housing Conditions none    Primary Care Provided by self    Provides Primary Care For no one    Family Caregiver if Needed spouse    Family Caregiver Names My    Quality of Family Relationships helpful;involved;supportive    Able to Return to Prior Arrangements yes       Resource/Environmental Concerns    Resource/Environmental Concerns none    Transportation Concerns none       Transition Planning    Patient/Family Anticipates Transition to home with family;home with help/services    Patient/Family Anticipated Services at Transition home health care    Transportation Anticipated family or friend will provide       Discharge Needs Assessment    Readmission Within the Last 30 Days no previous admission in last 30 days    Equipment Currently Used at Home none    Concerns to be Addressed no discharge needs identified;denies needs/concerns at this time    Anticipated Changes Related to Illness none    Equipment Needed After Discharge none    Provided Post Acute Provider List? N/A    Provided Post Acute Provider Quality & Resource List? N/A    Patient's Choice of Community Agency(s) formerly [facility]               Discharge Plan     Row Name 03/31/23 1045       Plan    Plan Home with wife, formerly [facility] and [facility] for 6 weeks of IV antibiotics, referrals pending    Patient/Family in Agreement with Plan yes    Plan Comments IMM noted. CCP met with patient and his wife My with patient’s verbal permission. CCP introduced self and explained role. Patient confirmed the information on his  face sheet is accurate. Patient states that he lives at home with his wife My. Patient confirmed his PCP is Susy Germain. Patient states he has worked with [facility] in the past. Patient denies a history of SNF. Patient denies using any DME. Patient states he has 4 steps to enter his home with a handrail on both sides. Patient states his bedroom is located upstairs with a full flight of steps and a handrail on the left hand side. Patient states his pharmacy is [facility] on [address]. Patient states his wife can transport him home at discharge. CCP made referral to [facility] and [facility] due to patient likely going home on 6 weeks of IV antibiotics. CCP to follow.              Continued Care and Services - Admitted Since 3/30/2023    Coordination has not been started for this encounter.     Selected Continued Care - Prior Encounters Includes continued care and service providers with selected services from prior encounters from 12/30/2022 to 3/31/2023    Discharged on 1/21/2023 Admission date: 1/15/2023 - Discharge disposition: Home-Health Care c    Home Medical Care     Service Provider Selected Services Address Phone Fax Patient Preferred     [facility] [address] [address] [phone] --                       Demographic Summary     Row Name 03/31/23 1040       General Information    Admission Type inpatient    Arrived From physician office - external    Required Notices Provided Important Message from Medicare    Referral Source admission list    Reason for Consult discharge planning    Preferred Language English               Functional Status     Row Name 03/31/23 1044       Functional Status    Usual Activity Tolerance good    Current Activity Tolerance good       Functional Status, IADL    Medications independent    Meal Preparation independent    Housekeeping independent    Laundry independent    Shopping independent        Mental Status    General Appearance WDL WDL       Mental Status Summary    Recent Changes in Mental Status/Cognitive Functioning no changes       Employment/    Employment Status retired               Psychosocial    No documentation.                Abuse/Neglect    No documentation.                Legal    No documentation.                Substance Abuse    No documentation.                Patient Forms    No documentation.

## 2023-03-31 NOTE — PROGRESS NOTES
"[facility] Clinical Pharmacy Services: Vancomycin Pharmacokinetic Initial Consult Note    Kelby Peters is a 72 y.o. male who is on day 1 of pharmacy to dose vancomycin.    Indication: Bone and/or Joint Infection  Consulting Provider: Dr. Villarreal  Planned Duration of Therapy: 42 days - stop date of 5/12/23  Loading Dose Ordered or Given: 2000 mg on 3/30 at 2329  MRSA PCR performed: no  Culture/Source:   3/30 BloodCx - NG@24h 2/2  3/31 TissueCx (right toe) - pending  3/31 TissueCx (left toe) - pending    Target: -600 mg/L.hr   Other Antimicrobials: Ceftriaxone 2g daily    Vitals/Labs  Ht: 170.2 cm (67.01\"); Wt: 93.8 kg (206 lb 14.4 oz)  Temp Readings from Last 1 Encounters:   03/31/23 98.3 °F (36.8 °C) (Oral)    Estimated Creatinine Clearance: 110.5 mL/min (A) (by C-G formula based on SCr of 0.66 mg/dL (L)).        Results from last 7 days   Lab Units 03/31/23  0759 03/30/23  1529   CREATININE mg/dL 0.66* 0.78   WBC 10*3/mm3 8.94 10.56     Assessment/Plan:    Vancomycin Dose:   1000 mg IV every  12  hours  Predictive AUC level for the dose ordered is 406 mg/L.hr, which is within the target of 400-600 mg/L.hr  Vanc Trough has been ordered for 4/1 at 0800     Pharmacy will follow patient's kidney function and will adjust doses and obtain levels as necessary. Thank you for involving pharmacy in this patient's care. Please contact pharmacy with any questions or concerns.                           Kalyani Riddle, [facility]  Clinical Pharmacist   "

## 2023-04-01 LAB
ANION GAP SERPL CALCULATED.3IONS-SCNC: 12.5 MMOL/L (ref 5–15)
BASOPHILS # BLD AUTO: 0.03 10*3/MM3 (ref 0–0.2)
BASOPHILS NFR BLD AUTO: 0.3 % (ref 0–1.5)
BUN SERPL-MCNC: 20 MG/DL (ref 8–23)
BUN/CREAT SERPL: 27.8 (ref 7–25)
CALCIUM SPEC-SCNC: 8.7 MG/DL (ref 8.6–10.5)
CHLORIDE SERPL-SCNC: 93 MMOL/L (ref 98–107)
CO2 SERPL-SCNC: 27.5 MMOL/L (ref 22–29)
CREAT SERPL-MCNC: 0.72 MG/DL (ref 0.76–1.27)
CRP SERPL-MCNC: 7.11 MG/DL (ref 0–0.5)
DEPRECATED RDW RBC AUTO: 46.1 FL (ref 37–54)
EGFRCR SERPLBLD CKD-EPI 2021: 97.1 ML/MIN/1.73
EOSINOPHIL # BLD AUTO: 0.01 10*3/MM3 (ref 0–0.4)
EOSINOPHIL NFR BLD AUTO: 0.1 % (ref 0.3–6.2)
ERYTHROCYTE [DISTWIDTH] IN BLOOD BY AUTOMATED COUNT: 14.4 % (ref 12.3–15.4)
GLUCOSE BLDC GLUCOMTR-MCNC: 159 MG/DL (ref 70–130)
GLUCOSE BLDC GLUCOMTR-MCNC: 212 MG/DL (ref 70–130)
GLUCOSE BLDC GLUCOMTR-MCNC: 246 MG/DL (ref 70–130)
GLUCOSE BLDC GLUCOMTR-MCNC: 250 MG/DL (ref 70–130)
GLUCOSE BLDC GLUCOMTR-MCNC: 86 MG/DL (ref 70–130)
GLUCOSE SERPL-MCNC: 238 MG/DL (ref 65–99)
HCT VFR BLD AUTO: 31.4 % (ref 37.5–51)
HGB BLD-MCNC: 10.2 G/DL (ref 13–17.7)
IMM GRANULOCYTES # BLD AUTO: 0.04 10*3/MM3 (ref 0–0.05)
IMM GRANULOCYTES NFR BLD AUTO: 0.4 % (ref 0–0.5)
LYMPHOCYTES # BLD AUTO: 0.77 10*3/MM3 (ref 0.7–3.1)
LYMPHOCYTES NFR BLD AUTO: 7.8 % (ref 19.6–45.3)
MCH RBC QN AUTO: 28.3 PG (ref 26.6–33)
MCHC RBC AUTO-ENTMCNC: 32.5 G/DL (ref 31.5–35.7)
MCV RBC AUTO: 87 FL (ref 79–97)
MONOCYTES # BLD AUTO: 0.64 10*3/MM3 (ref 0.1–0.9)
MONOCYTES NFR BLD AUTO: 6.5 % (ref 5–12)
NEUTROPHILS NFR BLD AUTO: 8.38 10*3/MM3 (ref 1.7–7)
NEUTROPHILS NFR BLD AUTO: 84.9 % (ref 42.7–76)
NRBC BLD AUTO-RTO: 0.1 /100 WBC (ref 0–0.2)
PLATELET # BLD AUTO: 432 10*3/MM3 (ref 140–450)
PMV BLD AUTO: 9.2 FL (ref 6–12)
POTASSIUM SERPL-SCNC: 3.9 MMOL/L (ref 3.5–5.2)
RBC # BLD AUTO: 3.61 10*6/MM3 (ref 4.14–5.8)
SODIUM SERPL-SCNC: 133 MMOL/L (ref 136–145)
VANCOMYCIN TROUGH SERPL-MCNC: 11.7 MCG/ML (ref 5–20)
WBC NRBC COR # BLD: 9.87 10*3/MM3 (ref 3.4–10.8)

## 2023-04-01 PROCEDURE — 80048 BASIC METABOLIC PNL TOTAL CA: CPT | Performed by: SURGERY

## 2023-04-01 PROCEDURE — 25010000002 HEPARIN (PORCINE) PER 1000 UNITS: Performed by: SURGERY

## 2023-04-01 PROCEDURE — 25010000002 CEFTRIAXONE PER 250 MG: Performed by: INTERNAL MEDICINE

## 2023-04-01 PROCEDURE — 63710000001 INSULIN LISPRO (HUMAN) PER 5 UNITS: Performed by: SURGERY

## 2023-04-01 PROCEDURE — 82962 GLUCOSE BLOOD TEST: CPT

## 2023-04-01 PROCEDURE — 86140 C-REACTIVE PROTEIN: CPT | Performed by: SURGERY

## 2023-04-01 PROCEDURE — 85025 COMPLETE CBC W/AUTO DIFF WBC: CPT | Performed by: SURGERY

## 2023-04-01 PROCEDURE — 99232 SBSQ HOSP IP/OBS MODERATE 35: CPT | Performed by: INTERNAL MEDICINE

## 2023-04-01 PROCEDURE — 80202 ASSAY OF VANCOMYCIN: CPT | Performed by: INTERNAL MEDICINE

## 2023-04-01 PROCEDURE — 25010000002 VANCOMYCIN PER 500 MG: Performed by: INTERNAL MEDICINE

## 2023-04-01 PROCEDURE — 63710000001 INSULIN LISPRO (HUMAN) PER 5 UNITS: Performed by: STUDENT IN AN ORGANIZED HEALTH CARE EDUCATION/TRAINING PROGRAM

## 2023-04-01 RX ORDER — INSULIN LISPRO 100 [IU]/ML
6 INJECTION, SOLUTION INTRAVENOUS; SUBCUTANEOUS
Status: DISCONTINUED | OUTPATIENT
Start: 2023-04-01 | End: 2023-04-04 | Stop reason: HOSPADM

## 2023-04-01 RX ADMIN — PREDNISOLONE ACETATE 1 DROP: 10 SUSPENSION/ DROPS OPHTHALMIC at 08:36

## 2023-04-01 RX ADMIN — LEVETIRACETAM 500 MG: 500 TABLET, FILM COATED ORAL at 22:37

## 2023-04-01 RX ADMIN — VANCOMYCIN HYDROCHLORIDE 1000 MG: 1 INJECTION, SOLUTION INTRAVENOUS at 22:36

## 2023-04-01 RX ADMIN — GABAPENTIN 800 MG: 400 CAPSULE ORAL at 14:12

## 2023-04-01 RX ADMIN — HYDROCODONE BITARTRATE AND ACETAMINOPHEN 1 TABLET: 5; 325 TABLET ORAL at 11:19

## 2023-04-01 RX ADMIN — HYDROCHLOROTHIAZIDE 25 MG: 25 TABLET ORAL at 08:33

## 2023-04-01 RX ADMIN — INSULIN LISPRO 4 UNITS: 100 INJECTION, SOLUTION INTRAVENOUS; SUBCUTANEOUS at 08:35

## 2023-04-01 RX ADMIN — INSULIN LISPRO 5 UNITS: 100 INJECTION, SOLUTION INTRAVENOUS; SUBCUTANEOUS at 11:21

## 2023-04-01 RX ADMIN — INSULIN GLARGINE-YFGN 10 UNITS: 100 INJECTION, SOLUTION SUBCUTANEOUS at 08:34

## 2023-04-01 RX ADMIN — TIMOLOL MALEATE: 5 SOLUTION OPHTHALMIC at 22:54

## 2023-04-01 RX ADMIN — TIMOLOL MALEATE: 5 SOLUTION OPHTHALMIC at 08:36

## 2023-04-01 RX ADMIN — HYDROCODONE BITARTRATE AND ACETAMINOPHEN 1 TABLET: 5; 325 TABLET ORAL at 05:24

## 2023-04-01 RX ADMIN — ROSUVASTATIN CALCIUM 10 MG: 10 TABLET, FILM COATED ORAL at 22:37

## 2023-04-01 RX ADMIN — INSULIN LISPRO 4 UNITS: 100 INJECTION, SOLUTION INTRAVENOUS; SUBCUTANEOUS at 11:21

## 2023-04-01 RX ADMIN — CLOPIDOGREL BISULFATE 75 MG: 75 TABLET, FILM COATED ORAL at 08:33

## 2023-04-01 RX ADMIN — INSULIN LISPRO 6 UNITS: 100 INJECTION, SOLUTION INTRAVENOUS; SUBCUTANEOUS at 17:12

## 2023-04-01 RX ADMIN — INSULIN LISPRO 5 UNITS: 100 INJECTION, SOLUTION INTRAVENOUS; SUBCUTANEOUS at 17:12

## 2023-04-01 RX ADMIN — HYDROCODONE BITARTRATE AND ACETAMINOPHEN 1 TABLET: 5; 325 TABLET ORAL at 17:12

## 2023-04-01 RX ADMIN — VANCOMYCIN HYDROCHLORIDE 1000 MG: 1 INJECTION, SOLUTION INTRAVENOUS at 09:26

## 2023-04-01 RX ADMIN — PREDNISOLONE ACETATE 1 DROP: 10 SUSPENSION/ DROPS OPHTHALMIC at 17:11

## 2023-04-01 RX ADMIN — INSULIN LISPRO 8 UNITS: 100 INJECTION, SOLUTION INTRAVENOUS; SUBCUTANEOUS at 08:35

## 2023-04-01 RX ADMIN — LISINOPRIL 20 MG: 20 TABLET ORAL at 08:33

## 2023-04-01 RX ADMIN — LEVETIRACETAM 500 MG: 500 TABLET, FILM COATED ORAL at 08:33

## 2023-04-01 RX ADMIN — HEPARIN SODIUM 5000 UNITS: 5000 INJECTION INTRAVENOUS; SUBCUTANEOUS at 08:34

## 2023-04-01 RX ADMIN — GABAPENTIN 800 MG: 400 CAPSULE ORAL at 05:24

## 2023-04-01 RX ADMIN — AMLODIPINE BESYLATE 10 MG: 10 TABLET ORAL at 08:32

## 2023-04-01 RX ADMIN — PREDNISOLONE ACETATE 1 DROP: 10 SUSPENSION/ DROPS OPHTHALMIC at 22:58

## 2023-04-01 RX ADMIN — HEPARIN SODIUM 5000 UNITS: 5000 INJECTION INTRAVENOUS; SUBCUTANEOUS at 22:37

## 2023-04-01 RX ADMIN — GABAPENTIN 800 MG: 400 CAPSULE ORAL at 22:57

## 2023-04-01 RX ADMIN — CEFTRIAXONE 2 G: 2 INJECTION, POWDER, FOR SOLUTION INTRAMUSCULAR; INTRAVENOUS at 17:12

## 2023-04-01 RX ADMIN — ASPIRIN 81 MG: 81 TABLET, COATED ORAL at 08:33

## 2023-04-01 NOTE — PROGRESS NOTES
Name: Kelby Peters ADMIT: 3/30/2023   : 1950  PCP: Susy Germain APRN    MRN: [id number] LOS: 2 days   AGE/SEX: 72 y.o. male  ROOM:  [place] [address]/1     CC: Postop day 1 status post right fifth ray and left first ray amputation  Interval History: No acute events overnight.  Pain not initially due to neuropathy.  Patient and his wife somewhat taken aback by the side of his amputated toes.  Otherwise resting comfortably in bed.  Subjective   Subjective     Review of Systems  Objective   Objective     Vitals:   Temp:  [98.1 °F (36.7 °C)-99.4 °F (37.4 °C)] 98.9 °F (37.2 °C)  Heart Rate:  [59-73] 71  Resp:  [14-20] 16  BP: (119-189)/(58-84) 187/79    I/O this shift:  In: 240 [P.O.:240]  Out: -     Scheduled Meds:     amLODIPine, 10 mg, Oral, Q24H  aspirin, 81 mg, Oral, Daily  cefTRIAXone, 2 g, Intravenous, Q24H  clopidogrel, 75 mg, Oral, Daily  dorzolamide-timolol, , Both Eyes, BID  gabapentin, 800 mg, Oral, Q8H  heparin (porcine), 5,000 Units, Subcutaneous, Q12H  hydroCHLOROthiazide, 25 mg, Oral, Daily  insulin glargine, 10 Units, Subcutaneous, QAM  insulin lispro, 0-14 Units, Subcutaneous, TID AC  insulin lispro, 4 Units, Subcutaneous, TID With Meals  ipratropium-albuterol, 3 mL, Nebulization, Q8H - RT  levETIRAcetam, 500 mg, Oral, BID  lisinopril, 20 mg, Oral, Daily  prednisoLONE acetate, 1 drop, Both Eyes, TID  rosuvastatin, 10 mg, Oral, Nightly  vancomycin, 1,000 mg, Intravenous, Q12H      IV Meds:   lactated ringers, 9 mL/hr, Last Rate: 9 mL/hr (23 1314)  Pharmacy to dose vancomycin,         Physical Exam   NAD  RRR  Respirations unlabored  Right fifth ray amputation site clean base with no evident purulence or ongoing gross infection, surrounding skin with little to no cellulitis  Left first ray amputation site clean base with no evident purulence or ongoing gross infection, surrounding skin with minimal cellulitis    Data Reviewed:  CBC    Results from last 7 days   Lab Units  04/01/23  0805 03/31/23  0759 03/30/23  1529   WBC 10*3/mm3 9.87 8.94 10.56   HEMOGLOBIN g/dL 10.2* 10.3* 9.7*   PLATELETS 10*3/mm3 432 375 323     BMP   Results from last 7 days   Lab Units 04/01/23  0805 03/31/23  0759 03/30/23  1529   SODIUM mmol/L 133* 137 133*   POTASSIUM mmol/L 3.9 3.7 3.5   CHLORIDE mmol/L 93* 96* 92*   CO2 mmol/L 27.5 30.3* 30.9*   BUN mg/dL 20 13 20   CREATININE mg/dL 0.72* 0.66* 0.78   GLUCOSE mg/dL 238* 149* 193*     Cr Clearance Estimated Creatinine Clearance: 100.6 mL/min (A) (by C-G formula based on SCr of 0.72 mg/dL (L)).  Coag     HbA1C   Lab Results   Component Value Date    HGBA1C 7.70 (H) 03/31/2023    HGBA1C 9.10 (H) 01/16/2023    HGBA1C 10.40 (H) 12/07/2022     Blood Glucose   Glucose   Date/Time Value Ref Range Status   04/01/2023 0608 250 (H) 70 - 130 mg/dL Final     Comment:     Meter: [id number] : [id number] Kisha Alexia NA   03/31/2023 2318 278 (H) 70 - 130 mg/dL Final     Comment:     Meter: [id number] : [id number] Kisha Alexia NA   03/31/2023 2042 197 (H) 70 - 130 mg/dL Final     Comment:     Meter: [id number] : [id number] Claudineorf Alexia NA   03/31/2023 1526 135 (H) 70 - 130 mg/dL Final     Comment:     Meter: [id number] : [id number] Jahaira Hdez RN   03/31/2023 1048 145 (H) 70 - 130 mg/dL Final     Comment:     Meter: [id number] : [id number] Remi Camacho NA   03/31/2023 0628 155 (H) 70 - 130 mg/dL Final     Comment:     Meter: [id number] : LA JANE   03/30/2023 1625 188 (H) 70 - 130 mg/dL Final     Comment:     Meter: [id number] : [id number] Chioma Rodrigues RN     Infection   Results from last 7 days   Lab Units 03/30/23  1530 03/30/23  1529   BLOODCX  No growth at 24 hours No growth at 24 hours   PROCALCITONIN ng/mL  --  0.08     CMP   Results from last 7 days   Lab Units 04/01/23  0805 03/31/23  0759 03/30/23  1529   SODIUM mmol/L 133* 137 133*   POTASSIUM mmol/L 3.9 3.7 3.5   CHLORIDE mmol/L 93* 96* 92*   CO2  mmol/L 27.5 30.3* 30.9*   BUN mg/dL 20 13 20   CREATININE mg/dL 0.72* 0.66* 0.78   GLUCOSE mg/dL 238* 149* 193*   ALBUMIN g/dL  --   --  3.4*   BILIRUBIN mg/dL  --   --  0.2   ALK PHOS U/L  --   --  59   AST (SGOT) U/L  --   --  11   ALT (SGPT) U/L  --   --  7     ABG      Radiology(recent) MRI Foot Right With & Without Contrast    Result Date: 3/31/2023  Open wound along lateral side of the right forefoot with exposed bone and MRI findings of osteomyelitis in the toe proximal phalanx and the fifth metatarsal head and neck. The abnormal marrow signal is more extensive today than on the MRI from earlier this year.  This report was finalized on 3/31/2023 12:03 AM by Dr. Mike Cat M.D.      MRI Foot Left With & Without Contrast    Result Date: 3/31/2023  Open wound along the plantar side of the medial left forefoot with osteomyelitis in the medial great toe sesamoid, first metatarsal head and neck and proximal phalanx of the great toe. The flexor hallucis longus tendon is torn with the tendon sheath extending across the open wound and some abnormal synovitis along the tendon sheath to the level of the metatarsal base.  Additionally there has been third toe amputation and there is no bone wound along the lateral side of the foot superficial to the fifth metatarsal head. However, there is no evidence of osteomyelitis in the third or the fifth metatarsals.  This report was finalized on 3/31/2023 12:02 AM by Dr. Mike Cat M.D.            Active Hospital Problems:   Active Hospital Problems    Diagnosis  POA   • **Diabetic foot ulcer [E11.621, L97.509]  Yes   • Chronic osteomyelitis of foot [M86.679]  Yes   • Diabetes mellitus with nephropathy [E11.21]  Yes   • Type 2 diabetes mellitus with diabetic polyneuropathy [E11.42]  Yes   • Peripheral vascular disease [I73.9]  Yes   • History of seizures [Z87.898]  Yes   • Diabetic peripheral neuropathy (HCC) [E11.42]  Yes   • Hypertension [I10]  Yes   • Chronic pain  [G89.29]  Yes      Resolved Hospital Problems   No resolved problems to display.      Assessment & Plan   Billin, Post Op Global  Assessment / Plan     72-year-old gentleman with osteomyelitis of bilateral feet secondary to diabetic neuropathy status post right fifth toe amputation and left first toe amputation with resection of metatarsal heads bilaterally.  Both wounds look good today with no ongoing infection.  Continue daily Betadine dressing changes.  Nonweightbearing bilaterally for now  Antibiotics per infectious disease, cultures pending currently.  Supportive care per primary team    Max Winter II, MD  23  10:50 EDT  Office Number [phone]

## 2023-04-01 NOTE — PROGRESS NOTES
Name: Kelby Peters ADMIT: 3/30/2023   : 1950  PCP: Susy Germain APRN    MRN: [id number] LOS: 2 days   AGE/SEX: 72 y.o. male  ROOM: [facility]     Subjective   Subjective   No acute events overnight. Underwent amputation of left great toe and right little toe yesterday.       Objective   Objective   Vital Signs  Temp:  [98.1 °F (36.7 °C)-99.4 °F (37.4 °C)] 98.3 °F (36.8 °C)  Heart Rate:  [59-73] 71  Resp:  [14-20] 16  BP: (115-189)/(63-84) 115/65  SpO2:  [83 %-99 %] 97 %  on  Flow (L/min):  [3-6] 3;   Device (Oxygen Therapy): nasal cannula  Body mass index is 31.94 kg/m².  Physical Exam  Constitutional:       Appearance: Normal appearance.   HENT:      Head: Normocephalic and atraumatic.   Cardiovascular:      Rate and Rhythm: Normal rate and regular rhythm.   Pulmonary:      Effort: Pulmonary effort is normal. No respiratory distress.   Abdominal:      General: There is no distension.      Palpations: Abdomen is soft.      Tenderness: There is no abdominal tenderness.   Musculoskeletal:      Right lower leg: No edema.      Comments: Bilateral feet bandaged.    Neurological:      General: No focal deficit present.      Mental Status: He is alert and oriented to person, place, and time.         Results Review     I reviewed the patient's new clinical results.  Results from last 7 days   Lab Units 23  0805 23  07523  1529   WBC 10*3/mm3 9.87 8.94 10.56   HEMOGLOBIN g/dL 10.2* 10.3* 9.7*   PLATELETS 10*3/mm3 432 375 323     Results from last 7 days   Lab Units 23  0805 23  0759 23  1529   SODIUM mmol/L 133* 137 133*   POTASSIUM mmol/L 3.9 3.7 3.5   CHLORIDE mmol/L 93* 96* 92*   CO2 mmol/L 27.5 30.3* 30.9*   BUN mg/dL 20 13 20   CREATININE mg/dL 0.72* 0.66* 0.78   GLUCOSE mg/dL 238* 149* 193*   Estimated Creatinine Clearance: 100.6 mL/min (A) (by C-G formula based on SCr of 0.72 mg/dL (L)).  Results from last 7 days   Lab Units 23  1529   ALBUMIN g/dL 3.4*    BILIRUBIN mg/dL 0.2   ALK PHOS U/L 59   AST (SGOT) U/L 11   ALT (SGPT) U/L 7     Results from last 7 days   Lab Units 04/01/23  0805 03/31/23  0759 03/30/23  1529   CALCIUM mg/dL 8.7 8.9 9.1   ALBUMIN g/dL  --   --  3.4*     Results from last 7 days   Lab Units 03/30/23  1529   PROCALCITONIN ng/mL 0.08   No results found for: COVID19  Hemoglobin A1C   Date/Time Value Ref Range Status   03/31/2023 0759 7.70 (H) 4.80 - 5.60 % Final     Glucose   Date/Time Value Ref Range Status   04/01/2023 1115 246 (H) 70 - 130 mg/dL Final     Comment:     Meter: [id number] : [id number] Valente Hazel NA   04/01/2023 0608 250 (H) 70 - 130 mg/dL Final     Comment:     Meter: [id number] : [id number] Kisha Riley NA   03/31/2023 2318 278 (H) 70 - 130 mg/dL Final     Comment:     Meter: [id number] : [id number] Kisha Riley NA   03/31/2023 2042 197 (H) 70 - 130 mg/dL Final     Comment:     Meter: [id number] : [id number] ElvisAbdelrahmannicole Alfieia NA   03/31/2023 1526 135 (H) 70 - 130 mg/dL Final     Comment:     Meter: [id number] : [id number] Jahaira Hdez RN   03/31/2023 1048 145 (H) 70 - 130 mg/dL Final     Comment:     Meter: [id number] : [id number] Remi Camacho NA   03/31/2023 0628 155 (H) 70 - 130 mg/dL Final     Comment:     Meter: [id number] : [employer]       MRI Foot Right With & Without Contrast  Narrative: MRI right MID and forefoot with and without contrast     HISTORY: Open wound. Evaluate osteomyelitis.     TECHNIQUE: MRI right MID and forefoot was performed before and after the  IV administration of 20 mL MultiHance contrast. Left foot MRI was  performed today and is reported separately. Correlation with MRI  01/16/2023.     FINDINGS: The lateral forefoot soft tissue wound appears larger today  with apparent exposed bone along the lateral base of the fifth toe  proximal phalanx and the lateral edge of the fifth metatarsal head. More  extensive abnormal bone marrow edema and  enhancement is observed along  the distal approximately 20 mm of the fifth metatarsal head and in the  proximal phalanx. There is abnormal synovial enhancement at the fifth  metatarsophalangeal joint with mild soft tissue enhancement around the  metatarsal head. There is also abnormal enhancement of the soft tissue  throughout the midline and lateral portion of the mid and forefoot.     Marrow signal elsewhere throughout the mid and forefoot is normal. The  Lisfranc ligament complex is intact. There is no neuropathic change in  the midfoot. First through fourth metatarsophalangeal joints and  interphalangeal joints are preserved.     Foot musculature is atrophic.     Impression: Open wound along lateral side of the right forefoot with  exposed bone and MRI findings of osteomyelitis in the toe proximal  phalanx and the fifth metatarsal head and neck. The abnormal marrow  signal is more extensive today than on the MRI from earlier this year.     This report was finalized on 3/31/2023 12:03 AM by Dr. Mike Cat M.D.     MRI Foot Left With & Without Contrast  Narrative: MRI LEFT MID AND FOREFOOT WITH AND WITHOUT CONTRAST     HISTORY: Previous third toe amputation. Evaluate osteomyelitis. Open  wounds.     TECHNIQUE: MRI left mid and forefoot is correlated with left foot MRI  01/05/2023 and right foot MRI performed tonight reported separately. 20  mL of MultiHance contrast was given.     FINDINGS: There is an open wound along the plantar side of the foot  superficial to the medial great toe sesamoid which appears deformed and  demonstrates diffuse abnormal marrow signal. There is abnormal synovial  enhancement and fluid at the first MTP joint with abnormal bone marrow  edema in the distal 3 cm of the first metatarsal and most of the great  toe proximal phalanx.     There has been interval third toe amputation. There also is an open  wound superficial to the fifth metatarsal head dorsolaterally with some  adjacent  soft tissue edema and enhancement. No marrow signal abnormality  is present in the fifth or the third metatarsals or in the remaining  second, fourth, or fifth toes.     The Lisfranc ligament complex is intact. There is mild degenerative  change between the metatarsal bases but no midfoot neuropathic  arthropathy.     The flexor hallucis longus tendon is torn and retracted to about the  level of the sesamoids. There is some tendon sheath synovial thickening  and enhancement. This tendon sheath extends across the open wound. On  the sagittal images, no fluid is seen tracking proximally.     Foot musculature is atrophic.     Impression: Open wound along the plantar side of the medial left  forefoot with osteomyelitis in the medial great toe sesamoid, first  metatarsal head and neck and proximal phalanx of the great toe. The  flexor hallucis longus tendon is torn with the tendon sheath extending  across the open wound and some abnormal synovitis along the tendon  sheath to the level of the metatarsal base.     Additionally there has been third toe amputation and there is no bone  wound along the lateral side of the foot superficial to the fifth  metatarsal head. However, there is no evidence of osteomyelitis in the  third or the fifth metatarsals.     This report was finalized on 3/31/2023 12:02 AM by Dr. Mike Cat M.D.       Scheduled Medications  amLODIPine, 10 mg, Oral, Q24H  aspirin, 81 mg, Oral, Daily  cefTRIAXone, 2 g, Intravenous, Q24H  clopidogrel, 75 mg, Oral, Daily  dorzolamide-timolol, , Both Eyes, BID  gabapentin, 800 mg, Oral, Q8H  heparin (porcine), 5,000 Units, Subcutaneous, Q12H  hydroCHLOROthiazide, 25 mg, Oral, Daily  insulin glargine, 10 Units, Subcutaneous, QAM  insulin lispro, 0-14 Units, Subcutaneous, TID AC  insulin lispro, 4 Units, Subcutaneous, TID With Meals  ipratropium-albuterol, 3 mL, Nebulization, Q8H - RT  levETIRAcetam, 500 mg, Oral, BID  lisinopril, 20 mg, Oral,  Daily  prednisoLONE acetate, 1 drop, Both Eyes, TID  rosuvastatin, 10 mg, Oral, Nightly  vancomycin, 1,000 mg, Intravenous, Q12H    Infusions  lactated ringers, 9 mL/hr, Last Rate: 9 mL/hr (03/31/23 1314)  Pharmacy to dose vancomycin,     Diet  Diet: Cardiac Diets, Diabetic Diets, Renal Diets; Healthy Heart (2-3 Na+); Consistent Carbohydrate; Low Sodium (2-3g), Low Potassium, Low Phosphorus; Texture: Regular Texture (IDDSI 7); Fluid Consistency: Thin (IDDSI 0)       Assessment/Plan     Active Hospital Problems    Diagnosis  POA   • **Diabetic foot ulcer [E11.621, L97.509]  Yes   • Chronic osteomyelitis of foot [M86.679]  Yes   • Diabetes mellitus with nephropathy [E11.21]  Yes   • Type 2 diabetes mellitus with diabetic polyneuropathy [E11.42]  Yes   • Peripheral vascular disease [I73.9]  Yes   • History of seizures [Z87.898]  Yes   • Diabetic peripheral neuropathy (HCC) [E11.42]  Yes   • Hypertension [I10]  Yes   • Chronic pain [G89.29]  Yes      Resolved Hospital Problems   No resolved problems to display.       72 y.o. male admitted with Diabetic foot ulcer.  Mr. Peters is a 72 y.o. male with a history of chronic osteomyelitis chetan feet, MSSA & strep wound infections, PVD, DM w/neuropathy & nephropathy, Sleep apnea, seizures, HTN who is admitted for diabetic foot ulcers     Diabetic foot ulcers/Chronic OM/PVD:  -Vascular surgery and ID consulted.  -MRI with osteo of the left great toe and right little toe. S/p amputation of metatarsal heads.   -Check blood cultures  -procal normal, CRP 10.54  -Completed 6 weeks IV cefazolin 3/6/23. Defer imaging to vascular surgery  - Wound culture withy GPCs. On Rocephin, vancomycin     DM/Nephropathy/Neuropathy:  -Monitor BG trends. A1C 9.10%. Correctional scale insulin.  -home rx: 70/30 45 bid   -while in hospital: lantus 15 and humalog 6 tid, ssi. Needs tighter glycemic control.     Chronic pain:  -Restart hydrocodone, gabapentin     Hx seizures:  -Keppra     HTN:  -Followed by  Nephrology. Increased lisinopril to 20 mg at last visit.    SCDs for dvt ppx  Full code           Don Lyles MD  [facility]  04/01/23  12:55 EDT

## 2023-04-01 NOTE — PLAN OF CARE
Goal Outcome Evaluation:  Plan of Care Reviewed With: patient        Progress: improving  Outcome Evaluation: vss, pain controlled by prn pain meds. pt has been on 1-3L NC during the shift. dressings CDI. IV abx given. labs in am. will continue to monitor.

## 2023-04-01 NOTE — PLAN OF CARE
Problem: Pain Acute  Goal: Acceptable Pain Control and Functional Ability  Outcome: Ongoing, Progressing  Intervention: Optimize Psychosocial Wellbeing  Recent Flowsheet Documentation  Taken 4/1/2023 0800 by Sandra Shipley RN  Diversional Activities: television     Problem: Pain Acute  Goal: Acceptable Pain Control and Functional Ability  Intervention: Optimize Psychosocial Wellbeing  Recent Flowsheet Documentation  Taken 4/1/2023 0800 by Sandra Shipley, RN  Diversional Activities: television     Problem: Fall Injury Risk  Goal: Absence of Fall and Fall-Related Injury  Outcome: Ongoing, Progressing   Goal Outcome Evaluation:  Plan of Care Reviewed With: patient        Progress: improving  Outcome Evaluation: VSS. C/o pain managed with PRN PO medication. Bilateral feet drsgs changed by vascular surgery. IV abx cont'd. Will monitor.

## 2023-04-01 NOTE — PROGRESS NOTES
ID NOTE    CC: f/u osteomyelitis B feet    Subj: OR yesterday for debridement and toe amputations. Gram stains with GPCs. History from pt and his wife.     Medications:    Current Facility-Administered Medications:   •  acetaminophen (TYLENOL) tablet 650 mg, 650 mg, Oral, Q4H PRN, 650 mg at 03/30/23 1704 **OR** acetaminophen (TYLENOL) 160 MG/5ML solution 650 mg, 650 mg, Oral, Q4H PRN **OR** acetaminophen (TYLENOL) suppository 650 mg, 650 mg, Rectal, Q4H PRN, Jasbir Alvarado MD  •  amLODIPine (NORVASC) tablet 10 mg, 10 mg, Oral, Q24H, Jasbir Alvarado MD, 10 mg at 04/01/23 0832  •  aspirin EC tablet 81 mg, 81 mg, Oral, Daily, Jasbir Alvarado MD, 81 mg at 04/01/23 0833  •  cefTRIAXone (ROCEPHIN) 2 g in sodium chloride 0.9 % 100 mL IVPB-VTB, 2 g, Intravenous, Q24H, Pedro Luis Villarreal MD, Last Rate: 200 mL/hr at 03/31/23 1805, 2 g at 03/31/23 1805  •  clopidogrel (PLAVIX) tablet 75 mg, 75 mg, Oral, Daily, Jasbir Alvarado MD, 75 mg at 04/01/23 0833  •  dextrose (D50W) (25 g/50 mL) IV injection 25 g, 25 g, Intravenous, Q15 Min PRN, Jasbir Alvarado MD  •  dextrose (GLUTOSE) oral gel 15 g, 15 g, Oral, Q15 Min PRN, Jasbir Alvarado MD  •  dorzolamide (TRUSOPT) 2 % 1 drop, timolol (TIMOPTIC) 0.5 % 1 drop for Cosopt 22.3-6.8 mg/mL, , Both Eyes, BID, Jasbir Alvarado MD, Given at 04/01/23 0836  •  gabapentin (NEURONTIN) capsule 800 mg, 800 mg, Oral, Q8H, Jasbir Alvarado MD, 800 mg at 04/01/23 0524  •  glucagon (GLUCAGEN) injection 1 mg, 1 mg, Subcutaneous, Q15 Min PRN, Jasbir Alvarado MD  •  heparin (porcine) 5000 UNIT/ML injection 5,000 Units, 5,000 Units, Subcutaneous, Q12H, Jasbir Alvarado MD, 5,000 Units at 04/01/23 0834  •  hydroCHLOROthiazide (HYDRODIURIL) tablet 25 mg, 25 mg, Oral, Daily, Jasbir Alvarado MD, 25 mg at 04/01/23 0833  •  HYDROcodone-acetaminophen (NORCO)  MG per tablet 1 tablet, 1 tablet, Oral, Q6H PRN, Jasbir Alvarado MD, 1 tablet at 03/31/23 2804  •  HYDROcodone-acetaminophen  (NORCO) 5-325 MG per tablet 1 tablet, 1 tablet, Oral, Q4H PRN, Jasbir Alvarado MD, 1 tablet at 04/01/23 0524  •  insulin glargine (LANTUS, SEMGLEE) injection 10 Units, 10 Units, Subcutaneous, QAM, Jasbir Alvarado MD, 10 Units at 04/01/23 0834  •  insulin lispro (ADMELOG) injection 0-14 Units, 0-14 Units, Subcutaneous, TID AC, Jasbir Alvarado MD, 8 Units at 04/01/23 0835  •  insulin lispro (ADMELOG) injection 4 Units, 4 Units, Subcutaneous, TID With Meals, Jasbir Alvarado MD, 4 Units at 04/01/23 0835  •  ipratropium-albuterol (DUO-NEB) nebulizer solution 3 mL, 3 mL, Nebulization, Q8H - RT, Jasbir Alvarado MD, 3 mL at 03/31/23 2315  •  lactated ringers infusion, 9 mL/hr, Intravenous, Continuous, Jasbir Alvarado MD, Last Rate: 9 mL/hr at 03/31/23 1314, Restarted at 03/31/23 1406  •  levETIRAcetam (KEPPRA) tablet 500 mg, 500 mg, Oral, BID, Jasbir Alvarado MD, 500 mg at 04/01/23 0833  •  lisinopril (PRINIVIL,ZESTRIL) tablet 20 mg, 20 mg, Oral, Daily, Jasbir Alvarado MD, 20 mg at 04/01/23 0833  •  nitroglycerin (NITROSTAT) SL tablet 0.4 mg, 0.4 mg, Sublingual, Q5 Min PRN, Jasbir Alvarado MD  •  ondansetron (ZOFRAN) tablet 4 mg, 4 mg, Oral, Q6H PRN **OR** ondansetron (ZOFRAN) injection 4 mg, 4 mg, Intravenous, Q6H PRN, Jasbir Alvarado MD, 4 mg at 03/31/23 0417  •  ondansetron (ZOFRAN) tablet 4 mg, 4 mg, Oral, Q6H PRN **OR** ondansetron (ZOFRAN) injection 4 mg, 4 mg, Intravenous, Q6H PRN, Jasbir Alvarado MD  •  Pharmacy to dose vancomycin, , Does not apply, Continuous PRN, Pedro Luis Villarreal MD  •  prednisoLONE acetate (PRED FORTE) 1 % ophthalmic suspension 1 drop, 1 drop, Both Eyes, TID, Jasbir Alvarado MD, 1 drop at 04/01/23 0836  •  rosuvastatin (CRESTOR) tablet 10 mg, 10 mg, Oral, Nightly, Jasbir Alvarado MD, 10 mg at 03/31/23 2036  •  vancomycin (VANCOCIN) 1000 mg/200 mL dextrose 5% IVPB, 1,000 mg, Intravenous, Q12H, Pedro Luis Villarreal MD, 1,000 mg at 04/01/23 0926      Objective   Vital  Signs   Temp:  [98.1 °F (36.7 °C)-99.4 °F (37.4 °C)] 98.9 °F (37.2 °C)  Heart Rate:  [59-73] 71  Resp:  [14-20] 16  BP: (119-189)/(58-84) 187/79    Physical Exam:   General: awake, alert, NAD, very nice   Eyes: no scleral icterus  Cardiovascular: NR  Respiratory: normal work of breathing   MSK: both feet bandaged  Neurological: Alert and oriented x 3  Psychiatric: Normal mood and affect     Labs:   CBC, BMP, CRP, A1c, blood and wound cultures reviewed today  Lab Results   Component Value Date    WBC 9.87 04/01/2023    HGB 10.2 (L) 04/01/2023    HCT 31.4 (L) 04/01/2023    MCV 87.0 04/01/2023     04/01/2023     Lab Results   Component Value Date    GLUCOSE 238 (H) 04/01/2023    CALCIUM 8.7 04/01/2023     (L) 04/01/2023    K 3.9 04/01/2023    CO2 27.5 04/01/2023    CL 93 (L) 04/01/2023    BUN 20 04/01/2023    CREATININE 0.72 (L) 04/01/2023    EGFRRESULT 95.9 12/07/2022    EGFR 97.1 04/01/2023    BCR 27.8 (H) 04/01/2023    ANIONGAP 12.5 04/01/2023     Lab Results   Component Value Date    ALT 7 03/30/2023     Lab Results   Component Value Date    CRP 7.11 (H) 04/01/2023     Lab Results   Component Value Date    HGBA1C 7.70 (H) 03/31/2023     HIV negative  Hep C negative    Microbiology:  3/30 BCx: NGTD  3/31 R Foot OR Cx: pending  3/31 L Foot OR Cx: pending      ASSESSMENT/PLAN:  1. Chronic osteomyelitis of bilateral feet  2. Uncontrolled DM2  3. History of Strep and MSSA infections  4. Peripheral vascular disease  5. Peripheral neuropathy  6. Uncontrolled DM2 - complicated by foot ulcer - A1c 9.1%  7.  Obesity BMI 32    He went to the OR on 3/31/23 for B foot debridements, R 5th toe amputation, and L great toe amputation. Cultures pending. Gram stain with GPCs. Continue empiric vancomycin w/ goal -600 and ceftriaxone 2 g IV q24h while awaiting cultures. I anticipate a 6-week course w/ stop date around 5/12/23.     A1c improved from January.     ID will follow.

## 2023-04-01 NOTE — PROGRESS NOTES
[facility] Clinical Pharmacy Services: Vancomycin Level Monitoring Note    Kelby Peters is a 72 y.o. male who is on day 2/42 of pharmacy to dose vancomycin for Bone and/or Joint Infection.    Estimated Creatinine Clearance: 100.6 mL/min (A) (by C-G formula based on SCr of 0.72 mg/dL (L)).    Current Vanc Dose: 1000 mg IV every  12  hours  Results from last 7 days   Lab Units 04/01/23  0805   VANCOMYCIN TR mcg/mL 11.70   Predicted AUC at current dose:482 mg/L.hr  Next Level Date and Time: Vanc Trough on 4/3 @ 0830    No changes at this time. Pharmacy is continuing to monitor and will adjust as needed.    Db England [facility]  Clinical Pharmacist

## 2023-04-01 NOTE — SIGNIFICANT NOTE
Per vascular surgeon's note this AM, pt is NWB bilaterally for now.  Discussed with RAJESH Flores, will hold on evaluation at this time.  Check back tomorrow, 4/2.

## 2023-04-02 LAB
ANION GAP SERPL CALCULATED.3IONS-SCNC: 10 MMOL/L (ref 5–15)
BACTERIA SPEC AEROBE CULT: ABNORMAL
BUN SERPL-MCNC: 16 MG/DL (ref 8–23)
BUN/CREAT SERPL: 22.9 (ref 7–25)
CALCIUM SPEC-SCNC: 8.9 MG/DL (ref 8.6–10.5)
CHLORIDE SERPL-SCNC: 97 MMOL/L (ref 98–107)
CO2 SERPL-SCNC: 32 MMOL/L (ref 22–29)
CREAT SERPL-MCNC: 0.7 MG/DL (ref 0.76–1.27)
EGFRCR SERPLBLD CKD-EPI 2021: 97.9 ML/MIN/1.73
GLUCOSE BLDC GLUCOMTR-MCNC: 111 MG/DL (ref 70–130)
GLUCOSE BLDC GLUCOMTR-MCNC: 157 MG/DL (ref 70–130)
GLUCOSE BLDC GLUCOMTR-MCNC: 162 MG/DL (ref 70–130)
GLUCOSE BLDC GLUCOMTR-MCNC: 217 MG/DL (ref 70–130)
GLUCOSE SERPL-MCNC: 166 MG/DL (ref 65–99)
GRAM STN SPEC: ABNORMAL
POTASSIUM SERPL-SCNC: 3.7 MMOL/L (ref 3.5–5.2)
SODIUM SERPL-SCNC: 139 MMOL/L (ref 136–145)

## 2023-04-02 PROCEDURE — 25010000002 CEFTRIAXONE PER 250 MG: Performed by: INTERNAL MEDICINE

## 2023-04-02 PROCEDURE — 25010000002 HEPARIN (PORCINE) PER 1000 UNITS: Performed by: SURGERY

## 2023-04-02 PROCEDURE — 05HY33Z INSERTION OF INFUSION DEVICE INTO UPPER VEIN, PERCUTANEOUS APPROACH: ICD-10-PCS | Performed by: INTERNAL MEDICINE

## 2023-04-02 PROCEDURE — C1751 CATH, INF, PER/CENT/MIDLINE: HCPCS

## 2023-04-02 PROCEDURE — 63710000001 INSULIN LISPRO (HUMAN) PER 5 UNITS: Performed by: STUDENT IN AN ORGANIZED HEALTH CARE EDUCATION/TRAINING PROGRAM

## 2023-04-02 PROCEDURE — 99232 SBSQ HOSP IP/OBS MODERATE 35: CPT | Performed by: INTERNAL MEDICINE

## 2023-04-02 PROCEDURE — 25010000002 VANCOMYCIN PER 500 MG: Performed by: INTERNAL MEDICINE

## 2023-04-02 PROCEDURE — 82962 GLUCOSE BLOOD TEST: CPT

## 2023-04-02 PROCEDURE — 63710000001 INSULIN LISPRO (HUMAN) PER 5 UNITS: Performed by: SURGERY

## 2023-04-02 PROCEDURE — 80048 BASIC METABOLIC PNL TOTAL CA: CPT | Performed by: INTERNAL MEDICINE

## 2023-04-02 RX ORDER — SODIUM CHLORIDE 0.9 % (FLUSH) 0.9 %
20 SYRINGE (ML) INJECTION AS NEEDED
Status: DISCONTINUED | OUTPATIENT
Start: 2023-04-02 | End: 2023-04-04 | Stop reason: HOSPADM

## 2023-04-02 RX ORDER — SODIUM CHLORIDE 0.9 % (FLUSH) 0.9 %
10 SYRINGE (ML) INJECTION AS NEEDED
Status: DISCONTINUED | OUTPATIENT
Start: 2023-04-02 | End: 2023-04-04 | Stop reason: HOSPADM

## 2023-04-02 RX ORDER — SODIUM CHLORIDE 0.9 % (FLUSH) 0.9 %
10 SYRINGE (ML) INJECTION EVERY 12 HOURS SCHEDULED
Status: DISCONTINUED | OUTPATIENT
Start: 2023-04-02 | End: 2023-04-04 | Stop reason: HOSPADM

## 2023-04-02 RX ADMIN — HYDROCODONE BITARTRATE AND ACETAMINOPHEN 1 TABLET: 5; 325 TABLET ORAL at 20:53

## 2023-04-02 RX ADMIN — INSULIN LISPRO 6 UNITS: 100 INJECTION, SOLUTION INTRAVENOUS; SUBCUTANEOUS at 08:29

## 2023-04-02 RX ADMIN — GABAPENTIN 800 MG: 400 CAPSULE ORAL at 20:21

## 2023-04-02 RX ADMIN — HYDROCODONE BITARTRATE AND ACETAMINOPHEN 1 TABLET: 5; 325 TABLET ORAL at 11:44

## 2023-04-02 RX ADMIN — HYDROCODONE BITARTRATE AND ACETAMINOPHEN 1 TABLET: 5; 325 TABLET ORAL at 01:09

## 2023-04-02 RX ADMIN — Medication 10 ML: at 20:23

## 2023-04-02 RX ADMIN — HEPARIN SODIUM 5000 UNITS: 5000 INJECTION INTRAVENOUS; SUBCUTANEOUS at 08:28

## 2023-04-02 RX ADMIN — INSULIN LISPRO 6 UNITS: 100 INJECTION, SOLUTION INTRAVENOUS; SUBCUTANEOUS at 17:04

## 2023-04-02 RX ADMIN — LISINOPRIL 20 MG: 20 TABLET ORAL at 08:26

## 2023-04-02 RX ADMIN — TIMOLOL MALEATE: 5 SOLUTION OPHTHALMIC at 08:28

## 2023-04-02 RX ADMIN — ROSUVASTATIN CALCIUM 10 MG: 10 TABLET, FILM COATED ORAL at 20:20

## 2023-04-02 RX ADMIN — GABAPENTIN 800 MG: 400 CAPSULE ORAL at 14:23

## 2023-04-02 RX ADMIN — INSULIN LISPRO 3 UNITS: 100 INJECTION, SOLUTION INTRAVENOUS; SUBCUTANEOUS at 11:44

## 2023-04-02 RX ADMIN — HYDROCODONE BITARTRATE AND ACETAMINOPHEN 1 TABLET: 5; 325 TABLET ORAL at 17:04

## 2023-04-02 RX ADMIN — CLOPIDOGREL BISULFATE 75 MG: 75 TABLET, FILM COATED ORAL at 08:26

## 2023-04-02 RX ADMIN — HYDROCHLOROTHIAZIDE 25 MG: 25 TABLET ORAL at 08:26

## 2023-04-02 RX ADMIN — VANCOMYCIN HYDROCHLORIDE 1000 MG: 1 INJECTION, SOLUTION INTRAVENOUS at 08:30

## 2023-04-02 RX ADMIN — GABAPENTIN 800 MG: 400 CAPSULE ORAL at 06:08

## 2023-04-02 RX ADMIN — INSULIN LISPRO 6 UNITS: 100 INJECTION, SOLUTION INTRAVENOUS; SUBCUTANEOUS at 11:45

## 2023-04-02 RX ADMIN — HEPARIN SODIUM 5000 UNITS: 5000 INJECTION INTRAVENOUS; SUBCUTANEOUS at 20:20

## 2023-04-02 RX ADMIN — AMLODIPINE BESYLATE 10 MG: 10 TABLET ORAL at 08:26

## 2023-04-02 RX ADMIN — PREDNISOLONE ACETATE 1 DROP: 10 SUSPENSION/ DROPS OPHTHALMIC at 20:23

## 2023-04-02 RX ADMIN — LEVETIRACETAM 500 MG: 500 TABLET, FILM COATED ORAL at 20:20

## 2023-04-02 RX ADMIN — HYDROCODONE BITARTRATE AND ACETAMINOPHEN 1 TABLET: 5; 325 TABLET ORAL at 06:08

## 2023-04-02 RX ADMIN — ASPIRIN 81 MG: 81 TABLET, COATED ORAL at 08:26

## 2023-04-02 RX ADMIN — LEVETIRACETAM 500 MG: 500 TABLET, FILM COATED ORAL at 08:26

## 2023-04-02 RX ADMIN — PREDNISOLONE ACETATE 1 DROP: 10 SUSPENSION/ DROPS OPHTHALMIC at 17:05

## 2023-04-02 RX ADMIN — PREDNISOLONE ACETATE 1 DROP: 10 SUSPENSION/ DROPS OPHTHALMIC at 08:29

## 2023-04-02 RX ADMIN — INSULIN GLARGINE-YFGN 15 UNITS: 100 INJECTION, SOLUTION SUBCUTANEOUS at 08:30

## 2023-04-02 RX ADMIN — CEFTRIAXONE 2 G: 2 INJECTION, POWDER, FOR SOLUTION INTRAMUSCULAR; INTRAVENOUS at 17:04

## 2023-04-02 RX ADMIN — TIMOLOL MALEATE: 5 SOLUTION OPHTHALMIC at 20:23

## 2023-04-02 RX ADMIN — INSULIN LISPRO 3 UNITS: 100 INJECTION, SOLUTION INTRAVENOUS; SUBCUTANEOUS at 08:29

## 2023-04-02 NOTE — PROGRESS NOTES
ID NOTE    CC: f/u osteomyelitis B feet due to GBS    Subj: No fever. Cx with GBS. Tolerating antibiotics w/o rash. D/w pt and his wife.     Medications:    Current Facility-Administered Medications:   •  acetaminophen (TYLENOL) tablet 650 mg, 650 mg, Oral, Q4H PRN, 650 mg at 03/30/23 1704 **OR** acetaminophen (TYLENOL) 160 MG/5ML solution 650 mg, 650 mg, Oral, Q4H PRN **OR** acetaminophen (TYLENOL) suppository 650 mg, 650 mg, Rectal, Q4H PRN, Jasbir Alvarado MD  •  amLODIPine (NORVASC) tablet 10 mg, 10 mg, Oral, Q24H, Jasbir Alvarado MD, 10 mg at 04/02/23 0826  •  aspirin EC tablet 81 mg, 81 mg, Oral, Daily, Jasbir Alvarado MD, 81 mg at 04/02/23 0826  •  cefTRIAXone (ROCEPHIN) 2 g in sodium chloride 0.9 % 100 mL IVPB-VTB, 2 g, Intravenous, Q24H, Pedro Luis Villarreal MD, Last Rate: 200 mL/hr at 04/01/23 1712, 2 g at 04/01/23 1712  •  clopidogrel (PLAVIX) tablet 75 mg, 75 mg, Oral, Daily, Jasbir Alvarado MD, 75 mg at 04/02/23 0826  •  dextrose (D50W) (25 g/50 mL) IV injection 25 g, 25 g, Intravenous, Q15 Min PRN, Jasbir Alvarado MD  •  dextrose (GLUTOSE) oral gel 15 g, 15 g, Oral, Q15 Min PRN, Jasbir Alvarado MD  •  dorzolamide (TRUSOPT) 2 % 1 drop, timolol (TIMOPTIC) 0.5 % 1 drop for Cosopt 22.3-6.8 mg/mL, , Both Eyes, BID, Jasbir Alvarado MD, Given at 04/02/23 0828  •  gabapentin (NEURONTIN) capsule 800 mg, 800 mg, Oral, Q8H, Jasbir Alvarado MD, 800 mg at 04/02/23 0608  •  glucagon (GLUCAGEN) injection 1 mg, 1 mg, Subcutaneous, Q15 Min PRN, Jasbir Alvarado MD  •  heparin (porcine) 5000 UNIT/ML injection 5,000 Units, 5,000 Units, Subcutaneous, Q12H, Jasbir Alvarado MD, 5,000 Units at 04/02/23 0828  •  hydroCHLOROthiazide (HYDRODIURIL) tablet 25 mg, 25 mg, Oral, Daily, Jasbir Alvarado MD, 25 mg at 04/02/23 0826  •  HYDROcodone-acetaminophen (NORCO)  MG per tablet 1 tablet, 1 tablet, Oral, Q6H PRN, Jasbir Alvarado MD, 1 tablet at 03/31/23 2326  •  HYDROcodone-acetaminophen (NORCO) 5-325 MG per  tablet 1 tablet, 1 tablet, Oral, Q4H PRN, Jasbir Alvarado MD, 1 tablet at 04/02/23 0608  •  insulin glargine (LANTUS, SEMGLEE) injection 15 Units, 15 Units, Subcutaneous, QAM, Don Lyles MD, 15 Units at 04/02/23 0830  •  insulin lispro (ADMELOG) injection 0-14 Units, 0-14 Units, Subcutaneous, TID AC, Jasbir Alvarado MD, 3 Units at 04/02/23 0829  •  insulin lispro (ADMELOG) injection 6 Units, 6 Units, Subcutaneous, TID With Meals, Don Lyles MD, 6 Units at 04/02/23 0829  •  ipratropium-albuterol (DUO-NEB) nebulizer solution 3 mL, 3 mL, Nebulization, Q8H - RT, Jasbir Alvarado MD, 3 mL at 03/31/23 2315  •  lactated ringers infusion, 9 mL/hr, Intravenous, Continuous, Jasbir Alvarado MD, Last Rate: 9 mL/hr at 03/31/23 1314, Restarted at 03/31/23 1406  •  levETIRAcetam (KEPPRA) tablet 500 mg, 500 mg, Oral, BID, Jasbir Alvarado MD, 500 mg at 04/02/23 0826  •  lisinopril (PRINIVIL,ZESTRIL) tablet 20 mg, 20 mg, Oral, Daily, Jasbir Alvarado MD, 20 mg at 04/02/23 0826  •  nitroglycerin (NITROSTAT) SL tablet 0.4 mg, 0.4 mg, Sublingual, Q5 Min PRN, Jasbir Alvarado MD  •  ondansetron (ZOFRAN) tablet 4 mg, 4 mg, Oral, Q6H PRN **OR** ondansetron (ZOFRAN) injection 4 mg, 4 mg, Intravenous, Q6H PRN, Jasbir Alvarado MD, 4 mg at 03/31/23 0417  •  ondansetron (ZOFRAN) tablet 4 mg, 4 mg, Oral, Q6H PRN **OR** ondansetron (ZOFRAN) injection 4 mg, 4 mg, Intravenous, Q6H PRN, Jasbir Alvarado MD  •  prednisoLONE acetate (PRED FORTE) 1 % ophthalmic suspension 1 drop, 1 drop, Both Eyes, TID, Jasbir Alvarado MD, 1 drop at 04/02/23 0829  •  rosuvastatin (CRESTOR) tablet 10 mg, 10 mg, Oral, Nightly, Jasbir Alvarado MD, 10 mg at 04/01/23 2237      Objective   Vital Signs   Temp:  [96.7 °F (35.9 °C)-98.3 °F (36.8 °C)] 97.4 °F (36.3 °C)  Heart Rate:  [57-59] 59  Resp:  [16] 16  BP: (115-175)/(64-90) 175/90    Physical Exam:   General: awake, alert, NAD, very nice   Eyes: no scleral icterus  MSK: both feet bandaged  Neurological:  Alert and oriented x 3  Psychiatric: Normal mood and affect     Labs:   BMP, VTr, blood and wound cultures reviewed today  Lab Results   Component Value Date    WBC 9.87 04/01/2023    HGB 10.2 (L) 04/01/2023    HCT 31.4 (L) 04/01/2023    MCV 87.0 04/01/2023     04/01/2023     Lab Results   Component Value Date    GLUCOSE 166 (H) 04/02/2023    CALCIUM 8.9 04/02/2023     04/02/2023    K 3.7 04/02/2023    CO2 32.0 (H) 04/02/2023    CL 97 (L) 04/02/2023    BUN 16 04/02/2023    CREATININE 0.70 (L) 04/02/2023    EGFRRESULT 95.9 12/07/2022    EGFR 97.9 04/02/2023    BCR 22.9 04/02/2023    ANIONGAP 10.0 04/02/2023     Lab Results   Component Value Date    ALT 7 03/30/2023     Lab Results   Component Value Date    CRP 7.11 (H) 04/01/2023     Lab Results   Component Value Date    HGBA1C 7.70 (H) 03/31/2023     Lab Results   Component Value Date    VANCOTROUGH 11.70 04/01/2023     HIV negative  Hep C negative    Microbiology:  3/30 BCx: NGTD  3/31 R Foot OR Cx: rare GBS  3/31 L Foot OR Cx: rare GBS      ASSESSMENT/PLAN:  1. Chronic osteomyelitis of bilateral feet due to Group B Strep  2. Uncontrolled DM2  3. History of Strep and MSSA infections  4. Peripheral vascular disease  5. Peripheral neuropathy  6. Uncontrolled DM2 - complicated by foot ulcer - A1c 9.1%  7.  Obesity BMI 32    He went to the OR on 3/31/23 for B foot debridements, R 5th toe amputation, and L great toe amputation. Cultures growing Group B Strep. Stop vancomycin. Continue ceftriaxone 2 g IV q24h for a 6-week course w/ stop date 5/12/23 at which time he will follow-up w/ me in the ID clinic. Weekly CBC w/ diff, Crt, and CRP faxed to me at [phone]. PICC ordered.    Thank you for allowing me to be involved in the care of this patient. Infectious diseases will sign off at this time with antibiotics plan in place, but please call me at [phone] if any further ID questions or new ID concerns.

## 2023-04-02 NOTE — PLAN OF CARE
Problem: Pain Acute  Goal: Acceptable Pain Control and Functional Ability  Outcome: Ongoing, Progressing  Intervention: Optimize Psychosocial Wellbeing  Recent Flowsheet Documentation  Taken 4/2/2023 0800 by Sandra Shipley, RAJESH  Diversional Activities: television     Problem: Pain Acute  Goal: Acceptable Pain Control and Functional Ability  Intervention: Optimize Psychosocial Wellbeing  Recent Flowsheet Documentation  Taken 4/2/2023 0800 by Sandra Shipley, RN  Diversional Activities: television     Problem: Fall Injury Risk  Goal: Absence of Fall and Fall-Related Injury  Outcome: Ongoing, Progressing   Goal Outcome Evaluation:  Plan of Care Reviewed With: patient, spouse        Progress: improving  Outcome Evaluation: VSS. C/o pain managed with prn medication. BLE drsg in place. IV abx cont'd. Will monitor.

## 2023-04-02 NOTE — PLAN OF CARE
Goal Outcome Evaluation:  Plan of Care Reviewed With: patient        Progress: improving  Outcome Evaluation: Alert, oriented, VSS, prn pain med given as needed, O2 while sleeping, Bilat feet dressing intact, IV antibitotics continued, Accuchecks AC/HS.

## 2023-04-02 NOTE — PROGRESS NOTES
Name: Kelby Peters ADMIT: 3/30/2023   : 1950  PCP: Susy Germain APRN    MRN: [id number] LOS: 3 days   AGE/SEX: 72 y.o. male  ROOM: [facility]     Subjective   Subjective   Examined at bedside. No new complaints.        Objective   Objective   Vital Signs  Temp:  [96.7 °F (35.9 °C)-98.7 °F (37.1 °C)] 98.7 °F (37.1 °C)  Heart Rate:  [57-59] 59  Resp:  [16] 16  BP: (124-189)/(64-96) 189/96  SpO2:  [96 %-99 %] 99 %  on  Flow (L/min):  [2] 2;   Device (Oxygen Therapy): nasal cannula  Body mass index is 31.6 kg/m².  Physical Exam  Constitutional:       Appearance: Normal appearance.   HENT:      Head: Normocephalic and atraumatic.   Eyes:      Extraocular Movements: Extraocular movements intact.      Pupils: Pupils are equal, round, and reactive to light.   Cardiovascular:      Rate and Rhythm: Normal rate and regular rhythm.   Pulmonary:      Effort: Pulmonary effort is normal. No respiratory distress.   Abdominal:      General: There is no distension.      Palpations: Abdomen is soft.      Tenderness: There is no abdominal tenderness.   Musculoskeletal:      Right lower leg: No edema.      Comments: Bilateral feet bandaged.    Neurological:      Mental Status: He is alert and oriented to person, place, and time.         Results Review     I reviewed the patient's new clinical results.  Results from last 7 days   Lab Units 23  0805 23  07523  1529   WBC 10*3/mm3 9.87 8.94 10.56   HEMOGLOBIN g/dL 10.2* 10.3* 9.7*   PLATELETS 10*3/mm3 432 375 323     Results from last 7 days   Lab Units 23  0606 23  0805 23  0759 23  1529   SODIUM mmol/L 139 133* 137 133*   POTASSIUM mmol/L 3.7 3.9 3.7 3.5   CHLORIDE mmol/L 97* 93* 96* 92*   CO2 mmol/L 32.0* 27.5 30.3* 30.9*   BUN mg/dL 16 20 13 20   CREATININE mg/dL 0.70* 0.72* 0.66* 0.78   GLUCOSE mg/dL 166* 238* 149* 193*   Estimated Creatinine Clearance: 102.9 mL/min (A) (by C-G formula based on SCr of 0.7 mg/dL (L)).  Results  from last 7 days   Lab Units 03/30/23  1529   ALBUMIN g/dL 3.4*   BILIRUBIN mg/dL 0.2   ALK PHOS U/L 59   AST (SGOT) U/L 11   ALT (SGPT) U/L 7     Results from last 7 days   Lab Units 04/02/23  0606 04/01/23  0805 03/31/23  0759 03/30/23  1529   CALCIUM mg/dL 8.9 8.7 8.9 9.1   ALBUMIN g/dL  --   --   --  3.4*     Results from last 7 days   Lab Units 03/30/23  1529   PROCALCITONIN ng/mL 0.08   No results found for: COVID19  Hemoglobin A1C   Date/Time Value Ref Range Status   03/31/2023 0759 7.70 (H) 4.80 - 5.60 % Final     Glucose   Date/Time Value Ref Range Status   04/02/2023 1137 217 (H) 70 - 130 mg/dL Final     Comment:     Meter: [id number] : [id number] Valente ALCARAZ   04/02/2023 0606 162 (H) 70 - 130 mg/dL Final     Comment:     Meter: [id number] : [id number] Shaq ALCARAZ   04/01/2023 2252 159 (H) 70 - 130 mg/dL Final     Comment:     Meter: [id number] : [id number] Johan Keene RN   04/01/2023 2053 86 70 - 130 mg/dL Final     Comment:     Meter: [id number] : [id number] Shaq ALCARAZ   04/01/2023 1526 212 (H) 70 - 130 mg/dL Final     Comment:     Meter: [id number] : [id number] Valente ALCARAZ   04/01/2023 1115 246 (H) 70 - 130 mg/dL Final     Comment:     Meter: [id number] : [id number] Valente ALCARAZ   04/01/2023 0608 250 (H) 70 - 130 mg/dL Final     Comment:     Meter: [id number] : [id number] Kisha ALCARAZ       MRI Foot Right With & Without Contrast  Narrative: MRI right MID and forefoot with and without contrast     HISTORY: Open wound. Evaluate osteomyelitis.     TECHNIQUE: MRI right MID and forefoot was performed before and after the  IV administration of 20 mL MultiHance contrast. Left foot MRI was  performed today and is reported separately. Correlation with MRI  01/16/2023.     FINDINGS: The lateral forefoot soft tissue wound appears larger today  with apparent exposed bone along the lateral base of the fifth toe  proximal phalanx and the lateral edge of  the fifth metatarsal head. More  extensive abnormal bone marrow edema and enhancement is observed along  the distal approximately 20 mm of the fifth metatarsal head and in the  proximal phalanx. There is abnormal synovial enhancement at the fifth  metatarsophalangeal joint with mild soft tissue enhancement around the  metatarsal head. There is also abnormal enhancement of the soft tissue  throughout the midline and lateral portion of the mid and forefoot.     Marrow signal elsewhere throughout the mid and forefoot is normal. The  Lisfranc ligament complex is intact. There is no neuropathic change in  the midfoot. First through fourth metatarsophalangeal joints and  interphalangeal joints are preserved.     Foot musculature is atrophic.     Impression: Open wound along lateral side of the right forefoot with  exposed bone and MRI findings of osteomyelitis in the toe proximal  phalanx and the fifth metatarsal head and neck. The abnormal marrow  signal is more extensive today than on the MRI from earlier this year.     This report was finalized on 3/31/2023 12:03 AM by Dr. Mike Cat M.D.     MRI Foot Left With & Without Contrast  Narrative: MRI LEFT MID AND FOREFOOT WITH AND WITHOUT CONTRAST     HISTORY: Previous third toe amputation. Evaluate osteomyelitis. Open  wounds.     TECHNIQUE: MRI left mid and forefoot is correlated with left foot MRI  01/05/2023 and right foot MRI performed tonight reported separately. 20  mL of MultiHance contrast was given.     FINDINGS: There is an open wound along the plantar side of the foot  superficial to the medial great toe sesamoid which appears deformed and  demonstrates diffuse abnormal marrow signal. There is abnormal synovial  enhancement and fluid at the first MTP joint with abnormal bone marrow  edema in the distal 3 cm of the first metatarsal and most of the great  toe proximal phalanx.     There has been interval third toe amputation. There also is an open  wound  superficial to the fifth metatarsal head dorsolaterally with some  adjacent soft tissue edema and enhancement. No marrow signal abnormality  is present in the fifth or the third metatarsals or in the remaining  second, fourth, or fifth toes.     The Lisfranc ligament complex is intact. There is mild degenerative  change between the metatarsal bases but no midfoot neuropathic  arthropathy.     The flexor hallucis longus tendon is torn and retracted to about the  level of the sesamoids. There is some tendon sheath synovial thickening  and enhancement. This tendon sheath extends across the open wound. On  the sagittal images, no fluid is seen tracking proximally.     Foot musculature is atrophic.     Impression: Open wound along the plantar side of the medial left  forefoot with osteomyelitis in the medial great toe sesamoid, first  metatarsal head and neck and proximal phalanx of the great toe. The  flexor hallucis longus tendon is torn with the tendon sheath extending  across the open wound and some abnormal synovitis along the tendon  sheath to the level of the metatarsal base.     Additionally there has been third toe amputation and there is no bone  wound along the lateral side of the foot superficial to the fifth  metatarsal head. However, there is no evidence of osteomyelitis in the  third or the fifth metatarsals.     This report was finalized on 3/31/2023 12:02 AM by Dr. Miek Cat M.D.       Scheduled Medications  amLODIPine, 10 mg, Oral, Q24H  aspirin, 81 mg, Oral, Daily  cefTRIAXone, 2 g, Intravenous, Q24H  clopidogrel, 75 mg, Oral, Daily  dorzolamide-timolol, , Both Eyes, BID  gabapentin, 800 mg, Oral, Q8H  heparin (porcine), 5,000 Units, Subcutaneous, Q12H  hydroCHLOROthiazide, 25 mg, Oral, Daily  insulin glargine, 15 Units, Subcutaneous, QAM  insulin lispro, 0-14 Units, Subcutaneous, TID AC  insulin lispro, 6 Units, Subcutaneous, TID With Meals  levETIRAcetam, 500 mg, Oral, BID  lisinopril, 20 mg,  Oral, Daily  prednisoLONE acetate, 1 drop, Both Eyes, TID  rosuvastatin, 10 mg, Oral, Nightly    Infusions  lactated ringers, 9 mL/hr, Last Rate: 9 mL/hr (03/31/23 1314)    Diet  Diet: Regular/House Diet; Texture: Regular Texture (IDDSI 7); Fluid Consistency: Thin (IDDSI 0)       Assessment/Plan     Active Hospital Problems    Diagnosis  POA   • **Diabetic foot ulcer [E11.621, L97.509]  Yes   • Chronic osteomyelitis of foot [M86.679]  Yes   • Diabetes mellitus with nephropathy [E11.21]  Yes   • Type 2 diabetes mellitus with diabetic polyneuropathy [E11.42]  Yes   • Peripheral vascular disease [I73.9]  Yes   • History of seizures [Z87.898]  Yes   • Diabetic peripheral neuropathy (HCC) [E11.42]  Yes   • Hypertension [I10]  Yes   • Chronic pain [G89.29]  Yes      Resolved Hospital Problems   No resolved problems to display.       72 y.o. male admitted with Diabetic foot ulcer.  Mr. Peters is a 72 y.o. male with a history of chronic osteomyelitis chetan feet, MSSA & strep wound infections, PVD, DM w/neuropathy & nephropathy, Sleep apnea, seizures, HTN who is admitted for diabetic foot ulcers     Diabetic foot ulcers/Chronic OM/PVD:  -Vascular surgery and ID consulted.  -MRI with osteo of the left great toe and right little toe. S/p amputation of metatarsal heads.   -Check blood cultures  -procal normal, CRP 10.54  -Completed 6 weeks IV cefazolin 3/6/23  - now only on rocephin 2g q24hrs. Will need a 6 week course with stop date 5/12/23.      DM/Nephropathy/Neuropathy:  -Monitor BG trends. A1C 9.10%. Correctional scale insulin.  -home rx: 70/30 45 bid   -while in hospital: lantus 15 and humalog 6 tid, ssi. Sugars acceptable acutely     Chronic pain:  -Restart hydrocodone, gabapentin     Hx seizures:  -Keppra     HTN:  -Followed by Nephrology.on Lisinopril 20mgg, hctz 25    SCDs for dvt ppx  Full code           Don Lyles MD  [facility]  04/02/23  15:02 EDT

## 2023-04-02 NOTE — PROGRESS NOTES
Name: Kelby Peters ADMIT: 3/30/2023   : 1950  PCP: Susy Germain APRN    MRN: [id number] LOS: 3 days   AGE/SEX: 72 y.o. male  ROOM:  [place] [address]     CC: Postop day 2 status post right fifth ray and left first ray amputation  Interval History: No acute events overnight.  Patient doing well this morning.  Multiple questions about discharge and DME for home.  Subjective   Subjective     Review of Systems  Objective   Objective     Vitals:   Temp:  [96.7 °F (35.9 °C)-98.7 °F (37.1 °C)] 98.7 °F (37.1 °C)  Heart Rate:  [57-59] 59  Resp:  [16] 16  BP: (124-189)/(64-96) 189/96    I/O this shift:  In: 240 [P.O.:240]  Out: -     Scheduled Meds:     amLODIPine, 10 mg, Oral, Q24H  aspirin, 81 mg, Oral, Daily  cefTRIAXone, 2 g, Intravenous, Q24H  clopidogrel, 75 mg, Oral, Daily  dorzolamide-timolol, , Both Eyes, BID  gabapentin, 800 mg, Oral, Q8H  heparin (porcine), 5,000 Units, Subcutaneous, Q12H  hydroCHLOROthiazide, 25 mg, Oral, Daily  insulin glargine, 15 Units, Subcutaneous, QAM  insulin lispro, 0-14 Units, Subcutaneous, TID AC  insulin lispro, 6 Units, Subcutaneous, TID With Meals  ipratropium-albuterol, 3 mL, Nebulization, Q8H - RT  levETIRAcetam, 500 mg, Oral, BID  lisinopril, 20 mg, Oral, Daily  prednisoLONE acetate, 1 drop, Both Eyes, TID  rosuvastatin, 10 mg, Oral, Nightly      IV Meds:   lactated ringers, 9 mL/hr, Last Rate: 9 mL/hr (23 1314)        Physical Exam   NAD  RRR  Respirations unlabored  Right fifth ray amputation site clean base with no evident purulence or ongoing gross infection, surrounding skin with little to no cellulitis.   Left first ray amputation site clean base with no evident purulence or ongoing gross infection, surrounding skin with minimal cellulitis    Data Reviewed:  CBC    Results from last 7 days   Lab Units 23  0805 23  0759 23  1529   WBC 10*3/mm3 9.87 8.94 10.56   HEMOGLOBIN g/dL 10.2* 10.3* 9.7*   PLATELETS 10*3/mm3 432 375 323     BMP    Results from last 7 days   Lab Units 04/02/23  0606 04/01/23  0805 03/31/23  0759 03/30/23  1529   SODIUM mmol/L 139 133* 137 133*   POTASSIUM mmol/L 3.7 3.9 3.7 3.5   CHLORIDE mmol/L 97* 93* 96* 92*   CO2 mmol/L 32.0* 27.5 30.3* 30.9*   BUN mg/dL 16 20 13 20   CREATININE mg/dL 0.70* 0.72* 0.66* 0.78   GLUCOSE mg/dL 166* 238* 149* 193*     Cr Clearance Estimated Creatinine Clearance: 102.9 mL/min (A) (by C-G formula based on SCr of 0.7 mg/dL (L)).  Coag     HbA1C   Lab Results   Component Value Date    HGBA1C 7.70 (H) 03/31/2023    HGBA1C 9.10 (H) 01/16/2023    HGBA1C 10.40 (H) 12/07/2022     Blood Glucose   Glucose   Date/Time Value Ref Range Status   04/02/2023 1137 217 (H) 70 - 130 mg/dL Final     Comment:     Meter: [id number] : [id number] Valente Hazel NA   04/02/2023 0606 162 (H) 70 - 130 mg/dL Final     Comment:     Meter: [id number] : [id number] Shaq Bingham NA   04/01/2023 2252 159 (H) 70 - 130 mg/dL Final     Comment:     Meter: [id number] : [id number] Johan Keene RN   04/01/2023 2053 86 70 - 130 mg/dL Final     Comment:     Meter: [id number] : [id number] Shaq Bingham NA   04/01/2023 1526 212 (H) 70 - 130 mg/dL Final     Comment:     Meter: [id number] : [id number] Valente Oliva NA   04/01/2023 1115 246 (H) 70 - 130 mg/dL Final     Comment:     Meter: [id number] : [id number] Valente Hazel NA   04/01/2023 0608 250 (H) 70 - 130 mg/dL Final     Comment:     Meter: [id number] : [id number] Kisha Riley NA   03/31/2023 2318 278 (H) 70 - 130 mg/dL Final     Comment:     Meter: [id number] : [id number] Kisha ALCARAZ     Infection   Results from last 7 days   Lab Units 03/30/23  1530 03/30/23  1529   BLOODCX  No growth at 2 days No growth at 2 days   PROCALCITONIN ng/mL  --  0.08     CMP   Results from last 7 days   Lab Units 04/02/23  0606 04/01/23  0805 03/31/23  0759 03/30/23  1529   SODIUM mmol/L 139 133* 137 133*   POTASSIUM mmol/L 3.7 3.9 3.7 3.5   CHLORIDE  mmol/L 97* 93* 96* 92*   CO2 mmol/L 32.0* 27.5 30.3* 30.9*   BUN mg/dL 16 20 13 20   CREATININE mg/dL 0.70* 0.72* 0.66* 0.78   GLUCOSE mg/dL 166* 238* 149* 193*   ALBUMIN g/dL  --   --   --  3.4*   BILIRUBIN mg/dL  --   --   --  0.2   ALK PHOS U/L  --   --   --  59   AST (SGOT) U/L  --   --   --  11   ALT (SGPT) U/L  --   --   --  7     ABG      Radiology(recent) No radiology results for the last day        Active Hospital Problems:   Active Hospital Problems    Diagnosis  POA   • **Diabetic foot ulcer [E11.621, L97.509]  Yes   • Chronic osteomyelitis of foot [M86.679]  Yes   • Diabetes mellitus with nephropathy [E11.21]  Yes   • Type 2 diabetes mellitus with diabetic polyneuropathy [E11.42]  Yes   • Peripheral vascular disease [I73.9]  Yes   • History of seizures [Z87.898]  Yes   • Diabetic peripheral neuropathy (HCC) [E11.42]  Yes   • Hypertension [I10]  Yes   • Chronic pain [G89.29]  Yes      Resolved Hospital Problems   No resolved problems to display.      Assessment & Plan   Billin, Post Op Global  Assessment / Plan     72-year-old gentleman with osteomyelitis of bilateral feet secondary to diabetic neuropathy status post right fifth toe amputation and left first toe amputation with resection of metatarsal heads bilaterally.  Both wounds continue to look healthy and improving  Continue daily Betadine dressing changes.  Nonweightbearing bilaterally for now.  Antibiotics per infectious disease, cultures pending currently.  Supportive care per primary team    Max Winter II, MD  23  14:14 EDT  Office Number [phone]

## 2023-04-02 NOTE — SIGNIFICANT NOTE
"   04/02/23 1620   PICC Single Lumen 04/02/23 Right Basilic   Placement date: If unknown, DO NOT use \"Add Comment\" note/Placement time: If unknown, DO NOT use \"Add Comment\" note: 04/02/23 1619   Hand Hygiene Completed: Yes  Size (Fr): 4  Description (optional): PowerPICC Lot#[id number]  Length (cm): 40 cm  Orienta...   Site Assessment Clean;Dry;Intact   #1 Lumen Status Blood return noted;Capped;Flushed;Normal saline locked   Length sammy (cm) 40 cm   Line Care Connections checked and tightened   Extremity Circumference (cm) 32 cm   Dressing Type Border Dressing;Securing device;Antimicrobial dressing/disc   Dressing Status Clean;Dry;Intact   Liquid Adhesive Applied   Dressing Change Due 04/09/23   Indication/Daily Review of Necessity intravenous medication therapy     3 needles, 2 guidewires, 1 scalpel accounted for and disposed of properly.      "

## 2023-04-03 LAB
ANION GAP SERPL CALCULATED.3IONS-SCNC: 15.5 MMOL/L (ref 5–15)
BUN SERPL-MCNC: 15 MG/DL (ref 8–23)
BUN/CREAT SERPL: 22.1 (ref 7–25)
CALCIUM SPEC-SCNC: 9.5 MG/DL (ref 8.6–10.5)
CHLORIDE SERPL-SCNC: 96 MMOL/L (ref 98–107)
CO2 SERPL-SCNC: 29.5 MMOL/L (ref 22–29)
CREAT SERPL-MCNC: 0.68 MG/DL (ref 0.76–1.27)
DEPRECATED RDW RBC AUTO: 47.3 FL (ref 37–54)
EGFRCR SERPLBLD CKD-EPI 2021: 98.8 ML/MIN/1.73
ERYTHROCYTE [DISTWIDTH] IN BLOOD BY AUTOMATED COUNT: 14.6 % (ref 12.3–15.4)
GLUCOSE BLDC GLUCOMTR-MCNC: 165 MG/DL (ref 70–130)
GLUCOSE BLDC GLUCOMTR-MCNC: 176 MG/DL (ref 70–130)
GLUCOSE BLDC GLUCOMTR-MCNC: 181 MG/DL (ref 70–130)
GLUCOSE BLDC GLUCOMTR-MCNC: 233 MG/DL (ref 70–130)
GLUCOSE SERPL-MCNC: 175 MG/DL (ref 65–99)
HCT VFR BLD AUTO: 35.1 % (ref 37.5–51)
HGB BLD-MCNC: 11 G/DL (ref 13–17.7)
LAB AP CASE REPORT: NORMAL
MCH RBC QN AUTO: 27.4 PG (ref 26.6–33)
MCHC RBC AUTO-ENTMCNC: 31.3 G/DL (ref 31.5–35.7)
MCV RBC AUTO: 87.5 FL (ref 79–97)
PATH REPORT.FINAL DX SPEC: NORMAL
PATH REPORT.GROSS SPEC: NORMAL
PLATELET # BLD AUTO: 494 10*3/MM3 (ref 140–450)
PMV BLD AUTO: 9.2 FL (ref 6–12)
POTASSIUM SERPL-SCNC: 4 MMOL/L (ref 3.5–5.2)
RBC # BLD AUTO: 4.01 10*6/MM3 (ref 4.14–5.8)
SODIUM SERPL-SCNC: 141 MMOL/L (ref 136–145)
WBC NRBC COR # BLD: 6.74 10*3/MM3 (ref 3.4–10.8)

## 2023-04-03 PROCEDURE — 80048 BASIC METABOLIC PNL TOTAL CA: CPT | Performed by: INTERNAL MEDICINE

## 2023-04-03 PROCEDURE — 25010000002 CEFTRIAXONE PER 250 MG: Performed by: INTERNAL MEDICINE

## 2023-04-03 PROCEDURE — 25010000002 HEPARIN (PORCINE) PER 1000 UNITS: Performed by: SURGERY

## 2023-04-03 PROCEDURE — 63710000001 INSULIN LISPRO (HUMAN) PER 5 UNITS: Performed by: SURGERY

## 2023-04-03 PROCEDURE — 82962 GLUCOSE BLOOD TEST: CPT

## 2023-04-03 PROCEDURE — 85027 COMPLETE CBC AUTOMATED: CPT | Performed by: STUDENT IN AN ORGANIZED HEALTH CARE EDUCATION/TRAINING PROGRAM

## 2023-04-03 PROCEDURE — 63710000001 INSULIN LISPRO (HUMAN) PER 5 UNITS: Performed by: STUDENT IN AN ORGANIZED HEALTH CARE EDUCATION/TRAINING PROGRAM

## 2023-04-03 RX ORDER — SODIUM HYPOCHLORITE 1.25 MG/ML
SOLUTION TOPICAL DAILY
Status: DISCONTINUED | OUTPATIENT
Start: 2023-04-04 | End: 2023-04-04 | Stop reason: HOSPADM

## 2023-04-03 RX ADMIN — HYDROCODONE BITARTRATE AND ACETAMINOPHEN 1 TABLET: 10; 325 TABLET ORAL at 09:08

## 2023-04-03 RX ADMIN — Medication 10 ML: at 08:22

## 2023-04-03 RX ADMIN — GABAPENTIN 800 MG: 400 CAPSULE ORAL at 05:11

## 2023-04-03 RX ADMIN — INSULIN LISPRO 5 UNITS: 100 INJECTION, SOLUTION INTRAVENOUS; SUBCUTANEOUS at 11:58

## 2023-04-03 RX ADMIN — TIMOLOL MALEATE: 5 SOLUTION OPHTHALMIC at 09:10

## 2023-04-03 RX ADMIN — ROSUVASTATIN CALCIUM 10 MG: 10 TABLET, FILM COATED ORAL at 20:56

## 2023-04-03 RX ADMIN — INSULIN GLARGINE-YFGN 15 UNITS: 100 INJECTION, SOLUTION SUBCUTANEOUS at 08:19

## 2023-04-03 RX ADMIN — GABAPENTIN 800 MG: 400 CAPSULE ORAL at 14:26

## 2023-04-03 RX ADMIN — HEPARIN SODIUM 5000 UNITS: 5000 INJECTION INTRAVENOUS; SUBCUTANEOUS at 20:56

## 2023-04-03 RX ADMIN — PREDNISOLONE ACETATE 1 DROP: 10 SUSPENSION/ DROPS OPHTHALMIC at 17:27

## 2023-04-03 RX ADMIN — HEPARIN SODIUM 5000 UNITS: 5000 INJECTION INTRAVENOUS; SUBCUTANEOUS at 08:19

## 2023-04-03 RX ADMIN — INSULIN LISPRO 6 UNITS: 100 INJECTION, SOLUTION INTRAVENOUS; SUBCUTANEOUS at 17:26

## 2023-04-03 RX ADMIN — GABAPENTIN 800 MG: 400 CAPSULE ORAL at 21:00

## 2023-04-03 RX ADMIN — LISINOPRIL 20 MG: 20 TABLET ORAL at 08:19

## 2023-04-03 RX ADMIN — CEFTRIAXONE 2 G: 2 INJECTION, POWDER, FOR SOLUTION INTRAMUSCULAR; INTRAVENOUS at 18:48

## 2023-04-03 RX ADMIN — ASPIRIN 81 MG: 81 TABLET, COATED ORAL at 08:19

## 2023-04-03 RX ADMIN — TIMOLOL MALEATE: 5 SOLUTION OPHTHALMIC at 20:57

## 2023-04-03 RX ADMIN — INSULIN LISPRO 6 UNITS: 100 INJECTION, SOLUTION INTRAVENOUS; SUBCUTANEOUS at 11:57

## 2023-04-03 RX ADMIN — HYDROCHLOROTHIAZIDE 25 MG: 25 TABLET ORAL at 08:18

## 2023-04-03 RX ADMIN — PREDNISOLONE ACETATE 1 DROP: 10 SUSPENSION/ DROPS OPHTHALMIC at 09:10

## 2023-04-03 RX ADMIN — PREDNISOLONE ACETATE 1 DROP: 10 SUSPENSION/ DROPS OPHTHALMIC at 20:57

## 2023-04-03 RX ADMIN — CLOPIDOGREL BISULFATE 75 MG: 75 TABLET, FILM COATED ORAL at 08:19

## 2023-04-03 RX ADMIN — AMLODIPINE BESYLATE 10 MG: 10 TABLET ORAL at 08:19

## 2023-04-03 RX ADMIN — HYDROCODONE BITARTRATE AND ACETAMINOPHEN 1 TABLET: 5; 325 TABLET ORAL at 05:11

## 2023-04-03 RX ADMIN — INSULIN LISPRO 3 UNITS: 100 INJECTION, SOLUTION INTRAVENOUS; SUBCUTANEOUS at 17:26

## 2023-04-03 RX ADMIN — Medication 10 ML: at 20:57

## 2023-04-03 RX ADMIN — HYDROCODONE BITARTRATE AND ACETAMINOPHEN 1 TABLET: 5; 325 TABLET ORAL at 17:26

## 2023-04-03 RX ADMIN — LEVETIRACETAM 500 MG: 500 TABLET, FILM COATED ORAL at 08:18

## 2023-04-03 RX ADMIN — HYDROCODONE BITARTRATE AND ACETAMINOPHEN 1 TABLET: 5; 325 TABLET ORAL at 20:58

## 2023-04-03 RX ADMIN — HYDROCODONE BITARTRATE AND ACETAMINOPHEN 1 TABLET: 5; 325 TABLET ORAL at 00:44

## 2023-04-03 RX ADMIN — LEVETIRACETAM 500 MG: 500 TABLET, FILM COATED ORAL at 20:56

## 2023-04-03 RX ADMIN — INSULIN LISPRO 6 UNITS: 100 INJECTION, SOLUTION INTRAVENOUS; SUBCUTANEOUS at 08:19

## 2023-04-03 RX ADMIN — INSULIN LISPRO 3 UNITS: 100 INJECTION, SOLUTION INTRAVENOUS; SUBCUTANEOUS at 08:20

## 2023-04-03 NOTE — PROGRESS NOTES
Name: Kelby Peters ADMIT: 3/30/2023   : 1950  PCP: Susy Germain APRN    MRN: [id number] LOS: 4 days   AGE/SEX: 72 y.o. male  ROOM: [facility]     Subjective   Subjective   Afebrile. Pain controlled. BM on Friday.     Review of Systems   Constitutional: Negative for fever.   HENT: Negative for congestion.    Respiratory: Negative for shortness of breath.    Cardiovascular: Negative for chest pain.   Gastrointestinal: Negative for abdominal pain and nausea.   Genitourinary: Negative for difficulty urinating.   Musculoskeletal: Negative for arthralgias.   Skin: Positive for wound.   Neurological: Negative for headaches.   Psychiatric/Behavioral: Negative for sleep disturbance.        Objective   Objective   Vital Signs  Temp:  [97.4 °F (36.3 °C)-98.7 °F (37.1 °C)] 97.7 °F (36.5 °C)  Heart Rate:  [53-73] 67  Resp:  [16] 16  BP: (142-177)/(69-87) 151/73  SpO2:  [94 %-99 %] 94 %  on  Flow (L/min):  [2-3.5] 2;   Device (Oxygen Therapy): room air  Body mass index is 30.77 kg/m².  Physical Exam  Vitals and nursing note reviewed.   Constitutional:       General: He is not in acute distress.     Appearance: He is ill-appearing. He is not toxic-appearing.   HENT:      Head: Normocephalic.      Mouth/Throat:      Mouth: Mucous membranes are moist.   Eyes:      Conjunctiva/sclera: Conjunctivae normal.   Cardiovascular:      Rate and Rhythm: Normal rate and regular rhythm.   Pulmonary:      Effort: Pulmonary effort is normal. No respiratory distress.      Breath sounds: No wheezing or rales.   Abdominal:      General: Bowel sounds are normal.      Palpations: Abdomen is soft.   Musculoskeletal:      Cervical back: Neck supple.      Right lower leg: No edema.      Left lower leg: No edema.   Skin:     General: Skin is warm and dry.      Comments: Dressings CDI chetan feet   Neurological:      Mental Status: He is alert and oriented to person, place, and time.   Psychiatric:         Mood and Affect: Mood normal.          Behavior: Behavior normal.       Results Review     I reviewed the patient's new clinical results.  Results from last 7 days   Lab Units 04/03/23  0617 04/01/23  0805 03/31/23  0759 03/30/23  1529   WBC 10*3/mm3 6.74 9.87 8.94 10.56   HEMOGLOBIN g/dL 11.0* 10.2* 10.3* 9.7*   PLATELETS 10*3/mm3 494* 432 375 323     Results from last 7 days   Lab Units 04/03/23  0617 04/02/23  0606 04/01/23  0805 03/31/23  0759   SODIUM mmol/L 141 139 133* 137   POTASSIUM mmol/L 4.0 3.7 3.9 3.7   CHLORIDE mmol/L 96* 97* 93* 96*   CO2 mmol/L 29.5* 32.0* 27.5 30.3*   BUN mg/dL 15 16 20 13   CREATININE mg/dL 0.68* 0.70* 0.72* 0.66*   GLUCOSE mg/dL 175* 166* 238* 149*   EGFR mL/min/1.73 98.8 97.9 97.1 99.7     Results from last 7 days   Lab Units 03/30/23  1529   ALBUMIN g/dL 3.4*   BILIRUBIN mg/dL 0.2   ALK PHOS U/L 59   AST (SGOT) U/L 11   ALT (SGPT) U/L 7     Results from last 7 days   Lab Units 04/03/23  0617 04/02/23  0606 04/01/23  0805 03/31/23  0759 03/30/23  1529   CALCIUM mg/dL 9.5 8.9 8.7 8.9 9.1   ALBUMIN g/dL  --   --   --   --  3.4*     Results from last 7 days   Lab Units 03/30/23  1529   PROCALCITONIN ng/mL 0.08     Glucose   Date/Time Value Ref Range Status   04/03/2023 1051 233 (H) 70 - 130 mg/dL Final     Comment:     Meter: [id number] : [id number] Coughlin Catina    04/03/2023 0615 181 (H) 70 - 130 mg/dL Final     Comment:     Meter: [id number] : [id number] Shaq Otilia KIERAN   04/02/2023 2026 157 (H) 70 - 130 mg/dL Final     Comment:     Meter: [id number] : [id number] [place] MartinOtilia    04/02/2023 1512 111 70 - 130 mg/dL Final     Comment:     Meter: [id number] : [id number] Valente Oliva    04/02/2023 1137 217 (H) 70 - 130 mg/dL Final     Comment:     Meter: [id number] : [id number] Valente Oliva    04/02/2023 0606 162 (H) 70 - 130 mg/dL Final     Comment:     Meter: [id number] : [id number] Shaq Soareselle    04/01/2023 2252 159 (H) 70 - 130 mg/dL Final     Comment:     Meter:  [id number] : [id number] Johan Keene RN       No radiology results for the last day  I have personally reviewed all medications:  Scheduled Medications  amLODIPine, 10 mg, Oral, Q24H  aspirin, 81 mg, Oral, Daily  cefTRIAXone, 2 g, Intravenous, Q24H  clopidogrel, 75 mg, Oral, Daily  dorzolamide-timolol, , Both Eyes, BID  gabapentin, 800 mg, Oral, Q8H  heparin (porcine), 5,000 Units, Subcutaneous, Q12H  hydroCHLOROthiazide, 25 mg, Oral, Daily  insulin glargine, 15 Units, Subcutaneous, QAM  insulin lispro, 0-14 Units, Subcutaneous, TID AC  insulin lispro, 6 Units, Subcutaneous, TID With Meals  levETIRAcetam, 500 mg, Oral, BID  lisinopril, 20 mg, Oral, Daily  prednisoLONE acetate, 1 drop, Both Eyes, TID  rosuvastatin, 10 mg, Oral, Nightly  sodium chloride, 10 mL, Intravenous, Q12H    Infusions   Diet  Diet: Regular/House Diet; Texture: Regular Texture (IDDSI 7); Fluid Consistency: Thin (IDDSI 0)    I have personally reviewed:  [x]  Laboratory   [x]  Microbiology   [x]  Radiology   [x]  EKG/Telemetry  [x]  Cardiology/Vascular   [x]  Pathology    [x]  Records       Assessment/Plan     Active Hospital Problems    Diagnosis  POA   • **Diabetic foot ulcer [E11.621, L97.509]  Yes   • Chronic osteomyelitis of foot [M86.679]  Yes   • Diabetes mellitus with nephropathy [E11.21]  Yes   • Type 2 diabetes mellitus with diabetic polyneuropathy [E11.42]  Yes   • Peripheral vascular disease [I73.9]  Yes   • History of seizures [Z87.898]  Yes   • Diabetic peripheral neuropathy (HCC) [E11.42]  Yes   • Hypertension [I10]  Yes   • Chronic pain [G89.29]  Yes      Resolved Hospital Problems   No resolved problems to display.       72 y.o. male admitted with Diabetic foot ulcer.    Mr. Peters is a 72 y.o. male with a history of chronic osteomyelitis chetan feet, MSSA & strep wound infections, PVD, DM w/neuropathy & nephropathy, Sleep apnea, seizures, HTN who is admitted for diabetic foot ulcers     Diabetic foot ulcers/Chronic  OM/PVD:  -Vascular surgery and ID following  -MRI with osteo of the left great toe and right little toe. S/p amputation of metatarsal heads.   -BC NGTD. Surgical cultures group B strep  -Completed 6 weeks IV cefazolin 3/6/23   - now ID recommends rocephin 2g q24hrs. Will need a 6 week course with stop date 5/12/23.       DM/Nephropathy/Neuropathy:  -Monitor BG trends. A1C 9.10%. Correctional scale insulin.  -home rx: 70/30 45 bid   -while in hospital: lantus 15 and humalog 6 tid, ssi. Sugars acceptable acutely      Chronic pain:  -Restart hydrocodone, gabapentin     Hx seizures:  -Keppra     HTN:  -Followed by Nephrology outpatient. Continue  Lisinopril 20mg, hctz 25 mg, amlodipine 10 mg    PT eval when off loading shoes received then will be allowed to ambulate; bedrest until then       · Heparin SC for DVT prophylaxis.  · Full code.  · Discussed with patient, spouse and nursing staff.  · Anticipate discharge home with home health in 1-2 days.      ABEBA Gan  [facility]  04/03/23  13:05 EDT

## 2023-04-03 NOTE — PLAN OF CARE
Goal Outcome Evaluation:  Plan of Care Reviewed With: patient        Progress: no change  Outcome Evaluation: VSS. Alert and oriented x4. Bilateral lower extremity pain managed with prn PO pain meds. IV abx until 5/12/2023. PICC RUE. Blood return noted. BLE drsg in place. Daily drsg changed per order. Will continue to provide supportive care.

## 2023-04-03 NOTE — SIGNIFICANT NOTE
04/03/23 0930   OTHER   Discipline physical therapist   Rehab Time/Intention   Session Not Performed other (see comments)  (per RN [place] pt on bed rest until darco shoes arrive per vascular; will continue to follow)   Therapy Assessment/Plan (PT)   Criteria for Skilled Interventions Met (PT) yes   Recommendation   PT - Next Appointment 04/04/23

## 2023-04-03 NOTE — PLAN OF CARE
Problem: Adult Inpatient Plan of Care  Goal: Plan of Care Review  Outcome: Ongoing, Progressing  Flowsheets (Taken 4/3/2023 1432)  Progress: no change  Plan of Care Reviewed With:   patient   spouse  Outcome Evaluation: vss. bedrest while awaiting darco shoes, call placed to [facility]. dressing changed by vascular MD. picc line in RUE. prn medication provided for c/o pain   Goal Outcome Evaluation:  Plan of Care Reviewed With: patient, spouse        Progress: no change  Outcome Evaluation: vss. bedrest while awaiting darco shoes, call placed to [facility]. dressing changed by vascular MD. picc line in RUE. prn medication provided for c/o pain

## 2023-04-03 NOTE — NURSING NOTE
"Diabetes Education  Assessment/Teaching    Patient Name:  Kelby Peters  YOB: 1950  MRN: [id number]  Admit Date:  3/30/2023      Assessment Date:  4/3/2023  Flowsheet Row Most Recent Value   General Information     Referral From: Database. Meet with 71 y/o at bedside to assess needs for DM ed.    Height 170.2 cm (67.01\")   Height Method Stated   Weight 89.1 kg (196 lb 8 oz)   Weight Method Bed scale   Diabetes History    What type of diabetes do you have? Type 2   Length of Diabetes Diagnosis -- 30 yr hx per pt.   Do you test your blood sugar at home? yes   Frequency of checks tid to qid per pt.   Do you have any diabetes complications? Toe amputations   Education Preferences    Barriers to Learning -- Pt wants to use [employer] but states he cannot afford the cost (20%.)   Assessment Topics    Taking Medication - Assessment Competent - pt reports he is using a mixed insulin from [employer] ([employer] brand) to lower costs for insulin.   Problem Solving - Assessment -- Review.   Healthy Coping - Assessment Competent -supportive spouse here at bedside.   Monitoring - Assessment Competent   DM Goals    Contact Plan Follow-up medical care          Flowsheet Row Most Recent Value   DM Education Needs    Meter Has own   Problem Solving Hyperglycemia -encourage pt to call endo for repeat high BGs at home.   Healthy Coping Appropriate   Discharge Plan Follow-up with MD. Pt reports outpt DM f/u with endocrinology.   Teaching Method Discussion   Patient Response Verbalized understanding      Other Comments:   Electronically signed by:  Anai Maki, RN, BSN, [facility]  04/03/23 15:21 EDT  "

## 2023-04-03 NOTE — PROGRESS NOTES
Nutrition Services    Patient Name:  Kelby Peters  YOB: 1950  MRN: [id number]  Admit Date:  3/30/2023    Assessment Date:  04/03/23    Comment: Nutrition assessment initiated due to Dx (Osteomyelitis, Diabetic foot ulcer). Now S/P amputation of some toes. Pt reports a good appetite, eating 75% most meals. Labs, meds, skin reviewed.     Encouraged good nutrition to support wounds and discussed use of Miguel supplement to support his wound - pt agreeable to try it. Will follow clinical course, nutrition needs.      CLINICAL NUTRITION ASSESSMENT      Reason for Assessment Pressure Injury and/or Non-Healing Wound     Diagnosis/Problem   Osteomyelitis, Diabetic foot ulcer   Medical/Surgical History Past Medical History:   Diagnosis Date   • Basal cell carcinoma 06/2022   • Brain injury    • Broken toes    • Diabetes mellitus    • Finger injury    • Gallbladder obstruction    • Hearing disorder    • Hiatal hernia    • Hypercholesterolemia    • Leg injury    • Migraine    • Osteomyelitis    • Peripheral neuropathy    • PONV (postoperative nausea and vomiting)    • Prostate enlargement    • Seizures    • Shingles    • Sleep apnea        Past Surgical History:   Procedure Laterality Date   • AMPUTATION DIGIT Left 1/19/2023    Procedure: 3RD LEFT TOE AMPUTATION AND LEFT FOOT DEBRIDEMENT;  Surgeon: Ivonne Martin MD;  Location: [facility] 18/19;  Service: Vascular;  Laterality: Left;   • AMPUTATION DIGIT Bilateral 3/31/2023    Procedure: RIGHT FIRST TOE AMPUTATION AND LEFT FIFTH TOE AMPUTATION;  Surgeon: Jasbir Alvarado MD;  Location: [facility] 18/19;  Service: Vascular;  Laterality: Bilateral;   • ANGIOPLASTY FEMORAL ARTERY Left 1/19/2023    Procedure: LEFT LEG ANGIOGRAM;  Surgeon: Ivonne Martin MD;  Location: [facility] 18/19;  Service: Vascular;  Laterality: Left;   • BASAL CELL CARCINOMA EXCISION  10/13/2022   • CATARACT EXTRACTION Right    • CORNEAL TRANSPLANT Left  "03/31/2021   • EYE SURGERY Left    • GALLBLADDER SURGERY     • INNER EAR SURGERY          Encounter Information        Nutrition History:  Good appetite   Food Preferences:    Supplements:    Factors Affecting Intake: No factors at this time     Anthropometrics        Current Height  Current Weight  BMI kg/m2 Height: 170.2 cm (67.01\")  Weight: 89.1 kg (196 lb 8 oz) (04/03/23 0514)  Body mass index is 30.77 kg/m².   Adjusted BMI (if applicable)        Admission Weight 97.5kg       Ideal Body Weight (IBW) 66.1kg   Adjusted IBW (if applicable)        Usual Body Weight (UBW)    Weight Change/Trend Stable       Weight History Wt Readings from Last 30 Encounters:   04/03/23 0514 89.1 kg (196 lb 8 oz)   04/02/23 0500 91.5 kg (201 lb 12.8 oz)   04/01/23 0530 92.5 kg (204 lb)   03/31/23 0600 93.8 kg (206 lb 14.4 oz)   03/30/23 1320 97.5 kg (215 lb)   03/17/23 0924 97.5 kg (215 lb)   03/09/23 1104 96.3 kg (212 lb 6.4 oz)   03/06/23 1312 94.8 kg (209 lb)   01/31/23 1112 91.2 kg (201 lb)   01/16/23 1530 91.2 kg (201 lb)   01/15/23 1417 91.2 kg (201 lb)   01/15/23 1507 91.2 kg (201 lb)   01/06/23 0832 91.5 kg (201 lb 12.8 oz)   12/07/22 1043 94.2 kg (207 lb 9.6 oz)   09/28/22 0829 96.1 kg (211 lb 12.8 oz)   08/22/22 0918 95.9 kg (211 lb 6.4 oz)   07/20/22 1518 96.2 kg (212 lb)   06/22/22 0930 95 kg (209 lb 6.4 oz)   04/22/22 0846 96.4 kg (212 lb 9.6 oz)   03/01/22 0802 94.2 kg (207 lb 9.6 oz)   02/21/22 0904 94.3 kg (208 lb)   12/23/21 1524 98 kg (216 lb)   12/20/21 0840 98.7 kg (217 lb 9.6 oz)   12/16/21 0758 95.3 kg (210 lb)   11/18/21 1119 95.3 kg (210 lb)   10/19/21 0952 94.3 kg (208 lb)   08/10/21 0759 94.2 kg (207 lb 9.6 oz)   06/08/21 0830 94.5 kg (208 lb 6.4 oz)   05/14/21 1446 95.3 kg (210 lb)   04/08/21 0857 95.2 kg (209 lb 12.8 oz)   02/01/21 1004 94.8 kg (209 lb)   10/01/20 1032 93.3 kg (205 lb 9.6 oz)   08/03/20 1017 95.5 kg (210 lb 9.6 oz)   06/02/20 1023 95.1 kg (209 lb 9.6 oz)   02/03/20 0959 96.2 kg (212 lb)    " "         Estimated/Assessed Needs       Energy Requirements    Height for Calculation  Height: 170.2 cm (67.01\")   Weight for Calculation 89.1kg   Method for Estimation  20 kcal/kg   EST Needs (kcal/day) 1782       Protein Requirements    Weight for Calculation 89.1kg   EST Protein Needs (g/kg) 1.2 gm/kg   EST Daily Needs (g/day) 107       Fluid Requirements     Method for Estimation 1 mL/kcal    Estimated Needs (mL/day) 1800     Tests/Procedures        Tests/Procedures MRI, X-Ray     Labs       Pertinent Labs    Results from last 7 days   Lab Units 04/03/23 0617 04/02/23  0606 04/01/23 0805 03/31/23 0759 03/30/23  1529   SODIUM mmol/L 141 139 133*   < > 133*   POTASSIUM mmol/L 4.0 3.7 3.9   < > 3.5   CHLORIDE mmol/L 96* 97* 93*   < > 92*   CO2 mmol/L 29.5* 32.0* 27.5   < > 30.9*   BUN mg/dL 15 16 20   < > 20   CREATININE mg/dL 0.68* 0.70* 0.72*   < > 0.78   CALCIUM mg/dL 9.5 8.9 8.7   < > 9.1   BILIRUBIN mg/dL  --   --   --   --  0.2   ALK PHOS U/L  --   --   --   --  59   ALT (SGPT) U/L  --   --   --   --  7   AST (SGOT) U/L  --   --   --   --  11   GLUCOSE mg/dL 175* 166* 238*   < > 193*    < > = values in this interval not displayed.     Results from last 7 days   Lab Units 04/03/23 0617 03/31/23 0759 03/30/23  1529   HEMOGLOBIN g/dL 11.0*   < > 9.7*   HEMATOCRIT % 35.1*   < > 29.4*   WBC 10*3/mm3 6.74   < > 10.56   ALBUMIN g/dL  --   --  3.4*    < > = values in this interval not displayed.     Results from last 7 days   Lab Units 04/03/23 0617 04/01/23  0805 03/31/23 0759 03/30/23  1529   PLATELETS 10*3/mm3 494* 432 375 323     No results found for: COVID19  Lab Results   Component Value Date    HGBA1C 7.70 (H) 03/31/2023          Medications           Scheduled Medications amLODIPine, 10 mg, Oral, Q24H  aspirin, 81 mg, Oral, Daily  cefTRIAXone, 2 g, Intravenous, Q24H  clopidogrel, 75 mg, Oral, Daily  dorzolamide-timolol, , Both Eyes, BID  gabapentin, 800 mg, Oral, Q8H  heparin (porcine), 5,000 Units, " Subcutaneous, Q12H  hydroCHLOROthiazide, 25 mg, Oral, Daily  insulin glargine, 15 Units, Subcutaneous, QAM  insulin lispro, 0-14 Units, Subcutaneous, TID AC  insulin lispro, 6 Units, Subcutaneous, TID With Meals  levETIRAcetam, 500 mg, Oral, BID  lisinopril, 20 mg, Oral, Daily  prednisoLONE acetate, 1 drop, Both Eyes, TID  rosuvastatin, 10 mg, Oral, Nightly  sodium chloride, 10 mL, Intravenous, Q12H       Infusions     PRN Medications •  acetaminophen **OR** acetaminophen **OR** acetaminophen  •  dextrose  •  dextrose  •  glucagon (human recombinant)  •  HYDROcodone-acetaminophen  •  HYDROcodone-acetaminophen  •  nitroglycerin  •  ondansetron **OR** ondansetron  •  ondansetron **OR** ondansetron  •  sodium chloride  •  sodium chloride     Physical Findings          Physical Appearance alert, hard of hearing, oriented, overweight, on oxygen therapy   Oral/Mouth Cavity WNL   Edema  1+ (trace)   Gastrointestinal last bowel movement:3/31   Skin  diabetic ulcer, surgical incision (amputation site - toes)   Tubes/Drains none   NFPE Not applicable at this time   --  Current Nutrition Orders & Evaluation of Intake       Oral Nutrition     Food Allergies NKFA   Current PO Diet Diet: Regular/House Diet; Texture: Regular Texture (IDDSI 7); Fluid Consistency: Thin (IDDSI 0)   Supplement n/a   PO Evaluation     % PO Intake 75%    # of Days Evaluated 3   --  PES STATEMENT / NUTRITION DIAGNOSIS      Nutrition Dx Problem  Problem: Increased Nutrient Needs  Etiology: Medical Diagnosis diabetic ulcer  Signs/Symptoms: Protein    Comment:    --  NUTRITION INTERVENTION / PLAN OF CARE      Intervention Goal(s) Maintain nutrition status, Tolerate PO , Maintain intake and Maintain weight         RD Intervention/Action Advise alternative selection, Advise available snack, Interview for preferences, Encourage intake, Follow Tx Progress, Care plan reviewed and Recommend/ordered:          Prescription/Orders:       PO Diet       Supplements  Miguel bid      Snacks       Enteral Nutrition       Parenteral Nutrition    New Prescription Ordered? Yes   --      Monitor/Evaluation Per protocol   Discharge Plan/Needs Pending clinical course   Education Will instruct as appropriate   --    RD to follow per protocol.      Electronically signed by:  Huong Prado RD  04/03/23 13:49 EDT

## 2023-04-03 NOTE — PROGRESS NOTES
Name: Kelby Peters ADMIT: 3/30/2023   : 1950  PCP: Susy Germain APRN    MRN: [id number] LOS: 4 days   AGE/SEX: 72 y.o. male  ROOM: [address]    [place], [facility] [employer]    72 y.o. 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  Pain control adequate, in context of diabetic neuropathy.  No postoperative nausea or vomiting.    Scheduled Medications:   amLODIPine, 10 mg, Oral, Q24H  aspirin, 81 mg, Oral, Daily  cefTRIAXone, 2 g, Intravenous, Q24H  clopidogrel, 75 mg, Oral, Daily  dorzolamide-timolol, , Both Eyes, BID  gabapentin, 800 mg, Oral, Q8H  heparin (porcine), 5,000 Units, Subcutaneous, Q12H  hydroCHLOROthiazide, 25 mg, Oral, Daily  insulin glargine, 15 Units, Subcutaneous, QAM  insulin lispro, 0-14 Units, Subcutaneous, TID AC  insulin lispro, 6 Units, Subcutaneous, TID With Meals  levETIRAcetam, 500 mg, Oral, BID  lisinopril, 20 mg, Oral, Daily  prednisoLONE acetate, 1 drop, Both Eyes, TID  rosuvastatin, 10 mg, Oral, Nightly  sodium chloride, 10 mL, Intravenous, Q12H    Active Infusions:  lactated ringers, 9 mL/hr, Last Rate: 9 mL/hr (23 1314)    Vital Signs  Vital Signs Patient Vitals for the past 24 hrs:   BP Temp Temp src Pulse Resp SpO2 Weight   23 0818 168/87 -- -- 73 -- 95 % --   23 0514 -- -- -- -- -- -- 89.1 kg (196 lb 8 oz)   23 2258 160/73 97.7 °F (36.5 °C) Axillary 53 16 97 % --   23 1900 142/69 97.4 °F (36.3 °C) Axillary 60 16 99 % --   23 1500 177/87 98.7 °F (37.1 °C) Axillary -- 16 -- --   23 1100 (!) 189/96 98.7 °F (37.1 °C) Axillary -- 16 -- --     Physical Exam:    Physical Exam     CBC    Results from last 7 days   Lab Units 23  0617 23  0805 23  0759 23  1529   WBC 10*3/mm3 6.74 9.87 8.94 10.56   HEMOGLOBIN g/dL 11.0* 10.2* 10.3* 9.7*   PLATELETS 10*3/mm3 494* 432 375 323     BMP   Results from last 7  days   Lab Units 04/03/23  0617 04/02/23  0606 04/01/23  0805 03/31/23  0759 03/30/23  1529   SODIUM mmol/L 141 139 133* 137 133*   POTASSIUM mmol/L 4.0 3.7 3.9 3.7 3.5   CHLORIDE mmol/L 96* 97* 93* 96* 92*   CO2 mmol/L 29.5* 32.0* 27.5 30.3* 30.9*   BUN mg/dL 15 16 20 13 20   CREATININE mg/dL 0.68* 0.70* 0.72* 0.66* 0.78   GLUCOSE mg/dL 175* 166* 238* 149* 193*     Cr Clearance Estimated Creatinine Clearance: 104.6 mL/min (A) (by C-G formula based on SCr of 0.68 mg/dL (L)).  Infection   Results from last 7 days   Lab Units 03/30/23  1530 03/30/23  1529   BLOODCX  No growth at 3 days No growth at 3 days   PROCALCITONIN ng/mL  --  0.08     Assessment & Plan   Assessment & Plan    Diabetic foot ulcer    Chronic pain    Hypertension    Diabetic peripheral neuropathy (HCC)    History of seizures    Peripheral vascular disease    Type 2 diabetes mellitus with diabetic polyneuropathy    Chronic osteomyelitis of foot    Diabetes mellitus with nephropathy    72 y.o. male with diabetes and history of gangrene of the right fifth and left great toes, post open amputation.  Has previously been revascularized and has undergone arterial testing documenting satisfactory perfusion.  Will transition to Dakin's solution dressings and provide Darco shoes for offloading bilaterally.  Then can ambulate.  Home anytime with outpatient follow-up with Dr. Alvarado from surgical perspective.    Sammy Allison MD  04/03/23  09:53 EDT    Please call my office with any question: [phone]    Active Hospital Problems    Diagnosis  POA   • **Diabetic foot ulcer [E11.621, L97.509]  Yes   • Chronic osteomyelitis of foot [M86.679]  Yes   • Diabetes mellitus with nephropathy [E11.21]  Yes   • Type 2 diabetes mellitus with diabetic polyneuropathy [E11.42]  Yes   • Peripheral vascular disease [I73.9]  Yes   • History of seizures [Z87.898]  Yes   • Diabetic peripheral neuropathy (HCC) [E11.42]  Yes   • Hypertension [I10]  Yes   • Chronic pain [G89.29]   Yes      Resolved Hospital Problems   No resolved problems to display.

## 2023-04-03 NOTE — PAYOR COMM NOTE
"Kai Peters FERNANDO (72 y.o. Male)       ATTN:  CONTINUED STAY CLINICALS TO REVIEW:  REF# [id number]    PLEASE REPLY WITH ALL APPROVED DAYS TO [facility] DEPT: [phone]  [phone]    [facility]: NPI [id number]  TIN # [id number]        Date of Birth   1950    Social Security Number       Address   [address] [place], [place] [address]    Home Phone   [phone]    MRN   [id number]       Scientologist   Buddhism    Marital Status                               Admission Date   3/30/23    Admission Type   Urgent    Admitting Provider   Mariano Lombardo MD    Attending Provider   Shahriar Ca MD    Department, Room/Bed   [address], E561/1       Discharge Date       Discharge Disposition       Discharge Destination                               Attending Provider: Shhariar Ca MD    Allergies: No Known Allergies    Isolation: None   Infection: None   Code Status: CPR    Ht: 170.2 cm (67.01\")   Wt: 89.1 kg (196 lb 8 oz)    Admission Cmt: None   Principal Problem: Diabetic foot ulcer [E11.621,L97.509]                 Active Insurance as of 3/30/2023     Primary Coverage     Payor Plan Insurance Group Employer/Plan Group    AETNA MEDICARE REPLACEMENT AETNA MEDICARE REPLACEMENT [id number]     Payor Plan Address Payor Plan Phone Number Payor Plan Fax Number Effective Dates    [address] [phone]  1/1/2021 - None Entered    [facility] [id number]       Subscriber Name Subscriber Birth Date Member ID       KAI PETERS 1950 [id number]                 Emergency Contacts      (Rel.) Home Phone Work Phone Mobile Phone    My Peters (Spouse) [phone] -- --            Vital Signs (last 2 days)     Date/Time Temp Temp src Pulse Resp BP Patient Position SpO2    04/03/23 1500 97.2 (36.2) Oral 64 16 137/69 Lying 98    04/03/23 1055 97.7 (36.5) Oral 67 16 151/73 Lying 94    04/03/23 0818 -- -- 73 -- 168/87 Lying 95    04/02/23 2258 97.7 " (36.5) Axillary 53 16 160/73 Lying 97    04/02/23 1900 97.4 (36.3) Axillary 60 16 142/69 Lying 99    04/02/23 1500 98.7 (37.1) Axillary -- 16 177/87 Lying --    04/02/23 1100 98.7 (37.1) Axillary -- 16 189/96 Lying --    04/02/23 0700 97.4 (36.3) Oral 59 16 175/90 Lying 99    04/01/23 2300 97.1 (36.2) Axillary 57 16 153/70 Lying 96    04/01/23 1951 96.7 (35.9) Axillary  59 16 124/64 Lying 99    Temp src: Has a cut under tounge. Did not want to do an oral temp at 04/01/23 1951 04/01/23 1500 98.3 (36.8) Oral -- 16 154/76 Lying --    04/01/23 1100 98.3 (36.8) Oral -- 16 115/65 Lying --    04/01/23 0700 98.9 (37.2) Oral -- 16 187/79 Lying --        Oxygen Therapy (last 2 days)     Date/Time SpO2 Device (Oxygen Therapy) Flow (L/min) Oxygen Concentration (%) ETCO2 (mmHg)    04/03/23 1500 98 room air -- -- --    04/03/23 1426 -- nasal cannula 2 -- --    04/03/23 1055 94 room air -- -- --    04/03/23 0908 -- room air -- -- --    04/03/23 0818 95 -- -- -- --    04/03/23 0044 -- nasal cannula 2 -- --    04/02/23 2258 97 nasal cannula 2 -- --    04/02/23 2018 -- room air -- -- --    04/02/23 1900 99 nasal cannula 3.5 -- --    04/02/23 0700 99 -- -- -- --    04/01/23 2300 96 nasal cannula 2 -- --    04/01/23 2200 -- nasal cannula 2 -- --    04/01/23 1951 99 nasal cannula 2 -- --    04/01/23 1300 -- nasal cannula 2 -- --    04/01/23 0230 -- nasal cannula 3 -- --        Intake & Output (last 2 days)       04/01 0701 04/02 0700 04/02 0701 04/03 0700 04/03 0701 04/04 0700    P.O. 300 240 480    I.V. (mL/kg)       IV Piggyback 200      Total Intake(mL/kg) 500 (5.5) 240 (2.7) 480 (5.4)    Urine (mL/kg/hr) 2465 (1.1) 1875 (0.9)     Total Output 2465 1875     Net -1965 -1635 +480           Urine Unmeasured Occurrence 1 x          Lines, Drains & Airways     Active LDAs     Name Placement date Placement time Site Days    PICC Single Lumen 04/02/23 Right Basilic 04/02/23  1619  Basilic  less than 1                Current  Facility-Administered Medications   Medication Dose Route Frequency Provider Last Rate Last Admin   • acetaminophen (TYLENOL) tablet 650 mg  650 mg Oral Q4H PRN Jasbir Alvarado MD   650 mg at 03/30/23 1704    Or   • acetaminophen (TYLENOL) 160 MG/5ML solution 650 mg  650 mg Oral Q4H PRN Jasbir Alvarado MD        Or   • acetaminophen (TYLENOL) suppository 650 mg  650 mg Rectal Q4H PRN Jasbir Alvarado MD       • amLODIPine (NORVASC) tablet 10 mg  10 mg Oral Q24H Jasbir Alvarado MD   10 mg at 04/03/23 0819   • aspirin EC tablet 81 mg  81 mg Oral Daily Jasbir Alvarado MD   81 mg at 04/03/23 0819   • cefTRIAXone (ROCEPHIN) 2 g in sodium chloride 0.9 % 100 mL IVPB-VTB  2 g Intravenous Q24H Pedro Luis Villarreal  mL/hr at 04/02/23 1704 2 g at 04/02/23 1704   • clopidogrel (PLAVIX) tablet 75 mg  75 mg Oral Daily Jasbir Alvarado MD   75 mg at 04/03/23 0819   • dextrose (D50W) (25 g/50 mL) IV injection 25 g  25 g Intravenous Q15 Min PRN Jasbir Alvarado MD       • dextrose (GLUTOSE) oral gel 15 g  15 g Oral Q15 Min PRN Jasbir Alvarado MD       • dorzolamide (TRUSOPT) 2 % 1 drop, timolol (TIMOPTIC) 0.5 % 1 drop for Cosopt 22.3-6.8 mg/mL   Both Eyes BID Jasbir Alvarado MD   Given at 04/03/23 0910   • gabapentin (NEURONTIN) capsule 800 mg  800 mg Oral Q8H Jasbir Alvarado MD   800 mg at 04/03/23 1426   • glucagon (GLUCAGEN) injection 1 mg  1 mg Subcutaneous Q15 Min PRN Jasbir Alvarado MD       • heparin (porcine) 5000 UNIT/ML injection 5,000 Units  5,000 Units Subcutaneous Q12H Jasbir Alvarado MD   5,000 Units at 04/03/23 0819   • hydroCHLOROthiazide (HYDRODIURIL) tablet 25 mg  25 mg Oral Daily Jasbir Alvarado MD   25 mg at 04/03/23 0818   • HYDROcodone-acetaminophen (NORCO)  MG per tablet 1 tablet  1 tablet Oral Q6H PRN Jasbir Alvarado MD   1 tablet at 04/03/23 0908   • HYDROcodone-acetaminophen (NORCO) 5-325 MG per tablet 1 tablet  1 tablet Oral Q4H PRN Jasbir Alvarado MD   1 tablet at 04/03/23  0511   • insulin glargine (LANTUS, SEMGLEE) injection 15 Units  15 Units Subcutaneous QAM Don Lyles MD   15 Units at 04/03/23 0819   • insulin lispro (ADMELOG) injection 0-14 Units  0-14 Units Subcutaneous TID AC Jasbir Alvarado MD   5 Units at 04/03/23 1158   • insulin lispro (ADMELOG) injection 6 Units  6 Units Subcutaneous TID With Meals Don Lyles MD   6 Units at 04/03/23 1157   • levETIRAcetam (KEPPRA) tablet 500 mg  500 mg Oral BID Jasbir Alvarado MD   500 mg at 04/03/23 0818   • lisinopril (PRINIVIL,ZESTRIL) tablet 20 mg  20 mg Oral Daily Jasbir Alvarado MD   20 mg at 04/03/23 0819   • nitroglycerin (NITROSTAT) SL tablet 0.4 mg  0.4 mg Sublingual Q5 Min PRN Jasbir Alvarado MD       • ondansetron (ZOFRAN) tablet 4 mg  4 mg Oral Q6H PRN Jasbir Alvarado MD        Or   • ondansetron (ZOFRAN) injection 4 mg  4 mg Intravenous Q6H PRN Jasbir Alvarado MD   4 mg at 03/31/23 0417   • ondansetron (ZOFRAN) tablet 4 mg  4 mg Oral Q6H PRN Jasbir Alvarado MD        Or   • ondansetron (ZOFRAN) injection 4 mg  4 mg Intravenous Q6H PRN Jasbir Alvarado MD       • prednisoLONE acetate (PRED FORTE) 1 % ophthalmic suspension 1 drop  1 drop Both Eyes TID Jasbir Alvarado MD   1 drop at 04/03/23 0910   • rosuvastatin (CRESTOR) tablet 10 mg  10 mg Oral Nightly Jasbir Alvarado MD   10 mg at 04/02/23 2020   • sodium chloride 0.9 % flush 10 mL  10 mL Intravenous Q12H Pedro Luis Villarreal MD   10 mL at 04/03/23 0822   • sodium chloride 0.9 % flush 10 mL  10 mL Intravenous PRN Pedro Luis Villarreal MD       • sodium chloride 0.9 % flush 20 mL  20 mL Intravenous PRN Pedro Luis Villarreal MD           Lab Results (last 48 hours)     Procedure Component Value Units Date/Time    Tissue Pathology Exam [[id number]] Collected: 03/31/23 1437    Specimen: Tissue from Toe, Left; Tissue from Toe, Right Updated: 04/03/23 1450     Case Report --     Surgical Pathology Report                         Case: [id number]     "                              Authorizing Provider:  Jasbir Alvarado MD        Collected:           03/31/2023 02:37 PM          Ordering Location:     [facility]  Received:            03/31/2023 02:52 PM                                 MAIN OR                                                                      Pathologist:           Naun Gutierrez MD                                                         Specimens:   1) - Toe, Left, LEFT FIRST TOE                                                                      2) - Toe, Right, RIGHT FIFTH TOE                                                            Final Diagnosis --     1. Left First Toe, Amputation:     A. Distal acute and chronic cellulitis, viable and degenerating ischemic cortical and trabecular bone and                       scattered acute osteomyelitis.   B. Surgical margin appears viable.    2. Right Fifth Toe, Amputation:     A. Focal mild chronic inflamation with viable and focally ischemic cortical and trabecular bone with bone remodeling.  B. Surgical margin appears viable.    JAT/clm       Gross Description --     1. Toe, Left.  Received in formalin labeled \"left first toe\" is a 6.5 x 3.5 x 3.5 cm great toe disarticulated at the metatarsal phalangeal joint. The toenail is present. Skin, soft tissue and bone margins are grossly viable. There are no lesions on the epidermis. Sectioning through the proximal phalangeal bone reveals tan yellow trabecular bone. Representative sections are submitted as follows:    1A: proximal skin and soft tissue margins en face  1B: proximal phalangeal bone following decalcification    jap/uso/jat/pkm     2. Toe, Right.  Received in formalin labeled \"right fifth toe\" is a 4.0 x 2.0 x 1.6 cm toe amputated across the proximal phalangeal bone. The resection margin of bone is tan red and trabeculated. Skin and soft tissue margins appear to be grossly viable. There are no lesions on the epidermis. " Representative sections are submitted as follows:    2A: proximal skin and soft tissue margins en face  2B: proximal bone margin following decalcification    jap/uso/jat/pkm        POC Glucose Once [[id number]]  (Abnormal) Collected: 04/03/23 1051    Specimen: Blood Updated: 04/03/23 1053     Glucose 233 mg/dL      Comment: Meter: [id number] : [id number] Ced ALCARAZ       Anaerobic Culture - Surgical Site, Toe, Left [[id number]]  (Normal) Collected: 03/31/23 1438    Specimen: Surgical Site from Toe, Left Updated: 04/03/23 1031     Anaerobic Culture No anaerobes isolated at 3 days    Anaerobic Culture - Surgical Site, Toe, Right [[id number]]  (Normal) Collected: 03/31/23 1448    Specimen: Surgical Site from Toe, Right Updated: 04/03/23 1025     Anaerobic Culture No anaerobes isolated at 3 days    Basic Metabolic Panel [[id number]]  (Abnormal) Collected: 04/03/23 0617    Specimen: Blood Updated: 04/03/23 0712     Glucose 175 mg/dL      BUN 15 mg/dL      Creatinine 0.68 mg/dL      Sodium 141 mmol/L      Potassium 4.0 mmol/L      Chloride 96 mmol/L      CO2 29.5 mmol/L      Calcium 9.5 mg/dL      BUN/Creatinine Ratio 22.1     Anion Gap 15.5 mmol/L      eGFR 98.8 mL/min/1.73     Narrative:      GFR Normal >60  Chronic Kidney Disease <60  Kidney Failure <15    The GFR formula is only valid for adults with stable renal function between ages 18 and 70.    CBC (No Diff) [[id number]]  (Abnormal) Collected: 04/03/23 0617    Specimen: Blood Updated: 04/03/23 0637     WBC 6.74 10*3/mm3      RBC 4.01 10*6/mm3      Hemoglobin 11.0 g/dL      Hematocrit 35.1 %      MCV 87.5 fL      MCH 27.4 pg      MCHC 31.3 g/dL      RDW 14.6 %      RDW-SD 47.3 fl      MPV 9.2 fL      Platelets 494 10*3/mm3     POC Glucose Once [[id number]]  (Abnormal) Collected: 04/03/23 0615    Specimen: Blood Updated: 04/03/23 0616     Glucose 181 mg/dL      Comment: Meter: [id number] : [id number] Shaq ALCARAZ       POC Glucose Once [[id number]]   (Abnormal) Collected: 04/02/23 2026    Specimen: Blood Updated: 04/02/23 2028     Glucose 157 mg/dL      Comment: Meter: [id number] : [id number] Shaq ALCARAZ       Blood Culture - Blood, Arm, Right [[id number]]  (Normal) Collected: 03/30/23 1529    Specimen: Blood from Arm, Right Updated: 04/02/23 1545     Blood Culture No growth at 3 days    Blood Culture - Blood, Arm, Left [[id number]]  (Normal) Collected: 03/30/23 1530    Specimen: Blood from Arm, Left Updated: 04/02/23 1545     Blood Culture No growth at 3 days    POC Glucose Once [[id number]]  (Normal) Collected: 04/02/23 1512    Specimen: Blood Updated: 04/02/23 1514     Glucose 111 mg/dL      Comment: Meter: [id number] : [id number] Valente ALCARAZ       POC Glucose Once [[id number]]  (Abnormal) Collected: 04/02/23 1137    Specimen: Blood Updated: 04/02/23 1140     Glucose 217 mg/dL      Comment: Meter: [id number] : [id number] Valente ALCARAZ       Basic Metabolic Panel [[id number]]  (Abnormal) Collected: 04/02/23 0606    Specimen: Blood Updated: 04/02/23 0706     Glucose 166 mg/dL      BUN 16 mg/dL      Creatinine 0.70 mg/dL      Sodium 139 mmol/L      Potassium 3.7 mmol/L      Chloride 97 mmol/L      CO2 32.0 mmol/L      Calcium 8.9 mg/dL      BUN/Creatinine Ratio 22.9     Anion Gap 10.0 mmol/L      eGFR 97.9 mL/min/1.73     Narrative:      GFR Normal >60  Chronic Kidney Disease <60  Kidney Failure <15    The GFR formula is only valid for adults with stable renal function between ages 18 and 70.    Tissue / Bone Culture - Surgical Site, Toe, Right [[id number]]  (Abnormal) Collected: 03/31/23 1448    Specimen: Surgical Site from Toe, Right Updated: 04/02/23 0641     Tissue Culture Rare Streptococcus agalactiae (Group B)     Comment:   This organism is considered to be universally susceptible to penicillin.  No further antibiotic testing will be performed. If Clindamycin or Erythromycin is the drug of choice, notify the laboratory within 7 days to  request susceptibility testing.         Rare Normal Skin Chichi     Gram Stain Few (2+) WBCs per low power field      Rare (1+) Gram positive cocci    Tissue / Bone Culture - Surgical Site, Toe, Left [[id number]]  (Abnormal) Collected: 03/31/23 1438    Specimen: Surgical Site from Toe, Left Updated: 04/02/23 0637     Tissue Culture Scant growth (1+) Streptococcus agalactiae (Group B)     Gram Stain Few (2+) WBCs per low power field      Rare (1+) Gram positive cocci    POC Glucose Once [[id number]]  (Abnormal) Collected: 04/02/23 0606    Specimen: Blood Updated: 04/02/23 0609     Glucose 162 mg/dL      Comment: Meter: [id number] : [id number] Shaq ALCARAZ       POC Glucose Once [[id number]]  (Abnormal) Collected: 04/01/23 2252    Specimen: Blood Updated: 04/01/23 2300     Glucose 159 mg/dL      Comment: Meter: [id number] : [id number] Johan Keene RN       POC Glucose Once [[id number]]  (Normal) Collected: 04/01/23 2053    Specimen: Blood Updated: 04/01/23 2054     Glucose 86 mg/dL      Comment: Meter: [id number] : [id number] Shaq ALCARAZ       POC Glucose Once [[id number]]  (Abnormal) Collected: 04/01/23 1526    Specimen: Blood Updated: 04/01/23 1527     Glucose 212 mg/dL      Comment: Meter: [id number] : [id number] Valente ALCARAZ               ECG/EMG Results (last 48 hours)     Procedure Component Value Units Date/Time    SCANNED - TELEMETRY   [[id number]] Resulted: 03/30/23     Updated: 04/03/23 1127          Orders (last 48 hrs)      Start     Ordered    04/03/23 1800  Dietary Nutrition Supplements Miguel  Daily With Lunch & Dinner      Comments: orange    04/03/23 1506    04/03/23 1054  POC Glucose Once  PROCEDURE ONCE         04/03/23 1051    04/03/23 0957  Wound Care  Daily       04/03/23 0956    04/03/23 0830  Vancomycin, Trough  Timed,   Status:  Canceled         04/01/23 1021    04/03/23 0617  POC Glucose Once  PROCEDURE ONCE         04/03/23 0615    04/03/23 0600  CBC (No Diff)   Morning Draw         04/02/23 1512    04/03/23 0000  Flush All PICC Lumens Every 8 Hours & After Each Intermittent Use When Not Continuously Running IV Infusion; Use 10mL NS  Every 8 Hours        Comments: Must Use a 10mL Syringe or Larger for All Flushes & IV Push Medications.  Flush, Remove Syringe, Then Clamp Using Positive Pressure Valve    04/02/23 1626    04/02/23 2100  sodium chloride 0.9 % flush 10 mL  Every 12 Hours Scheduled         04/02/23 1625    04/02/23 2028  POC Glucose Once  PROCEDURE ONCE         04/02/23 2026 04/02/23 1627  No Venipuncture or BP in PICC Arm  Continuous         04/02/23 1626    04/02/23 1627  May Draw From PICC  Continuous         04/02/23 1626 04/02/23 1627  Use 3CG Technology For Placement Verification (PICC Nurse)  Once         04/02/23 1626    04/02/23 1626  PICC LINE CARE - Change Transparent Dressing With CHG Disk & Securement Device Every 7 Days  Weekly        Comments: Per [facility] Policy    04/02/23 1625    04/02/23 1626  PICC LINE CARE - Connectors / Hubs Must Be Scrubbed 15 Seconds Using 70% Alcohol & Allowed to Dry Before Accessing Line  Continuous         04/02/23 1625    04/02/23 1626  PICC LINE CARE - Change Needleless Connectors  Per Order Details        Comments: Per [facility] Policy    04/02/23 1625    04/02/23 1626  Catheter Care PICC  Per Order Details        Comments: 1) Follow PICC Protocol For Care  2) No Blood Pressure in Arm With PICC  3) Warm Packs to PICC Arm As Needed For Discomfort  4) Measure & Record Arm Circumference if Patient Experiences Pain, Swelling, Redness or Warmth in PICC Arm; Compare Measurement to Initial Measure, Report to Provider if Greater Than 3cm Difference  5) Follow Flush Protocol Per Facility    04/02/23 1626    04/02/23 1626  Ensure PICC Positive Pressure Cap is In Place  Per Order Details        Comments: Change Every 3 Days & As Needed For Evidence of Infusate or Blood in Cap    04/02/23 1626    04/02/23 1625  sodium chloride 0.9  % flush 10 mL  As Needed         04/02/23 1625    04/02/23 1625  sodium chloride 0.9 % flush 20 mL  As Needed         04/02/23 1625    04/02/23 1514  POC Glucose Once  PROCEDURE ONCE         04/02/23 1512    04/02/23 1141  POC Glucose Once  PROCEDURE ONCE         04/02/23 1137    04/02/23 1138  Diet: Regular/House Diet; Texture: Regular Texture (IDDSI 7); Fluid Consistency: Thin (IDDSI 0)  Diet Effective Now         04/02/23 1137    04/02/23 0937  Inpatient IV Team Consult PICC 1 Lumen  Once        Provider:  (Not yet assigned)    04/02/23 0936    04/02/23 0937  RN To Release PICC Line Care Orders Once Line in Place  Once         04/02/23 0936    04/02/23 0700  insulin glargine (LANTUS, SEMGLEE) injection 15 Units  Every Morning         04/01/23 1259    04/02/23 0610  POC Glucose Once  PROCEDURE ONCE         04/02/23 0606    04/02/23 0600  Basic Metabolic Panel  Daily       04/01/23 1026    04/01/23 2300  POC Glucose Once  PROCEDURE ONCE         04/01/23 2252    04/01/23 2055  POC Glucose Once  PROCEDURE ONCE         04/01/23 2053    04/01/23 1800  insulin lispro (ADMELOG) injection 6 Units  3 Times Daily With Meals         04/01/23 1259    04/01/23 1528  POC Glucose Once  PROCEDURE ONCE         04/01/23 1526    04/01/23 1049  Wound Care  As Needed,   Status:  Canceled       04/01/23 1050    04/01/23 0900  heparin (porcine) 5000 UNIT/ML injection 5,000 Units  Every 12 Hours Scheduled         03/31/23 1520    04/01/23 0800  Ambulate Patient  2 Times Daily      Comments: Wearing [employer] shoes, the patient may be up ambulating    03/31/23 1520    03/31/23 2100  vancomycin (VANCOCIN) 1000 mg/200 mL dextrose 5% IVPB  Every 12 Hours,   Status:  Discontinued         03/31/23 1753    03/31/23 1800  Up in Chair  2 Times Daily       03/31/23 1520    03/31/23 1800  cefTRIAXone (ROCEPHIN) 2 g in sodium chloride 0.9 % 100 mL IVPB-VTB  Every 24 Hours         03/31/23 1534    03/31/23 1600  Incentive Spirometry  Every 2 Hours While  Awake       03/31/23 1520    03/31/23 1533  Pharmacy to dose vancomycin  Continuous PRN,   Status:  Discontinued         03/31/23 1534    03/31/23 1522  ipratropium-albuterol (DUO-NEB) nebulizer solution 3 mL  Every 8 Hours - RT         03/31/23 1520    03/31/23 1519  HYDROcodone-acetaminophen (NORCO) 5-325 MG per tablet 1 tablet  Every 4 Hours PRN         03/31/23 1520    03/31/23 1517  nitroglycerin (NITROSTAT) SL tablet 0.4 mg  Every 5 Minutes PRN         03/31/23 1520    03/31/23 1517  Oxygen Therapy- Nasal Cannula; 2 LPM; Titrate for SPO2: 90%, Greater Than or Equal To  Continuous PRN,   Status:  Canceled      Comments: If Patient Develops Unresponsiveness, Acute Dyspnea, Cyanosis or Suspected Hypoxemia Start Continuous Pulse Ox Monitoring, Apply Oxygen & Notify Provider    03/31/23 1520    03/31/23 1516  ondansetron (ZOFRAN) tablet 4 mg  Every 6 Hours PRN        See Hyperspace for full Linked Orders Report.    03/31/23 1520    03/31/23 1516  ondansetron (ZOFRAN) injection 4 mg  Every 6 Hours PRN        See Hyperspace for full Linked Orders Report.    03/31/23 1520    03/31/23 1307  lactated ringers infusion  Continuous,   Status:  Discontinued         03/31/23 1305    03/31/23 0900  amLODIPine (NORVASC) tablet 10 mg  Every 24 Hours Scheduled         03/30/23 1611    03/31/23 0900  aspirin EC tablet 81 mg  Daily         03/30/23 1611    03/31/23 0900  hydroCHLOROthiazide (HYDRODIURIL) tablet 25 mg  Daily         03/30/23 1611    03/31/23 0900  lisinopril (PRINIVIL,ZESTRIL) tablet 20 mg  Daily         03/30/23 1611    03/31/23 0900  clopidogrel (PLAVIX) tablet 75 mg  Daily         03/30/23 1823    03/30/23 2200  gabapentin (NEURONTIN) capsule 800 mg  Every 8 Hours Scheduled         03/30/23 1611    03/30/23 2100  dorzolamide (TRUSOPT) 2 % 1 drop, timolol (TIMOPTIC) 0.5 % 1 drop for Cosopt 22.3-6.8 mg/mL  2 Times Daily         03/30/23 1611    03/30/23 2100  levETIRAcetam (KEPPRA) tablet 500 mg  2 Times Daily          03/30/23 1611 03/30/23 2100  prednisoLONE acetate (PRED FORTE) 1 % ophthalmic suspension 1 drop  3 Times Daily         03/30/23 1611 03/30/23 2100  rosuvastatin (CRESTOR) tablet 10 mg  Nightly         03/30/23 1611 03/30/23 2000  Vital Signs  Every 4 Hours      Comments: Per per hospital policy    03/30/23 1613    03/30/23 1800  Oral Care  2 Times Daily       03/30/23 1613    03/30/23 1800  Incentive Spirometry  Every 4 Hours While Awake,   Status:  Canceled       03/30/23 1613    03/30/23 1730  insulin lispro (ADMELOG) injection 0-14 Units  3 Times Daily Before Meals         03/30/23 1612    03/30/23 1700  POC Glucose TID AC  3 Times Daily Before Meals       03/30/23 1612 03/30/23 1700  Strict Intake & Output  Every Hour,   Status:  Canceled       03/30/23 1613    03/30/23 1614  Daily Weights  Daily       03/30/23 1613    03/30/23 1612  ondansetron (ZOFRAN) tablet 4 mg  Every 6 Hours PRN        See Hyperspace for full Linked Orders Report.    03/30/23 1613    03/30/23 1612  ondansetron (ZOFRAN) injection 4 mg  Every 6 Hours PRN        See Hyperspace for full Linked Orders Report.    03/30/23 1613    03/30/23 1612  acetaminophen (TYLENOL) tablet 650 mg  Every 4 Hours PRN        See Hyperspace for full Linked Orders Report.    03/30/23 1613    03/30/23 1612  acetaminophen (TYLENOL) 160 MG/5ML solution 650 mg  Every 4 Hours PRN        See Hyperspace for full Linked Orders Report.    03/30/23 1613    03/30/23 1612  acetaminophen (TYLENOL) suppository 650 mg  Every 4 Hours PRN        See Hyperspace for full Linked Orders Report.    03/30/23 1613    03/30/23 1611  glucagon (GLUCAGEN) injection 1 mg  Every 15 Minutes PRN         03/30/23 1612    03/30/23 1611  dextrose (GLUTOSE) oral gel 15 g  Every 15 Minutes PRN         03/30/23 1612    03/30/23 1611  dextrose (D50W) (25 g/50 mL) IV injection 25 g  Every 15 Minutes PRN         03/30/23 1612    03/30/23 1611  HYDROcodone-acetaminophen (NORCO)  MG  per tablet 1 tablet  Every 6 Hours PRN         23 1611    Unscheduled  Follow Hypoglycemia Standing Orders For Blood Glucose <70 & Notify Provider of Treatment  As Needed      Comments: Follow Hypoglycemia Orders As Outlined in Process Instructions (Open Order Report to View Full Instructions)  Notify Provider Any Time Hypoglycemia Treatment is Administered    23 1612    Unscheduled  Up with assistance  As Needed       23 1613    Unscheduled  Telemetry - Continuous Pulse Oximetry  Continuous PRN      Comments: If Patient Develops Unresponsiveness, Acute Dyspnea, Cyanosis or Suspected Hypoxemia Start Continuous Pulse Ox Monitoring, Apply Oxygen & Notify Provider    23 1520    Unscheduled  Potassium  As Needed      Comments: For Sudden Ventricular Dysrhythmia      23 1520    Unscheduled  Magnesium  As Needed      Comments: For Sudden Ventricular Dysrhythmia      23 1520    Unscheduled  Digoxin Level  As Needed      Comments: For Atrial Dysrhythmia      23 1520    Unscheduled  Blood Gas, Arterial -  As Needed      Comments: Per O2 Policy      23 1520    Unscheduled  PICC LINE CARE - Change Dressing As Needed When Damp, Loose or Soiled  As Needed       23 1625    Unscheduled  Pulse / Turbulent Flush With 20 mL NS After Each Blood Specimen Obtained From PICC Line  As Needed       23 1626    Unscheduled  For Sluggish / Occluded Line, Use CATHFLO Activase Per Protocol (IV Nurse or Oncology Nurse With Order Only)  As Needed       23 1626    --  SCANNED - TELEMETRY           23 0000                 Physician Progress Notes (last 48 hours)      Yane Marie APRN at 23 1305              Name: Kelby Peters ADMIT: 3/30/2023   : 1950  PCP: Susy Germain APRN    MRN: [id number] LOS: 4 days   AGE/SEX: 72 y.o. male  ROOM: [facility]/     Subjective   Subjective   Afebrile. Pain controlled. BM on Friday.     Review of Systems    Constitutional: Negative for fever.   HENT: Negative for congestion.    Respiratory: Negative for shortness of breath.    Cardiovascular: Negative for chest pain.   Gastrointestinal: Negative for abdominal pain and nausea.   Genitourinary: Negative for difficulty urinating.   Musculoskeletal: Negative for arthralgias.   Skin: Positive for wound.   Neurological: Negative for headaches.   Psychiatric/Behavioral: Negative for sleep disturbance.       Objective   Objective   Vital Signs  Temp:  [97.4 °F (36.3 °C)-98.7 °F (37.1 °C)] 97.7 °F (36.5 °C)  Heart Rate:  [53-73] 67  Resp:  [16] 16  BP: (142-177)/(69-87) 151/73  SpO2:  [94 %-99 %] 94 %  on  Flow (L/min):  [2-3.5] 2;   Device (Oxygen Therapy): room air  Body mass index is 30.77 kg/m².  Physical Exam  Vitals and nursing note reviewed.   Constitutional:       General: He is not in acute distress.     Appearance: He is ill-appearing. He is not toxic-appearing.   HENT:      Head: Normocephalic.      Mouth/Throat:      Mouth: Mucous membranes are moist.   Eyes:      Conjunctiva/sclera: Conjunctivae normal.   Cardiovascular:      Rate and Rhythm: Normal rate and regular rhythm.   Pulmonary:      Effort: Pulmonary effort is normal. No respiratory distress.      Breath sounds: No wheezing or rales.   Abdominal:      General: Bowel sounds are normal.      Palpations: Abdomen is soft.   Musculoskeletal:      Cervical back: Neck supple.      Right lower leg: No edema.      Left lower leg: No edema.   Skin:     General: Skin is warm and dry.      Comments: Dressings CDI chetan feet   Neurological:      Mental Status: He is alert and oriented to person, place, and time.   Psychiatric:         Mood and Affect: Mood normal.         Behavior: Behavior normal.       Results Review     I reviewed the patient's new clinical results.  Results from last 7 days   Lab Units 04/03/23  0617 04/01/23  0805 03/31/23  0759 03/30/23  1529   WBC 10*3/mm3 6.74 9.87 8.94 10.56   HEMOGLOBIN g/dL  11.0* 10.2* 10.3* 9.7*   PLATELETS 10*3/mm3 494* 432 375 323     Results from last 7 days   Lab Units 04/03/23  0617 04/02/23  0606 04/01/23  0805 03/31/23  0759   SODIUM mmol/L 141 139 133* 137   POTASSIUM mmol/L 4.0 3.7 3.9 3.7   CHLORIDE mmol/L 96* 97* 93* 96*   CO2 mmol/L 29.5* 32.0* 27.5 30.3*   BUN mg/dL 15 16 20 13   CREATININE mg/dL 0.68* 0.70* 0.72* 0.66*   GLUCOSE mg/dL 175* 166* 238* 149*   EGFR mL/min/1.73 98.8 97.9 97.1 99.7     Results from last 7 days   Lab Units 03/30/23  1529   ALBUMIN g/dL 3.4*   BILIRUBIN mg/dL 0.2   ALK PHOS U/L 59   AST (SGOT) U/L 11   ALT (SGPT) U/L 7     Results from last 7 days   Lab Units 04/03/23  0617 04/02/23  0606 04/01/23  0805 03/31/23  0759 03/30/23  1529   CALCIUM mg/dL 9.5 8.9 8.7 8.9 9.1   ALBUMIN g/dL  --   --   --   --  3.4*     Results from last 7 days   Lab Units 03/30/23  1529   PROCALCITONIN ng/mL 0.08     Glucose   Date/Time Value Ref Range Status   04/03/2023 1051 233 (H) 70 - 130 mg/dL Final     Comment:     Meter: [id number] : [id number] Ced Dominique    04/03/2023 0615 181 (H) 70 - 130 mg/dL Final     Comment:     Meter: [id number] : [id number] Shaq Otilia NA   04/02/2023 2026 157 (H) 70 - 130 mg/dL Final     Comment:     Meter: [id number] : [id number] Shaq Otilia NA   04/02/2023 1512 111 70 - 130 mg/dL Final     Comment:     Meter: [id number] : [id number] Valente Oliva    04/02/2023 1137 217 (H) 70 - 130 mg/dL Final     Comment:     Meter: [id number] : [id number] Valente Oliva NA   04/02/2023 0606 162 (H) 70 - 130 mg/dL Final     Comment:     Meter: [id number] : [id number] Shaq Bingham NA   04/01/2023 2252 159 (H) 70 - 130 mg/dL Final     Comment:     Meter: [id number] : [id number] Johan Keene RN       No radiology results for the last day  I have personally reviewed all medications:  Scheduled Medications  amLODIPine, 10 mg, Oral, Q24H  aspirin, 81 mg, Oral, Daily  cefTRIAXone, 2 g, Intravenous,  Q24H  clopidogrel, 75 mg, Oral, Daily  dorzolamide-timolol, , Both Eyes, BID  gabapentin, 800 mg, Oral, Q8H  heparin (porcine), 5,000 Units, Subcutaneous, Q12H  hydroCHLOROthiazide, 25 mg, Oral, Daily  insulin glargine, 15 Units, Subcutaneous, QAM  insulin lispro, 0-14 Units, Subcutaneous, TID AC  insulin lispro, 6 Units, Subcutaneous, TID With Meals  levETIRAcetam, 500 mg, Oral, BID  lisinopril, 20 mg, Oral, Daily  prednisoLONE acetate, 1 drop, Both Eyes, TID  rosuvastatin, 10 mg, Oral, Nightly  sodium chloride, 10 mL, Intravenous, Q12H    Infusions   Diet  Diet: Regular/House Diet; Texture: Regular Texture (IDDSI 7); Fluid Consistency: Thin (IDDSI 0)    I have personally reviewed:  [x]  Laboratory   [x]  Microbiology   [x]  Radiology   [x]  EKG/Telemetry  [x]  Cardiology/Vascular   [x]  Pathology    [x]  Records      Assessment/Plan     Active Hospital Problems    Diagnosis  POA   • **Diabetic foot ulcer [E11.621, L97.509]  Yes   • Chronic osteomyelitis of foot [M86.679]  Yes   • Diabetes mellitus with nephropathy [E11.21]  Yes   • Type 2 diabetes mellitus with diabetic polyneuropathy [E11.42]  Yes   • Peripheral vascular disease [I73.9]  Yes   • History of seizures [Z87.898]  Yes   • Diabetic peripheral neuropathy (HCC) [E11.42]  Yes   • Hypertension [I10]  Yes   • Chronic pain [G89.29]  Yes      Resolved Hospital Problems   No resolved problems to display.       72 y.o. male admitted with Diabetic foot ulcer.    Mr. Peters is a 72 y.o. male with a history of chronic osteomyelitis chetan feet, MSSA & strep wound infections, PVD, DM w/neuropathy & nephropathy, Sleep apnea, seizures, HTN who is admitted for diabetic foot ulcers     Diabetic foot ulcers/Chronic OM/PVD:  -Vascular surgery and ID following  -MRI with osteo of the left great toe and right little toe. S/p amputation of metatarsal heads.   -BC NGTD. Surgical cultures group B strep  -Completed 6 weeks IV cefazolin 3/6/23   - now ID recommends rocephin 2g  q24hrs. Will need a 6 week course with stop date 23.       DM/Nephropathy/Neuropathy:  -Monitor BG trends. A1C 9.10%. Correctional scale insulin.  -home rx: 70/30 45 bid   -while in hospital: lantus 15 and humalog 6 tid, ssi. Sugars acceptable acutely      Chronic pain:  -Restart hydrocodone, gabapentin     Hx seizures:  -Keppra     HTN:  -Followed by Nephrology outpatient. Continue  Lisinopril 20mg, hctz 25 mg, amlodipine 10 mg    PT eval when off loading shoes received then will be allowed to ambulate; bedrest until then       · Heparin SC for DVT prophylaxis.  · Full code.  · Discussed with patient, spouse and nursing staff.  · Anticipate discharge home with home health in 1-2 days.      ABEBA Gan  [facility]  23  13:05 EDT        Electronically signed by Yane Marie APRN at 23 1312     Sammy Allison MD at 23 0953          Name: Kelby Peters ADMIT: 3/30/2023   : 1950  PCP: Susy Germain APRN    MRN: [id number] LOS: 4 days   AGE/SEX: 72 y.o. male  ROOM: E561/1   [facility]    [place], Post Op Global    72 y.o. 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  Pain control adequate, in context of diabetic neuropathy.  No postoperative nausea or vomiting.    Scheduled Medications:   amLODIPine, 10 mg, Oral, Q24H  aspirin, 81 mg, Oral, Daily  cefTRIAXone, 2 g, Intravenous, Q24H  clopidogrel, 75 mg, Oral, Daily  dorzolamide-timolol, , Both Eyes, BID  gabapentin, 800 mg, Oral, Q8H  heparin (porcine), 5,000 Units, Subcutaneous, Q12H  hydroCHLOROthiazide, 25 mg, Oral, Daily  insulin glargine, 15 Units, Subcutaneous, QAM  insulin lispro, 0-14 Units, Subcutaneous, TID AC  insulin lispro, 6 Units, Subcutaneous, TID With Meals  levETIRAcetam, 500 mg, Oral, BID  lisinopril, 20 mg, Oral, Daily  prednisoLONE acetate, 1 drop, Both Eyes,  TID  rosuvastatin, 10 mg, Oral, Nightly  sodium chloride, 10 mL, Intravenous, Q12H    Active Infusions:  lactated ringers, 9 mL/hr, Last Rate: 9 mL/hr (03/31/23 1314)    Vital Signs  Vital Signs Patient Vitals for the past 24 hrs:   BP Temp Temp src Pulse Resp SpO2 Weight   04/03/23 0818 168/87 -- -- 73 -- 95 % --   04/03/23 0514 -- -- -- -- -- -- 89.1 kg (196 lb 8 oz)   04/02/23 2258 160/73 97.7 °F (36.5 °C) Axillary 53 16 97 % --   04/02/23 1900 142/69 97.4 °F (36.3 °C) Axillary 60 16 99 % --   04/02/23 1500 177/87 98.7 °F (37.1 °C) Axillary -- 16 -- --   04/02/23 1100 (!) 189/96 98.7 °F (37.1 °C) Axillary -- 16 -- --     Physical Exam:    Physical Exam     CBC    Results from last 7 days   Lab Units 04/03/23  0617 04/01/23  0805 03/31/23  0759 03/30/23  1529   WBC 10*3/mm3 6.74 9.87 8.94 10.56   HEMOGLOBIN g/dL 11.0* 10.2* 10.3* 9.7*   PLATELETS 10*3/mm3 494* 432 375 323     BMP   Results from last 7 days   Lab Units 04/03/23  0617 04/02/23  0606 04/01/23  0805 03/31/23  0759 03/30/23  1529   SODIUM mmol/L 141 139 133* 137 133*   POTASSIUM mmol/L 4.0 3.7 3.9 3.7 3.5   CHLORIDE mmol/L 96* 97* 93* 96* 92*   CO2 mmol/L 29.5* 32.0* 27.5 30.3* 30.9*   BUN mg/dL 15 16 20 13 20   CREATININE mg/dL 0.68* 0.70* 0.72* 0.66* 0.78   GLUCOSE mg/dL 175* 166* 238* 149* 193*     Cr Clearance Estimated Creatinine Clearance: 104.6 mL/min (A) (by C-G formula based on SCr of 0.68 mg/dL (L)).  Infection   Results from last 7 days   Lab Units 03/30/23  1530 03/30/23  1529   BLOODCX  No growth at 3 days No growth at 3 days   PROCALCITONIN ng/mL  --  0.08     Assessment & Plan   Assessment & Plan    Diabetic foot ulcer    Chronic pain    Hypertension    Diabetic peripheral neuropathy (HCC)    History of seizures    Peripheral vascular disease    Type 2 diabetes mellitus with diabetic polyneuropathy    Chronic osteomyelitis of foot    Diabetes mellitus with nephropathy    72 y.o. male with diabetes and history of gangrene of the right  fifth and left great toes, post open amputation.  Has previously been revascularized and has undergone arterial testing documenting satisfactory perfusion.  Will transition to Dakin's solution dressings and provide Darco shoes for offloading bilaterally.  Then can ambulate.  Home anytime with outpatient follow-up with Dr. Alvarado from surgical perspective.    Sammy Allison MD  23  09:53 EDT    Please call my office with any question: [phone]    Active Hospital Problems    Diagnosis  POA   • **Diabetic foot ulcer [E11.621, L97.509]  Yes   • Chronic osteomyelitis of foot [M86.679]  Yes   • Diabetes mellitus with nephropathy [E11.21]  Yes   • Type 2 diabetes mellitus with diabetic polyneuropathy [E11.42]  Yes   • Peripheral vascular disease [I73.9]  Yes   • History of seizures [Z87.898]  Yes   • Diabetic peripheral neuropathy (HCC) [E11.42]  Yes   • Hypertension [I10]  Yes   • Chronic pain [G89.29]  Yes      Resolved Hospital Problems   No resolved problems to display.           Electronically signed by Sammy Allison MD at 23 1000     Don Lyles MD at 23 1501              Name: Kelby Peters ADMIT: 3/30/2023   : 1950  PCP: Susy eGrmain APRN    MRN: [id number] LOS: 3 days   AGE/SEX: 72 y.o. male  ROOM: [facility]     Subjective   Subjective   Examined at bedside. No new complaints.       Objective   Objective   Vital Signs  Temp:  [96.7 °F (35.9 °C)-98.7 °F (37.1 °C)] 98.7 °F (37.1 °C)  Heart Rate:  [57-59] 59  Resp:  [16] 16  BP: (124-189)/(64-96) 189/96  SpO2:  [96 %-99 %] 99 %  on  Flow (L/min):  [2] 2;   Device (Oxygen Therapy): nasal cannula  Body mass index is 31.6 kg/m².  Physical Exam  Constitutional:       Appearance: Normal appearance.   HENT:      Head: Normocephalic and atraumatic.   Eyes:      Extraocular Movements: Extraocular movements intact.      Pupils: Pupils are equal, round, and reactive to light.   Cardiovascular:      Rate and Rhythm: Normal rate and  regular rhythm.   Pulmonary:      Effort: Pulmonary effort is normal. No respiratory distress.   Abdominal:      General: There is no distension.      Palpations: Abdomen is soft.      Tenderness: There is no abdominal tenderness.   Musculoskeletal:      Right lower leg: No edema.      Comments: Bilateral feet bandaged.    Neurological:      Mental Status: He is alert and oriented to person, place, and time.         Results Review     I reviewed the patient's new clinical results.  Results from last 7 days   Lab Units 04/01/23  0805 03/31/23  0759 03/30/23  1529   WBC 10*3/mm3 9.87 8.94 10.56   HEMOGLOBIN g/dL 10.2* 10.3* 9.7*   PLATELETS 10*3/mm3 432 375 323     Results from last 7 days   Lab Units 04/02/23  0606 04/01/23  0805 03/31/23  0759 03/30/23  1529   SODIUM mmol/L 139 133* 137 133*   POTASSIUM mmol/L 3.7 3.9 3.7 3.5   CHLORIDE mmol/L 97* 93* 96* 92*   CO2 mmol/L 32.0* 27.5 30.3* 30.9*   BUN mg/dL 16 20 13 20   CREATININE mg/dL 0.70* 0.72* 0.66* 0.78   GLUCOSE mg/dL 166* 238* 149* 193*   Estimated Creatinine Clearance: 102.9 mL/min (A) (by C-G formula based on SCr of 0.7 mg/dL (L)).  Results from last 7 days   Lab Units 03/30/23  1529   ALBUMIN g/dL 3.4*   BILIRUBIN mg/dL 0.2   ALK PHOS U/L 59   AST (SGOT) U/L 11   ALT (SGPT) U/L 7     Results from last 7 days   Lab Units 04/02/23  0606 04/01/23  0805 03/31/23  0759 03/30/23  1529   CALCIUM mg/dL 8.9 8.7 8.9 9.1   ALBUMIN g/dL  --   --   --  3.4*     Results from last 7 days   Lab Units 03/30/23  1529   PROCALCITONIN ng/mL 0.08   No results found for: COVID19  Hemoglobin A1C   Date/Time Value Ref Range Status   03/31/2023 0759 7.70 (H) 4.80 - 5.60 % Final     Glucose   Date/Time Value Ref Range Status   04/02/2023 1137 217 (H) 70 - 130 mg/dL Final     Comment:     Meter: [id number] : [id number] Valente ALCARAZ   04/02/2023 0606 162 (H) 70 - 130 mg/dL Final     Comment:     Meter: [id number] : [id number] Shaq ALCARAZ   04/01/2023 2252 159 (H)  70 - 130 mg/dL Final     Comment:     Meter: [id number] : [id number] Johan Keene RN   04/01/2023 2053 86 70 - 130 mg/dL Final     Comment:     Meter: [id number] : [id number] Shaq Bingham NA   04/01/2023 1526 212 (H) 70 - 130 mg/dL Final     Comment:     Meter: [id number] : [id number] Valente Oliva NA   04/01/2023 1115 246 (H) 70 - 130 mg/dL Final     Comment:     Meter: [id number] : [id number] Valente Oliva NA   04/01/2023 0608 250 (H) 70 - 130 mg/dL Final     Comment:     Meter: [id number] : [id number] Kisha ALCARAZ       MRI Foot Right With & Without Contrast  Narrative: MRI right MID and forefoot with and without contrast     HISTORY: Open wound. Evaluate osteomyelitis.     TECHNIQUE: MRI right MID and forefoot was performed before and after the  IV administration of 20 mL MultiHance contrast. Left foot MRI was  performed today and is reported separately. Correlation with MRI  01/16/2023.     FINDINGS: The lateral forefoot soft tissue wound appears larger today  with apparent exposed bone along the lateral base of the fifth toe  proximal phalanx and the lateral edge of the fifth metatarsal head. More  extensive abnormal bone marrow edema and enhancement is observed along  the distal approximately 20 mm of the fifth metatarsal head and in the  proximal phalanx. There is abnormal synovial enhancement at the fifth  metatarsophalangeal joint with mild soft tissue enhancement around the  metatarsal head. There is also abnormal enhancement of the soft tissue  throughout the midline and lateral portion of the mid and forefoot.     Marrow signal elsewhere throughout the mid and forefoot is normal. The  Lisfranc ligament complex is intact. There is no neuropathic change in  the midfoot. First through fourth metatarsophalangeal joints and  interphalangeal joints are preserved.     Foot musculature is atrophic.     Impression: Open wound along lateral side of the right forefoot with  exposed  bone and MRI findings of osteomyelitis in the toe proximal  phalanx and the fifth metatarsal head and neck. The abnormal marrow  signal is more extensive today than on the MRI from earlier this year.     This report was finalized on 3/31/2023 12:03 AM by Dr. Mike Cat M.D.     MRI Foot Left With & Without Contrast  Narrative: MRI LEFT MID AND FOREFOOT WITH AND WITHOUT CONTRAST     HISTORY: Previous third toe amputation. Evaluate osteomyelitis. Open  wounds.     TECHNIQUE: MRI left mid and forefoot is correlated with left foot MRI  01/05/2023 and right foot MRI performed tonight reported separately. 20  mL of MultiHance contrast was given.     FINDINGS: There is an open wound along the plantar side of the foot  superficial to the medial great toe sesamoid which appears deformed and  demonstrates diffuse abnormal marrow signal. There is abnormal synovial  enhancement and fluid at the first MTP joint with abnormal bone marrow  edema in the distal 3 cm of the first metatarsal and most of the great  toe proximal phalanx.     There has been interval third toe amputation. There also is an open  wound superficial to the fifth metatarsal head dorsolaterally with some  adjacent soft tissue edema and enhancement. No marrow signal abnormality  is present in the fifth or the third metatarsals or in the remaining  second, fourth, or fifth toes.     The Lisfranc ligament complex is intact. There is mild degenerative  change between the metatarsal bases but no midfoot neuropathic  arthropathy.     The flexor hallucis longus tendon is torn and retracted to about the  level of the sesamoids. There is some tendon sheath synovial thickening  and enhancement. This tendon sheath extends across the open wound. On  the sagittal images, no fluid is seen tracking proximally.     Foot musculature is atrophic.     Impression: Open wound along the plantar side of the medial left  forefoot with osteomyelitis in the medial great toe  sesamoid, first  metatarsal head and neck and proximal phalanx of the great toe. The  flexor hallucis longus tendon is torn with the tendon sheath extending  across the open wound and some abnormal synovitis along the tendon  sheath to the level of the metatarsal base.     Additionally there has been third toe amputation and there is no bone  wound along the lateral side of the foot superficial to the fifth  metatarsal head. However, there is no evidence of osteomyelitis in the  third or the fifth metatarsals.     This report was finalized on 3/31/2023 12:02 AM by Dr. Mike Cat M.D.       Scheduled Medications  amLODIPine, 10 mg, Oral, Q24H  aspirin, 81 mg, Oral, Daily  cefTRIAXone, 2 g, Intravenous, Q24H  clopidogrel, 75 mg, Oral, Daily  dorzolamide-timolol, , Both Eyes, BID  gabapentin, 800 mg, Oral, Q8H  heparin (porcine), 5,000 Units, Subcutaneous, Q12H  hydroCHLOROthiazide, 25 mg, Oral, Daily  insulin glargine, 15 Units, Subcutaneous, QAM  insulin lispro, 0-14 Units, Subcutaneous, TID AC  insulin lispro, 6 Units, Subcutaneous, TID With Meals  levETIRAcetam, 500 mg, Oral, BID  lisinopril, 20 mg, Oral, Daily  prednisoLONE acetate, 1 drop, Both Eyes, TID  rosuvastatin, 10 mg, Oral, Nightly    Infusions  lactated ringers, 9 mL/hr, Last Rate: 9 mL/hr (03/31/23 1314)    Diet  Diet: Regular/House Diet; Texture: Regular Texture (IDDSI 7); Fluid Consistency: Thin (IDDSI 0)      Assessment/Plan     Active Hospital Problems    Diagnosis  POA   • **Diabetic foot ulcer [E11.621, L97.509]  Yes   • Chronic osteomyelitis of foot [M86.679]  Yes   • Diabetes mellitus with nephropathy [E11.21]  Yes   • Type 2 diabetes mellitus with diabetic polyneuropathy [E11.42]  Yes   • Peripheral vascular disease [I73.9]  Yes   • History of seizures [Z87.898]  Yes   • Diabetic peripheral neuropathy (HCC) [E11.42]  Yes   • Hypertension [I10]  Yes   • Chronic pain [G89.29]  Yes      Resolved Hospital Problems   No resolved problems to  display.       72 y.o. male admitted with Diabetic foot ulcer.  Mr. Peters is a 72 y.o. male with a history of chronic osteomyelitis chetan feet, MSSA & strep wound infections, PVD, DM w/neuropathy & nephropathy, Sleep apnea, seizures, HTN who is admitted for diabetic foot ulcers     Diabetic foot ulcers/Chronic OM/PVD:  -Vascular surgery and ID consulted.  -MRI with osteo of the left great toe and right little toe. S/p amputation of metatarsal heads.   -Check blood cultures  -procal normal, CRP 10.54  -Completed 6 weeks IV cefazolin 3/6/23  - now only on rocephin 2g q24hrs. Will need a 6 week course with stop date 23.      DM/Nephropathy/Neuropathy:  -Monitor BG trends. A1C 9.10%. Correctional scale insulin.  -home rx: 70/30 45 bid   -while in hospital: lantus 15 and humalog 6 tid, ssi. Sugars acceptable acutely     Chronic pain:  -Restart hydrocodone, gabapentin     Hx seizures:  -Keppra     HTN:  -Followed by Nephrology.on Lisinopril 20mgg, hctz 25    SCDs for dvt ppx  Full code           Don Lyles MD  [facility]  23  15:02 EDT          Electronically signed by Don Lyles MD at 23 1511     Max Winter II, MD at 23 1414          Name: Kelby Peters ADMIT: 3/30/2023   : 1950  PCP: Susy Germain APRN    MRN: [id number] LOS: 3 days   AGE/SEX: 72 y.o. male  ROOM:  [place] [id number]/     CC: Postop day 2 status post right fifth ray and left first ray amputation  Interval History: No acute events overnight.  Patient doing well this morning.  Multiple questions about discharge and DME for home.  Subjective   Subjective     Review of Systems  Objective   Objective     Vitals:   Temp:  [96.7 °F (35.9 °C)-98.7 °F (37.1 °C)] 98.7 °F (37.1 °C)  Heart Rate:  [57-59] 59  Resp:  [16] 16  BP: (124-189)/(64-96) 189/96    I/O this shift:  In: 240 [P.O.:240]  Out: -     Scheduled Meds:     amLODIPine, 10 mg, Oral, Q24H  aspirin, 81 mg, Oral, Daily  cefTRIAXone, 2 g,  Intravenous, Q24H  clopidogrel, 75 mg, Oral, Daily  dorzolamide-timolol, , Both Eyes, BID  gabapentin, 800 mg, Oral, Q8H  heparin (porcine), 5,000 Units, Subcutaneous, Q12H  hydroCHLOROthiazide, 25 mg, Oral, Daily  insulin glargine, 15 Units, Subcutaneous, QAM  insulin lispro, 0-14 Units, Subcutaneous, TID AC  insulin lispro, 6 Units, Subcutaneous, TID With Meals  ipratropium-albuterol, 3 mL, Nebulization, Q8H - RT  levETIRAcetam, 500 mg, Oral, BID  lisinopril, 20 mg, Oral, Daily  prednisoLONE acetate, 1 drop, Both Eyes, TID  rosuvastatin, 10 mg, Oral, Nightly      IV Meds:   lactated ringers, 9 mL/hr, Last Rate: 9 mL/hr (03/31/23 1314)        Physical Exam   NAD  RRR  Respirations unlabored  Right fifth ray amputation site clean base with no evident purulence or ongoing gross infection, surrounding skin with little to no cellulitis.   Left first ray amputation site clean base with no evident purulence or ongoing gross infection, surrounding skin with minimal cellulitis    Data Reviewed:  CBC    Results from last 7 days   Lab Units 04/01/23  0805 03/31/23  0759 03/30/23  1529   WBC 10*3/mm3 9.87 8.94 10.56   HEMOGLOBIN g/dL 10.2* 10.3* 9.7*   PLATELETS 10*3/mm3 432 375 323     BMP   Results from last 7 days   Lab Units 04/02/23  0606 04/01/23  0805 03/31/23  0759 03/30/23  1529   SODIUM mmol/L 139 133* 137 133*   POTASSIUM mmol/L 3.7 3.9 3.7 3.5   CHLORIDE mmol/L 97* 93* 96* 92*   CO2 mmol/L 32.0* 27.5 30.3* 30.9*   BUN mg/dL 16 20 13 20   CREATININE mg/dL 0.70* 0.72* 0.66* 0.78   GLUCOSE mg/dL 166* 238* 149* 193*     Cr Clearance Estimated Creatinine Clearance: 102.9 mL/min (A) (by C-G formula based on SCr of 0.7 mg/dL (L)).  Coag     HbA1C   Lab Results   Component Value Date    HGBA1C 7.70 (H) 03/31/2023    HGBA1C 9.10 (H) 01/16/2023    HGBA1C 10.40 (H) 12/07/2022     Blood Glucose   Glucose   Date/Time Value Ref Range Status   04/02/2023 1137 217 (H) 70 - 130 mg/dL Final     Comment:     Meter: [id number]  : [id number] Valente Oliva NA   04/02/2023 0606 162 (H) 70 - 130 mg/dL Final     Comment:     Meter: [id number] : [id number] Shaq Bingham NA   04/01/2023 2252 159 (H) 70 - 130 mg/dL Final     Comment:     Meter: [id number] : [id number] Johan Keene RAJESH   04/01/2023 2053 86 70 - 130 mg/dL Final     Comment:     Meter: [id number] : [id number] Shaq Bingham NA   04/01/2023 1526 212 (H) 70 - 130 mg/dL Final     Comment:     Meter: [id number] : [id number] Valente Oliva NA   04/01/2023 1115 246 (H) 70 - 130 mg/dL Final     Comment:     Meter: [id number] : [id number] Valente Oliva NA   04/01/2023 0608 250 (H) 70 - 130 mg/dL Final     Comment:     Meter: [id number] : [id number] Kisha Riley NA   03/31/2023 2318 278 (H) 70 - 130 mg/dL Final     Comment:     Meter: [id number] : [id number] Kisha Riley NA     Infection   Results from last 7 days   Lab Units 03/30/23  1530 03/30/23  1529   BLOODCX  No growth at 2 days No growth at 2 days   PROCALCITONIN ng/mL  --  0.08     CMP   Results from last 7 days   Lab Units 04/02/23  0606 04/01/23  0805 03/31/23  0759 03/30/23  1529   SODIUM mmol/L 139 133* 137 133*   POTASSIUM mmol/L 3.7 3.9 3.7 3.5   CHLORIDE mmol/L 97* 93* 96* 92*   CO2 mmol/L 32.0* 27.5 30.3* 30.9*   BUN mg/dL 16 20 13 20   CREATININE mg/dL 0.70* 0.72* 0.66* 0.78   GLUCOSE mg/dL 166* 238* 149* 193*   ALBUMIN g/dL  --   --   --  3.4*   BILIRUBIN mg/dL  --   --   --  0.2   ALK PHOS U/L  --   --   --  59   AST (SGOT) U/L  --   --   --  11   ALT (SGPT) U/L  --   --   --  7     ABG      Radiology(recent) No radiology results for the last day        Active Hospital Problems:   Active Hospital Problems    Diagnosis  POA   • **Diabetic foot ulcer [E11.621, L97.509]  Yes   • Chronic osteomyelitis of foot [M86.679]  Yes   • Diabetes mellitus with nephropathy [E11.21]  Yes   • Type 2 diabetes mellitus with diabetic polyneuropathy [E11.42]  Yes   • Peripheral vascular disease [I73.9]   Yes   • History of seizures [Z87.898]  Yes   • Diabetic peripheral neuropathy (HCC) [E11.42]  Yes   • Hypertension [I10]  Yes   • Chronic pain [G89.29]  Yes      Resolved Hospital Problems   No resolved problems to display.      Assessment & Plan   Billin, Post Op Global  Assessment / Plan     72-year-old gentleman with osteomyelitis of bilateral feet secondary to diabetic neuropathy status post right fifth toe amputation and left first toe amputation with resection of metatarsal heads bilaterally.  Both wounds continue to look healthy and improving  Continue daily Betadine dressing changes.  Nonweightbearing bilaterally for now.  Antibiotics per infectious disease, cultures pending currently.  Supportive care per primary team    Max Winter II, MD  23  14:14 EDT  Office Number [phone]                Electronically signed by Max Winter II, MD at 23 1413     Pedro Luis Villarreal MD at 23 1039          ID NOTE    CC: f/u osteomyelitis B feet due to GBS    Subj: No fever. Cx with GBS. Tolerating antibiotics w/o rash. D/w pt and his wife.     Medications:    Current Facility-Administered Medications:   •  acetaminophen (TYLENOL) tablet 650 mg, 650 mg, Oral, Q4H PRN, 650 mg at 23 1704 **OR** acetaminophen (TYLENOL) 160 MG/5ML solution 650 mg, 650 mg, Oral, Q4H PRN **OR** acetaminophen (TYLENOL) suppository 650 mg, 650 mg, Rectal, Q4H PRN, Jasbir Alvarado MD  •  amLODIPine (NORVASC) tablet 10 mg, 10 mg, Oral, Q24H, Jasbir Alvarado MD, 10 mg at 23 08  •  aspirin EC tablet 81 mg, 81 mg, Oral, Daily, Jasbir Alvarado MD, 81 mg at 23 08  •  cefTRIAXone (ROCEPHIN) 2 g in sodium chloride 0.9 % 100 mL IVPB-VTB, 2 g, Intravenous, Q24H, Pedro Luis Villarreal MD, Last Rate: 200 mL/hr at 23 1712, 2 g at 23 171  •  clopidogrel (PLAVIX) tablet 75 mg, 75 mg, Oral, Daily, Jasbir Alvarado MD, 75 mg at 23 0809  •  dextrose (D50W) (25 g/50 mL)  IV injection 25 g, 25 g, Intravenous, Q15 Min PRN, Jasbir Alvarado MD  •  dextrose (GLUTOSE) oral gel 15 g, 15 g, Oral, Q15 Min PRN, Jasbir Alvarado MD  •  dorzolamide (TRUSOPT) 2 % 1 drop, timolol (TIMOPTIC) 0.5 % 1 drop for Cosopt 22.3-6.8 mg/mL, , Both Eyes, BID, Jasbir Alvarado MD, Given at 04/02/23 0828  •  gabapentin (NEURONTIN) capsule 800 mg, 800 mg, Oral, Q8H, Jasbir Alvarado MD, 800 mg at 04/02/23 0608  •  glucagon (GLUCAGEN) injection 1 mg, 1 mg, Subcutaneous, Q15 Min PRN, Jasbir Alvarado MD  •  heparin (porcine) 5000 UNIT/ML injection 5,000 Units, 5,000 Units, Subcutaneous, Q12H, Jasbir Alvarado MD, 5,000 Units at 04/02/23 0828  •  hydroCHLOROthiazide (HYDRODIURIL) tablet 25 mg, 25 mg, Oral, Daily, Jasbir Alvarado MD, 25 mg at 04/02/23 0826  •  HYDROcodone-acetaminophen (NORCO)  MG per tablet 1 tablet, 1 tablet, Oral, Q6H PRN, Jasbir Alvarado MD, 1 tablet at 03/31/23 2326  •  HYDROcodone-acetaminophen (NORCO) 5-325 MG per tablet 1 tablet, 1 tablet, Oral, Q4H PRN, Jasbir Alvarado MD, 1 tablet at 04/02/23 0608  •  insulin glargine (LANTUS, SEMGLEE) injection 15 Units, 15 Units, Subcutaneous, QAM, Don Lyles MD, 15 Units at 04/02/23 0830  •  insulin lispro (ADMELOG) injection 0-14 Units, 0-14 Units, Subcutaneous, TID AC, Jasbir Alvarado MD, 3 Units at 04/02/23 0829  •  insulin lispro (ADMELOG) injection 6 Units, 6 Units, Subcutaneous, TID With Meals, Don Lyles MD, 6 Units at 04/02/23 0829  •  ipratropium-albuterol (DUO-NEB) nebulizer solution 3 mL, 3 mL, Nebulization, Q8H - RT, Jasbir Alvarado MD, 3 mL at 03/31/23 2315  •  lactated ringers infusion, 9 mL/hr, Intravenous, Continuous, Jasbir Alvarado MD, Last Rate: 9 mL/hr at 03/31/23 1314, Restarted at 03/31/23 1406  •  levETIRAcetam (KEPPRA) tablet 500 mg, 500 mg, Oral, BID, Jasbir Alvarado MD, 500 mg at 04/02/23 0826  •  lisinopril (PRINIVIL,ZESTRIL) tablet 20 mg, 20 mg, Oral, Daily, Jasbir Alvarado MD, 20 mg at 04/02/23 0826  •   nitroglycerin (NITROSTAT) SL tablet 0.4 mg, 0.4 mg, Sublingual, Q5 Min PRN, Jasbir Alvarado MD  •  ondansetron (ZOFRAN) tablet 4 mg, 4 mg, Oral, Q6H PRN **OR** ondansetron (ZOFRAN) injection 4 mg, 4 mg, Intravenous, Q6H PRN, Jasbir Alvarado MD, 4 mg at 03/31/23 0417  •  ondansetron (ZOFRAN) tablet 4 mg, 4 mg, Oral, Q6H PRN **OR** ondansetron (ZOFRAN) injection 4 mg, 4 mg, Intravenous, Q6H PRN, Jasbir Alvarado MD  •  prednisoLONE acetate (PRED FORTE) 1 % ophthalmic suspension 1 drop, 1 drop, Both Eyes, TID, Jasbir Alvarado MD, 1 drop at 04/02/23 0829  •  rosuvastatin (CRESTOR) tablet 10 mg, 10 mg, Oral, Nightly, Jasbir Alvarado MD, 10 mg at 04/01/23 2237      Objective   Vital Signs   Temp:  [96.7 °F (35.9 °C)-98.3 °F (36.8 °C)] 97.4 °F (36.3 °C)  Heart Rate:  [57-59] 59  Resp:  [16] 16  BP: (115-175)/(64-90) 175/90    Physical Exam:   General: awake, alert, NAD, very nice   Eyes: no scleral icterus  MSK: both feet bandaged  Neurological: Alert and oriented x 3  Psychiatric: Normal mood and affect     Labs:   BMP, VTr, blood and wound cultures reviewed today  Lab Results   Component Value Date    WBC 9.87 04/01/2023    HGB 10.2 (L) 04/01/2023    HCT 31.4 (L) 04/01/2023    MCV 87.0 04/01/2023     04/01/2023     Lab Results   Component Value Date    GLUCOSE 166 (H) 04/02/2023    CALCIUM 8.9 04/02/2023     04/02/2023    K 3.7 04/02/2023    CO2 32.0 (H) 04/02/2023    CL 97 (L) 04/02/2023    BUN 16 04/02/2023    CREATININE 0.70 (L) 04/02/2023    EGFRRESULT 95.9 12/07/2022    EGFR 97.9 04/02/2023    BCR 22.9 04/02/2023    ANIONGAP 10.0 04/02/2023     Lab Results   Component Value Date    ALT 7 03/30/2023     Lab Results   Component Value Date    CRP 7.11 (H) 04/01/2023     Lab Results   Component Value Date    HGBA1C 7.70 (H) 03/31/2023     Lab Results   Component Value Date    VANCOTROUGH 11.70 04/01/2023     HIV negative  Hep C negative    Microbiology:  3/30 BCx: NGTD  3/31 R Foot OR Cx: rare GBS  3/31  L Foot OR Cx: rare GBS      ASSESSMENT/PLAN:  1. Chronic osteomyelitis of bilateral feet due to Group B Strep  2. Uncontrolled DM2  3. History of Strep and MSSA infections  4. Peripheral vascular disease  5. Peripheral neuropathy  6. Uncontrolled DM2 - complicated by foot ulcer - A1c 9.1%  7.  Obesity BMI 32    He went to the OR on 3/31/23 for B foot debridements, R 5th toe amputation, and L great toe amputation. Cultures growing Group B Strep. Stop vancomycin. Continue ceftriaxone 2 g IV q24h for a 6-week course w/ stop date 5/12/23 at which time he will follow-up w/ me in the ID clinic. Weekly CBC w/ diff, Crt, and CRP faxed to me at [phone]. PICC ordered.    Thank you for allowing me to be involved in the care of this patient. Infectious diseases will sign off at this time with antibiotics plan in place, but please call me at [phone] if any further ID questions or new ID concerns.        Electronically signed by Pedro Luis Villarreal MD at 04/02/23 2239

## 2023-04-04 ENCOUNTER — READMISSION MANAGEMENT (OUTPATIENT)
Dept: CALL CENTER | Facility: HOSPITAL | Age: 73
End: 2023-04-04
Payer: MEDICARE

## 2023-04-04 ENCOUNTER — HOME HEALTH ADMISSION (OUTPATIENT)
Dept: HOME HEALTH SERVICES | Facility: HOME HEALTHCARE | Age: 73
End: 2023-04-04
Payer: COMMERCIAL

## 2023-04-04 VITALS
HEART RATE: 70 BPM | SYSTOLIC BLOOD PRESSURE: 138 MMHG | RESPIRATION RATE: 16 BRPM | WEIGHT: 210 LBS | BODY MASS INDEX: 32.96 KG/M2 | DIASTOLIC BLOOD PRESSURE: 78 MMHG | OXYGEN SATURATION: 96 % | TEMPERATURE: 97.7 F | HEIGHT: 67 IN

## 2023-04-04 LAB
ANION GAP SERPL CALCULATED.3IONS-SCNC: 9.3 MMOL/L (ref 5–15)
BACTERIA SPEC AEROBE CULT: NORMAL
BACTERIA SPEC AEROBE CULT: NORMAL
BUN SERPL-MCNC: 18 MG/DL (ref 8–23)
BUN/CREAT SERPL: 26.5 (ref 7–25)
CALCIUM SPEC-SCNC: 9.6 MG/DL (ref 8.6–10.5)
CHLORIDE SERPL-SCNC: 99 MMOL/L (ref 98–107)
CO2 SERPL-SCNC: 30.7 MMOL/L (ref 22–29)
CREAT SERPL-MCNC: 0.68 MG/DL (ref 0.76–1.27)
EGFRCR SERPLBLD CKD-EPI 2021: 98.8 ML/MIN/1.73
GLUCOSE BLDC GLUCOMTR-MCNC: 154 MG/DL (ref 70–130)
GLUCOSE BLDC GLUCOMTR-MCNC: 278 MG/DL (ref 70–130)
GLUCOSE SERPL-MCNC: 175 MG/DL (ref 65–99)
POTASSIUM SERPL-SCNC: 3.7 MMOL/L (ref 3.5–5.2)
SODIUM SERPL-SCNC: 139 MMOL/L (ref 136–145)

## 2023-04-04 PROCEDURE — 63710000001 INSULIN LISPRO (HUMAN) PER 5 UNITS: Performed by: STUDENT IN AN ORGANIZED HEALTH CARE EDUCATION/TRAINING PROGRAM

## 2023-04-04 PROCEDURE — 80048 BASIC METABOLIC PNL TOTAL CA: CPT | Performed by: INTERNAL MEDICINE

## 2023-04-04 PROCEDURE — 97162 PT EVAL MOD COMPLEX 30 MIN: CPT

## 2023-04-04 PROCEDURE — 25010000002 HEPARIN (PORCINE) PER 1000 UNITS: Performed by: SURGERY

## 2023-04-04 PROCEDURE — 63710000001 INSULIN LISPRO (HUMAN) PER 5 UNITS: Performed by: SURGERY

## 2023-04-04 PROCEDURE — 97530 THERAPEUTIC ACTIVITIES: CPT

## 2023-04-04 PROCEDURE — 97116 GAIT TRAINING THERAPY: CPT

## 2023-04-04 PROCEDURE — 82962 GLUCOSE BLOOD TEST: CPT

## 2023-04-04 RX ADMIN — HYDROCODONE BITARTRATE AND ACETAMINOPHEN 1 TABLET: 5; 325 TABLET ORAL at 15:11

## 2023-04-04 RX ADMIN — GABAPENTIN 800 MG: 400 CAPSULE ORAL at 15:11

## 2023-04-04 RX ADMIN — LISINOPRIL 20 MG: 20 TABLET ORAL at 09:11

## 2023-04-04 RX ADMIN — INSULIN LISPRO 3 UNITS: 100 INJECTION, SOLUTION INTRAVENOUS; SUBCUTANEOUS at 09:11

## 2023-04-04 RX ADMIN — LEVETIRACETAM 500 MG: 500 TABLET, FILM COATED ORAL at 09:11

## 2023-04-04 RX ADMIN — HYDROCODONE BITARTRATE AND ACETAMINOPHEN 1 TABLET: 5; 325 TABLET ORAL at 00:54

## 2023-04-04 RX ADMIN — HYDROCODONE BITARTRATE AND ACETAMINOPHEN 1 TABLET: 10; 325 TABLET ORAL at 09:11

## 2023-04-04 RX ADMIN — PREDNISOLONE ACETATE 1 DROP: 10 SUSPENSION/ DROPS OPHTHALMIC at 09:12

## 2023-04-04 RX ADMIN — INSULIN GLARGINE-YFGN 15 UNITS: 100 INJECTION, SOLUTION SUBCUTANEOUS at 09:12

## 2023-04-04 RX ADMIN — INSULIN LISPRO 6 UNITS: 100 INJECTION, SOLUTION INTRAVENOUS; SUBCUTANEOUS at 09:11

## 2023-04-04 RX ADMIN — AMLODIPINE BESYLATE 10 MG: 10 TABLET ORAL at 09:11

## 2023-04-04 RX ADMIN — INSULIN LISPRO 8 UNITS: 100 INJECTION, SOLUTION INTRAVENOUS; SUBCUTANEOUS at 12:36

## 2023-04-04 RX ADMIN — CLOPIDOGREL BISULFATE 75 MG: 75 TABLET, FILM COATED ORAL at 09:11

## 2023-04-04 RX ADMIN — SODIUM HYPOCHLORITE: 1.25 SOLUTION TOPICAL at 12:37

## 2023-04-04 RX ADMIN — HYDROCHLOROTHIAZIDE 25 MG: 25 TABLET ORAL at 09:11

## 2023-04-04 RX ADMIN — INSULIN LISPRO 6 UNITS: 100 INJECTION, SOLUTION INTRAVENOUS; SUBCUTANEOUS at 12:36

## 2023-04-04 RX ADMIN — GABAPENTIN 800 MG: 400 CAPSULE ORAL at 05:14

## 2023-04-04 RX ADMIN — Medication 10 ML: at 12:37

## 2023-04-04 RX ADMIN — TIMOLOL MALEATE: 5 SOLUTION OPHTHALMIC at 09:12

## 2023-04-04 RX ADMIN — ASPIRIN 81 MG: 81 TABLET, COATED ORAL at 09:11

## 2023-04-04 RX ADMIN — HYDROCODONE BITARTRATE AND ACETAMINOPHEN 1 TABLET: 5; 325 TABLET ORAL at 05:15

## 2023-04-04 RX ADMIN — HEPARIN SODIUM 5000 UNITS: 5000 INJECTION INTRAVENOUS; SUBCUTANEOUS at 09:11

## 2023-04-04 NOTE — OUTREACH NOTE
Prep Survey    Flowsheet Row Responses   [facility] patient discharged from? [facility]   Is LACE score < 7 ? No   Eligibility [facility]   Date of Admission 03/30/23   Date of Discharge 04/04/23   Discharge Disposition Home or Self Care   Discharge diagnosis RIGHT FIRST TOE AMPUTATION AND LEFT FIFTH TOE AMPUTATION   Does the patient have one of the following disease processes/diagnoses(primary or secondary)? General Surgery   Does the patient have Home health ordered? Yes   What is the Home health agency?  [facility]    Is there a DME ordered? Yes   What DME was ordered? [employer] -    Prep survey completed? Yes          Sayda VILLANUEVA - Registered Nurse

## 2023-04-04 NOTE — PLAN OF CARE
Problem: Adult Inpatient Plan of Care  Goal: Plan of Care Review  Outcome: Met  Flowsheets (Taken 4/4/2023 8243)  Progress: no change  Plan of Care Reviewed With: patient  Outcome Evaluation: vss. plan to discharge home today   Goal Outcome Evaluation:  Plan of Care Reviewed With: patient        Progress: no change  Outcome Evaluation: vss. plan to discharge home today

## 2023-04-04 NOTE — DISCHARGE PLACEMENT REQUEST
"Kai Peters (72 y.o. Male)     Date of Birth   1950    Social Security Number       Address   [address] [place], [place] [address]    Home Phone   [phone]    MRN   [id number]       Scientologist   Rastafari    Marital Status                               Admission Date   3/30/23    Admission Type   Urgent    Admitting Provider   Mariano Lombardo MD    Attending Provider   Shahriar Ca MD    Department, Room/Bed   [address], E561/1       Discharge Date       Discharge Disposition       Discharge Destination                               Attending Provider: Shahriar Ca MD    Allergies: No Known Allergies    Isolation: None   Infection: None   Code Status: CPR    Ht: 170.2 cm (67.01\")   Wt: 95.3 kg (210 lb)    Admission Cmt: None   Principal Problem: Diabetic foot ulcer [E11.621,L97.509]                 Active Insurance as of 3/30/2023     Primary Coverage     Payor Plan Insurance Group Employer/Plan Group    AETNA MEDICARE REPLACEMENT AETNA MEDICARE REPLACEMENT [id number]     Payor Plan Address Payor Plan Phone Number Payor Plan Fax Number Effective Dates    [address] [phone]  1/1/2021 - None Entered    [facility] [id number]       Subscriber Name Subscriber Birth Date Member ID       KAI PETERS FERNANDO 1950 [id number]                 Emergency Contacts      (Rel.) Home Phone Work Phone Mobile Phone    FranMy (Spouse) [phone] -- --             "

## 2023-04-04 NOTE — THERAPY EVALUATION
Patient Name: Kelby Peters  : 1950    MRN: [id number]                              Today's Date: 2023       Admit Date: 3/30/2023    Visit Dx:     ICD-10-CM ICD-9-CM   1. Diabetes mellitus with nephropathy  E11.21 250.40     583.81   2. Chronic osteomyelitis of foot  M86.679 730.17     Patient Active Problem List   Diagnosis   • Autonomic neuropathy   • Chronic pain   • Poorly controlled type 1 diabetes mellitus with autonomic neuropathy (HCC)   • Paraparesis   • Neuritis or radiculitis due to rupture of lumbar intervertebral disc   • Muscle pain   • Neck pain   • Pain in thoracic spine   • Polyneuropathy   • Ataxia   • Cholelithiasis   • Dysthymic disorder   • Hypertension   • Syncope and collapse   • Pain in extremity   • Encounter for long-term (current) use of high-risk medication   • Diabetic peripheral neuropathy (HCC)   • Abnormal gait   • Acquired hammer toe of left foot   • Disorder of nervous system due to type 2 diabetes mellitus   • Mixed hyperlipidemia   • History of seizures   • Noncompliance   • Diabetic foot ulcer   • Peripheral vascular disease   • Type 2 diabetes mellitus with diabetic polyneuropathy   • Acute hematogenous osteomyelitis of left foot   • Septic arthritis at the 1st metatarsophalangeal joint (HCC)   • Obese   • Glaucoma   • Dry gangrene   • Staphylococcus aureus infection   • Chronic osteomyelitis of foot   • Diabetes mellitus with nephropathy     Past Medical History:   Diagnosis Date   • Basal cell carcinoma 2022   • Brain injury    • Broken toes    • Diabetes mellitus    • Finger injury    • Gallbladder obstruction    • Hearing disorder    • Hiatal hernia    • Hypercholesterolemia    • Leg injury    • Migraine    • Osteomyelitis    • Peripheral neuropathy    • PONV (postoperative nausea and vomiting)    • Prostate enlargement    • Seizures    • Shingles    • Sleep apnea      Past Surgical History:   Procedure Laterality Date   • AMPUTATION DIGIT Left 2023     Procedure: 3RD LEFT TOE AMPUTATION AND LEFT FOOT DEBRIDEMENT;  Surgeon: Ivonne Martin MD;  Location: [facility] OR 18/19;  Service: Vascular;  Laterality: Left;   • AMPUTATION DIGIT Bilateral 3/31/2023    Procedure: RIGHT FIRST TOE AMPUTATION AND LEFT FIFTH TOE AMPUTATION;  Surgeon: Jasbir Alvarado MD;  Location: [facility] 18/19;  Service: Vascular;  Laterality: Bilateral;   • ANGIOPLASTY FEMORAL ARTERY Left 1/19/2023    Procedure: LEFT LEG ANGIOGRAM;  Surgeon: Ivonne Martin MD;  Location: [facility] OR 18/19;  Service: Vascular;  Laterality: Left;   • BASAL CELL CARCINOMA EXCISION  10/13/2022   • CATARACT EXTRACTION Right    • CORNEAL TRANSPLANT Left 03/31/2021   • EYE SURGERY Left    • GALLBLADDER SURGERY     • INNER EAR SURGERY        General Information     Row Name 04/04/23 1159          Physical Therapy Time and Intention    Document Type evaluation  -CS     Mode of Treatment individual therapy;physical therapy  -CS     Row Name 04/04/23 1159          General Information    Patient Profile Reviewed yes  -CS     Prior Level of Function independent:;all household mobility;gait;transfer;bed mobility  -CS     Existing Precautions/Restrictions fall;weight bearing;other (see comments)  B LE WBAT with B darco shoes  -CS     Barriers to Rehab none identified  -CS     Row Name 04/04/23 1159          Living Environment    People in Home spouse  -CS     Row Name 04/04/23 1159          Home Main Entrance    Number of Stairs, Main Entrance four  -CS     Stair Railings, Main Entrance railings safe and in good condition  -CS     Row Name 04/04/23 1159          Stairs Within Home, Primary    Number of Stairs, Within Home, Primary none  -CS     Row Name 04/04/23 1159          Cognition    Orientation Status (Cognition) oriented x 4  -CS     Row Name 04/04/23 1159          Safety Issues, Functional Mobility    Impairments Affecting Function (Mobility) pain  -CS           User Key  (r) = Recorded By,  (t) = Taken By, (c) = Cosigned By    Initials Name Provider Type    CS Janell Vanegas, PT Physical Therapist               Mobility     Row Name 04/04/23 1159          Bed Mobility    Bed Mobility supine-sit;sit-supine  -     Supine-Sit [place] (Bed Mobility) supervision  -CS     Sit-Supine [place] (Bed Mobility) supervision  -     Assistive Device (Bed Mobility) head of bed elevated  -     Row Name 04/04/23 1159          Sit-Stand Transfer    Sit-Stand [place] (Transfers) standby assist  -     Assistive Device (Sit-Stand Transfers) walker, front-wheeled  -     Row Name 04/04/23 1159          Gait/Stairs (Locomotion)    [place] Level (Gait) standby assist;contact guard  -     Assistive Device (Gait) walker, front-wheeled  -     Distance in Feet (Gait) 150'  -     Deviations/Abnormal Patterns (Gait) cathy decreased;stride length decreased  -     [place] Level (Stairs) not tested  -     Comment, (Gait/Stairs) slow pace but no LOB  -           User Key  (r) = Recorded By, (t) = Taken By, (c) = Cosigned By    Initials Name Provider Type    CS Janell Vanegas, PT Physical Therapist               Obj/Interventions     Row Name 04/04/23 1200          Range of Motion Comprehensive    General Range of Motion bilateral lower extremity ROM WFL  -     Row Name 04/04/23 1200          Strength Comprehensive (MMT)    General Manual Muscle Testing (MMT) Assessment no strength deficits identified  -     Row Name 04/04/23 1200          Balance    Balance Assessment sitting static balance;sitting dynamic balance;standing static balance;standing dynamic balance  -     Static Sitting Balance modified independence  -     Dynamic Sitting Balance modified independence  -     Position, Sitting Balance unsupported;sitting edge of bed  -     Static Standing Balance standby assist  -     Dynamic Standing Balance standby assist  -     Position/Device Used, Standing Balance  supported;walker, front-wheeled  -CS           User Key  (r) = Recorded By, (t) = Taken By, (c) = Cosigned By    Initials Name Provider Type    CS Janell Vanegas, PT Physical Therapist               Goals/Plan    No documentation.                Clinical Impression     Row Name 04/04/23 1200          Pain    Pretreatment Pain Rating 0/10 - no pain  -CS     Posttreatment Pain Rating 0/10 - no pain  -CS     Row Name 04/04/23 1200          Plan of Care Review    Plan of Care Reviewed With patient;spouse  -CS     Outcome Evaluation Pt is a 73 y/o M admitted to [facility] with B foot wounds and chronic osteomyelitis. Pt now POD 4 s/p right fifth and left first toe amputations. Per MD order pt is WBAT with B darco shoes donned. Pt received in bed upon arrival and agreeable to PT eval. Pt reports he lives with his spouse with 4 JULISSA and was (I) with no AD at [facility]. Pt completed bed mobility this visit with SPV. Pt stood and ambulated 150' c RW requiring SBA/CGA. Pt demonstrates a slow pace but no LOB. Pt is safe to D/C home with assist. PT recommends RW at D/C.  -CS     Row Name 04/04/23 1200          Therapy Assessment/Plan (PT)    Patient/Family Therapy Goals Statement (PT) to return home  -CS     Criteria for Skilled Interventions Met (PT) yes;meets criteria  -CS     Therapy Frequency (PT) evaluation only  -CS     Row Name 04/04/23 1200          Positioning and Restraints    Pre-Treatment Position in bed  -CS     Post Treatment Position bed  -CS     In Bed notified nsg;fowlers;call light within reach;encouraged to call for assist;with family/caregiver;legs elevated  no alarm upon arrival - cleared to ambulate with family  -CS           User Key  (r) = Recorded By, (t) = Taken By, (c) = Cosigned By    Initials Name Provider Type    CS Janell Vanegas, PT Physical Therapist               Outcome Measures     Row Name 04/04/23 1204          How much help from another person do you currently need...    Turning from your back to  your side while in flat bed without using bedrails? 4  -CS     Moving from lying on back to sitting on the side of a flat bed without bedrails? 4  -CS     Moving to and from a bed to a chair (including a wheelchair)? 4  -CS     Standing up from a chair using your arms (e.g., wheelchair, bedside chair)? 4  -CS     Climbing 3-5 steps with a railing? 3  -CS     To walk in hospital room? 4  -CS     AM-PAC 6 Clicks Score (PT) 23  -CS     Highest level of mobility 7 --> Walked 25 feet or more  -CS     Row Name 04/04/23 1204          Functional Assessment    Outcome Measure Options AM-PAC 6 Clicks Basic Mobility (PT)  -CS           User Key  (r) = Recorded By, (t) = Taken By, (c) = Cosigned By    Initials Name Provider Type    CS Janell Vanegas, PT Physical Therapist                             Physical Therapy Education     Title: PT OT SLP Therapies (Done)     Topic: Physical Therapy (Done)     Point: Mobility training (Done)     Learning Progress Summary           Patient Acceptance, E,TB, VU,DU by  at 4/4/2023 1205   Family Acceptance, E,TB, VU,DU by  at 4/4/2023 1205                   Point: Home exercise program (Done)     Learning Progress Summary           Patient Acceptance, E,TB, VU,DU by  at 4/4/2023 1205   Family Acceptance, E,TB, VU,DU by  at 4/4/2023 1205                   Point: Body mechanics (Done)     Learning Progress Summary           Patient Acceptance, E,TB, VU,DU by  at 4/4/2023 1205   Family Acceptance, E,TB, VU,DU by  at 4/4/2023 1205                   Point: Precautions (Done)     Learning Progress Summary           Patient Acceptance, E,TB, VU,DU by  at 4/4/2023 1205   Family Acceptance, E,TB, VU,DU by  at 4/4/2023 1205                               User Key     Initials Effective Dates Name Provider Type Discipline     09/22/22 -  Janell Vanegas, PT Physical Therapist PT              PT Recommendation and Plan     Plan of Care Reviewed With: patient, spouse  Outcome  Evaluation: Pt is a 71 y/o M admitted to  [facility] with B foot wounds and chronic osteomyelitis. Pt now POD 4 s/p right fifth and left first toe amputations. Per MD order pt is WBAT with B darco shoes donned. Pt received in bed upon arrival and agreeable to PT eval. Pt reports he lives with his spouse with 4 JULISSA and was (I) with no AD at [facility]. Pt completed bed mobility this visit with SPV. Pt stood and ambulated 150' c RW requiring SBA/CGA. Pt demonstrates a slow pace but no LOB. Pt is safe to D/C home with assist. PT recommends RW at D/C.     Time Calculation:    PT Charges     Row Name 04/04/23 1205             Time Calculation    Start Time 1130  -CS      Stop Time 1154  -CS      Time Calculation (min) 24 min  -CS      PT Received On 04/04/23  -CS         Time Calculation- PT    Total Timed Code Minutes- PT 23 minute(s)  -CS         Timed Charges    88579 - Gait Training Minutes  10  -CS      25025 - PT Therapeutic Activity Minutes 13  -CS         Total Minutes    Timed Charges Total Minutes 23  -CS       Total Minutes 23  -CS            User Key  (r) = Recorded By, (t) = Taken By, (c) = Cosigned By    Initials Name Provider Type    CS Janell Vanegas, PT Physical Therapist              Therapy Charges for Today     Code Description Service Date Service Provider Modifiers Qty    62211718857 HC GAIT TRAINING EA 15 MIN 4/4/2023 Janell Vanegas, PT GP 1    83391154008 HC PT THERAPEUTIC ACT EA 15 MIN 4/4/2023 Janell Vanegas, PT GP 1    00561209830 HC PT EVAL MOD COMPLEXITY 3 4/4/2023 Janell Vanegas, PT GP 1          PT G-Codes  Outcome Measure Options: AM-PAC 6 Clicks Basic Mobility (PT)  AM-PAC 6 Clicks Score (PT): 23  PT Discharge Summary  Anticipated Discharge Disposition (PT): home with assist    Janell Vanegas PT  4/4/2023

## 2023-04-04 NOTE — PLAN OF CARE
Goal Outcome Evaluation:  Plan of Care Reviewed With: patient, spouse           Outcome Evaluation: Pt is a 73 y/o M admitted to [facility] with B foot wounds and chronic osteomyelitis. Pt now POD 4 s/p right fifth and left first toe amputations. Per MD order pt is WBAT with B darco shoes donned. Pt received in bed upon arrival and agreeable to PT eval. Pt reports he lives with his spouse with 4 JULISSA and was (I) with no AD at [facility]. Pt completed bed mobility this visit with SPV. Pt stood and ambulated 150' c RW requiring SBA/CGA. Pt demonstrates a slow pace but no LOB. Pt is safe to D/C home with assist. PT recommends RW at D/C.

## 2023-04-04 NOTE — PROGRESS NOTES
Name: Kelby Peters ADMIT: 3/30/2023   : 1950  PCP: Susy Germain APRN    MRN: [id number] LOS: 5 days   AGE/SEX: 72 y.o. male  ROOM: [address]    [place], Post Op [employer]    72 y.o. male status post right fifth and left first toe amputations, both open, 3/31.  Feels well.  Has PICC line and Darco shoes.  Evening dressing change apparently not done.    Scheduled Medications:   amLODIPine, 10 mg, Oral, Q24H  aspirin, 81 mg, Oral, Daily  cefTRIAXone, 2 g, Intravenous, Q24H  clopidogrel, 75 mg, Oral, Daily  dorzolamide-timolol, , Both Eyes, BID  gabapentin, 800 mg, Oral, Q8H  heparin (porcine), 5,000 Units, Subcutaneous, Q12H  hydroCHLOROthiazide, 25 mg, Oral, Daily  insulin glargine, 15 Units, Subcutaneous, QAM  insulin lispro, 0-14 Units, Subcutaneous, TID AC  insulin lispro, 6 Units, Subcutaneous, TID With Meals  levETIRAcetam, 500 mg, Oral, BID  lisinopril, 20 mg, Oral, Daily  prednisoLONE acetate, 1 drop, Both Eyes, TID  rosuvastatin, 10 mg, Oral, Nightly  sodium chloride, 10 mL, Intravenous, Q12H  sodium hypochlorite, , Topical, Daily    Vital Signs  Vital Signs Patient Vitals for the past 24 hrs:   BP Temp Temp src Pulse Resp SpO2 Weight   23 0716 143/78 97.7 °F (36.5 °C) Oral 66 16 95 % --   23 0519 -- -- -- -- -- -- 95.3 kg (210 lb)   23 2300 138/80 97.6 °F (36.4 °C) Oral 55 16 96 % --   23 1900 125/63 97.5 °F (36.4 °C) Axillary 62 16 94 % --   23 1500 137/69 97.2 °F (36.2 °C) Oral 64 16 98 % --   23 1055 151/73 97.7 °F (36.5 °C) Oral 67 16 94 % --     Physical Exam: Alert, nontoxic.  Right arm PICC in place, with no right arm swelling.  Right and left toe amputation sites clean, pale, but no purulence expressed.  No cellulitis.  Nothing more to debride.     CBC    Results from last 7 days   Lab Units 23  0617 23  0805 23  0759 23  1529   WBC 10*3/mm3 6.74 9.87 8.94 10.56   HEMOGLOBIN g/dL 11.0* 10.2* 10.3* 9.7*    PLATELETS 10*3/mm3 494* 432 375 323     BMP   Results from last 7 days   Lab Units 04/04/23  0518 04/03/23  0617 04/02/23  0606 04/01/23  0805 03/31/23  0759 03/30/23  1529   SODIUM mmol/L 139 141 139 133* 137 133*   POTASSIUM mmol/L 3.7 4.0 3.7 3.9 3.7 3.5   CHLORIDE mmol/L 99 96* 97* 93* 96* 92*   CO2 mmol/L 30.7* 29.5* 32.0* 27.5 30.3* 30.9*   BUN mg/dL 18 15 16 20 13 20   CREATININE mg/dL 0.68* 0.68* 0.70* 0.72* 0.66* 0.78   GLUCOSE mg/dL 175* 175* 166* 238* 149* 193*     Cr Clearance Estimated Creatinine Clearance: 108.1 mL/min (A) (by C-G formula based on SCr of 0.68 mg/dL (L)).  Infection   Results from last 7 days   Lab Units 03/30/23  1530 03/30/23  1529   BLOODCX  No growth at 4 days No growth at 4 days   PROCALCITONIN ng/mL  --  0.08     Assessment & Plan   Assessment & Plan    Diabetic foot ulcer    Chronic pain    Hypertension    Diabetic peripheral neuropathy (HCC)    History of seizures    Peripheral vascular disease    Type 2 diabetes mellitus with diabetic polyneuropathy    Chronic osteomyelitis of foot    Diabetes mellitus with nephropathy    72 y.o. male with history of right fifth and left second toe infections, post open digital amputations.  Believe that lower extremity perfusion is going to be adequate to support healing.  Continue local care.  Okay to discharge from surgery standpoint.  Intend twice daily Dakin's solution dressing changes to both wounds for foreseeable future.  Plans for outpatient follow-up made.  We will see again this hospitalization on request.    Sammy Allison MD  04/04/23  09:57 EDT    Please call my office with any question: [phone]    Active Hospital Problems    Diagnosis  POA   • **Diabetic foot ulcer [E11.621, L97.509]  Yes   • Chronic osteomyelitis of foot [M86.679]  Yes   • Diabetes mellitus with nephropathy [E11.21]  Yes   • Type 2 diabetes mellitus with diabetic polyneuropathy [E11.42]  Yes   • Peripheral vascular disease [I73.9]  Yes   • History of  seizures [Z87.898]  Yes   • Diabetic peripheral neuropathy (HCC) [E11.42]  Yes   • Hypertension [I10]  Yes   • Chronic pain [G89.29]  Yes      Resolved Hospital Problems   No resolved problems to display.

## 2023-04-04 NOTE — DISCHARGE SUMMARY
[facility]    Date of Discharge:  4/4/2023    PCP: Susy Germain, APRN    Discharge Diagnosis:   Active Hospital Problems    Diagnosis  POA   • **Diabetic foot ulcer [E11.621, L97.509]  Yes   • Chronic osteomyelitis of foot [M86.679]  Yes   • Diabetes mellitus with nephropathy [E11.21]  Yes   • Type 2 diabetes mellitus with diabetic polyneuropathy [E11.42]  Yes   • Peripheral vascular disease [I73.9]  Yes   • History of seizures [Z87.898]  Yes   • Diabetic peripheral neuropathy (HCC) [E11.42]  Yes   • Hypertension [I10]  Yes   • Chronic pain [G89.29]  Yes      Resolved Hospital Problems   No resolved problems to display.     Procedure(s):  RIGHT FIRST TOE AMPUTATION AND LEFT FIFTH TOE AMPUTATION     Consults     Date and Time Order Name Status Description    3/30/2023  2:36 PM Inpatient Infectious Diseases Consult Completed     3/30/2023  2:33 PM Inpatient Vascular Surgery Consult Completed         Hospital Course  72 y.o. male with past medical history significant for chronic osteomyelitis of bilateral feet, MSSA and strep wound infections, peripheral vascular disease, sleep apnea, hypertension, admitted for diabetic foot ulcer.  Patient was seen by vascular surgery, infectious disease also followed the patient.  Patient will be completing the course of antibiotics on outpatient basis.  Patient also has diabetes, A1c was noted to be 9.1, indicates poorly controlled diabetes, he will follow-up with PCP closely.  Overall patient has clinically stabilized, his labs will still need to be followed by PCP in a week's time.  I have seen and examined patient at bedside, total time spent on discharge management is more than 30 minutes.        Temp:  [97.2 °F (36.2 °C)-97.7 °F (36.5 °C)] 97.7 °F (36.5 °C)  Heart Rate:  [55-66] 66  Resp:  [16] 16  BP: (125-143)/(63-80) 143/78  Body mass index is 32.88 kg/m².    Physical Exam   General, awake and alert.  Head and  ENT, normocephalic and atraumatic.  Lungs, symmetric expansion, equal air entry bilaterally.  Heart, regular rate and rhythm.  Abdomen, soft and nontender.  Extremities, no clubbing or cyanosis.  Bilateral lower extremity foot dressing noted  Neuro, no focal deficits.  Skin: Warm and no rash.  Psych, normal mood and affect.  Musculoskeletal, joint examination is grossly normal.      Disposition: Home or Self Care       Discharge Medications      New Medications      Instructions Start Date   cefTRIAXone 2 g in sodium chloride 0.9 % 100 mL IVPB   2 g, Intravenous, Every 24 Hours         Changes to Medications      Instructions Start Date   lisinopril 10 MG tablet  Commonly known as: PRINIVIL,ZESTRIL  What changed:   · how much to take  · when to take this   10 mg, Oral, Every 24 Hours Scheduled         Continue These Medications      Instructions Start Date   acetaminophen 500 MG tablet  Commonly known as: TYLENOL   1,000 mg, Oral, Every 8 Hours Scheduled      amitriptyline 10 MG tablet  Commonly known as: ELAVIL   10 mg, Oral, Daily      amLODIPine 10 MG tablet  Commonly known as: NORVASC   TAKE ONE TABLET BY MOUTH DAILY , NEEDS FOLLOW UP APPOINTMENT FOR REFILLS      aspirin 81 MG EC tablet   81 mg, Oral, Daily      BD Pen Needle Micro U/F 32G X 6 MM misc  Generic drug: Insulin Pen Needle   Use 5 injections daily      clopidogrel 75 MG tablet  Commonly known as: PLAVIX   75 mg, Oral, Daily      dorzolamide-timolol 22.3-6.8 MG/ML ophthalmic solution  Commonly known as: COSOPT   1 drop, Both Eyes, 2 Times Daily      FreeStyle Milka 3 Sensor misc   1 each, Does not apply, Every 14 Days      gabapentin 800 MG tablet  Commonly known as: NEURONTIN   800 mg, Oral, 4 Times Daily      glucagon 1 MG injection  Commonly known as: GLUCAGEN   For emergency use for severe low glucose: first inject, then call 911, once awake try to give oral treatment. Kit expires in 6-12 months.      glucose blood test strip  Commonly known as:  OneTouch Verio   Use to check blood sugar  4 time daily  E11.8      hydroCHLOROthiazide 25 MG tablet  Commonly known as: HYDRODIURIL   25 mg, Oral, Daily      HYDROcodone-acetaminophen  MG per tablet  Commonly known as: NORCO   1 tablet, Oral, Every 6 Hours PRN      [employer] Lancets Micro Thin 33G misc   Does not apply      OneTouch Delica Lancets 33G misc   Use to check blood sugar 4 times daily  E11.8      levETIRAcetam 500 MG tablet  Commonly known as: KEPPRA   TAKE ONE TABLET BY MOUTH TWICE A DAY      NovoLIN 70/30 ReliOn (70-30) 100 UNIT/ML injection  Generic drug: insulin NPH-insulin regular   40 Units, Subcutaneous, 2 Times Daily With Meals, Inject  Units in the AM &  Units in the PM, and titrate as directed up to 50 units twice a day.      prednisoLONE acetate 1 % ophthalmic suspension  Commonly known as: PRED FORTE   1 drop, Both Eyes, 3 Times Daily      rosuvastatin 10 MG tablet  Commonly known as: CRESTOR   10 mg, Oral, Nightly      sodium hypochlorite 0.125 % solution topical solution 0.125%  Commonly known as: DAKIN'S 1/4 STRENGTH   Apply topical to affected areas two times daily      valsartan-hydrochlorothiazide 160-12.5 MG per tablet  Commonly known as: DIOVAN-HCT   1 tablet, Oral, Daily              Additional Instructions for the Follow-ups that You Need to Schedule     Discharge Follow-up with PCP   As directed       Currently Documented PCP:    Susy Germain APRN    PCP Phone Number:    [phone]     Follow Up Details: Follow-up with PCP in 7 days.            Follow-up Information     Jasbir Alvarado MD Follow up in 2 week(s).    Specialty: Vascular Surgery  Why: For wound check  Contact information:  [address]  [address]  [facility] [id number] [phone]             Susy Germain APRN .    Specialty: Nurse Practitioner  Why: Follow-up with PCP in 7 days.  Contact information:  [address]  [address]  [facility] [id number] [phone]                        Future  Appointments   Date Time Provider Department Center   5/12/2023  1:30 PM Pedro Luis Villarreal MD [facility] ID [facility]   5/17/2023  9:40 AM Mary Henderson APRN [facility] PM EASPT [facility]   6/9/2023  9:30 AM Arsalan Gilbert MD [facility] END [facility]     Pending Labs     Order Current Status    Anaerobic Culture - Surgical Site, Toe, Left Preliminary result    Anaerobic Culture - Surgical Site, Toe, Right Preliminary result    Blood Culture - Blood, Arm, Left Preliminary result    Blood Culture - Blood, Arm, Right Preliminary result         Shahriar Ca MD  [facility]  04/04/23    Discharge time spent greater than 30 minutes.

## 2023-04-04 NOTE — PLAN OF CARE
Goal Outcome Evaluation:  Plan of Care Reviewed With: patient        Progress: no change  Outcome Evaluation: VSS. Alert and oriented x4. Pt c/o neuropathy pain. Managed with PRN PO pain meds. Darco shoes in use on BLE. RUE PICC in place. Will continue to provide supportive care.

## 2023-04-04 NOTE — PROGRESS NOTES
[facility] to provide Home Care services. [facility] to provide LMUA and supplies, as they did after the last admission.  Patient agreeable. Patient had [facility] in March 2023 after last hospital admission, for SN and LUMA administration.Contact information confirmed. Please call Wife My to schedule HH visits, [phone].    Patient requests Srini LOPEZ RN to provide SN for Home Health.

## 2023-04-05 ENCOUNTER — TRANSITIONAL CARE MANAGEMENT TELEPHONE ENCOUNTER (OUTPATIENT)
Dept: CALL CENTER | Facility: HOSPITAL | Age: 73
End: 2023-04-05
Payer: MEDICARE

## 2023-04-05 ENCOUNTER — HOME CARE VISIT (OUTPATIENT)
Dept: HOME HEALTH SERVICES | Facility: HOME HEALTHCARE | Age: 73
End: 2023-04-05
Payer: COMMERCIAL

## 2023-04-05 LAB
BACTERIA SPEC ANAEROBE CULT: NORMAL
BACTERIA SPEC ANAEROBE CULT: NORMAL

## 2023-04-05 PROCEDURE — G0299 HHS/HOSPICE OF RN EA 15 MIN: HCPCS

## 2023-04-05 NOTE — OUTREACH NOTE
Call Center TCM Note    Flowsheet Row Responses   [facility] patient discharged from? [facility]   Does the patient have one of the following disease processes/diagnoses(primary or secondary)? General Surgery   TCM attempt successful? Yes   Call start time 1531   Call end time 1539   Discharge diagnosis RIGHT FIRST TOE AMPUTATION AND LEFT FIFTH TOE AMPUTATION   Meds reviewed with patient/caregiver? Yes   Is the patient having any side effects they believe may be caused by any medication additions or changes? No   Does the patient have all medications related to this admission filled (includes all antibiotics, pain medications, etc.) Yes   Is the patient taking all medications as directed (includes completed medication regime)? Yes   Does the patient have an appointment with their PCP within 7 days of discharge? No appointments available   Nursing Interventions Routed TCM call to PCP office   What is the Home health agency?  [facility]    Has home health visited the patient within 72 hours of discharge? Yes   What DME was ordered? [facility] -    Has all DME been delivered? Yes   DME comments Pt at time of this call had not received IV ABX from [employer]. Rollater from [facility] were in home. I called [employer] and they guaranteed delivery to pt within 90 minutes, pt notified.    Psychosocial issues? No   Did the patient receive a copy of their discharge instructions? Yes   Nursing interventions Reviewed instructions with patient   What is the patient's perception of their health status since discharge? Improving   Nursing interventions Nurse provided patient education   Is the patient /caregiver able to teach back basic post-op care? Continue use of incentive spirometry at least 1 week post discharge, Take showers only when approved by MD-sponge bathe until then, Do not remove steri-strips, Lifting as instructed by MD in discharge instructions, No tub bath, swimming, or hot tub until  instructed by MD, Drive as instructed by MD in discharge instructions, Keep incision areas clean,dry and protected   Is the patient/caregiver able to teach back signs and symptoms of incisional infection? Increased redness, swelling or pain at the incisonal site, Fever, Increased drainage or bleeding, Incisional warmth, Pus or odor from incision   Is the patient/caregiver able to teach back steps to recovery at home? Practice good oral hygiene, Eat a well-balance diet, Rest and rebuild strength, gradually increase activity, Weigh daily, Make a list of questions for surgeon's appointment, Set small, achievable goals for return to baseline health   If the patient is a current smoker, are they able to teach back resources for cessation? Not a smoker   Is the patient/caregiver able to teach back the hierarchy of who to call/visit for symptoms/problems? PCP, Specialist, Home health nurse, Urgent Care, ED, 911 Yes   TCM call completed? Yes   Wrap up additional comments D/C DX: RIGHT FIRST TOE AMPUTATION AND LEFT FIFTH TOE AMPUTATION - Pt doing pretty well. [facility] with pt at time of call. Rollater delivered today. Pt at time of this call (331pm)had not received IV ABX from [facility]. Pt is to have dose every day. I called [facility] and they guaranteed delivery to pt within 90 minutes, pt notified.  PCP Susy Germain has no available times I could access for TCM APPT.   Call end time 5320          Melba Navarro MA    4/5/2023, 15:44 EDT

## 2023-04-05 NOTE — PAYOR COMM NOTE
"Kai Peters (72 y.o. Male)       ATTN: DISCHARGE SUMMARY FOR REVIEW: REF# [id number]    [facility] DEPT: [phone],  [phone]    [facility]: NPI [id number]  [facility]# [id number]      Date of Birth   1950    Social Security Number       Address   [address] [place], [place] [address]    Home Phone   [phone]    MRN   [id number]       Rastafari   Faith    Marital Status                               Admission Date   3/30/23    Admission Type   Urgent    Admitting Provider   Mariano Lombardo MD    Attending Provider       Department, Room/Bed   [facility] 5 [facility], E561/1       Discharge Date   4/4/2023    Discharge Disposition   Home or Self Care    Discharge Destination                               Attending Provider: (none)   Allergies: No Known Allergies    Isolation: None   Infection: None   Code Status: Prior    Ht: 170.2 cm (67.01\")   Wt: 95.3 kg (210 lb)    Admission Cmt: None   Principal Problem: Diabetic foot ulcer [E11.621,L97.509]                 Active Insurance as of 3/30/2023     Primary Coverage     Payor Plan Insurance Group Employer/Plan Group    AETNA MEDICARE REPLACEMENT AETNA MEDICARE REPLACEMENT [id number]     Payor Plan Address Payor Plan Phone Number Payor Plan Fax Number Effective Dates    [address] [phone]  1/1/2021 - None Entered    [facility] [id number]       Subscriber Name Subscriber Birth Date Member ID       KAI PETERS 1950 [id number]                 Emergency Contacts      (Rel.) Home Phone Work Phone Mobile Phone    My Peters (Spouse) [phone] -- --               Discharge Summary      Shahriar Ca MD at 04/04/23 [address] [place] [address] [facility]    Date of Discharge:  4/4/2023    PCP: Susy Germain, APRN    Discharge Diagnosis:   Active Hospital Problems    Diagnosis  POA   • **Diabetic foot ulcer [E11.621, L97.509]  Yes   • " Chronic osteomyelitis of foot [M86.679]  Yes   • Diabetes mellitus with nephropathy [E11.21]  Yes   • Type 2 diabetes mellitus with diabetic polyneuropathy [E11.42]  Yes   • Peripheral vascular disease [I73.9]  Yes   • History of seizures [Z87.898]  Yes   • Diabetic peripheral neuropathy (HCC) [E11.42]  Yes   • Hypertension [I10]  Yes   • Chronic pain [G89.29]  Yes      Resolved Hospital Problems   No resolved problems to display.     Procedure(s):  RIGHT FIRST TOE AMPUTATION AND LEFT FIFTH TOE AMPUTATION     Consults     Date and Time Order Name Status Description    3/30/2023  2:36 PM Inpatient Infectious Diseases Consult Completed     3/30/2023  2:33 PM Inpatient Vascular Surgery Consult Completed         Hospital Course  72 y.o. male with past medical history significant for chronic osteomyelitis of bilateral feet, MSSA and strep wound infections, peripheral vascular disease, sleep apnea, hypertension, admitted for diabetic foot ulcer.  Patient was seen by vascular surgery, infectious disease also followed the patient.  Patient will be completing the course of antibiotics on outpatient basis.  Patient also has diabetes, A1c was noted to be 9.1, indicates poorly controlled diabetes, he will follow-up with PCP closely.  Overall patient has clinically stabilized, his labs will still need to be followed by PCP in a week's time.  I have seen and examined patient at bedside, total time spent on discharge management is more than 30 minutes.        Temp:  [97.2 °F (36.2 °C)-97.7 °F (36.5 °C)] 97.7 °F (36.5 °C)  Heart Rate:  [55-66] 66  Resp:  [16] 16  BP: (125-143)/(63-80) 143/78  Body mass index is 32.88 kg/m².    Physical Exam   General, awake and alert.  Head and ENT, normocephalic and atraumatic.  Lungs, symmetric expansion, equal air entry bilaterally.  Heart, regular rate and rhythm.  Abdomen, soft and nontender.  Extremities, no clubbing or cyanosis.  Bilateral lower extremity foot dressing noted  Neuro, no focal  deficits.  Skin: Warm and no rash.  Psych, normal mood and affect.  Musculoskeletal, joint examination is grossly normal.      Disposition: Home or Self Care       Discharge Medications      New Medications      Instructions Start Date   cefTRIAXone 2 g in sodium chloride 0.9 % 100 mL IVPB   2 g, Intravenous, Every 24 Hours         Changes to Medications      Instructions Start Date   lisinopril 10 MG tablet  Commonly known as: WANGZESTRIL  What changed:   · how much to take  · when to take this   10 mg, Oral, Every 24 Hours Scheduled         Continue These Medications      Instructions Start Date   acetaminophen 500 MG tablet  Commonly known as: TYLENOL   1,000 mg, Oral, Every 8 Hours Scheduled      amitriptyline 10 MG tablet  Commonly known as: ELAVIL   10 mg, Oral, Daily      amLODIPine 10 MG tablet  Commonly known as: NORVASC   TAKE ONE TABLET BY MOUTH DAILY , NEEDS FOLLOW UP APPOINTMENT FOR REFILLS      aspirin 81 MG EC tablet   81 mg, Oral, Daily      BD Pen Needle Micro U/F 32G X 6 MM misc  Generic drug: Insulin Pen Needle   Use 5 injections daily      clopidogrel 75 MG tablet  Commonly known as: PLAVIX   75 mg, Oral, Daily      dorzolamide-timolol 22.3-6.8 MG/ML ophthalmic solution  Commonly known as: COSOPT   1 drop, Both Eyes, 2 Times Daily      FreeStyle Milka 3 Sensor misc   1 each, Does not apply, Every 14 Days      gabapentin 800 MG tablet  Commonly known as: NEURONTIN   800 mg, Oral, 4 Times Daily      glucagon 1 MG injection  Commonly known as: GLUCAGEN   For emergency use for severe low glucose: first inject, then call 911, once awake try to give oral treatment. Kit expires in 6-12 months.      glucose blood test strip  Commonly known as: OneTouch Verio   Use to check blood sugar  4 time daily  E11.8      hydroCHLOROthiazide 25 MG tablet  Commonly known as: HYDRODIURIL   25 mg, Oral, Daily      HYDROcodone-acetaminophen  MG per tablet  Commonly known as: NORCO   1 tablet, Oral, Every 6  Hours PRN      [employer] Lancets Micro Thin 33G misc   Does not apply      OneTouch Delica Lancets 33G misc   Use to check blood sugar 4 times daily  E11.8      levETIRAcetam 500 MG tablet  Commonly known as: KEPPRA   TAKE ONE TABLET BY MOUTH TWICE A DAY      NovoLIN 70/30 ReliOn (70-30) 100 UNIT/ML injection  Generic drug: insulin NPH-insulin regular   40 Units, Subcutaneous, 2 Times Daily With Meals, Inject  Units in the AM &  Units in the PM, and titrate as directed up to 50 units twice a day.      prednisoLONE acetate 1 % ophthalmic suspension  Commonly known as: PRED FORTE   1 drop, Both Eyes, 3 Times Daily      rosuvastatin 10 MG tablet  Commonly known as: CRESTOR   10 mg, Oral, Nightly      sodium hypochlorite 0.125 % solution topical solution 0.125%  Commonly known as: DAKIN'S 1/4 STRENGTH   Apply topical to affected areas two times daily      valsartan-hydrochlorothiazide 160-12.5 MG per tablet  Commonly known as: DIOVAN-HCT   1 tablet, Oral, Daily              Additional Instructions for the Follow-ups that You Need to Schedule     Discharge Follow-up with PCP   As directed       Currently Documented PCP:    Susy Germain APRN    PCP Phone Number:    [phone]     Follow Up Details: Follow-up with PCP in 7 days.            Follow-up Information     Jasbir Alvarado MD Follow up in 2 week(s).    Specialty: Vascular Surgery  Why: For wound check  Contact information:  [address]  [facility] [address] [phone]             Susy Germain APRN .    Specialty: Nurse Practitioner  Why: Follow-up with PCP in 7 days.  Contact information:  [address]  [address]  [facility] [id number] [phone]                        Future Appointments   Date Time Provider Department Center   5/12/2023  1:30 PM Pedro Luis Villarreal MD [facility] ID [facility]   5/17/2023  9:40 AM Mary Henderson APRN [facility] PM EASPT [facility]   6/9/2023  9:30 AM Arsalan Gilbert MD [facility] END [facility]     Pending Labs      Order Current Status    Anaerobic Culture - Surgical Site, Toe, Left Preliminary result    Anaerobic Culture - Surgical Site, Toe, Right Preliminary result    Blood Culture - Blood, Arm, Left Preliminary result    Blood Culture - Blood, Arm, Right Preliminary result         Shahriar Mathis MD  [facility]  04/04/23    Discharge time spent greater than 30 minutes.    Electronically signed by Shahriar Mathis MD at 04/04/23 1436       Discharge Order (From admission, onward)     Start     Ordered    04/04/23 1433  Discharge patient  Once        Expected Discharge Date: 04/04/23    Discharge Disposition: Home or Self Care    Physician of Record for Attribution - Please select from Treatment Team: SHAHRIAR MATHIS [[id number]]    Review needed by CMO to determine Physician of Record: No       Question Answer Comment   Physician of Record for Attribution - Please select from Treatment Team SHAHRIAR MATHIS    Review needed by CMO to determine Physician of Record No        04/04/23 1434

## 2023-04-05 NOTE — HOME HEALTH
PLAN FOR NEXT SNV- WEEKLY WOUND ASSESSMENT/PHOTOS- BILAT FEET AMPUTATION WOUNDS  WEEKLY LAB DRAW-   WEEKLY PICC DRESSING CHANGE    FOCUS OF CARE- BILAT TOE AMPUATION WOUND HEALING, IVAB INFUSION MANAGEMENT/CARES      PT W/ PICC RUE- DATED 4/2/223- NO S/S COMPLCIATIONS FLUSHES EASILY W/ BLOOD RETURN NOTED. PT W/ BIALT GREAT TOE AMPUTATIONS, WIFE INSTURCT HOW TO DO BID DAKINS WET- DRY- WIFE DID CHANGE DRESSINGS THIS AM PRIOR TO SN ARRIVAL AND SHE DID A GOOD. JOB SN INSTURCT TO KEEP MOISTENED GAUZE IN WOUND BASE AND OFF RAKESH-WOUND AREA- /USE LESS GAUZE TO PACK - OTHERWISE LOOKS GOOD.  PT DOES NOT HAVE IVAB DELIEVERED FROM [facility] YET, HE STS IT WILL BE AN IV PUSH LIKE HE WAS DOING PRIOR TO HOSPITALIZATION HE AND WIFE COMPETENT W/ IVAB PUSH . MEDS RECONCILLED, PT STS HE IS TO BE NWB, HE HAS DIABETIC ORHTOTIC SHOES W/ PLUGS REMOVED FOR NO PRESSURE AREAS W/ WOUNDS. PT STS IS GETTING A NEW DEXCOM- DOES NOT HAVE YET

## 2023-04-05 NOTE — CASE MANAGEMENT/SOCIAL WORK
Case Management Discharge Note      Final Note: Discharged home with [facility] and [facility] for infusion.  Rollator to be provided by Darius    Provided Post Acute Provider List?: N/A  Provided Post Acute Provider Quality & Resource List?: N/A    Selected Continued Care - Discharged on 4/4/2023 Admission date: 3/30/2023 - Discharge disposition: Home or Self Care    Destination    No services have been selected for the patient.              Durable Medical Equipment     Service Provider Selected Services Address Phone Fax Patient Preferred    [facility] [address], [facility] [address] [phone] --          Dialysis/Infusion     Service Provider Selected Services Address Phone Fax Patient Preferred    [facility] Infusion and IV Therapy [id number] [facility] [address] [address] [phone] --          Home Medical Care     Service Provider Selected Services Address Phone Fax Patient Preferred    [facility] [address] [place] [id number] [phone] --          Therapy    No services have been selected for the patient.              Community Resources    No services have been selected for the patient.              Community & DME    No services have been selected for the patient.                Selected Continued Care - Prior Encounters Includes continued care and service providers with selected services from prior encounters from 12/30/2022 to 4/4/2023    Discharged on 1/21/2023 Admission date: 1/15/2023 - Discharge disposition: Home-Health Care Svc    Home Medical Care     Service Provider Selected Services Address Phone Fax Patient Preferred    [facility] [address] [place] [id number] [phone] --                    Transportation Services  Private: Car    Final Discharge  Disposition Code: 06 - home with home health care

## 2023-04-06 VITALS
OXYGEN SATURATION: 95 % | HEART RATE: 64 BPM | TEMPERATURE: 98.8 F | BODY MASS INDEX: 31.32 KG/M2 | RESPIRATION RATE: 18 BRPM | WEIGHT: 200 LBS | DIASTOLIC BLOOD PRESSURE: 60 MMHG | SYSTOLIC BLOOD PRESSURE: 116 MMHG

## 2023-04-07 ENCOUNTER — HOME CARE VISIT (OUTPATIENT)
Dept: HOME HEALTH SERVICES | Facility: HOME HEALTHCARE | Age: 73
End: 2023-04-07
Payer: COMMERCIAL

## 2023-04-07 NOTE — CASE COMMUNICATION
SUPPLELY ORDER PLACE WITH [facility] [phone] SPOKE WITH KRISTEN  30 DAY SUPPLY FOR WOUND CARE:  KERLIX  4X4 LOAF GAUZE  4X4 STERILE NON-WOVEN GAUZE  PAPER TAPE  NORMAL SALINE

## 2023-04-10 ENCOUNTER — LAB REQUISITION (OUTPATIENT)
Dept: LAB | Facility: HOSPITAL | Age: 73
End: 2023-04-10
Payer: MEDICARE

## 2023-04-10 ENCOUNTER — HOME CARE VISIT (OUTPATIENT)
Dept: HOME HEALTH SERVICES | Facility: HOME HEALTHCARE | Age: 73
End: 2023-04-10
Payer: COMMERCIAL

## 2023-04-10 DIAGNOSIS — Z00.00 ENCOUNTER FOR GENERAL ADULT MEDICAL EXAMINATION WITHOUT ABNORMAL FINDINGS: ICD-10-CM

## 2023-04-10 LAB
BASOPHILS # BLD AUTO: 0.06 10*3/MM3 (ref 0–0.2)
BASOPHILS NFR BLD AUTO: 0.7 % (ref 0–1.5)
CREAT SERPL-MCNC: 0.89 MG/DL (ref 0.76–1.27)
CRP SERPL-MCNC: 0.5 MG/DL (ref 0–0.5)
DEPRECATED RDW RBC AUTO: 45.8 FL (ref 37–54)
EGFRCR SERPLBLD CKD-EPI 2021: 91.1 ML/MIN/1.73
EOSINOPHIL # BLD AUTO: 0.28 10*3/MM3 (ref 0–0.4)
EOSINOPHIL NFR BLD AUTO: 3.1 % (ref 0.3–6.2)
ERYTHROCYTE [DISTWIDTH] IN BLOOD BY AUTOMATED COUNT: 14.8 % (ref 12.3–15.4)
HCT VFR BLD AUTO: 33 % (ref 37.5–51)
HGB BLD-MCNC: 11 G/DL (ref 13–17.7)
IMM GRANULOCYTES # BLD AUTO: 0.05 10*3/MM3 (ref 0–0.05)
IMM GRANULOCYTES NFR BLD AUTO: 0.5 % (ref 0–0.5)
LYMPHOCYTES # BLD AUTO: 1.6 10*3/MM3 (ref 0.7–3.1)
LYMPHOCYTES NFR BLD AUTO: 17.6 % (ref 19.6–45.3)
MCH RBC QN AUTO: 28.6 PG (ref 26.6–33)
MCHC RBC AUTO-ENTMCNC: 33.3 G/DL (ref 31.5–35.7)
MCV RBC AUTO: 85.9 FL (ref 79–97)
MONOCYTES # BLD AUTO: 0.65 10*3/MM3 (ref 0.1–0.9)
MONOCYTES NFR BLD AUTO: 7.1 % (ref 5–12)
NEUTROPHILS NFR BLD AUTO: 6.46 10*3/MM3 (ref 1.7–7)
NEUTROPHILS NFR BLD AUTO: 71 % (ref 42.7–76)
NRBC BLD AUTO-RTO: 0 /100 WBC (ref 0–0.2)
PLATELET # BLD AUTO: 406 10*3/MM3 (ref 140–450)
PMV BLD AUTO: 10.5 FL (ref 6–12)
RBC # BLD AUTO: 3.84 10*6/MM3 (ref 4.14–5.8)
WBC NRBC COR # BLD: 9.1 10*3/MM3 (ref 3.4–10.8)

## 2023-04-10 PROCEDURE — G0299 HHS/HOSPICE OF RN EA 15 MIN: HCPCS

## 2023-04-10 PROCEDURE — 82565 ASSAY OF CREATININE: CPT | Performed by: INTERNAL MEDICINE

## 2023-04-10 PROCEDURE — 85025 COMPLETE CBC W/AUTO DIFF WBC: CPT | Performed by: INTERNAL MEDICINE

## 2023-04-10 PROCEDURE — 86140 C-REACTIVE PROTEIN: CPT | Performed by: INTERNAL MEDICINE

## 2023-04-10 NOTE — HOME HEALTH
PLAN FOR NEXT SNV- WEEKLY WOUND ASSESSMENT/PHOTOS- BILAT FEET AMPUTATION WOUNDS  WEEKLY LAB DRAW-   WEEKLY PICC DRESSING CHANGE    FOCUS OF CARE- BILAT TOE AMPUATION WOUND HEALING, IVAB INFUSION MANAGEMENT/CARES    4/10- WIFE/PT COMPLAINT W/ BID DRESSING CHANGES DAKINS W-D TO BIALT GREAT TOE AMPUATION WOUNDS- SEE PHOTOS-, SN CHANGED PICC DRESSING AND OBTAINED LABS VIA PICC. SPECIMEN TAKEN TO [facility]

## 2023-04-11 ENCOUNTER — READMISSION MANAGEMENT (OUTPATIENT)
Dept: CALL CENTER | Facility: HOSPITAL | Age: 73
End: 2023-04-11
Payer: MEDICARE

## 2023-04-11 VITALS
TEMPERATURE: 97.8 F | OXYGEN SATURATION: 97 % | HEART RATE: 68 BPM | SYSTOLIC BLOOD PRESSURE: 128 MMHG | DIASTOLIC BLOOD PRESSURE: 68 MMHG | RESPIRATION RATE: 18 BRPM

## 2023-04-11 NOTE — OUTREACH NOTE
General Surgery Week 2 Survey    Flowsheet Row Responses   [facility] [place] patient discharged from? [place]   Does the patient have one of the following disease processes/diagnoses(primary or secondary)? General Surgery   Week 2 attempt successful? Yes   Call start time 1757   Call end time 1800   Discharge diagnosis RIGHT FIRST TOE AMPUTATION AND LEFT FIFTH TOE AMPUTATION   Person spoke with today (if not patient) and relationship pt   Meds reviewed with patient/caregiver? Yes   Is the patient having any side effects they believe may be caused by any medication additions or changes? No   Does the patient have all medications related to this admission filled (includes all antibiotics, pain medications, etc.) Yes   Is the patient taking all medications as directed (includes completed medication regime)? Yes   Does the patient have a follow up appointment scheduled with their surgeon? Yes  [4/20/23]   Has the patient kept scheduled appointments due by today? N/A   Comments [facility] appt on 4/24/23   What is the Home health agency?  [facility]    Has home health visited the patient within 72 hours of discharge? Yes   Psychosocial issues? No   What is the patient's perception of their health status since discharge? Improving   Is the patient /caregiver able to teach back basic post-op care? Take showers only when approved by MD-sponge bathe until then, No tub bath, swimming, or hot tub until instructed by MD, Keep incision areas clean,dry and protected, Lifting as instructed by MD in discharge instructions   Is the patient/caregiver able to teach back signs and symptoms of incisional infection? Increased redness, swelling or pain at the incisonal site, Increased drainage or bleeding, Incisional warmth, Pus or odor from incision, Fever   Is the patient/caregiver able to teach back steps to recovery at home? Rest and rebuild strength, gradually increase activity, Eat a well-balance diet   If the patient  is a current smoker, are they able to teach back resources for cessation? Not a smoker   Is the patient/caregiver able to teach back the hierarchy of who to call/visit for symptoms/problems? PCP, Specialist, Home health nurse, Urgent Care, ED, 911 Yes   Week 2 call completed? Yes   Is the patient interested in additional calls from an ambulatory ?  NOTE:  applies to high risk patients requiring additional follow-up. No   Wrap up additional comments Pt states he is doing alright, and denies any increased redness, swelling, or increased drainage at incisional site. No medication issues. Pt verified Post-op appt on 4/20/23          Maria Luisa Jones Registered Nurse

## 2023-04-12 ENCOUNTER — TELEPHONE (OUTPATIENT)
Dept: ENDOCRINOLOGY | Age: 73
End: 2023-04-12
Payer: MEDICARE

## 2023-04-12 NOTE — TELEPHONE ENCOUNTER
Patient's wife My called saying that insurance will only cover Mlika 2. Needs to be sent to DME .

## 2023-04-14 DIAGNOSIS — M79.609 PAIN IN EXTREMITY, UNSPECIFIED EXTREMITY: ICD-10-CM

## 2023-04-14 DIAGNOSIS — E11.42 DIABETIC PERIPHERAL NEUROPATHY: ICD-10-CM

## 2023-04-14 RX ORDER — HYDROCODONE BITARTRATE AND ACETAMINOPHEN 10; 325 MG/1; MG/1
1 TABLET ORAL EVERY 6 HOURS PRN
Qty: 120 TABLET | Refills: 0 | Status: CANCELLED | OUTPATIENT
Start: 2023-04-14

## 2023-04-14 RX ORDER — HYDROCODONE BITARTRATE AND ACETAMINOPHEN 10; 325 MG/1; MG/1
1 TABLET ORAL EVERY 6 HOURS PRN
Qty: 120 TABLET | Refills: 0 | Status: SHIPPED | OUTPATIENT
Start: 2023-04-14

## 2023-04-14 NOTE — TELEPHONE ENCOUNTER
Medication Refill Request    Date of phone call: 23    Medication being requested: Norco  mg    si tab po q 6 hrs prn  Qty: 120    Date of last visit: 3/17/23    Date of last refill:     HOME up to date?:     Next Follow up?: 23    Any new pertinent information? (i.e, new medication allergies, new use of medications, change in patient's health or condition, non-compliance or inconsistency with prescribing agreement?):

## 2023-04-17 ENCOUNTER — HOME CARE VISIT (OUTPATIENT)
Dept: HOME HEALTH SERVICES | Facility: HOME HEALTHCARE | Age: 73
End: 2023-04-17
Payer: COMMERCIAL

## 2023-04-17 ENCOUNTER — LAB REQUISITION (OUTPATIENT)
Dept: LAB | Facility: HOSPITAL | Age: 73
End: 2023-04-17
Payer: MEDICARE

## 2023-04-17 DIAGNOSIS — M86.9 OSTEOMYELITIS, UNSPECIFIED: ICD-10-CM

## 2023-04-17 LAB
BASOPHILS # BLD AUTO: 0.04 10*3/MM3 (ref 0–0.2)
BASOPHILS NFR BLD AUTO: 0.7 % (ref 0–1.5)
CREAT SERPL-MCNC: 1.02 MG/DL (ref 0.76–1.27)
CRP SERPL-MCNC: <0.3 MG/DL (ref 0–0.5)
DEPRECATED RDW RBC AUTO: 46.6 FL (ref 37–54)
EGFRCR SERPLBLD CKD-EPI 2021: 78.1 ML/MIN/1.73
EOSINOPHIL # BLD AUTO: 0.31 10*3/MM3 (ref 0–0.4)
EOSINOPHIL NFR BLD AUTO: 5.1 % (ref 0.3–6.2)
ERYTHROCYTE [DISTWIDTH] IN BLOOD BY AUTOMATED COUNT: 15 % (ref 12.3–15.4)
HCT VFR BLD AUTO: 31.4 % (ref 37.5–51)
HGB BLD-MCNC: 10.7 G/DL (ref 13–17.7)
IMM GRANULOCYTES # BLD AUTO: 0.02 10*3/MM3 (ref 0–0.05)
IMM GRANULOCYTES NFR BLD AUTO: 0.3 % (ref 0–0.5)
LYMPHOCYTES # BLD AUTO: 1.48 10*3/MM3 (ref 0.7–3.1)
LYMPHOCYTES NFR BLD AUTO: 24.3 % (ref 19.6–45.3)
MCH RBC QN AUTO: 29.2 PG (ref 26.6–33)
MCHC RBC AUTO-ENTMCNC: 34.1 G/DL (ref 31.5–35.7)
MCV RBC AUTO: 85.6 FL (ref 79–97)
MONOCYTES # BLD AUTO: 0.43 10*3/MM3 (ref 0.1–0.9)
MONOCYTES NFR BLD AUTO: 7 % (ref 5–12)
NEUTROPHILS NFR BLD AUTO: 3.82 10*3/MM3 (ref 1.7–7)
NEUTROPHILS NFR BLD AUTO: 62.6 % (ref 42.7–76)
NRBC BLD AUTO-RTO: 0 /100 WBC (ref 0–0.2)
PLATELET # BLD AUTO: 294 10*3/MM3 (ref 140–450)
PMV BLD AUTO: 11.2 FL (ref 6–12)
RBC # BLD AUTO: 3.67 10*6/MM3 (ref 4.14–5.8)
WBC NRBC COR # BLD: 6.1 10*3/MM3 (ref 3.4–10.8)

## 2023-04-17 PROCEDURE — 86140 C-REACTIVE PROTEIN: CPT | Performed by: INTERNAL MEDICINE

## 2023-04-17 PROCEDURE — 85025 COMPLETE CBC W/AUTO DIFF WBC: CPT | Performed by: INTERNAL MEDICINE

## 2023-04-17 PROCEDURE — 82565 ASSAY OF CREATININE: CPT | Performed by: INTERNAL MEDICINE

## 2023-04-17 PROCEDURE — G0299 HHS/HOSPICE OF RN EA 15 MIN: HCPCS

## 2023-04-18 VITALS
HEART RATE: 71 BPM | TEMPERATURE: 98 F | DIASTOLIC BLOOD PRESSURE: 80 MMHG | OXYGEN SATURATION: 95 % | RESPIRATION RATE: 16 BRPM | SYSTOLIC BLOOD PRESSURE: 150 MMHG

## 2023-04-18 NOTE — HOME HEALTH
PLAN FOR NEXT SNV- WEEKLY WOUND ASSESSMENT/PHOTOS- BILAT FEET AMPUTATION WOUNDS  WEEKLY LAB DRAW-   WEEKLY PICC DRESSING CHANGE    FOCUS OF CARE- BILAT TOE AMPUATION WOUND HEALING, IVAB INFUSION MANAGEMENT/CARES    4/17- PICC DSG CHANGED, LAB VIA PICC, BILAT FOOT WOUND PIXS, WIFE IS COMPLAINT W/ DAILY DRESSING CHANGES, PT NEW ON 70/30 AND HE REPORTS HE FEELS BETTER BS CONTROL W/ 70/30.

## 2023-04-19 ENCOUNTER — READMISSION MANAGEMENT (OUTPATIENT)
Dept: CALL CENTER | Facility: HOSPITAL | Age: 73
End: 2023-04-19
Payer: MEDICARE

## 2023-04-19 NOTE — OUTREACH NOTE
"General Surgery Week 3 Survey    Flowsheet Row Responses   [facility] patient discharged from? [place]   Does the patient have one of the following disease processes/diagnoses(primary or secondary)? General Surgery   Week 3 attempt successful? Yes   Call end time 1421   Discharge diagnosis RIGHT FIRST TOE AMPUTATION AND LEFT FIFTH TOE AMPUTATION   Is the patient taking all medications as directed (includes completed medication regime)? Yes   Medication comments Still has 3 more weeks of antibiotics   Does the patient have a follow up appointment scheduled with their surgeon? Yes   Has the patient kept scheduled appointments due by today? N/A   Comments Has appt tomorrow with surgeon   What is the Home health agency?  [facility]    Has home health visited the patient within 72 hours of discharge? Yes   What DME was ordered? [facility] -    Has all DME been delivered? Yes   Psychosocial issues? No   What is the patient's perception of their health status since discharge? Improving   Nursing interventions Nurse provided patient education   Is the patient/caregiver able to teach back signs and symptoms of incisional infection? Increased redness, swelling or pain at the incisonal site, Increased drainage or bleeding, Incisional warmth, Pus or odor from incision, Fever   Is the patient/caregiver able to teach back steps to recovery at home? Set small, achievable goals for return to baseline health, Rest and rebuild strength, gradually increase activity, Eat a well-balance diet, Make a list of questions for surgeon's appointment   If the patient is a current smoker, are they able to teach back resources for cessation? Not a smoker   Is the patient/caregiver able to teach back the hierarchy of who to call/visit for symptoms/problems? PCP, Specialist, Home health nurse, Urgent Care, ED, 911 Yes   Additional teach back comments States he is is doing \"pretty good\".  Feet are doing a lot better.  " Has PICC line and wife is adminitoring antibiotics.  He uses a walker with no issues.  Has meals delivered by his insurance company.  Has appt with surgeon tomorrow.    Week 3 call completed? Yes   Graduated/Revoked comments Denies questions or needs at this time.           Brenna CONLEY - Licensed Nurse

## 2023-04-24 ENCOUNTER — OFFICE VISIT (OUTPATIENT)
Dept: FAMILY MEDICINE CLINIC | Facility: CLINIC | Age: 73
End: 2023-04-24
Payer: MEDICARE

## 2023-04-24 ENCOUNTER — HOME CARE VISIT (OUTPATIENT)
Dept: HOME HEALTH SERVICES | Facility: HOME HEALTHCARE | Age: 73
End: 2023-04-24
Payer: COMMERCIAL

## 2023-04-24 ENCOUNTER — LAB (OUTPATIENT)
Dept: LAB | Facility: HOSPITAL | Age: 73
End: 2023-04-24
Payer: MEDICARE

## 2023-04-24 ENCOUNTER — TRANSCRIBE ORDERS (OUTPATIENT)
Dept: ADMINISTRATIVE | Facility: HOSPITAL | Age: 73
End: 2023-04-24
Payer: MEDICARE

## 2023-04-24 ENCOUNTER — LAB REQUISITION (OUTPATIENT)
Dept: LAB | Facility: HOSPITAL | Age: 73
End: 2023-04-24
Payer: MEDICARE

## 2023-04-24 VITALS
SYSTOLIC BLOOD PRESSURE: 120 MMHG | OXYGEN SATURATION: 98 % | TEMPERATURE: 98.9 F | DIASTOLIC BLOOD PRESSURE: 70 MMHG | HEART RATE: 88 BPM | RESPIRATION RATE: 16 BRPM

## 2023-04-24 VITALS
WEIGHT: 211 LBS | BODY MASS INDEX: 33.12 KG/M2 | SYSTOLIC BLOOD PRESSURE: 152 MMHG | OXYGEN SATURATION: 98 % | TEMPERATURE: 96.2 F | DIASTOLIC BLOOD PRESSURE: 70 MMHG | HEART RATE: 68 BPM | HEIGHT: 67 IN

## 2023-04-24 DIAGNOSIS — Z87.898 HISTORY OF SEIZURES: ICD-10-CM

## 2023-04-24 DIAGNOSIS — E10.43: ICD-10-CM

## 2023-04-24 DIAGNOSIS — N18.6 TYPE 2 DIABETES MELLITUS WITH ESRD (END-STAGE RENAL DISEASE): ICD-10-CM

## 2023-04-24 DIAGNOSIS — Z09 HOSPITAL DISCHARGE FOLLOW-UP: Primary | ICD-10-CM

## 2023-04-24 DIAGNOSIS — I12.9 HYPERTENSIVE NEPHROPATHY: ICD-10-CM

## 2023-04-24 DIAGNOSIS — E10.65: ICD-10-CM

## 2023-04-24 DIAGNOSIS — E11.22 TYPE 2 DIABETES MELLITUS WITH ESRD (END-STAGE RENAL DISEASE): ICD-10-CM

## 2023-04-24 DIAGNOSIS — R80.0 ISOLATED NON-NEPHROTIC PROTEINURIA: ICD-10-CM

## 2023-04-24 DIAGNOSIS — R80.0 ISOLATED NON-NEPHROTIC PROTEINURIA: Primary | ICD-10-CM

## 2023-04-24 DIAGNOSIS — Z00.00 ENCOUNTER FOR GENERAL ADULT MEDICAL EXAMINATION WITHOUT ABNORMAL FINDINGS: ICD-10-CM

## 2023-04-24 DIAGNOSIS — I10 PRIMARY HYPERTENSION: ICD-10-CM

## 2023-04-24 LAB
ALBUMIN SERPL-MCNC: 4.2 G/DL (ref 3.5–5.2)
ALBUMIN UR-MCNC: 30 MG/DL
ANION GAP SERPL CALCULATED.3IONS-SCNC: 9.1 MMOL/L (ref 5–15)
BACTERIA UR QL AUTO: ABNORMAL /HPF
BASOPHILS # BLD AUTO: 0.04 10*3/MM3 (ref 0–0.2)
BASOPHILS NFR BLD AUTO: 0.8 % (ref 0–1.5)
BILIRUB UR QL STRIP: NEGATIVE
BUN SERPL-MCNC: 16 MG/DL (ref 8–23)
BUN/CREAT SERPL: 21.6 (ref 7–25)
CALCIUM SPEC-SCNC: 9.9 MG/DL (ref 8.6–10.5)
CHLORIDE SERPL-SCNC: 101 MMOL/L (ref 98–107)
CLARITY UR: CLEAR
CO2 SERPL-SCNC: 29.9 MMOL/L (ref 22–29)
COLOR UR: YELLOW
CREAT SERPL-MCNC: 0.74 MG/DL (ref 0.76–1.27)
CREAT UR-MCNC: 18.9 MG/DL
CREAT UR-MCNC: 18.9 MG/DL
CRP SERPL-MCNC: <0.3 MG/DL (ref 0–0.5)
DEPRECATED RDW RBC AUTO: 49 FL (ref 37–54)
EGFRCR SERPLBLD CKD-EPI 2021: 96.3 ML/MIN/1.73
EOSINOPHIL # BLD AUTO: 0.35 10*3/MM3 (ref 0–0.4)
EOSINOPHIL NFR BLD AUTO: 6.9 % (ref 0.3–6.2)
ERYTHROCYTE [DISTWIDTH] IN BLOOD BY AUTOMATED COUNT: 15.5 % (ref 12.3–15.4)
GLUCOSE SERPL-MCNC: 102 MG/DL (ref 65–99)
GLUCOSE UR STRIP-MCNC: NEGATIVE MG/DL
HCT VFR BLD AUTO: 32.4 % (ref 37.5–51)
HGB BLD-MCNC: 10.8 G/DL (ref 13–17.7)
HGB UR QL STRIP.AUTO: ABNORMAL
HYALINE CASTS UR QL AUTO: ABNORMAL /LPF
IMM GRANULOCYTES # BLD AUTO: 0.01 10*3/MM3 (ref 0–0.05)
IMM GRANULOCYTES NFR BLD AUTO: 0.2 % (ref 0–0.5)
KETONES UR QL STRIP: NEGATIVE
LEUKOCYTE ESTERASE UR QL STRIP.AUTO: NEGATIVE
LYMPHOCYTES # BLD AUTO: 1.19 10*3/MM3 (ref 0.7–3.1)
LYMPHOCYTES NFR BLD AUTO: 23.5 % (ref 19.6–45.3)
MCH RBC QN AUTO: 29.3 PG (ref 26.6–33)
MCHC RBC AUTO-ENTMCNC: 33.3 G/DL (ref 31.5–35.7)
MCV RBC AUTO: 88 FL (ref 79–97)
MICROALBUMIN/CREAT UR: 1587.3 MG/G
MONOCYTES # BLD AUTO: 0.36 10*3/MM3 (ref 0.1–0.9)
MONOCYTES NFR BLD AUTO: 7.1 % (ref 5–12)
NEUTROPHILS NFR BLD AUTO: 3.11 10*3/MM3 (ref 1.7–7)
NEUTROPHILS NFR BLD AUTO: 61.5 % (ref 42.7–76)
NITRITE UR QL STRIP: NEGATIVE
NRBC BLD AUTO-RTO: 0 /100 WBC (ref 0–0.2)
PH UR STRIP.AUTO: 7 [PH] (ref 5–8)
PHOSPHATE SERPL-MCNC: 3.7 MG/DL (ref 2.5–4.5)
PLATELET # BLD AUTO: 190 10*3/MM3 (ref 140–450)
PMV BLD AUTO: 11.3 FL (ref 6–12)
POTASSIUM SERPL-SCNC: 4.8 MMOL/L (ref 3.5–5.2)
PROT ?TM UR-MCNC: 48 MG/DL
PROT UR QL STRIP: ABNORMAL
PROT/CREAT UR: 2539.7 MG/G CREA (ref 0–200)
RBC # BLD AUTO: 3.68 10*6/MM3 (ref 4.14–5.8)
RBC # UR STRIP: ABNORMAL /HPF
REF LAB TEST METHOD: ABNORMAL
SODIUM SERPL-SCNC: 140 MMOL/L (ref 136–145)
SP GR UR STRIP: 1.01 (ref 1–1.03)
SQUAMOUS #/AREA URNS HPF: ABNORMAL /HPF
UROBILINOGEN UR QL STRIP: ABNORMAL
WBC # UR STRIP: ABNORMAL /HPF
WBC NRBC COR # BLD: 5.06 10*3/MM3 (ref 3.4–10.8)

## 2023-04-24 PROCEDURE — 85025 COMPLETE CBC W/AUTO DIFF WBC: CPT | Performed by: INTERNAL MEDICINE

## 2023-04-24 PROCEDURE — 80069 RENAL FUNCTION PANEL: CPT

## 2023-04-24 PROCEDURE — 81001 URINALYSIS AUTO W/SCOPE: CPT

## 2023-04-24 PROCEDURE — 84156 ASSAY OF PROTEIN URINE: CPT

## 2023-04-24 PROCEDURE — 86140 C-REACTIVE PROTEIN: CPT | Performed by: INTERNAL MEDICINE

## 2023-04-24 PROCEDURE — G0299 HHS/HOSPICE OF RN EA 15 MIN: HCPCS

## 2023-04-24 PROCEDURE — 82570 ASSAY OF URINE CREATININE: CPT

## 2023-04-24 PROCEDURE — 82043 UR ALBUMIN QUANTITATIVE: CPT

## 2023-04-24 PROCEDURE — 36415 COLL VENOUS BLD VENIPUNCTURE: CPT

## 2023-04-24 NOTE — PROGRESS NOTES
Chief Complaint  Hospital Follow Up Visit (Hospital f/u amputation of toes)    Subjective        Kelby Peters presents to [facility]  History of Present Illness pt here with spouse for hospital d/c f/u.    D/c on 4/4/23.  Patient has been readmitted due to bilateral diabetic toes.  He is status post left foot amputation  first metatarsal and right fifth metatarsal amputation.  Has bilateral foot wounds with spouse doing dressing changes currently.  They have a follow-up with Dr. Alvarado and spouse reports likely they will be getting a wound VAC and will discuss that further at follow-up on 5/11/2020.  Spouse feels like wounds are healing with twice daily dressing changes.  No signs or symptoms of infection as far she can tell.  Wound care evaluates weekly.    He has been taking and tolerating Rocephin daily via right PICC.  Had PICC care this morning.  No arm pain or swelling.  Denies side effects of antibiotics-nausea, vomiting, diarrhea, constipation, rash.  He is following with ID for this.    Since last visit with me he has seen Endo and is now under their care for diabetes.  He was changed to 70/30 insulin and is using 40 units twice daily without side effects.  States that his glucose maintains around 120/day.  No sliding scale.  He has not yet received the jeremy 2 that was ordered.  He has no signs or symptoms of hypo or hyperglycemia.  Upon most recent hospitalization his A1c was 9.1.  Has follow-up with Endo in June.    His meds were changed in the hospital.  He thinks he is now taking 2 of something, not sure what this is-may be lisinopril.  He reports he also is taking something else in addition to amlodipine.  Blood pressures are being checked at home twice a day and running about 146/75.  He has an upcoming appointment with nephrology next week.  He is not sure what blood pressure medications he is taking exactly.    Continue follow-up with pain management for chronic pain  "and doing well on current opioid and gabapentin.    No refills needed today as far as they know.    Spouse will send updated med list when they get home.    Patient has no headache, dizziness, chest pain, palpitations, dyspnea, cough.      Objective   Vital Signs:  /70   Pulse 68   Temp 96.2 °F (35.7 °C)   Ht 170.2 cm (67\")   Wt 95.7 kg (211 lb)   SpO2 98%   BMI 33.05 kg/m²   Estimated body mass index is 33.05 kg/m² as calculated from the following:    Height as of this encounter: 170.2 cm (67\").    Weight as of this encounter: 95.7 kg (211 lb).             Physical Exam  Vitals and nursing note reviewed.   Constitutional:       General: He is not in acute distress.     Appearance: He is well-developed. He is not ill-appearing or diaphoretic.   HENT:      Head: Normocephalic and atraumatic.   Eyes:      General:         Right eye: No discharge.         Left eye: No discharge.      Conjunctiva/sclera: Conjunctivae normal.   Cardiovascular:      Rate and Rhythm: Normal rate and regular rhythm.   Pulmonary:      Effort: Pulmonary effort is normal.      Breath sounds: Normal breath sounds. No wheezing.   Abdominal:      General: Bowel sounds are normal. There is no distension.      Palpations: Abdomen is soft.   Musculoskeletal:      Comments: Bilateral diabetic boots with underlying intact dressings    PICC line to right upper extremity without signs or symptoms of complication, dressing clean dry and intact.   Skin:     General: Skin is warm and dry.   Neurological:      Mental Status: He is alert and oriented to person, place, and time.   Psychiatric:         Mood and Affect: Mood normal.         Behavior: Behavior normal.        Result Review :                   Assessment and Plan   Diagnoses and all orders for this visit:    1. Hospital discharge follow-up (Primary)    2. Poorly controlled type 1 diabetes mellitus with autonomic neuropathy    3. Primary hypertension    4. History of " seizures    Hospital discharge summary reviewed with patient.  Attempted to reconcile medications but not able as patient is not exactly sure what he is taking.  It looks like he was on lisinopril which was increased in the hospital but valsartan HCTZ was also on med list and he should not be taking both of these together.  Spouse will contact me when she gets home to reconcile medications.  They do have an appointment upcoming with nephrology next week so we will hold off on adjusting any medications for blood pressure but he still does not have overall good blood pressure control and probably needs adjustment of medications.  Most recent creatinine was noted to be normal.    Patient is now followed by Endo for type 2 diabetes control.  Awaiting 24-hour monitor.  Seems to be better controlled since discharge and endocrinology.  No hypoglycemia noted.  Last A1c was over 9%.    Discussed brought pictures of patient's bilateral foot wounds which show beefy red wound beds but pretty significant wounds especially on left foot.  Likely will need wound VAC at follow-up.  There were no signs or symptoms and pictures of complications.    No seizures noted since discharge we will continue current Keppra dose.     Patient and spouse seem to have a good understanding of treatment plan, ongoing appointments.  Medications were not able to be reconciled as patient does not really know exactly what he is taking at home as far as blood pressure goes.  All other medications seem appropriate, and seems to be compliant with taking them.  No apparent side effects.       Follow Up   Return if symptoms worsen or fail to improve.  Patient was given instructions and counseling regarding his condition or for health maintenance advice. Please see specific information pulled into the AVS if appropriate.

## 2023-04-24 NOTE — HOME HEALTH
PLAN FOR NEXT SNV- WEEKLY WOUND ASSESSMENT/PHOTOS- BILAT FEET AMPUTATION WOUNDS  WEEKLY LAB DRAW- CBC W/ DIFF, CRP TO CHRISTY  WEEKLY PICC DRESSING CHANGE- LAST DOSE IVAB 5/12    FOCUS OF CARE- BILAT TOE AMPUATION WOUND HEALING, IVAB INFUSION MANAGEMENT/CARES    4/24- SN CHANGED PICC- NO S/S COMPLICATIONS, OBTAINED LAB VIA PICC- PT TOLERATED W/O C/O DISCOMFORT, BILAT FOOT WOUNDS W/ S/S HEALING AEB HEALTHY APPEARING TISSUES AND DECREASING WOUND MEASUREMENTS

## 2023-04-25 DIAGNOSIS — Z87.898 HISTORY OF SEIZURES: ICD-10-CM

## 2023-04-25 RX ORDER — LEVETIRACETAM 500 MG/1
TABLET ORAL
Qty: 180 TABLET | Refills: 0 | Status: SHIPPED | OUTPATIENT
Start: 2023-04-25

## 2023-04-25 NOTE — TELEPHONE ENCOUNTER
Rx Refill Note  Requested Prescriptions     Pending Prescriptions Disp Refills   • levETIRAcetam (KEPPRA) 500 MG tablet [Pharmacy Med Name: levETIRAcetam 500 MG TABLET] 180 tablet 1     Sig: TAKE ONE TABLET BY MOUTH TWICE A DAY      Last office visit with prescribing clinician: 4/24/2023   Last telemedicine visit with prescribing clinician: Visit date not found   Next office visit with prescribing clinician: Visit date not found                         Would you like a call back once the refill request has been completed: [] Yes [] No    If the office needs to give you a call back, can they leave a voicemail: [] Yes [] No    Izabela Piña, PCT  04/25/23, 11:17 EDT

## 2023-04-26 RX ORDER — LISINOPRIL 20 MG/1
20 TABLET ORAL DAILY
Qty: 90 TABLET | Refills: 0
Start: 2023-04-26

## 2023-04-28 ENCOUNTER — TELEPHONE (OUTPATIENT)
Dept: ENDOCRINOLOGY | Age: 73
End: 2023-04-28

## 2023-04-28 NOTE — TELEPHONE ENCOUNTER
Caller: YURIY SOLARES    Relationship: SPOUSE    Best call back number: [phone]    Equipment requested: LEBRA 2 MONITOR    Reason for the request: INSURANCE DOES NOT COVER #3    Prescribing Provider: ZULEMA    Additional information or concerns: PLEASE CALL PATIENT SPOUSE BACK TO LET THEM KNOW IF THE MONITOR HAS BEEN ORDERED. CALLER NORMALLY TALKS WITH ALFONSO.

## 2023-05-01 ENCOUNTER — HOME CARE VISIT (OUTPATIENT)
Dept: HOME HEALTH SERVICES | Facility: HOME HEALTHCARE | Age: 73
End: 2023-05-01
Payer: COMMERCIAL

## 2023-05-01 ENCOUNTER — LAB REQUISITION (OUTPATIENT)
Dept: LAB | Facility: HOSPITAL | Age: 73
End: 2023-05-01
Payer: MEDICARE

## 2023-05-01 VITALS
RESPIRATION RATE: 16 BRPM | HEART RATE: 72 BPM | TEMPERATURE: 98 F | OXYGEN SATURATION: 98 % | DIASTOLIC BLOOD PRESSURE: 70 MMHG | SYSTOLIC BLOOD PRESSURE: 140 MMHG

## 2023-05-01 DIAGNOSIS — Z00.00 ENCOUNTER FOR GENERAL ADULT MEDICAL EXAMINATION WITHOUT ABNORMAL FINDINGS: ICD-10-CM

## 2023-05-01 LAB
BASOPHILS # BLD AUTO: 0.05 10*3/MM3 (ref 0–0.2)
BASOPHILS NFR BLD AUTO: 1 % (ref 0–1.5)
CREAT SERPL-MCNC: 1.03 MG/DL (ref 0.76–1.27)
CRP SERPL-MCNC: <0.3 MG/DL (ref 0–0.5)
DEPRECATED RDW RBC AUTO: 49.4 FL (ref 37–54)
EGFRCR SERPLBLD CKD-EPI 2021: 77.2 ML/MIN/1.73
EOSINOPHIL # BLD AUTO: 0.33 10*3/MM3 (ref 0–0.4)
EOSINOPHIL NFR BLD AUTO: 6.5 % (ref 0.3–6.2)
ERYTHROCYTE [DISTWIDTH] IN BLOOD BY AUTOMATED COUNT: 15.3 % (ref 12.3–15.4)
HCT VFR BLD AUTO: 34.8 % (ref 37.5–51)
HGB BLD-MCNC: 11.4 G/DL (ref 13–17.7)
IMM GRANULOCYTES # BLD AUTO: 0.02 10*3/MM3 (ref 0–0.05)
IMM GRANULOCYTES NFR BLD AUTO: 0.4 % (ref 0–0.5)
LYMPHOCYTES # BLD AUTO: 1.32 10*3/MM3 (ref 0.7–3.1)
LYMPHOCYTES NFR BLD AUTO: 26.1 % (ref 19.6–45.3)
MCH RBC QN AUTO: 28.9 PG (ref 26.6–33)
MCHC RBC AUTO-ENTMCNC: 32.8 G/DL (ref 31.5–35.7)
MCV RBC AUTO: 88.3 FL (ref 79–97)
MONOCYTES # BLD AUTO: 0.46 10*3/MM3 (ref 0.1–0.9)
MONOCYTES NFR BLD AUTO: 9.1 % (ref 5–12)
NEUTROPHILS NFR BLD AUTO: 2.87 10*3/MM3 (ref 1.7–7)
NEUTROPHILS NFR BLD AUTO: 56.9 % (ref 42.7–76)
NRBC BLD AUTO-RTO: 0 /100 WBC (ref 0–0.2)
PLATELET # BLD AUTO: 197 10*3/MM3 (ref 140–450)
PMV BLD AUTO: 11.3 FL (ref 6–12)
RBC # BLD AUTO: 3.94 10*6/MM3 (ref 4.14–5.8)
WBC NRBC COR # BLD: 5.05 10*3/MM3 (ref 3.4–10.8)

## 2023-05-01 PROCEDURE — 85025 COMPLETE CBC W/AUTO DIFF WBC: CPT | Performed by: INTERNAL MEDICINE

## 2023-05-01 PROCEDURE — 86140 C-REACTIVE PROTEIN: CPT | Performed by: INTERNAL MEDICINE

## 2023-05-01 PROCEDURE — G0299 HHS/HOSPICE OF RN EA 15 MIN: HCPCS

## 2023-05-01 PROCEDURE — 82565 ASSAY OF CREATININE: CPT | Performed by: INTERNAL MEDICINE

## 2023-05-01 NOTE — HOME HEALTH
PLAN FOR NEXT SNV- WEEKLY WOUND ASSESSMENT/PHOTOS- BILAT FEET AMPUTATION WOUNDS  WEEKLY LAB DRAW- CBC W/ DIFF, CRP TO CHRISTY  WEEKLY PICC DRESSING CHANGE- LAST DOSE IVAB 5/12- APPT W/ DR HARRISON 5/12/23    FOCUS OF CARE- BILAT TOE AMPUATION WOUND HEALING, IVAB INFUSION MANAGEMENT/CARES    5/1- BILAT FEET WOUNDS W/ S/S HELAING AEB HEALING APPEARING TISSUES AND DECREASING MEASUREMENTS,

## 2023-05-02 RX ORDER — HYDROCHLOROTHIAZIDE 25 MG/1
50 TABLET ORAL DAILY
Qty: 30 TABLET | Refills: 3 | Status: CANCELLED | OUTPATIENT
Start: 2023-05-02

## 2023-05-02 RX ORDER — HYDROCHLOROTHIAZIDE 25 MG/1
50 TABLET ORAL DAILY
Qty: 30 TABLET | Refills: 3 | Status: SHIPPED | OUTPATIENT
Start: 2023-05-02

## 2023-05-08 ENCOUNTER — HOME CARE VISIT (OUTPATIENT)
Dept: HOME HEALTH SERVICES | Facility: HOME HEALTHCARE | Age: 73
End: 2023-05-08
Payer: MEDICARE

## 2023-05-08 ENCOUNTER — LAB REQUISITION (OUTPATIENT)
Dept: LAB | Facility: HOSPITAL | Age: 73
End: 2023-05-08
Payer: MEDICARE

## 2023-05-08 ENCOUNTER — HOME CARE VISIT (OUTPATIENT)
Dept: HOME HEALTH SERVICES | Facility: HOME HEALTHCARE | Age: 73
End: 2023-05-08
Payer: COMMERCIAL

## 2023-05-08 DIAGNOSIS — Z00.00 ENCOUNTER FOR GENERAL ADULT MEDICAL EXAMINATION WITHOUT ABNORMAL FINDINGS: ICD-10-CM

## 2023-05-08 LAB
BASOPHILS # BLD AUTO: 0.08 10*3/MM3 (ref 0–0.2)
BASOPHILS NFR BLD AUTO: 1.4 % (ref 0–1.5)
CREAT SERPL-MCNC: 0.78 MG/DL (ref 0.76–1.27)
CRP SERPL-MCNC: <0.3 MG/DL (ref 0–0.5)
DEPRECATED RDW RBC AUTO: 49.8 FL (ref 37–54)
EGFRCR SERPLBLD CKD-EPI 2021: 94.8 ML/MIN/1.73
EOSINOPHIL # BLD AUTO: 0.32 10*3/MM3 (ref 0–0.4)
EOSINOPHIL NFR BLD AUTO: 5.5 % (ref 0.3–6.2)
ERYTHROCYTE [DISTWIDTH] IN BLOOD BY AUTOMATED COUNT: 15.1 % (ref 12.3–15.4)
HCT VFR BLD AUTO: 34.6 % (ref 37.5–51)
HGB BLD-MCNC: 11.1 G/DL (ref 13–17.7)
IMM GRANULOCYTES # BLD AUTO: 0.01 10*3/MM3 (ref 0–0.05)
IMM GRANULOCYTES NFR BLD AUTO: 0.2 % (ref 0–0.5)
LYMPHOCYTES # BLD AUTO: 1.37 10*3/MM3 (ref 0.7–3.1)
LYMPHOCYTES NFR BLD AUTO: 23.7 % (ref 19.6–45.3)
MCH RBC QN AUTO: 28.8 PG (ref 26.6–33)
MCHC RBC AUTO-ENTMCNC: 32.1 G/DL (ref 31.5–35.7)
MCV RBC AUTO: 89.9 FL (ref 79–97)
MONOCYTES # BLD AUTO: 0.53 10*3/MM3 (ref 0.1–0.9)
MONOCYTES NFR BLD AUTO: 9.2 % (ref 5–12)
NEUTROPHILS NFR BLD AUTO: 3.48 10*3/MM3 (ref 1.7–7)
NEUTROPHILS NFR BLD AUTO: 60 % (ref 42.7–76)
NRBC BLD AUTO-RTO: 0 /100 WBC (ref 0–0.2)
PLATELET # BLD AUTO: 233 10*3/MM3 (ref 140–450)
PMV BLD AUTO: 11.4 FL (ref 6–12)
RBC # BLD AUTO: 3.85 10*6/MM3 (ref 4.14–5.8)
WBC NRBC COR # BLD: 5.79 10*3/MM3 (ref 3.4–10.8)

## 2023-05-08 PROCEDURE — 82565 ASSAY OF CREATININE: CPT | Performed by: INTERNAL MEDICINE

## 2023-05-08 PROCEDURE — G0299 HHS/HOSPICE OF RN EA 15 MIN: HCPCS

## 2023-05-08 PROCEDURE — 86140 C-REACTIVE PROTEIN: CPT | Performed by: INTERNAL MEDICINE

## 2023-05-08 PROCEDURE — 85025 COMPLETE CBC W/AUTO DIFF WBC: CPT | Performed by: INTERNAL MEDICINE

## 2023-05-11 VITALS
DIASTOLIC BLOOD PRESSURE: 72 MMHG | OXYGEN SATURATION: 97 % | SYSTOLIC BLOOD PRESSURE: 142 MMHG | TEMPERATURE: 97.1 F | RESPIRATION RATE: 18 BRPM | HEART RATE: 62 BPM

## 2023-05-11 NOTE — HOME HEALTH
Routine Visit Note:    Skill/education provided: PICC line dressing changed and wounds assessed to Buddy CALLEJAS patient and caregvier on compliance with PICC line and dressing changes    Patient/caregiver response: Patient and caregiver stated understanding    Plan for next visit: WEEKLY WOUND ASSESSMENT/PHOTOS- BILAT FEET AMPUTATION WOUNDS WEEKLY LAB DRAW- CBC W/ DIFF, CRP TO CHRISTY WEEKLY PICC DRESSING CHANGE

## 2023-05-12 ENCOUNTER — OFFICE VISIT (OUTPATIENT)
Dept: INFECTIOUS DISEASES | Facility: CLINIC | Age: 73
End: 2023-05-12
Payer: MEDICARE

## 2023-05-12 VITALS
TEMPERATURE: 97.1 F | BODY MASS INDEX: 33.39 KG/M2 | SYSTOLIC BLOOD PRESSURE: 154 MMHG | DIASTOLIC BLOOD PRESSURE: 76 MMHG | WEIGHT: 213.2 LBS | RESPIRATION RATE: 20 BRPM | HEART RATE: 66 BPM

## 2023-05-12 DIAGNOSIS — E66.9 OBESITY (BMI 30-39.9): ICD-10-CM

## 2023-05-12 DIAGNOSIS — E11.65 UNCONTROLLED TYPE 2 DIABETES MELLITUS WITH HYPERGLYCEMIA: ICD-10-CM

## 2023-05-12 DIAGNOSIS — Z79.2 LONG TERM (CURRENT) USE OF ANTIBIOTICS: ICD-10-CM

## 2023-05-12 DIAGNOSIS — A49.1 GROUP B STREPTOCOCCAL INFECTION: ICD-10-CM

## 2023-05-12 DIAGNOSIS — M86.60 CHRONIC OSTEOMYELITIS: Primary | ICD-10-CM

## 2023-05-12 RX ORDER — PENICILLIN V POTASSIUM 500 MG/1
500 TABLET ORAL 2 TIMES DAILY
Qty: 60 TABLET | Refills: 2 | Status: SHIPPED | OUTPATIENT
Start: 2023-05-12 | End: 2023-06-11

## 2023-05-12 NOTE — PROGRESS NOTES
ID CLINIC NOTE    CC: f/u osteomyelitis B feet due to GBS    HPI: Kelby Peters is a 72 y.o. male here for f/u osteomyelitis B feet due to GBS. He went to the OR on 3/31/23 for B foot debridements, R 5th toe amputation, and L great toe amputation. Cultures grew Group B Strep. I recommended a 6-week course of ceftriaxone 2 g IV q24h for a w/ stop date of today (5/12/23).     He says his feet are healing well. The wounds are closed but they are clean and dry. No surrounding erythema. His most recent CRP was < 0.30 mg/dL. He saw his surgeon Dr Alvarado yesterday and received a good report. They may look into doing skin grafts soon.     No issues w/ his PICC line. Eager to have it removed.          Past Medical History:   Diagnosis Date   • Basal cell carcinoma 06/2022   • Brain injury    • Broken toes    • Diabetes mellitus    • Finger injury    • Gallbladder obstruction    • Hearing disorder    • Hiatal hernia    • Hypercholesterolemia    • Leg injury    • Migraine    • Osteomyelitis    • Peripheral neuropathy    • PONV (postoperative nausea and vomiting)    • Prostate enlargement    • Seizures    • Shingles    • Sleep apnea        Past Surgical History:   Procedure Laterality Date   • AMPUTATION DIGIT Left 1/19/2023    Procedure: 3RD LEFT TOE AMPUTATION AND LEFT FOOT DEBRIDEMENT;  Surgeon: Ivonne Martin MD;  Location: [facility] 18/19;  Service: Vascular;  Laterality: Left;   • AMPUTATION DIGIT Bilateral 3/31/2023    Procedure: RIGHT FIRST TOE AMPUTATION AND LEFT FIFTH TOE AMPUTATION;  Surgeon: Jasbir Alvarado MD;  Location: [facility] 18/19;  Service: Vascular;  Laterality: Bilateral;   • ANGIOPLASTY FEMORAL ARTERY Left 1/19/2023    Procedure: LEFT LEG ANGIOGRAM;  Surgeon: Ivonne Martin MD;  Location: [facility] 18/19;  Service: Vascular;  Laterality: Left;   • BASAL CELL CARCINOMA EXCISION  10/13/2022   • CATARACT EXTRACTION Right    • CORNEAL TRANSPLANT Left 03/31/2021   • EYE SURGERY  Left    • GALLBLADDER SURGERY     • INNER EAR SURGERY       History:    Lives in [facility] w/ his wife     Antibiotic allergies and intolerances:  None    Medications:     Current Outpatient Medications:   •  acetaminophen (TYLENOL) 500 MG tablet, Take 2 tablets by mouth Every 8 (Eight) Hours., Disp: 30 tablet, Rfl: 0  •  amLODIPine (NORVASC) 10 MG tablet, TAKE ONE TABLET BY MOUTH DAILY , NEEDS FOLLOW UP APPOINTMENT FOR REFILLS, Disp: 90 tablet, Rfl: 0  •  aspirin 81 MG EC tablet, Take 1 tablet by mouth Daily., Disp: 30 tablet, Rfl: 3  •  cefTRIAXone 2 g in sodium chloride 0.9 % 100 mL IVPB, Infuse 2 g into a venous catheter Daily for 38 doses. Indications: Bone and/or Joint Infection, Disp: , Rfl:   •  clopidogrel (PLAVIX) 75 MG tablet, Take 1 tablet by mouth Daily., Disp: 30 tablet, Rfl: 3  •  Continuous Blood Gluc Sensor (FreeStyle Milka 3 Sensor) misc, 1 each Every 14 (Fourteen) Days., Disp: 6 each, Rfl: 1  •  diphenhydrAMINE (BENADRYL) 25 MG tablet, Take 2 tablets by mouth Every 6 (Six) Hours As Needed for Allergies., Disp: , Rfl:   •  dorzolamide-timolol (COSOPT) 22.3-6.8 MG/ML ophthalmic solution, Administer 1 drop to both eyes 2 (Two) Times a Day. Indications: Wide-Angle Glaucoma, Disp: , Rfl:   •  EPINEPHrine, Anaphylaxis, (ADRENALIN) 1 MG/ML injection, Inject 0.3 mg into the appropriate muscle as directed by prescriber As Needed for Anaphylaxis. Indications: Life-Threatening Hypersensitivity Reaction, Disp: , Rfl:   •  gabapentin (NEURONTIN) 800 MG tablet, Take 1 tablet by mouth 4 (Four) Times a Day., Disp: 120 tablet, Rfl: 2  •  glucagon (GLUCAGEN) 1 MG injection, For emergency use for severe low glucose: first inject, then call 911, once awake try to give oral treatment. Kit expires in 6-12 months., Disp: 1 kit, Rfl: 6  •  glucose blood (OneTouch Verio) test strip, Use to check blood sugar  4 time daily  E11.8, Disp: 400 each, Rfl: 3  •  hydroCHLOROthiazide (HYDRODIURIL) 25 MG tablet, Take 2  tablets by mouth Daily. Indications: Edema, Disp: 30 tablet, Rfl: 3  •  HYDROcodone-acetaminophen (NORCO)  MG per tablet, Take 1 tablet by mouth Every 6 (Six) Hours As Needed for Severe Pain. Fill date 4/16/2023  Indications: Pain, Disp: 120 tablet, Rfl: 0  •  insulin NPH-insulin regular (NovoLIN 70/30 ReliOn) (70-30) 100 UNIT/ML injection, Inject 40 Units under the skin into the appropriate area as directed 2 (Two) Times a Day With Meals. Inject  Units in the AM &  Units in the PM, and titrate as directed up to 50 units twice a day., Disp: 20 mL, Rfl: 6  •  Insulin Pen Needle (BD Pen Needle Micro U/F) 32G X 6 MM misc, Use 5 injections daily, Disp: 1500 each, Rfl: 3  •  [employer] LANCETS MICRO THIN 33G misc, , Disp: , Rfl:   •  levETIRAcetam (KEPPRA) 500 MG tablet, TAKE ONE TABLET BY MOUTH TWICE A DAY, Disp: 180 tablet, Rfl: 0  •  lisinopril (PRINIVIL,ZESTRIL) 20 MG tablet, Take 1 tablet by mouth Daily., Disp: 90 tablet, Rfl: 0  •  OneTouch Delica Lancets 33G misc, Use to check blood sugar 4 times daily  E11.8, Disp: 400 each, Rfl: 3  •  prednisoLONE acetate (PRED FORTE) 1 % ophthalmic suspension, Administer 1 drop to both eyes 3 (Three) Times a Day. Indications: Conjunctivitis, Disp: , Rfl:   •  rosuvastatin (CRESTOR) 10 MG tablet, Take 1 tablet by mouth Every Night., Disp: 90 tablet, Rfl: 1  •  sodium hypochlorite (DAKIN'S 1/4 STRENGTH) 0.125 % solution topical solution 0.125%, Apply topical to affected areas two times daily (Patient taking differently: Apply 1 mL topically to the appropriate area as directed Daily. Apply topical to affected areas two times daily  Indications: Wound Care), Disp: 473 mL, Rfl: 0      OBJECTIVE:  /76   Pulse 66   Temp 97.1 °F (36.2 °C)   Resp 20   Wt 96.7 kg (213 lb 3.2 oz)   BMI 33.39 kg/m²     General: awake, alert, NAD, very nice   Eyes: no scleral icterus  CV: NR  Pulm: normal WOB on room air  MSK: lateral R foot with small, healing wound; no erythema; s/p L great  toe amputation with healing open wound; no erythema  Neurological: Alert and oriented x 3  Psychiatric: Normal mood and affect   Vasc: PICC in RUE w/o erythema    DIAGNOSTICS:  CBC, Crt, CRP reviewed today  Lab Results   Component Value Date    WBC 5.79 05/08/2023    HGB 11.1 (L) 05/08/2023    HCT 34.6 (L) 05/08/2023     05/08/2023     Lab Results   Component Value Date    CREATININE 0.78 05/08/2023       Lab Results   Component Value Date    CRP <0.30 05/08/2023     Lab Results   Component Value Date    HGBA1C 7.70 (H) 03/31/2023     HIV negative  Hep C negative     Microbiology:  3/30 BCx: negative  3/31 R Foot OR Cx: rare GBS  3/31 L Foot OR Cx: rare GBS    ASSESSMENT/PLAN:  1. Chronic osteomyelitis of bilateral feet due to Group B Strep  2. Uncontrolled DM2 - complicated by foot ulcer - A1c 7.7%  3. History of Strep and MSSA infections  4. Peripheral vascular disease  5. Peripheral neuropathy  6. Long term use of antibiotics  7.  Obesity BMI 32    He's had an excellent clinical response and laboratory response to surgical debridement and 6 weeks of targeted antibiotic therapy. DC PICC line and ceftriaxone today. Given history of recurrent infection and wounds not quite yet closed, I am going to give him penicillin  mg PO BID x 3 months as prophylaxis. We discussed benefits vs risks and mutually agreed to proceed.     RTC 3 months or sooner if needed. Keep follow-up with Dr Alvarado as scheduled.

## 2023-05-15 ENCOUNTER — HOME CARE VISIT (OUTPATIENT)
Dept: HOME HEALTH SERVICES | Facility: HOME HEALTHCARE | Age: 73
End: 2023-05-15
Payer: COMMERCIAL

## 2023-05-15 VITALS
HEART RATE: 67 BPM | DIASTOLIC BLOOD PRESSURE: 70 MMHG | OXYGEN SATURATION: 98 % | SYSTOLIC BLOOD PRESSURE: 120 MMHG | TEMPERATURE: 98.4 F | RESPIRATION RATE: 16 BRPM

## 2023-05-15 PROCEDURE — G0162 HHC RN E&M PLAN SVS, 15 MIN: HCPCS

## 2023-05-17 ENCOUNTER — TRANSCRIBE ORDERS (OUTPATIENT)
Dept: ADMINISTRATIVE | Facility: HOSPITAL | Age: 73
End: 2023-05-17
Payer: MEDICARE

## 2023-05-17 ENCOUNTER — LAB (OUTPATIENT)
Dept: LAB | Facility: HOSPITAL | Age: 73
End: 2023-05-17
Payer: MEDICARE

## 2023-05-17 ENCOUNTER — OFFICE VISIT (OUTPATIENT)
Dept: PAIN MEDICINE | Facility: CLINIC | Age: 73
End: 2023-05-17
Payer: MEDICARE

## 2023-05-17 VITALS
HEART RATE: 70 BPM | OXYGEN SATURATION: 95 % | SYSTOLIC BLOOD PRESSURE: 150 MMHG | DIASTOLIC BLOOD PRESSURE: 66 MMHG | TEMPERATURE: 97.5 F | HEIGHT: 67 IN | WEIGHT: 212.8 LBS | BODY MASS INDEX: 33.4 KG/M2

## 2023-05-17 DIAGNOSIS — R80.0 ISOLATED NON-NEPHROTIC PROTEINURIA: ICD-10-CM

## 2023-05-17 DIAGNOSIS — R80.0 ISOLATED NON-NEPHROTIC PROTEINURIA: Primary | ICD-10-CM

## 2023-05-17 DIAGNOSIS — E11.22 TYPE 2 DIABETES MELLITUS WITH DIABETIC CHRONIC KIDNEY DISEASE, UNSPECIFIED CKD STAGE, UNSPECIFIED WHETHER LONG TERM INSULIN USE: ICD-10-CM

## 2023-05-17 DIAGNOSIS — E11.42 DIABETIC PERIPHERAL NEUROPATHY: ICD-10-CM

## 2023-05-17 DIAGNOSIS — M79.609 PAIN IN EXTREMITY, UNSPECIFIED EXTREMITY: ICD-10-CM

## 2023-05-17 DIAGNOSIS — I12.9 HYPERTENSIVE NEPHROPATHY: ICD-10-CM

## 2023-05-17 DIAGNOSIS — Z79.899 ENCOUNTER FOR LONG-TERM (CURRENT) USE OF HIGH-RISK MEDICATION: Primary | ICD-10-CM

## 2023-05-17 DIAGNOSIS — G62.9 POLYNEUROPATHY: ICD-10-CM

## 2023-05-17 LAB
ALBUMIN SERPL-MCNC: 4.1 G/DL (ref 3.5–5.2)
ANION GAP SERPL CALCULATED.3IONS-SCNC: 8.6 MMOL/L (ref 5–15)
BUN SERPL-MCNC: 20 MG/DL (ref 8–23)
BUN/CREAT SERPL: 27.4 (ref 7–25)
CALCIUM SPEC-SCNC: 9.6 MG/DL (ref 8.6–10.5)
CHLORIDE SERPL-SCNC: 96 MMOL/L (ref 98–107)
CO2 SERPL-SCNC: 30.4 MMOL/L (ref 22–29)
CREAT SERPL-MCNC: 0.73 MG/DL (ref 0.76–1.27)
EGFRCR SERPLBLD CKD-EPI 2021: 96.7 ML/MIN/1.73
GLUCOSE SERPL-MCNC: 285 MG/DL (ref 65–99)
PHOSPHATE SERPL-MCNC: 3.9 MG/DL (ref 2.5–4.5)
POTASSIUM SERPL-SCNC: 4.6 MMOL/L (ref 3.5–5.2)
SODIUM SERPL-SCNC: 135 MMOL/L (ref 136–145)

## 2023-05-17 PROCEDURE — 80069 RENAL FUNCTION PANEL: CPT

## 2023-05-17 PROCEDURE — 36415 COLL VENOUS BLD VENIPUNCTURE: CPT

## 2023-05-17 RX ORDER — GABAPENTIN 800 MG/1
800 TABLET ORAL 4 TIMES DAILY
Qty: 120 TABLET | Refills: 2 | Status: SHIPPED | OUTPATIENT
Start: 2023-05-17

## 2023-05-17 RX ORDER — HYDROCODONE BITARTRATE AND ACETAMINOPHEN 10; 325 MG/1; MG/1
1 TABLET ORAL EVERY 6 HOURS PRN
Qty: 120 TABLET | Refills: 0 | Status: SHIPPED | OUTPATIENT
Start: 2023-05-17

## 2023-05-17 NOTE — PROGRESS NOTES
"CHIEF COMPLAINT  F/U extremity pain- patient states that his pain has remained the same since his last visit.     Subjective   Kelby Peters is a 72 y.o. male  who presents for follow-up.  He has a history of extremity pain d/t neuropathy.    Today his pain is 7/10VAS in severity.  He continues with Hydrocodone 10/325 4/day and gabapentin 800 mg 4/day(1-1-2). Also continues with Keppra 500 mg BID (prescribed by PCP). This medication regimen decreases his pain by a significant amount. He states \"If I did not have this medication it would be unbearable\". He denies any side effects including somnolence or constipation.      He continues to work with Dr. 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      He is currently working with ID and vascular surgery due to diabetic ulcers with osteomyelitis in his bilateral feet. He continues with antibiotics. He is seeing Dr. Alvarado every 2 weeks, he continues to also be followed by Dr. Villarreal.      Failed Cymbalta.  Horizant and Gralise Cost-prohibitive.  Failed Lyrica---pedal edema  History of IDDM.    Extremity Pain   The pain is present in the left hand, right hand, left lower leg and right lower leg. This is a chronic problem. The current episode started more than 1 year ago. The problem occurs constantly. The problem has been waxing and waning. The pain is at a severity of 7/10. The pain is moderate. Associated symptoms include numbness. Pertinent negatives include no fever. He has tried oral narcotics and rest for the symptoms. The treatment provided significant relief. His past medical history is significant for diabetes.      PEG Assessment   What number best describes your pain on average in the past week?7  What number best describes how, during the past week, pain has interfered with your enjoyment of life?6  What number best describes how, during the past week, pain has interfered with your general activity?  8    The following " "portions of the patient's history were reviewed and updated as appropriate: allergies, current medications, past family history, past medical history, past social history, past surgical history and problem list.    Review of Systems   Constitutional: Positive for activity change (decreased) and fatigue. Negative for chills and fever.   HENT: Negative for congestion.    Eyes: Negative for visual disturbance.   Respiratory: Negative for chest tightness and shortness of breath.    Cardiovascular: Negative for chest pain.   Gastrointestinal: Positive for abdominal pain. Negative for constipation and diarrhea.   Genitourinary: Negative for difficulty urinating and dysuria.   Musculoskeletal: Negative for back pain and neck pain.   Neurological: Positive for weakness and numbness. Negative for dizziness, light-headedness and headaches.   Psychiatric/Behavioral: Positive for agitation. Negative for sleep disturbance. The patient is not nervous/anxious.      --  The aforementioned information the Chief Complaint section and above subjective data including any HPI data, and also the Review of Systems data, has been personally reviewed and affirmed.  --    Vitals:    05/17/23 0929   BP: 150/66   Pulse: 70   Temp: 97.5 °F (36.4 °C)   SpO2: 95%   Weight: 96.5 kg (212 lb 12.8 oz)   Height: 170.2 cm (67\")   PainSc:   7   PainLoc: Arm     Objective   Physical Exam  Vitals and nursing note reviewed.   Constitutional:       Appearance: Normal appearance. He is well-developed.   Eyes:      General: Lids are normal.   Cardiovascular:      Rate and Rhythm: Normal rate.   Pulmonary:      Effort: Pulmonary effort is normal.   Musculoskeletal:      Right foot: Tenderness present.      Left foot: Tenderness present.      Comments: Surgical walking boots to bilateral feet.    Neurological:      Mental Status: He is alert and oriented to person, place, and time.   Psychiatric:         Attention and Perception: Attention normal.         Mood " and Affect: Mood normal.         Speech: Speech normal.         Behavior: Behavior normal.         Judgment: Judgment normal.       Assessment & Plan   Diagnoses and all orders for this visit:    1. Encounter for long-term (current) use of high-risk medication (Primary)    2. Diabetic peripheral neuropathy  -     HYDROcodone-acetaminophen (NORCO)  MG per tablet; Take 1 tablet by mouth Every 6 (Six) Hours As Needed for Severe Pain. Fill date 5/27/2023  Indications: Pain  Dispense: 120 tablet; Refill: 0    3. Pain in extremity, unspecified extremity  -     HYDROcodone-acetaminophen (NORCO)  MG per tablet; Take 1 tablet by mouth Every 6 (Six) Hours As Needed for Severe Pain. Fill date 5/27/2023  Indications: Pain  Dispense: 120 tablet; Refill: 0    4. Polyneuropathy  -     gabapentin (NEURONTIN) 800 MG tablet; Take 1 tablet by mouth 4 (Four) Times a Day. Indications: Neuropathic Pain  Dispense: 120 tablet; Refill: 2      --- The urine drug screen confirmation from 1/6/2023 has been reviewed and the result is appropriate based on patient history and HOME report  --- CSA updated 6/22/2023  --- Refill [place] 10/325. Fill date 5/27/2023 applied. Patient appears stable with current regimen. No adverse effects. Regarding continuation of opioids, there is no evidence of aberrant behavior or any red flags.  The patient continues with appropriate response to opioid therapy. ADL's remain intact by self.   --- Refill Gabapentin   --- Follow-up after procedure     HOME REPORT  As part of the patient's treatment plan, I am prescribing controlled substances. The patient has been made aware of appropriate use of such medications, including potential risk of somnolence, limited ability to drive and/or work safely, and the potential for dependence or overdose. It has also been made clear that these medications are for use by this patient only, without concomitant use of alcohol or other substances unless prescribed.      Patient has completed prescribing agreement detailing terms of continued prescribing of controlled substances, including monitoring HOME reports, urine drug screening, and pill counts if necessary. The patient is aware that inappropriate use will results in cessation of prescribing such medications.    As the clinician, I personally reviewed the HOME from 5/17/2023 while the patient was in the office today.    History and physical exam exhibit continued safe and appropriate use of controlled substances.    Dictated utilizing Dragon dictation.

## 2023-05-30 DIAGNOSIS — E11.42 DIABETIC PERIPHERAL NEUROPATHY: ICD-10-CM

## 2023-05-30 DIAGNOSIS — M79.609 PAIN IN EXTREMITY, UNSPECIFIED EXTREMITY: ICD-10-CM

## 2023-05-30 RX ORDER — HYDROCODONE BITARTRATE AND ACETAMINOPHEN 10; 325 MG/1; MG/1
1 TABLET ORAL EVERY 6 HOURS PRN
Qty: 120 TABLET | Refills: 0 | OUTPATIENT
Start: 2023-05-30

## 2023-05-30 NOTE — TELEPHONE ENCOUNTER
.Medication Refill Request    Date of phone call: 23    Medication being requested: Norco  mg    si tab po q 6 hrs prn  Qty: 120    Date of last visit: 23    Date of last refill:     HOME up to date?:     Next Follow up?: 23    Any new pertinent information? (i.e, new medication allergies, new use of medications, change in patient's health or condition, non-compliance or inconsistency with prescribing agreement?):

## 2023-06-09 ENCOUNTER — OFFICE VISIT (OUTPATIENT)
Dept: ENDOCRINOLOGY | Age: 73
End: 2023-06-09
Payer: MEDICARE

## 2023-06-09 VITALS
OXYGEN SATURATION: 98 % | DIASTOLIC BLOOD PRESSURE: 64 MMHG | WEIGHT: 218.2 LBS | TEMPERATURE: 96.4 F | SYSTOLIC BLOOD PRESSURE: 128 MMHG | BODY MASS INDEX: 34.25 KG/M2 | HEIGHT: 67 IN | HEART RATE: 61 BPM

## 2023-06-09 DIAGNOSIS — E11.65 TYPE 2 DIABETES MELLITUS WITH HYPERGLYCEMIA, WITH LONG-TERM CURRENT USE OF INSULIN: Primary | ICD-10-CM

## 2023-06-09 DIAGNOSIS — Z79.4 TYPE 2 DIABETES MELLITUS WITH HYPERGLYCEMIA, WITH LONG-TERM CURRENT USE OF INSULIN: Primary | ICD-10-CM

## 2023-06-09 PROCEDURE — 3051F HG A1C>EQUAL 7.0%<8.0%: CPT | Performed by: INTERNAL MEDICINE

## 2023-06-09 PROCEDURE — 99213 OFFICE O/P EST LOW 20 MIN: CPT | Performed by: INTERNAL MEDICINE

## 2023-06-09 PROCEDURE — 3078F DIAST BP <80 MM HG: CPT | Performed by: INTERNAL MEDICINE

## 2023-06-09 PROCEDURE — 3074F SYST BP LT 130 MM HG: CPT | Performed by: INTERNAL MEDICINE

## 2023-06-09 RX ORDER — CEFTRIAXONE SODIUM 10 G/100ML
INJECTION, POWDER, FOR SOLUTION INTRAVENOUS
COMMUNITY
Start: 2023-05-08

## 2023-06-09 RX ORDER — HUMAN INSULIN 100 [USP'U]/ML
35 INJECTION, SUSPENSION SUBCUTANEOUS
COMMUNITY
Start: 2023-03-09

## 2023-06-09 NOTE — PROGRESS NOTES
New Patient      Chief Complaint    Chief Complaint   Patient presents with    Diabetes     Testing bs 1 x day         HPI:   Kelby Peters is a 72 y.o. male usually seen in the Endocrinology clinic for consultative follow up and management of uncontrolled diabetes we last saw him on March 9, 2023.  He has type 2 diabetes diagnosed more than 30 years ago, currently with complications of diabetic polyneuropathy and peripheral vascular disease.  He is insensate and has had amputations of some of his toes.  He is up-to-date with his dilated eye examination and has a history of significant proliferative diabetic retinopathy status post laser treatment and injections.  He does not have any history or symptoms suggestive of coronary heart disease.  He has diabetic nephropathy.  His creatinine on May 17, 2023, was 0.73 mg/dL with an estimated GFR of 96.7.  His albumin/creatinine ratio on April 24, 2023, was 1587 mg/g.    When we saw him in the last visit, he was on NPH and regular insulin given separately and his A1c on January 16, 2023, was 9.1% down from 10.4% on December 7, 2022.  Our recommendation was to switch him to Novolin 70/30 initially as 40 units before breakfast and 40 units before dinner with instructions to increase the dose to 50 units twice daily if his blood sugar should continue to run high.  We gave him a prescription for glucagon and instructed his wife to know how to use it in case of unresponsiveness due to severe low blood sugars.  He returns for follow-up and is doing very well and quite well pleased with the change in his medication.  He however had to decrease the dose to 35 units 2 times daily because the 40 units was giving him some low blood sugars.  He also has a freestyle jeremy for continuous glucose monitoring.  We do not have a repeat A1c on him at this point.      Past Medical History:   Diagnosis Date    Basal cell carcinoma 06/2022    Brain injury     Broken toes     Diabetes mellitus      Finger injury     Gallbladder obstruction     Hearing disorder     Hiatal hernia     Hypercholesterolemia     Leg injury     Migraine     Osteomyelitis     Peripheral neuropathy     PONV (postoperative nausea and vomiting)     Prostate enlargement     Seizures     Shingles     Sleep apnea           ROS:  Pertinent to this visit, only as mentioned above.  The rest was negative    Social History     Socioeconomic History    Marital status:    Tobacco Use    Smoking status: Former    Smokeless tobacco: Never   Vaping Use    Vaping Use: Never used   Substance and Sexual Activity    Alcohol use: Yes     Comment: social drinker    Drug use: Yes     Types: Marijuana     Comment: 55 years ago    Sexual activity: Yes     Partners: Female     Physical Exam:  GENERAL: He looked well.  Accompanied by his wife.  Hard of hearing.  HEENT: Normal examination.       Assessment:  1.  Uncontrolled type 2 diabetes with complications as mentioned above.    Diagnoses and all orders for this visit:    1. Type 2 diabetes mellitus with hyperglycemia, with long-term current use of insulin (Primary)  -     Hemoglobin A1c  -     Hemoglobin A1c; Future    Recommendations:  1.  Mr. Peters was well pleased with the changes that we have made to his treatment and with the changes that he has seen in his blood sugars and that is great.  2.  We recommend to obtain an A1c today.  3.  In the meantime we will continue his premixed Novolin 70/30 is 35 units before breakfast and 35 units before dinner.  4.  He will come back for follow-up with repeat A1c in 3 months.  5.  We gave him and his wife the opportunity to ask questions but he did not have any.  He also did not have any other concerns today.

## 2023-06-10 LAB — HBA1C MFR BLD: 8.5 % (ref 4.8–5.6)

## 2023-07-25 ENCOUNTER — ANESTHESIA EVENT (OUTPATIENT)
Dept: PERIOP | Facility: HOSPITAL | Age: 73
End: 2023-07-25
Payer: MEDICARE

## 2023-07-25 ENCOUNTER — HOSPITAL ENCOUNTER (OUTPATIENT)
Facility: HOSPITAL | Age: 73
Setting detail: HOSPITAL OUTPATIENT SURGERY
Discharge: HOME | End: 2023-07-25
Attending: SURGERY | Admitting: SURGERY
Payer: MEDICARE

## 2023-07-25 ENCOUNTER — ANESTHESIA (OUTPATIENT)
Dept: PERIOP | Facility: HOSPITAL | Age: 73
End: 2023-07-25
Payer: MEDICARE

## 2023-07-25 VITALS
TEMPERATURE: 97.6 F | RESPIRATION RATE: 16 BRPM | HEART RATE: 74 BPM | OXYGEN SATURATION: 95 % | DIASTOLIC BLOOD PRESSURE: 86 MMHG | SYSTOLIC BLOOD PRESSURE: 169 MMHG

## 2023-07-25 DIAGNOSIS — M86.9 OSTEOMYELITIS: ICD-10-CM

## 2023-07-25 DIAGNOSIS — M86.679 CHRONIC OSTEOMYELITIS OF FOOT: Primary | ICD-10-CM

## 2023-07-25 LAB
ANION GAP SERPL CALCULATED.3IONS-SCNC: 10 MMOL/L (ref 5–15)
BUN SERPL-MCNC: 19 MG/DL (ref 8–23)
BUN/CREAT SERPL: 21.6 (ref 7–25)
CALCIUM SPEC-SCNC: 9.2 MG/DL (ref 8.6–10.5)
CHLORIDE SERPL-SCNC: 101 MMOL/L (ref 98–107)
CO2 SERPL-SCNC: 27 MMOL/L (ref 22–29)
CREAT SERPL-MCNC: 0.88 MG/DL (ref 0.76–1.27)
DEPRECATED RDW RBC AUTO: 41.9 FL (ref 37–54)
EGFRCR SERPLBLD CKD-EPI 2021: 90.8 ML/MIN/1.73
ERYTHROCYTE [DISTWIDTH] IN BLOOD BY AUTOMATED COUNT: 13 % (ref 12.3–15.4)
GLUCOSE BLDC GLUCOMTR-MCNC: 230 MG/DL (ref 70–130)
GLUCOSE BLDC GLUCOMTR-MCNC: 233 MG/DL (ref 70–130)
GLUCOSE SERPL-MCNC: 252 MG/DL (ref 65–99)
HCT VFR BLD AUTO: 37.6 % (ref 37.5–51)
HGB BLD-MCNC: 12.2 G/DL (ref 13–17.7)
MCH RBC QN AUTO: 28.8 PG (ref 26.6–33)
MCHC RBC AUTO-ENTMCNC: 32.4 G/DL (ref 31.5–35.7)
MCV RBC AUTO: 88.9 FL (ref 79–97)
PLATELET # BLD AUTO: 206 10*3/MM3 (ref 140–450)
PMV BLD AUTO: 11 FL (ref 6–12)
POTASSIUM SERPL-SCNC: 4.5 MMOL/L (ref 3.5–5.2)
RBC # BLD AUTO: 4.23 10*6/MM3 (ref 4.14–5.8)
SODIUM SERPL-SCNC: 138 MMOL/L (ref 136–145)
WBC NRBC COR # BLD: 6.65 10*3/MM3 (ref 3.4–10.8)

## 2023-07-25 PROCEDURE — 87205 SMEAR GRAM STAIN: CPT | Performed by: SURGERY

## 2023-07-25 PROCEDURE — 87075 CULTR BACTERIA EXCEPT BLOOD: CPT | Performed by: SURGERY

## 2023-07-25 PROCEDURE — 25010000002 BUPIVACAINE (PF) 0.25 % SOLUTION: Performed by: ANESTHESIOLOGY

## 2023-07-25 PROCEDURE — 88311 DECALCIFY TISSUE: CPT | Performed by: SURGERY

## 2023-07-25 PROCEDURE — 87070 CULTURE OTHR SPECIMN AEROBIC: CPT | Performed by: SURGERY

## 2023-07-25 PROCEDURE — 25010000002 PROPOFOL 10 MG/ML EMULSION: Performed by: NURSE ANESTHETIST, CERTIFIED REGISTERED

## 2023-07-25 PROCEDURE — 80048 BASIC METABOLIC PNL TOTAL CA: CPT | Performed by: ANESTHESIOLOGY

## 2023-07-25 PROCEDURE — 25010000002 CEFAZOLIN PER 500 MG: Performed by: SURGERY

## 2023-07-25 PROCEDURE — 25010000002 MIDAZOLAM PER 1 MG: Performed by: ANESTHESIOLOGY

## 2023-07-25 PROCEDURE — 25010000002 CEFAZOLIN IN DEXTROSE 2-4 GM/100ML-% SOLUTION: Performed by: SURGERY

## 2023-07-25 PROCEDURE — 82948 REAGENT STRIP/BLOOD GLUCOSE: CPT

## 2023-07-25 PROCEDURE — 88305 TISSUE EXAM BY PATHOLOGIST: CPT | Performed by: SURGERY

## 2023-07-25 PROCEDURE — 85027 COMPLETE CBC AUTOMATED: CPT | Performed by: ANESTHESIOLOGY

## 2023-07-25 RX ORDER — DROPERIDOL 2.5 MG/ML
0.62 INJECTION, SOLUTION INTRAMUSCULAR; INTRAVENOUS
Status: DISCONTINUED | OUTPATIENT
Start: 2023-07-25 | End: 2023-07-25 | Stop reason: HOSPADM

## 2023-07-25 RX ORDER — PROMETHAZINE HYDROCHLORIDE 25 MG/1
25 TABLET ORAL ONCE AS NEEDED
Status: DISCONTINUED | OUTPATIENT
Start: 2023-07-25 | End: 2023-07-25 | Stop reason: HOSPADM

## 2023-07-25 RX ORDER — HYDROCODONE BITARTRATE AND ACETAMINOPHEN 10; 325 MG/1; MG/1
1 TABLET ORAL EVERY 6 HOURS PRN
Status: DISCONTINUED | OUTPATIENT
Start: 2023-07-25 | End: 2023-07-25 | Stop reason: HOSPADM

## 2023-07-25 RX ORDER — CEFAZOLIN SODIUM 2 G/100ML
2000 INJECTION, SOLUTION INTRAVENOUS ONCE
Status: COMPLETED | OUTPATIENT
Start: 2023-07-25 | End: 2023-07-25

## 2023-07-25 RX ORDER — FAMOTIDINE 10 MG/ML
20 INJECTION, SOLUTION INTRAVENOUS ONCE
Status: COMPLETED | OUTPATIENT
Start: 2023-07-25 | End: 2023-07-25

## 2023-07-25 RX ORDER — LABETALOL HYDROCHLORIDE 5 MG/ML
5 INJECTION, SOLUTION INTRAVENOUS
Status: DISCONTINUED | OUTPATIENT
Start: 2023-07-25 | End: 2023-07-25 | Stop reason: HOSPADM

## 2023-07-25 RX ORDER — EPHEDRINE SULFATE 50 MG/ML
5 INJECTION, SOLUTION INTRAVENOUS ONCE AS NEEDED
Status: DISCONTINUED | OUTPATIENT
Start: 2023-07-25 | End: 2023-07-25 | Stop reason: HOSPADM

## 2023-07-25 RX ORDER — PROPOFOL 10 MG/ML
VIAL (ML) INTRAVENOUS AS NEEDED
Status: DISCONTINUED | OUTPATIENT
Start: 2023-07-25 | End: 2023-07-25 | Stop reason: SURG

## 2023-07-25 RX ORDER — BUPIVACAINE HYDROCHLORIDE 2.5 MG/ML
INJECTION, SOLUTION EPIDURAL; INFILTRATION; INTRACAUDAL
Status: COMPLETED | OUTPATIENT
Start: 2023-07-25 | End: 2023-07-25

## 2023-07-25 RX ORDER — ONDANSETRON 2 MG/ML
4 INJECTION INTRAMUSCULAR; INTRAVENOUS ONCE AS NEEDED
Status: DISCONTINUED | OUTPATIENT
Start: 2023-07-25 | End: 2023-07-25 | Stop reason: HOSPADM

## 2023-07-25 RX ORDER — HYDROCODONE BITARTRATE AND ACETAMINOPHEN 5; 325 MG/1; MG/1
1 TABLET ORAL ONCE AS NEEDED
Status: DISCONTINUED | OUTPATIENT
Start: 2023-07-25 | End: 2023-07-25 | Stop reason: HOSPADM

## 2023-07-25 RX ORDER — NALOXONE HCL 0.4 MG/ML
0.2 VIAL (ML) INJECTION AS NEEDED
Status: DISCONTINUED | OUTPATIENT
Start: 2023-07-25 | End: 2023-07-25 | Stop reason: HOSPADM

## 2023-07-25 RX ORDER — IPRATROPIUM BROMIDE AND ALBUTEROL SULFATE 2.5; .5 MG/3ML; MG/3ML
3 SOLUTION RESPIRATORY (INHALATION) ONCE AS NEEDED
Status: DISCONTINUED | OUTPATIENT
Start: 2023-07-25 | End: 2023-07-25 | Stop reason: HOSPADM

## 2023-07-25 RX ORDER — SODIUM CHLORIDE 0.9 % (FLUSH) 0.9 %
3-10 SYRINGE (ML) INJECTION AS NEEDED
Status: DISCONTINUED | OUTPATIENT
Start: 2023-07-25 | End: 2023-07-25 | Stop reason: HOSPADM

## 2023-07-25 RX ORDER — FENTANYL CITRATE 50 UG/ML
50 INJECTION, SOLUTION INTRAMUSCULAR; INTRAVENOUS ONCE AS NEEDED
Status: DISCONTINUED | OUTPATIENT
Start: 2023-07-25 | End: 2023-07-25 | Stop reason: HOSPADM

## 2023-07-25 RX ORDER — HYDROCODONE BITARTRATE AND ACETAMINOPHEN 5; 325 MG/1; MG/1
1-2 TABLET ORAL EVERY 6 HOURS PRN
Qty: 30 TABLET | Refills: 0 | Status: SHIPPED | OUTPATIENT
Start: 2023-07-25

## 2023-07-25 RX ORDER — SODIUM CHLORIDE 0.9 % (FLUSH) 0.9 %
3 SYRINGE (ML) INJECTION EVERY 12 HOURS SCHEDULED
Status: DISCONTINUED | OUTPATIENT
Start: 2023-07-25 | End: 2023-07-25 | Stop reason: HOSPADM

## 2023-07-25 RX ORDER — HYDRALAZINE HYDROCHLORIDE 20 MG/ML
5 INJECTION INTRAMUSCULAR; INTRAVENOUS
Status: DISCONTINUED | OUTPATIENT
Start: 2023-07-25 | End: 2023-07-25 | Stop reason: HOSPADM

## 2023-07-25 RX ORDER — DIPHENHYDRAMINE HYDROCHLORIDE 50 MG/ML
12.5 INJECTION INTRAMUSCULAR; INTRAVENOUS
Status: DISCONTINUED | OUTPATIENT
Start: 2023-07-25 | End: 2023-07-25 | Stop reason: HOSPADM

## 2023-07-25 RX ORDER — FENTANYL CITRATE 50 UG/ML
25 INJECTION, SOLUTION INTRAMUSCULAR; INTRAVENOUS
Status: DISCONTINUED | OUTPATIENT
Start: 2023-07-25 | End: 2023-07-25 | Stop reason: HOSPADM

## 2023-07-25 RX ORDER — HYDROMORPHONE HYDROCHLORIDE 1 MG/ML
0.25 INJECTION, SOLUTION INTRAMUSCULAR; INTRAVENOUS; SUBCUTANEOUS
Status: DISCONTINUED | OUTPATIENT
Start: 2023-07-25 | End: 2023-07-25 | Stop reason: HOSPADM

## 2023-07-25 RX ORDER — SODIUM CHLORIDE, SODIUM LACTATE, POTASSIUM CHLORIDE, CALCIUM CHLORIDE 600; 310; 30; 20 MG/100ML; MG/100ML; MG/100ML; MG/100ML
9 INJECTION, SOLUTION INTRAVENOUS CONTINUOUS
Status: DISCONTINUED | OUTPATIENT
Start: 2023-07-25 | End: 2023-07-25 | Stop reason: HOSPADM

## 2023-07-25 RX ORDER — HYDROCODONE BITARTRATE AND ACETAMINOPHEN 7.5; 325 MG/1; MG/1
1 TABLET ORAL EVERY 4 HOURS PRN
Status: DISCONTINUED | OUTPATIENT
Start: 2023-07-25 | End: 2023-07-25 | Stop reason: HOSPADM

## 2023-07-25 RX ORDER — GABAPENTIN 400 MG/1
1600 CAPSULE ORAL ONCE
Status: COMPLETED | OUTPATIENT
Start: 2023-07-25 | End: 2023-07-25

## 2023-07-25 RX ORDER — FLUMAZENIL 0.1 MG/ML
0.2 INJECTION INTRAVENOUS AS NEEDED
Status: DISCONTINUED | OUTPATIENT
Start: 2023-07-25 | End: 2023-07-25 | Stop reason: HOSPADM

## 2023-07-25 RX ORDER — PROMETHAZINE HYDROCHLORIDE 25 MG/1
25 SUPPOSITORY RECTAL ONCE AS NEEDED
Status: DISCONTINUED | OUTPATIENT
Start: 2023-07-25 | End: 2023-07-25 | Stop reason: HOSPADM

## 2023-07-25 RX ORDER — LIDOCAINE HYDROCHLORIDE 10 MG/ML
INJECTION, SOLUTION EPIDURAL; INFILTRATION; INTRACAUDAL; PERINEURAL
Status: COMPLETED | OUTPATIENT
Start: 2023-07-25 | End: 2023-07-25

## 2023-07-25 RX ORDER — MIDAZOLAM HYDROCHLORIDE 1 MG/ML
0.5 INJECTION INTRAMUSCULAR; INTRAVENOUS
Status: DISCONTINUED | OUTPATIENT
Start: 2023-07-25 | End: 2023-07-25 | Stop reason: HOSPADM

## 2023-07-25 RX ORDER — MIDAZOLAM HYDROCHLORIDE 1 MG/ML
INJECTION INTRAMUSCULAR; INTRAVENOUS
Status: COMPLETED | OUTPATIENT
Start: 2023-07-25 | End: 2023-07-25

## 2023-07-25 RX ADMIN — PROPOFOL 50 MG: 10 INJECTION, EMULSION INTRAVENOUS at 10:40

## 2023-07-25 RX ADMIN — MIDAZOLAM 1 MG: 1 INJECTION INTRAMUSCULAR; INTRAVENOUS at 08:32

## 2023-07-25 RX ADMIN — LIDOCAINE HYDROCHLORIDE 10 MG: 10 INJECTION, SOLUTION EPIDURAL; INFILTRATION; INTRACAUDAL; PERINEURAL at 08:37

## 2023-07-25 RX ADMIN — FAMOTIDINE 20 MG: 10 INJECTION INTRAVENOUS at 07:56

## 2023-07-25 RX ADMIN — PROPOFOL 50 MG: 10 INJECTION, EMULSION INTRAVENOUS at 10:31

## 2023-07-25 RX ADMIN — BUPIVACAINE HYDROCHLORIDE 10 ML: 2.5 INJECTION, SOLUTION EPIDURAL; INFILTRATION; INTRACAUDAL; PERINEURAL at 08:31

## 2023-07-25 RX ADMIN — CEFAZOLIN SODIUM 2000 MG: 2 INJECTION, SOLUTION INTRAVENOUS at 10:16

## 2023-07-25 RX ADMIN — GABAPENTIN 1600 MG: 400 CAPSULE ORAL at 11:29

## 2023-07-25 RX ADMIN — LIDOCAINE HYDROCHLORIDE 100 MG: 10 INJECTION, SOLUTION EPIDURAL; INFILTRATION; INTRACAUDAL; PERINEURAL at 10:31

## 2023-07-25 RX ADMIN — PROPOFOL 50 MCG/KG/MIN: 10 INJECTION, EMULSION INTRAVENOUS at 10:32

## 2023-07-25 RX ADMIN — SODIUM CHLORIDE, POTASSIUM CHLORIDE, SODIUM LACTATE AND CALCIUM CHLORIDE 9 ML/HR: 600; 310; 30; 20 INJECTION, SOLUTION INTRAVENOUS at 07:56

## 2023-07-25 RX ADMIN — HYDROCODONE BITARTRATE AND ACETAMINOPHEN 1 TABLET: 10; 325 TABLET ORAL at 11:29

## 2023-07-25 NOTE — OP NOTE
Operative Note  Date of Admission:  7/25/2023  OR Date: 7/25/2023    Pre-op Diagnosis:   Left second toe diabetic foot ulcer with hammertoe and clinical osteomyelitis    Post-Op Diagnosis Codes:  Same    Procedure:   1) left second toe simple amputation    Surgeon: Derrick Alvarado MD    Assistant:  Brenna ROQUE CSA and they provided critical assistance during the case including suctioning, exposure, retraction, and reduction of blood loss.    Anesthesia: Monitored Anesthesia Care with Regional    Staff:   Circulator: Ofe Kim RN; Izabela Hart RN  Scrub Person: Niharika Khan  Assistant: Brenna Mills CSA; Sloane Cota CST    Estimated Blood Loss: minimal    Specimens:   Order Name Source Comment Collection Info Order Time   ANAEROBIC CULTURE Toe, Left  Collected By: aJsbir Alvarado MD 7/25/2023 10:57 AM     Release to patient   Routine Release        WOUND CULTURE Toe, Left  Collected By: Jasbir Alvarado MD 7/25/2023 10:57 AM     Release to patient   Routine Release        BASIC METABOLIC PANEL   Collected By: Flavia Ferrer RN 7/25/2023  7:42 AM   CBC (NO DIFF)   Collected By: Flavia Ferrer RN 7/25/2023  7:42 AM   TISSUE PATHOLOGY EXAM Toe, Left  Collected By: Jasbir Alvarado MD 7/25/2023 10:57 AM     Release to patient   Routine Release             Complications: None    Findings: Good perfusion with intact bone at proximal phalanx    Indications:    The patient is an 73 y.o. male seen for evaluation hammertoe with clinical osteomyelitis and per persistent ulceration with diabetic foot on the left.  Patient is undergoing primary amputation to prevent infection from getting into the foot.  Patient family understand risk benefits complications and agreed to the procedure       Procedure:    The patient was prepped and draped sterilely.  Using incision elliptically at the mid section of the toe we exposed the inner phalangeal joint of the proximal and mid phalanx.   Division through this area allowed removal of the toe and the proximal phalanx was exposed and rongeured back to its proximal portion where it was hard and cancellous.  Cultures were sent from the site.  Irrigation and removal of the tendon sheaths high were performed.  Deep stitches 3-0 Vicryl followed by 3 interrupted vertical mattress sutures of 3-0 nylon were placed.  Patient had a light wrap placed to the foot.  Perfusion appears intact.  Patient tolerated procedure well.        Active Hospital Problems    Diagnosis  POA    Diabetic toe ulcer [E11.621, L97.509]  Yes      Resolved Hospital Problems   No resolved problems to display.      Jasbir Alvarado MD     Date: 7/25/2023  Time: 11:38 EDT

## 2023-07-25 NOTE — ANESTHESIA POSTPROCEDURE EVALUATION
Patient: Kelby Peters    Procedure Summary       Date: 07/25/23 Room / Location: [facility] OR 06 / [facility] MAIN OR    Anesthesia Start: 1021 Anesthesia Stop: 1120    Procedure: LEFT 2ND TOE AMPUTATION (Left) Diagnosis:     Surgeons: Jasbir Alvarado MD Provider: Harry Marquez MD    Anesthesia Type: regional ASA Status: 3            Anesthesia Type: regional    Vitals  Vitals Value Taken Time   /78 07/25/23 1133   Temp 36.4 °C (97.6 °F) 07/25/23 1113   Pulse 72 07/25/23 1139   Resp 16 07/25/23 1113   SpO2 96 % 07/25/23 1139   Vitals shown include unvalidated device data.        Post Anesthesia Care and Evaluation    Patient location during evaluation: bedside  Patient participation: complete - patient participated  Level of consciousness: awake and alert  Pain score: 0  Pain management: adequate    Airway patency: patent  Anesthetic complications: No anesthetic complications    Cardiovascular status: acceptable  Respiratory status: acceptable  Hydration status: acceptable    Comments: /73 (BP Location: Left arm, Patient Position: Sitting)   Pulse 71   Temp 36.4 °C (97.6 °F) (Oral)   Resp 16   SpO2 99%

## 2023-07-25 NOTE — BRIEF OP NOTE
AMPUTATION DIGIT  Progress Note    Kelby Peters  7/25/2023    Pre-op Diagnosis:   Left second toe osteomyelitis       Post-Op Diagnosis Codes:  Same    Procedure/CPT® Codes:        Procedure(s):  LEFT 2ND TOE AMPUTATION              Surgeon(s):  Jasbir Alvarado MD    Anesthesia: Monitored Anesthesia Care with Regional    Staff:   Circulator: Ofe Kim RN; Izabela Hart RN  Scrub Person: Niharika Khan  Assistant: Brenna Mills CSA; Sloane Cota CST  Assistant: Brenna Mills CSA; Sloane Cota CST      Estimated Blood Loss: minimal    Urine Voided: * No values recorded between 7/25/2023 10:21 AM and 7/25/2023 11:07 AM *    Specimens:                Specimens       ID Source Type Tests Collected By Collected At Frozen?    1 Toe, Left Wound ANAEROBIC CULTURE  WOUND CULTURE   Jasbir Alvarado MD 7/25/23 1057     Description: Left 2nd Toe    A Toe, Left Tissue TISSUE PATHOLOGY EXAM   Jasbir Alvarado MD 7/25/23 1055 No    Description: Left 2nd Toe                  Drains: * No LDAs found *    Findings: See dictation        Complications: None    Assistant: Brenna Mills CSA; Sloane Cota CST  was responsible for performing the following activities: Retraction, Suction, Irrigation, Suturing, and Closing and their skilled assistance was necessary for the success of this case.    Jasbir Alvarado MD     Date: 7/25/2023  Time: 11:09 EDT

## 2023-07-25 NOTE — ANESTHESIA PROCEDURE NOTES
Peripheral Block      Patient reassessed immediately prior to procedure    Patient location during procedure: pre-op  Start time: 7/25/2023 8:31 AM  Stop time: 7/25/2023 8:35 AM  Reason for block: at surgeon's request and primary anesthetic  Performed by  Anesthesiologist: Benjamin Garcia MD  Preanesthetic Checklist  Completed: patient identified, risks and benefits discussed and timeout performed  Prep:  Pt Position: supine  Prep: ChloraPrep  Patient monitoring: blood pressure monitoring, continuous pulse oximetry and EKG  Procedure    Sedation: yes  Performed under: local infiltration  Guidance:landmark technique    Laterality:left  Block Type:ankle  Injection Technique:single-shotNeedle Gauge:25 G  Resistance on Injection: none    Medications Used: lidocaine PF 1% (XYLOCAINE) injection - Injection   10 mg - 7/25/2023 8:37:00 AM  bupivacaine PF (MARCAINE) 0.25 % injection - Injection   10 mL - 7/25/2023 8:31:00 AM      Post Assessment  Injection Assessment: negative aspiration for heme  Patient Tolerance:comfortable throughout block  Complications:no  Additional Notes  Ankle block was performed.

## 2023-07-27 ENCOUNTER — TELEPHONE (OUTPATIENT)
Dept: PAIN MEDICINE | Facility: CLINIC | Age: 73
End: 2023-07-27

## 2023-07-27 LAB
LAB AP CASE REPORT: NORMAL
LAB AP CLINICAL INFORMATION: NORMAL
PATH REPORT.FINAL DX SPEC: NORMAL
PATH REPORT.GROSS SPEC: NORMAL

## 2023-07-27 NOTE — TELEPHONE ENCOUNTER
Caller: KAI   Relationship to Patient: SELF  Phone Number: [phone]  Reason for Call: PATIENT IS CALLING STATING THAT HE IS HAVING ISSUES FILLING HIS PAIN MEDICATION AT [facility] BECAUSE OF WHERE HE HAD HIS SX THEY WILL NOT FILL HIS PRESCRIBED MEDICATION BECAUSE HE GOT THE 5 EXTRA PILLS

## 2023-07-28 LAB
BACTERIA SPEC AEROBE CULT: NORMAL
GRAM STN SPEC: NORMAL

## 2023-07-28 NOTE — TELEPHONE ENCOUNTER
Spoke to patient, he understood that his new fill date is 7/31/23. I call [facility] pharmacy and informed the pharmacist of his new date.

## 2023-07-28 NOTE — TELEPHONE ENCOUNTER
He was prescribed 30 extra pills, a 5 day supply. His new fill date is 7/31/2023. Please let [facility] know this as well. Thanks

## 2023-07-30 LAB — BACTERIA SPEC ANAEROBE CULT: NORMAL

## 2023-07-31 DIAGNOSIS — M79.609 PAIN IN EXTREMITY, UNSPECIFIED EXTREMITY: ICD-10-CM

## 2023-07-31 DIAGNOSIS — E11.42 DIABETIC PERIPHERAL NEUROPATHY: ICD-10-CM

## 2023-07-31 RX ORDER — HYDROCODONE BITARTRATE AND ACETAMINOPHEN 10; 325 MG/1; MG/1
1 TABLET ORAL EVERY 6 HOURS PRN
Qty: 120 TABLET | Refills: 0 | Status: SHIPPED | OUTPATIENT
Start: 2023-07-31

## 2023-07-31 NOTE — TELEPHONE ENCOUNTER
Caller: Kelby Peters    Relationship to patient: Self    Best call back number: [phone]    Patient is needing: PATIENT CALLING TO CHECK STATUS OF HAVING RX SENT IN WITH TODAYS DATE ON IT

## 2023-07-31 NOTE — TELEPHONE ENCOUNTER
He had sx     Procedure Laterality Anesthesia  LEFT 2ND TOE AMPUTATION so it maybe that he wants to get that script No results found for: [id number] script we sent was on7-17-23.

## 2023-07-31 NOTE — TELEPHONE ENCOUNTER
PATIENT CALLED AND STATED THAT THE [facility] PHARMACY COULD NOT FILL HIS MEDICATION. PHARMACIST TOLD PATIENT THAT IT HAS TO HAVE TODAY'S DATE IN ORDER TO FILL.   PATIENT ONLY HAS ENOUGH PILLS FOR TODAY.   PATIENT WOULD LIKE A CALL WITH A STATUS UPDATE.   [phone]  PLEASE ADVISE.

## 2023-08-01 ENCOUNTER — TELEPHONE (OUTPATIENT)
Dept: PAIN MEDICINE | Facility: CLINIC | Age: 73
End: 2023-08-01

## 2023-08-01 NOTE — TELEPHONE ENCOUNTER
Caller: Kelby Peters    Relationship to patient: Self    Best call back number: [phone]    Patient is needing: PATIENT INFORMED [facility] REFUSED TO FILL HYDROCODONE PRESCRIPTION UNTIL LEILA GORDON CALL THEM TO CLARIFY SOME INFORMATION. PLEASE CALL PATIENT BACK WHEN DONE.

## 2023-08-29 DIAGNOSIS — E11.42 DIABETIC PERIPHERAL NEUROPATHY: ICD-10-CM

## 2023-08-29 DIAGNOSIS — M79.609 PAIN IN EXTREMITY, UNSPECIFIED EXTREMITY: ICD-10-CM

## 2023-08-29 RX ORDER — HYDROCODONE BITARTRATE AND ACETAMINOPHEN 10; 325 MG/1; MG/1
1 TABLET ORAL EVERY 6 HOURS PRN
Qty: 120 TABLET | Refills: 0 | Status: SHIPPED | OUTPATIENT
Start: 2023-08-29

## 2023-08-29 NOTE — TELEPHONE ENCOUNTER
Medication Refill Request    Date of phone call: 8-29-23    Medication being requested: norco  mg sig: Take 1 tablet by mouth Every 6 (Six) Hours As Needed for Severe Pain   Qty: 120    Date of last visit: 7-17-23    Date of last refill:     HOME up to date?:     Next Follow up?: 9-18-23    Any new pertinent information? (i.e, new medication allergies, new use of medications, change in patient's health or condition, non-compliance or inconsistency with prescribing agreement?):

## 2023-09-04 DIAGNOSIS — G62.9 POLYNEUROPATHY: ICD-10-CM

## 2023-09-05 RX ORDER — GABAPENTIN 800 MG/1
TABLET ORAL
Qty: 120 TABLET | Refills: 0 | Status: SHIPPED | OUTPATIENT
Start: 2023-09-05

## 2023-09-18 ENCOUNTER — OFFICE VISIT (OUTPATIENT)
Dept: PAIN MEDICINE | Facility: CLINIC | Age: 73
End: 2023-09-18
Payer: MEDICARE

## 2023-09-18 VITALS
RESPIRATION RATE: 18 BRPM | HEART RATE: 76 BPM | WEIGHT: 213 LBS | SYSTOLIC BLOOD PRESSURE: 168 MMHG | TEMPERATURE: 96.6 F | DIASTOLIC BLOOD PRESSURE: 86 MMHG | OXYGEN SATURATION: 97 % | HEIGHT: 67 IN | BODY MASS INDEX: 33.43 KG/M2

## 2023-09-18 DIAGNOSIS — G90.9 AUTONOMIC NEUROPATHY: Primary | ICD-10-CM

## 2023-09-18 DIAGNOSIS — G62.9 POLYNEUROPATHY: ICD-10-CM

## 2023-09-18 DIAGNOSIS — E11.42 DIABETIC PERIPHERAL NEUROPATHY: ICD-10-CM

## 2023-09-18 DIAGNOSIS — Z79.899 ENCOUNTER FOR LONG-TERM (CURRENT) USE OF HIGH-RISK MEDICATION: ICD-10-CM

## 2023-09-18 DIAGNOSIS — M79.609 PAIN IN EXTREMITY, UNSPECIFIED EXTREMITY: ICD-10-CM

## 2023-09-18 PROCEDURE — 1159F MED LIST DOCD IN RCRD: CPT | Performed by: NURSE PRACTITIONER

## 2023-09-18 PROCEDURE — 3077F SYST BP >= 140 MM HG: CPT | Performed by: NURSE PRACTITIONER

## 2023-09-18 PROCEDURE — 99213 OFFICE O/P EST LOW 20 MIN: CPT | Performed by: NURSE PRACTITIONER

## 2023-09-18 PROCEDURE — 1125F AMNT PAIN NOTED PAIN PRSNT: CPT | Performed by: NURSE PRACTITIONER

## 2023-09-18 PROCEDURE — 3079F DIAST BP 80-89 MM HG: CPT | Performed by: NURSE PRACTITIONER

## 2023-09-18 PROCEDURE — 1160F RVW MEDS BY RX/DR IN RCRD: CPT | Performed by: NURSE PRACTITIONER

## 2023-09-18 RX ORDER — HYDROCODONE BITARTRATE AND ACETAMINOPHEN 10; 325 MG/1; MG/1
1 TABLET ORAL EVERY 6 HOURS PRN
Qty: 120 TABLET | Refills: 0 | Status: SHIPPED | OUTPATIENT
Start: 2023-09-18

## 2023-09-18 RX ORDER — GABAPENTIN 800 MG/1
800 TABLET ORAL TAKE AS DIRECTED
Qty: 120 TABLET | Refills: 1 | Status: SHIPPED | OUTPATIENT
Start: 2023-09-18

## 2023-09-18 NOTE — PROGRESS NOTES
CHIEF COMPLAINT  Follow-up for extremity pain.    Subjective   Kelby Peters is a 73 y.o. male  who presents for follow-up.  He has a history of extremity pain d/t peripheral neuropathy. Today his pain is 7/10VAS in severity. He continues with Hydrocodone 10/325 4/day and gabapentin 800 mg 4/day(1-1-2). Also continues with Keppra 500 mg BID (prescribed by PCP). This medication regimen decreases his pain by a significant amount. ADLs by self. He denies any side effects including somnolence  or constipation.     He is maintained on Plavix     He is now in orthopedic prosthetic tennis shoes. He states that the wounds on his feet are improving.     7/25/2023-left second toe simple amputation due to diabetic foot ulcer with hammertoe and clinical osteomyelitis performed Dr. Alvarado  3/31/2023-left great toe amputation with resection of metatarsal phalangeal joint and metatarsal head and shaft, right fifth toe amputation with resection of metatarsal phalangeal joint metatarsal head and shaft due to osteomyelitis performed by Dr. Alvarado  1/19/2023-left leg angiogram, third left toe amputation and left foot debridement performed by Dr. Martin    Failed Cymbalta.  Horizant and Gralise Cost-prohibitive.  Failed Lyrica---pedal edema  History of IDDM    Extremity Pain   The pain is present in the left hand, right hand, left lower leg and right lower leg. This is a chronic problem. The current episode started more than 1 year ago. The problem occurs constantly. The problem has been unchanged. The quality of the pain is described as burning. The pain is at a severity of 7/10. The pain is moderate. Associated symptoms include numbness (bilateral legs and hands). Pertinent negatives include no fever. He has tried oral narcotics and rest for the symptoms. The treatment provided significant relief. His past medical history is significant for diabetes.      PEG Assessment   What number best describes your pain on average in the past  "week?8  What number best describes how, during the past week, pain has interfered with your enjoyment of life?9  What number best describes how, during the past week, pain has interfered with your general activity?  9    Review of Pertinent Medical Data ---  6/9/2023: WeM1r--9.5    The following portions of the patient's history were reviewed and updated as appropriate: allergies, current medications, past family history, past medical history, past social history, past surgical history, and problem list.    Review of Systems   Constitutional:  Negative for fever.   Gastrointestinal:  Negative for constipation and diarrhea.   Genitourinary:  Positive for difficulty urinating.   Neurological:  Positive for weakness and numbness (bilateral legs and hands).   Psychiatric/Behavioral:  Negative for sleep disturbance and suicidal ideas. The patient is nervous/anxious.      --  The aforementioned information the Chief Complaint section and above subjective data including any HPI data, and also the Review of Systems data, has been personally reviewed and affirmed.  --    Vitals:    09/18/23 0856   BP: 168/86   Pulse: 76   Resp: 18   Temp: 96.6 °F (35.9 °C)   SpO2: 97%   Weight: 96.6 kg (213 lb)   Height: 170.2 cm (67.01\")   PainSc:   7   PainLoc: Generalized     Objective   Physical Exam  Vitals and nursing note reviewed.   Constitutional:       Appearance: Normal appearance. He is well-developed.   Eyes:      General: Lids are normal.   Cardiovascular:      Rate and Rhythm: Normal rate.   Pulmonary:      Effort: Pulmonary effort is normal.   Neurological:      Mental Status: He is alert and oriented to person, place, and time.      Gait: Gait abnormal.   Psychiatric:         Speech: Speech normal.         Behavior: Behavior normal.         Judgment: Judgment normal.       Assessment & Plan   Diagnoses and all orders for this visit:    1. Autonomic neuropathy (Primary)    2. Diabetic peripheral neuropathy  -     " HYDROcodone-acetaminophen (NORCO)  MG per tablet; Take 1 tablet by mouth Every 6 (Six) Hours As Needed for Severe Pain. Fill date 9/29/2023  Indications: Pain  Dispense: 120 tablet; Refill: 0    3. Pain in extremity, unspecified extremity  -     HYDROcodone-acetaminophen (NORCO)  MG per tablet; Take 1 tablet by mouth Every 6 (Six) Hours As Needed for Severe Pain. Fill date 9/29/2023  Indications: Pain  Dispense: 120 tablet; Refill: 0    4. Polyneuropathy  -     gabapentin (NEURONTIN) 800 MG tablet; Take 1 tablet by mouth Take As Directed. 800 mg in the morning, 800 mg in the afternoon, 1600 mg at night  Dispense: 120 tablet; Refill: 1    5. Encounter for long-term (current) use of high-risk medication      Kelby KING Peters reports a pain score of 7.  Given his pain assessment as noted, treatment options were discussed and the following options were decided upon as a follow-up plan to address the patient's pain: continuation of current treatment plan for pain.    --- The urine drug screen confirmation from 7/17/2023 has been reviewed and the result is appropriate based on patient history and HOME report  --- CSA updated 7/17/2023  --- Opioid risk--Moderate  --- Refill Norco 10/325. Fill date 9/29/2023 applied. Patient appears stable with current regimen. No adverse effects. Regarding continuation of opioids, there is no evidence of aberrant behavior or any red flags.  The patient continues with appropriate response to opioid therapy. ADL's remain intact by self.   --- Refill Gabapentin.   --- Follow-up 2 months or sooner if needed.      HOME REPORT  As part of the patient's treatment plan, I am prescribing controlled substances. The patient has been made aware of appropriate use of such medications, including potential risk of somnolence, limited ability to drive and/or work safely, and the potential for dependence or overdose. It has also been made clear that these medications are for use by this patient  only, without concomitant use of alcohol or other substances unless prescribed.     Patient has completed prescribing agreement detailing terms of continued prescribing of controlled substances, including monitoring HOME reports, urine drug screening, and pill counts if necessary. The patient is aware that inappropriate use will results in cessation of prescribing such medications.    As the clinician, I personally reviewed the HOME from 9/18/2023 while the patient was in the office today.    History and physical exam exhibit continued safe and appropriate use of controlled substances.    Dictated utilizing Dragon dictation.

## 2023-10-17 DIAGNOSIS — Z87.898 HISTORY OF SEIZURES: ICD-10-CM

## 2023-10-17 RX ORDER — LEVETIRACETAM 500 MG/1
500 TABLET ORAL 2 TIMES DAILY
Qty: 180 TABLET | Refills: 0 | Status: SHIPPED | OUTPATIENT
Start: 2023-10-17

## 2023-10-17 NOTE — TELEPHONE ENCOUNTER
Rx Refill Note  Requested Prescriptions     Pending Prescriptions Disp Refills    levETIRAcetam (KEPPRA) 500 MG tablet [Pharmacy Med Name: levETIRAcetam 500 MG TABLET] 180 tablet 0     Sig: TAKE 1 TABLET BY MOUTH TWICE A DAY      Last office visit with prescribing clinician: 4/24/2023   Last telemedicine visit with prescribing clinician: Visit date not found   Next office visit with prescribing clinician: Visit date not found                         Would you like a call back once the refill request has been completed: [] Yes [] No    If the office needs to give you a call back, can they leave a voicemail: [] Yes [] No    Evelio Lafleur MA  10/17/23, 08:32 EDT

## 2023-10-23 DIAGNOSIS — M79.609 PAIN IN EXTREMITY, UNSPECIFIED EXTREMITY: ICD-10-CM

## 2023-10-23 DIAGNOSIS — E11.42 DIABETIC PERIPHERAL NEUROPATHY: ICD-10-CM

## 2023-10-23 RX ORDER — HYDROCODONE BITARTRATE AND ACETAMINOPHEN 10; 325 MG/1; MG/1
1 TABLET ORAL EVERY 6 HOURS PRN
Qty: 120 TABLET | Refills: 0 | Status: SHIPPED | OUTPATIENT
Start: 2023-10-23 | End: 2023-10-27

## 2023-10-23 NOTE — TELEPHONE ENCOUNTER
Medication Refill Request    Date of phone call: 10/23/2023    Medication being requested: hydro/apap  mg sig: take 1 tab q 6 hrs prn  Qty: 120    Date of last visit: 9/18/2023    Date of last refill: 9/29/2023    HOME up to date?: yes    Next Follow up?: 11/20/2023    Any new pertinent information? (i.e, new medication allergies, new use of medications, change in patient's health or condition, non-compliance or inconsistency with prescribing agreement?):    
Reviewed UDS and HOME. Both updated and appropriate. Refill appropriate.      
no

## 2023-10-27 ENCOUNTER — TELEPHONE (OUTPATIENT)
Dept: PAIN MEDICINE | Facility: CLINIC | Age: 73
End: 2023-10-27
Payer: MEDICARE

## 2023-10-27 DIAGNOSIS — G89.29 OTHER CHRONIC PAIN: ICD-10-CM

## 2023-10-27 DIAGNOSIS — G62.9 POLYNEUROPATHY: ICD-10-CM

## 2023-10-27 DIAGNOSIS — E11.42 DIABETIC PERIPHERAL NEUROPATHY: Primary | ICD-10-CM

## 2023-10-27 DIAGNOSIS — Z79.899 ENCOUNTER FOR LONG-TERM (CURRENT) USE OF HIGH-RISK MEDICATION: ICD-10-CM

## 2023-10-27 RX ORDER — HYDROCODONE BITARTRATE AND ACETAMINOPHEN 7.5; 325 MG/1; MG/1
1 TABLET ORAL EVERY 4 HOURS PRN
Qty: 150 TABLET | Refills: 0 | Status: SHIPPED | OUTPATIENT
Start: 2023-10-27

## 2023-10-27 NOTE — TELEPHONE ENCOUNTER
Patient call and stated that [facility] is out of his meds, I call the pharmacy . The pharmacist told me they have 7.5 and 5, I bet for him to hold on to either as I will asked you to send immediately

## 2023-10-27 NOTE — TELEPHONE ENCOUNTER
I will prescribed Norco 7.5/325, he can take this every 4 hours max 5/day. Please make sure the Norco 10/325 prescription is cancelled at the pharmacy.

## 2023-11-20 ENCOUNTER — OFFICE VISIT (OUTPATIENT)
Dept: PAIN MEDICINE | Facility: CLINIC | Age: 73
End: 2023-11-20
Payer: MEDICARE

## 2023-11-20 ENCOUNTER — TELEPHONE (OUTPATIENT)
Dept: ENDOCRINOLOGY | Age: 73
End: 2023-11-20
Payer: MEDICARE

## 2023-11-20 VITALS
OXYGEN SATURATION: 96 % | WEIGHT: 219.4 LBS | HEART RATE: 75 BPM | DIASTOLIC BLOOD PRESSURE: 68 MMHG | BODY MASS INDEX: 34.44 KG/M2 | RESPIRATION RATE: 18 BRPM | HEIGHT: 67 IN | TEMPERATURE: 96.8 F | SYSTOLIC BLOOD PRESSURE: 142 MMHG

## 2023-11-20 DIAGNOSIS — E11.9 CONTROLLED TYPE 2 DIABETES MELLITUS WITHOUT COMPLICATION, WITH LONG-TERM CURRENT USE OF INSULIN: ICD-10-CM

## 2023-11-20 DIAGNOSIS — G62.9 POLYNEUROPATHY: ICD-10-CM

## 2023-11-20 DIAGNOSIS — Z79.4 CONTROLLED TYPE 2 DIABETES MELLITUS WITHOUT COMPLICATION, WITH LONG-TERM CURRENT USE OF INSULIN: ICD-10-CM

## 2023-11-20 DIAGNOSIS — G90.9 AUTONOMIC NEUROPATHY: ICD-10-CM

## 2023-11-20 DIAGNOSIS — Z79.899 ENCOUNTER FOR LONG-TERM (CURRENT) USE OF HIGH-RISK MEDICATION: ICD-10-CM

## 2023-11-20 DIAGNOSIS — M79.609 PAIN IN EXTREMITY, UNSPECIFIED EXTREMITY: ICD-10-CM

## 2023-11-20 DIAGNOSIS — E11.42 DIABETIC PERIPHERAL NEUROPATHY: Primary | ICD-10-CM

## 2023-11-20 DIAGNOSIS — G89.29 OTHER CHRONIC PAIN: ICD-10-CM

## 2023-11-20 PROCEDURE — 3077F SYST BP >= 140 MM HG: CPT | Performed by: NURSE PRACTITIONER

## 2023-11-20 PROCEDURE — 99213 OFFICE O/P EST LOW 20 MIN: CPT | Performed by: NURSE PRACTITIONER

## 2023-11-20 PROCEDURE — 1125F AMNT PAIN NOTED PAIN PRSNT: CPT | Performed by: NURSE PRACTITIONER

## 2023-11-20 PROCEDURE — 1159F MED LIST DOCD IN RCRD: CPT | Performed by: NURSE PRACTITIONER

## 2023-11-20 PROCEDURE — 3078F DIAST BP <80 MM HG: CPT | Performed by: NURSE PRACTITIONER

## 2023-11-20 PROCEDURE — 1160F RVW MEDS BY RX/DR IN RCRD: CPT | Performed by: NURSE PRACTITIONER

## 2023-11-20 RX ORDER — BLOOD-GLUCOSE METER
EACH MISCELLANEOUS
Qty: 300 EACH | Refills: 3 | Status: SHIPPED | OUTPATIENT
Start: 2023-11-20

## 2023-11-20 RX ORDER — HYDROCODONE BITARTRATE AND ACETAMINOPHEN 7.5; 325 MG/1; MG/1
1 TABLET ORAL EVERY 4 HOURS PRN
Qty: 150 TABLET | Refills: 0 | Status: SHIPPED | OUTPATIENT
Start: 2023-11-20

## 2023-11-20 RX ORDER — GABAPENTIN 800 MG/1
800 TABLET ORAL TAKE AS DIRECTED
Qty: 120 TABLET | Refills: 1 | Status: SHIPPED | OUTPATIENT
Start: 2023-11-20

## 2023-11-20 NOTE — PROGRESS NOTES
CHIEF COMPLAINT  Extremity pain  Peripheral neuropathy     Subjective   Kelby Peters is a 73 y.o. male  who presents for follow-up.  He has a history of extremity pain d/t peripheral neuropathy.  Today his pain is 7/10VAS in severity.  He Norco was changed from 10/325 4/day to 7.5/325 5/day d/t availability. He continues with Hydrocodone 7.5/325 5/day and gabapentin 800 mg 4/day(1-1-2). Also continues with Keppra 500 mg BID (prescribed by PCP). He states that he likes this dosing of Norco better and he has noticed improvement in the highs/lows of his pain score with taking a lower dose of medication closer together. This medication regimen decreases his pain by a significant amount.  ADLs by self. He denies any side effects including somnolence or constipation.      He is maintained on Plavix      He is now in orthopedic prosthetic tennis shoes, he states that he is still getting used to these. He states that the wounds on his feet are continuing to improve.      7/25/2023-left second toe simple amputation due to diabetic foot ulcer with hammertoe and clinical osteomyelitis performed Dr. Alvarado  3/31/2023-left great toe amputation with resection of metatarsal phalangeal joint and metatarsal head and shaft, right fifth toe amputation with resection of metatarsal phalangeal joint metatarsal head and shaft due to osteomyelitis performed by Dr. Alvarado  1/19/2023-left leg angiogram, third left toe amputation and left foot debridement performed by Dr. Martin     Failed Cymbalta.  Horizant and Gralise Cost-prohibitive.  Failed Lyrica---pedal edema  History of IDDM    Extremity Pain   The pain is present in the left hand, right hand, left lower leg and right lower leg. This is a chronic problem. The current episode started more than 1 year ago. The problem occurs constantly. The problem has been waxing and waning. The quality of the pain is described as burning. The pain is at a severity of 7/10. The pain is moderate.  "Associated symptoms include numbness (bilateral legs and hands). Pertinent negatives include no fever. He has tried oral narcotics and rest for the symptoms. The treatment provided significant relief. His past medical history is significant for diabetes.      PEG Assessment   What number best describes your pain on average in the past week?7  What number best describes how, during the past week, pain has interfered with your enjoyment of life?7  What number best describes how, during the past week, pain has interfered with your general activity?  7    The following portions of the patient's history were reviewed and updated as appropriate: allergies, current medications, past family history, past medical history, past social history, past surgical history, and problem list.    Review of Systems   Constitutional:  Negative for fever.   Gastrointestinal:  Negative for constipation and diarrhea.   Genitourinary:  Positive for difficulty urinating.   Neurological:  Positive for weakness and numbness (bilateral legs and hands).   Psychiatric/Behavioral:  Negative for sleep disturbance and suicidal ideas. The patient is nervous/anxious.      --  The aforementioned information the Chief Complaint section and above subjective data including any HPI data, and also the Review of Systems data, has been personally reviewed and affirmed.  --    Vitals:    11/20/23 0934   BP: 142/68   BP Location: Left arm   Patient Position: Sitting   Cuff Size: Large Adult   Pulse: 75   Resp: 18   Temp: 96.8 °F (36 °C)   SpO2: 96%   Weight: 99.5 kg (219 lb 6.4 oz)   Height: 170.2 cm (67.01\")   PainSc:   7     Objective   Physical Exam  Vitals and nursing note reviewed.   Constitutional:       Appearance: Normal appearance. He is well-developed.   Eyes:      General: Lids are normal.   Cardiovascular:      Rate and Rhythm: Normal rate.   Pulmonary:      Effort: Pulmonary effort is normal.   Musculoskeletal:      Right hand: Tenderness present. "      Left hand: Tenderness present.      Right lower leg: Tenderness present.      Left lower leg: Tenderness present.   Neurological:      Mental Status: He is alert and oriented to person, place, and time.   Psychiatric:         Speech: Speech normal.         Behavior: Behavior normal.         Judgment: Judgment normal.       Assessment & Plan   Diagnoses and all orders for this visit:    1. Diabetic peripheral neuropathy (Primary)  -     HYDROcodone-acetaminophen (NORCO) 7.5-325 MG per tablet; Take 1 tablet by mouth Every 4 (Four) Hours As Needed for Moderate Pain. MAX 5/day  Dispense: 150 tablet; Refill: 0    2. Polyneuropathy  -     HYDROcodone-acetaminophen (NORCO) 7.5-325 MG per tablet; Take 1 tablet by mouth Every 4 (Four) Hours As Needed for Moderate Pain. MAX 5/day  Dispense: 150 tablet; Refill: 0  -     gabapentin (NEURONTIN) 800 MG tablet; Take 1 tablet by mouth Take As Directed. 800 mg in the morning, 800 mg in the afternoon, 1600 mg at night  Dispense: 120 tablet; Refill: 1    3. Encounter for long-term (current) use of high-risk medication    4. Pain in extremity, unspecified extremity    5. Other chronic pain  -     HYDROcodone-acetaminophen (NORCO) 7.5-325 MG per tablet; Take 1 tablet by mouth Every 4 (Four) Hours As Needed for Moderate Pain. MAX 5/day  Dispense: 150 tablet; Refill: 0    6. Autonomic neuropathy      Kelby Peters reports a pain score of 7.  Given his pain assessment as noted, treatment options were discussed and the following options were decided upon as a follow-up plan to address the patient's pain: continuation of current treatment plan for pain.    --- The urine drug screen confirmation from 7/17/2023 has been reviewed and the result is appropriate based on patient history and HOME report  --- CSA updated 7/17/2023  --- Opioid Risk--Moderate  --- Refill Norco 7.5/325. Fill date 11/28/2023 applied. Patient appears stable with current regimen. No adverse effects. Regarding  continuation of opioids, there is no evidence of aberrant behavior or any red flags.  The patient continues with appropriate response to opioid therapy. ADL's remain intact by self.   --- Refill Gabapentin   --- Follow-up 2 months or sooner if needed.      HOME REPORT  As part of the patient's treatment plan, I am prescribing controlled substances. The patient has been made aware of appropriate use of such medications, including potential risk of somnolence, limited ability to drive and/or work safely, and the potential for dependence or overdose. It has also been made clear that these medications are for use by this patient only, without concomitant use of alcohol or other substances unless prescribed.     Patient has completed prescribing agreement detailing terms of continued prescribing of controlled substances, including monitoring HOME reports, urine drug screening, and pill counts if necessary. The patient is aware that inappropriate use will results in cessation of prescribing such medications.    As the clinician, I personally reviewed the HOME from 11/20/2023 while the patient was in the office today.    History and physical exam exhibit continued safe and appropriate use of controlled substances.'     Dictated utilizing Dragon dictation.

## 2023-11-20 NOTE — TELEPHONE ENCOUNTER
I will send prescription, but patient needs appointment - canceled Dr. Srinivasan's appointment in Sept

## 2023-11-28 DIAGNOSIS — M79.609 PAIN IN EXTREMITY, UNSPECIFIED EXTREMITY: Primary | ICD-10-CM

## 2023-11-28 DIAGNOSIS — G62.9 POLYNEUROPATHY: ICD-10-CM

## 2023-11-28 RX ORDER — HYDROCODONE BITARTRATE AND ACETAMINOPHEN 10; 325 MG/1; MG/1
1 TABLET ORAL EVERY 6 HOURS PRN
Qty: 120 TABLET | Refills: 0 | Status: SHIPPED | OUTPATIENT
Start: 2023-11-28

## 2023-12-18 ENCOUNTER — TELEPHONE (OUTPATIENT)
Dept: ENDOCRINOLOGY | Age: 73
End: 2023-12-18
Payer: MEDICARE

## 2023-12-18 RX ORDER — HUMAN INSULIN 100 [IU]/ML
35 INJECTION, SUSPENSION SUBCUTANEOUS 2 TIMES DAILY WITH MEALS
Qty: 20 ML | Refills: 3 | Status: SHIPPED | OUTPATIENT
Start: 2023-12-18

## 2023-12-18 NOTE — TELEPHONE ENCOUNTER
Caller: My Peters    Relationship: Emergency Contact    Best call back number: [phone]    Requested Prescriptions:   insulin NPH-insulin regular (NovoLIN 70/30 ReliOn) (70-30) 100 UNIT/ML injection       Pharmacy where request should be sent: [place] [id number] [address] [facility] [phone] [facility] [phone] FX     Last office visit with prescribing clinician: Visit date not found   Last telemedicine visit with prescribing clinician: Visit date not found   Next office visit with prescribing clinician: 2/7/2024     Additional details provided by patient: PATIENT'S WIFE STATED THAT THE PHARMACY IN [place] THEY WERE USING HAS BEEN TRYING TO REACH DR. OQUENDO'S TO GET THE REFILL FOR THE NOVOLIN BUT THEY SAID THEY HAVEN'T GOTTEN ANY RESPONSES. I TOLD HER ON MY END I DID NOT SEE ANY REQUEST SO THEY ARE WANTING TO HAVE THE PRESCRIPTION SENT BACK TO THE [facility] IN [facility].    Does the patient have less than a 3 day supply:  [x] Yes  [] No    Would you like a call back once the refill request has been completed: [x] Yes [] No    If the office needs to give you a call back, can they leave a voicemail: [x] Yes [] No    Deepika Emerson Rep   12/18/23 08:57 EST

## 2023-12-26 DIAGNOSIS — M79.609 PAIN IN EXTREMITY, UNSPECIFIED EXTREMITY: ICD-10-CM

## 2023-12-26 DIAGNOSIS — G62.9 POLYNEUROPATHY: ICD-10-CM

## 2023-12-26 RX ORDER — HYDROCODONE BITARTRATE AND ACETAMINOPHEN 10; 325 MG/1; MG/1
1 TABLET ORAL EVERY 6 HOURS PRN
Qty: 120 TABLET | Refills: 0 | Status: SHIPPED | OUTPATIENT
Start: 2023-12-26

## 2023-12-26 NOTE — TELEPHONE ENCOUNTER
Rx Refill Note  Requested Prescriptions     Pending Prescriptions Disp Refills    HYDROcodone-acetaminophen (NORCO)  MG per tablet 120 tablet 0     Sig: Take 1 tablet by mouth Every 6 (Six) Hours As Needed for Moderate Pain.      Last office visit with prescribing clinician: 11/20/2023   Last telemedicine visit with prescribing clinician: Visit date not found   Next office visit with prescribing clinician: 1/19/2024                         Would you like a call back once the refill request has been completed: [] Yes [] No    If the office needs to give you a call back, can they leave a voicemail: [] Yes [] No    Ann Calix CMA  12/26/23, 11:15 EST

## 2024-01-05 ENCOUNTER — ANESTHESIA (OUTPATIENT)
Dept: PERIOP | Facility: HOSPITAL | Age: 74
End: 2024-01-05
Payer: MEDICARE

## 2024-01-05 ENCOUNTER — ANESTHESIA EVENT (OUTPATIENT)
Dept: PERIOP | Facility: HOSPITAL | Age: 74
End: 2024-01-05
Payer: MEDICARE

## 2024-01-05 ENCOUNTER — HOSPITAL ENCOUNTER (OUTPATIENT)
Facility: HOSPITAL | Age: 74
Setting detail: HOSPITAL OUTPATIENT SURGERY
Discharge: HOME | End: 2024-01-05
Attending: SURGERY | Admitting: SURGERY
Payer: MEDICARE

## 2024-01-05 VITALS
HEART RATE: 71 BPM | OXYGEN SATURATION: 98 % | DIASTOLIC BLOOD PRESSURE: 65 MMHG | RESPIRATION RATE: 16 BRPM | WEIGHT: 218.03 LBS | SYSTOLIC BLOOD PRESSURE: 126 MMHG | BODY MASS INDEX: 34.14 KG/M2 | TEMPERATURE: 98.1 F

## 2024-01-05 DIAGNOSIS — I96 GANGRENE: ICD-10-CM

## 2024-01-05 LAB
ANION GAP SERPL CALCULATED.3IONS-SCNC: 10 MMOL/L (ref 5–15)
BASOPHILS # BLD AUTO: 0.05 10*3/MM3 (ref 0–0.2)
BASOPHILS NFR BLD AUTO: 0.7 % (ref 0–1.5)
BUN SERPL-MCNC: 26 MG/DL (ref 8–23)
BUN/CREAT SERPL: 26.3 (ref 7–25)
CALCIUM SPEC-SCNC: 9.2 MG/DL (ref 8.6–10.5)
CHLORIDE SERPL-SCNC: 97 MMOL/L (ref 98–107)
CO2 SERPL-SCNC: 29 MMOL/L (ref 22–29)
CREAT SERPL-MCNC: 0.99 MG/DL (ref 0.76–1.27)
DEPRECATED RDW RBC AUTO: 43.2 FL (ref 37–54)
EGFRCR SERPLBLD CKD-EPI 2021: 80.4 ML/MIN/1.73
EOSINOPHIL # BLD AUTO: 0.32 10*3/MM3 (ref 0–0.4)
EOSINOPHIL NFR BLD AUTO: 4.3 % (ref 0.3–6.2)
ERYTHROCYTE [DISTWIDTH] IN BLOOD BY AUTOMATED COUNT: 12.9 % (ref 12.3–15.4)
GLUCOSE BLDC GLUCOMTR-MCNC: 178 MG/DL (ref 70–130)
GLUCOSE BLDC GLUCOMTR-MCNC: 193 MG/DL (ref 70–130)
GLUCOSE SERPL-MCNC: 204 MG/DL (ref 65–99)
HCT VFR BLD AUTO: 40.2 % (ref 37.5–51)
HGB BLD-MCNC: 13.5 G/DL (ref 13–17.7)
IMM GRANULOCYTES # BLD AUTO: 0.03 10*3/MM3 (ref 0–0.05)
IMM GRANULOCYTES NFR BLD AUTO: 0.4 % (ref 0–0.5)
LYMPHOCYTES # BLD AUTO: 1.19 10*3/MM3 (ref 0.7–3.1)
LYMPHOCYTES NFR BLD AUTO: 16 % (ref 19.6–45.3)
MCH RBC QN AUTO: 30.7 PG (ref 26.6–33)
MCHC RBC AUTO-ENTMCNC: 33.6 G/DL (ref 31.5–35.7)
MCV RBC AUTO: 91.4 FL (ref 79–97)
MONOCYTES # BLD AUTO: 0.72 10*3/MM3 (ref 0.1–0.9)
MONOCYTES NFR BLD AUTO: 9.7 % (ref 5–12)
NEUTROPHILS NFR BLD AUTO: 5.11 10*3/MM3 (ref 1.7–7)
NEUTROPHILS NFR BLD AUTO: 68.9 % (ref 42.7–76)
NRBC BLD AUTO-RTO: 0 /100 WBC (ref 0–0.2)
PLATELET # BLD AUTO: 235 10*3/MM3 (ref 140–450)
PMV BLD AUTO: 10.2 FL (ref 6–12)
POTASSIUM SERPL-SCNC: 4.2 MMOL/L (ref 3.5–5.2)
RBC # BLD AUTO: 4.4 10*6/MM3 (ref 4.14–5.8)
SODIUM SERPL-SCNC: 136 MMOL/L (ref 136–145)
WBC NRBC COR # BLD AUTO: 7.42 10*3/MM3 (ref 3.4–10.8)

## 2024-01-05 PROCEDURE — 25010000002 GLYCOPYRROLATE 0.2 MG/ML SOLUTION

## 2024-01-05 PROCEDURE — 88311 DECALCIFY TISSUE: CPT | Performed by: SURGERY

## 2024-01-05 PROCEDURE — 25010000002 PROPOFOL 200 MG/20ML EMULSION

## 2024-01-05 PROCEDURE — 25010000002 PROPOFOL 10 MG/ML EMULSION

## 2024-01-05 PROCEDURE — 88305 TISSUE EXAM BY PATHOLOGIST: CPT | Performed by: SURGERY

## 2024-01-05 PROCEDURE — 87070 CULTURE OTHR SPECIMN AEROBIC: CPT | Performed by: SURGERY

## 2024-01-05 PROCEDURE — 25010000002 CEFAZOLIN IN DEXTROSE 2-4 GM/100ML-% SOLUTION: Performed by: SURGERY

## 2024-01-05 PROCEDURE — 87015 SPECIMEN INFECT AGNT CONCNTJ: CPT | Performed by: SURGERY

## 2024-01-05 PROCEDURE — 85025 COMPLETE CBC W/AUTO DIFF WBC: CPT | Performed by: STUDENT IN AN ORGANIZED HEALTH CARE EDUCATION/TRAINING PROGRAM

## 2024-01-05 PROCEDURE — 82948 REAGENT STRIP/BLOOD GLUCOSE: CPT

## 2024-01-05 PROCEDURE — 87205 SMEAR GRAM STAIN: CPT | Performed by: SURGERY

## 2024-01-05 PROCEDURE — 25010000002 ROPIVACAINE PER 1 MG: Performed by: STUDENT IN AN ORGANIZED HEALTH CARE EDUCATION/TRAINING PROGRAM

## 2024-01-05 PROCEDURE — 80048 BASIC METABOLIC PNL TOTAL CA: CPT | Performed by: STUDENT IN AN ORGANIZED HEALTH CARE EDUCATION/TRAINING PROGRAM

## 2024-01-05 PROCEDURE — 25010000002 CEFAZOLIN PER 500 MG: Performed by: SURGERY

## 2024-01-05 PROCEDURE — 87075 CULTR BACTERIA EXCEPT BLOOD: CPT | Performed by: SURGERY

## 2024-01-05 PROCEDURE — 25010000002 MEPIVACAINE HCL (PF) 1.5 % SOLUTION: Performed by: STUDENT IN AN ORGANIZED HEALTH CARE EDUCATION/TRAINING PROGRAM

## 2024-01-05 PROCEDURE — 25810000003 LACTATED RINGERS PER 1000 ML: Performed by: STUDENT IN AN ORGANIZED HEALTH CARE EDUCATION/TRAINING PROGRAM

## 2024-01-05 RX ORDER — DOXYCYCLINE HYCLATE 100 MG/1
100 CAPSULE ORAL 2 TIMES DAILY
COMMUNITY
Start: 2024-01-04

## 2024-01-05 RX ORDER — HYDROMORPHONE HYDROCHLORIDE 1 MG/ML
0.5 INJECTION, SOLUTION INTRAMUSCULAR; INTRAVENOUS; SUBCUTANEOUS
Status: DISCONTINUED | OUTPATIENT
Start: 2024-01-05 | End: 2024-01-05 | Stop reason: HOSPADM

## 2024-01-05 RX ORDER — PROMETHAZINE HYDROCHLORIDE 25 MG/1
25 SUPPOSITORY RECTAL ONCE AS NEEDED
Status: DISCONTINUED | OUTPATIENT
Start: 2024-01-05 | End: 2024-01-05 | Stop reason: HOSPADM

## 2024-01-05 RX ORDER — LIDOCAINE HYDROCHLORIDE 20 MG/ML
INJECTION, SOLUTION INFILTRATION; PERINEURAL AS NEEDED
Status: DISCONTINUED | OUTPATIENT
Start: 2024-01-05 | End: 2024-01-05 | Stop reason: SURG

## 2024-01-05 RX ORDER — NALOXONE HCL 0.4 MG/ML
0.2 VIAL (ML) INJECTION AS NEEDED
Status: DISCONTINUED | OUTPATIENT
Start: 2024-01-05 | End: 2024-01-05 | Stop reason: HOSPADM

## 2024-01-05 RX ORDER — FENTANYL CITRATE 50 UG/ML
50 INJECTION, SOLUTION INTRAMUSCULAR; INTRAVENOUS ONCE AS NEEDED
Status: DISCONTINUED | OUTPATIENT
Start: 2024-01-05 | End: 2024-01-05 | Stop reason: HOSPADM

## 2024-01-05 RX ORDER — GLYCOPYRROLATE 0.2 MG/ML
INJECTION INTRAMUSCULAR; INTRAVENOUS AS NEEDED
Status: DISCONTINUED | OUTPATIENT
Start: 2024-01-05 | End: 2024-01-05 | Stop reason: SURG

## 2024-01-05 RX ORDER — FENTANYL CITRATE 50 UG/ML
50 INJECTION, SOLUTION INTRAMUSCULAR; INTRAVENOUS
Status: DISCONTINUED | OUTPATIENT
Start: 2024-01-05 | End: 2024-01-05 | Stop reason: HOSPADM

## 2024-01-05 RX ORDER — MIDAZOLAM HYDROCHLORIDE 1 MG/ML
0.5 INJECTION INTRAMUSCULAR; INTRAVENOUS
Status: DISCONTINUED | OUTPATIENT
Start: 2024-01-05 | End: 2024-01-05 | Stop reason: HOSPADM

## 2024-01-05 RX ORDER — OXYCODONE AND ACETAMINOPHEN 7.5; 325 MG/1; MG/1
1 TABLET ORAL EVERY 4 HOURS PRN
Status: DISCONTINUED | OUTPATIENT
Start: 2024-01-05 | End: 2024-01-05 | Stop reason: HOSPADM

## 2024-01-05 RX ORDER — LABETALOL HYDROCHLORIDE 5 MG/ML
5 INJECTION, SOLUTION INTRAVENOUS
Status: DISCONTINUED | OUTPATIENT
Start: 2024-01-05 | End: 2024-01-05 | Stop reason: HOSPADM

## 2024-01-05 RX ORDER — ONDANSETRON 2 MG/ML
4 INJECTION INTRAMUSCULAR; INTRAVENOUS ONCE AS NEEDED
Status: DISCONTINUED | OUTPATIENT
Start: 2024-01-05 | End: 2024-01-05 | Stop reason: HOSPADM

## 2024-01-05 RX ORDER — DIPHENHYDRAMINE HYDROCHLORIDE 50 MG/ML
12.5 INJECTION INTRAMUSCULAR; INTRAVENOUS
Status: DISCONTINUED | OUTPATIENT
Start: 2024-01-05 | End: 2024-01-05 | Stop reason: HOSPADM

## 2024-01-05 RX ORDER — SODIUM CHLORIDE 0.9 % (FLUSH) 0.9 %
3-10 SYRINGE (ML) INJECTION AS NEEDED
Status: DISCONTINUED | OUTPATIENT
Start: 2024-01-05 | End: 2024-01-05 | Stop reason: HOSPADM

## 2024-01-05 RX ORDER — EPHEDRINE SULFATE 50 MG/ML
5 INJECTION, SOLUTION INTRAVENOUS ONCE AS NEEDED
Status: DISCONTINUED | OUTPATIENT
Start: 2024-01-05 | End: 2024-01-05 | Stop reason: HOSPADM

## 2024-01-05 RX ORDER — SODIUM CHLORIDE 0.9 % (FLUSH) 0.9 %
3 SYRINGE (ML) INJECTION EVERY 12 HOURS SCHEDULED
Status: DISCONTINUED | OUTPATIENT
Start: 2024-01-05 | End: 2024-01-05 | Stop reason: HOSPADM

## 2024-01-05 RX ORDER — LIDOCAINE HYDROCHLORIDE 10 MG/ML
0.5 INJECTION, SOLUTION INFILTRATION; PERINEURAL ONCE AS NEEDED
Status: DISCONTINUED | OUTPATIENT
Start: 2024-01-05 | End: 2024-01-05 | Stop reason: HOSPADM

## 2024-01-05 RX ORDER — HYDRALAZINE HYDROCHLORIDE 20 MG/ML
5 INJECTION INTRAMUSCULAR; INTRAVENOUS
Status: DISCONTINUED | OUTPATIENT
Start: 2024-01-05 | End: 2024-01-05 | Stop reason: HOSPADM

## 2024-01-05 RX ORDER — SODIUM CHLORIDE, SODIUM LACTATE, POTASSIUM CHLORIDE, CALCIUM CHLORIDE 600; 310; 30; 20 MG/100ML; MG/100ML; MG/100ML; MG/100ML
9 INJECTION, SOLUTION INTRAVENOUS CONTINUOUS
Status: DISCONTINUED | OUTPATIENT
Start: 2024-01-05 | End: 2024-01-05 | Stop reason: HOSPADM

## 2024-01-05 RX ORDER — PROPOFOL 10 MG/ML
INJECTION, EMULSION INTRAVENOUS AS NEEDED
Status: DISCONTINUED | OUTPATIENT
Start: 2024-01-05 | End: 2024-01-05 | Stop reason: SURG

## 2024-01-05 RX ORDER — IPRATROPIUM BROMIDE AND ALBUTEROL SULFATE 2.5; .5 MG/3ML; MG/3ML
3 SOLUTION RESPIRATORY (INHALATION) ONCE AS NEEDED
Status: DISCONTINUED | OUTPATIENT
Start: 2024-01-05 | End: 2024-01-05 | Stop reason: HOSPADM

## 2024-01-05 RX ORDER — HYDROCODONE BITARTRATE AND ACETAMINOPHEN 5; 325 MG/1; MG/1
1 TABLET ORAL ONCE AS NEEDED
Status: DISCONTINUED | OUTPATIENT
Start: 2024-01-05 | End: 2024-01-05 | Stop reason: HOSPADM

## 2024-01-05 RX ORDER — DROPERIDOL 2.5 MG/ML
0.62 INJECTION, SOLUTION INTRAMUSCULAR; INTRAVENOUS
Status: DISCONTINUED | OUTPATIENT
Start: 2024-01-05 | End: 2024-01-05 | Stop reason: HOSPADM

## 2024-01-05 RX ORDER — CEFAZOLIN SODIUM 2 G/100ML
2000 INJECTION, SOLUTION INTRAVENOUS ONCE
Status: COMPLETED | OUTPATIENT
Start: 2024-01-05 | End: 2024-01-05

## 2024-01-05 RX ORDER — FLUMAZENIL 0.1 MG/ML
0.2 INJECTION INTRAVENOUS AS NEEDED
Status: DISCONTINUED | OUTPATIENT
Start: 2024-01-05 | End: 2024-01-05 | Stop reason: HOSPADM

## 2024-01-05 RX ORDER — PROMETHAZINE HYDROCHLORIDE 25 MG/1
25 TABLET ORAL ONCE AS NEEDED
Status: DISCONTINUED | OUTPATIENT
Start: 2024-01-05 | End: 2024-01-05 | Stop reason: HOSPADM

## 2024-01-05 RX ORDER — FAMOTIDINE 10 MG/ML
20 INJECTION, SOLUTION INTRAVENOUS ONCE
Status: COMPLETED | OUTPATIENT
Start: 2024-01-05 | End: 2024-01-05

## 2024-01-05 RX ORDER — ROPIVACAINE HYDROCHLORIDE 5 MG/ML
INJECTION, SOLUTION EPIDURAL; INFILTRATION; PERINEURAL
Status: COMPLETED | OUTPATIENT
Start: 2024-01-05 | End: 2024-01-05

## 2024-01-05 RX ADMIN — ROPIVACAINE HYDROCHLORIDE 15 ML: 5 INJECTION EPIDURAL; INFILTRATION; PERINEURAL at 07:25

## 2024-01-05 RX ADMIN — FAMOTIDINE 20 MG: 10 INJECTION INTRAVENOUS at 07:41

## 2024-01-05 RX ADMIN — CEFAZOLIN SODIUM 2000 MG: 2 INJECTION, SOLUTION INTRAVENOUS at 07:44

## 2024-01-05 RX ADMIN — PROPOFOL 100 MCG/KG/MIN: 10 INJECTION, EMULSION INTRAVENOUS at 08:01

## 2024-01-05 RX ADMIN — MEPIVACAINE HYDROCHLORIDE 15 ML: 15 INJECTION, SOLUTION EPIDURAL; INFILTRATION at 07:25

## 2024-01-05 RX ADMIN — GLYCOPYRROLATE 0.2 MG: 0.2 INJECTION INTRAMUSCULAR; INTRAVENOUS at 08:07

## 2024-01-05 RX ADMIN — LIDOCAINE HYDROCHLORIDE 60 MG: 20 INJECTION, SOLUTION INFILTRATION; PERINEURAL at 08:00

## 2024-01-05 RX ADMIN — SODIUM CHLORIDE, POTASSIUM CHLORIDE, SODIUM LACTATE AND CALCIUM CHLORIDE 9 ML/HR: 600; 310; 30; 20 INJECTION, SOLUTION INTRAVENOUS at 07:38

## 2024-01-05 RX ADMIN — PROPOFOL 80 MG: 10 INJECTION, EMULSION INTRAVENOUS at 08:00

## 2024-01-05 NOTE — ANESTHESIA POSTPROCEDURE EVALUATION
Patient: Kelby Peters    Procedure Summary       Date: 01/05/24 Room / Location: [facility] OR [address] [place] [address] MAIN OR    Anesthesia Start: 0752 Anesthesia Stop: 0845    Procedure: LEFT 4TH AND 5TH TOE AMPUTATION (Left) Diagnosis:     Surgeons: Jasbir Alvarado MD Provider: Francois Shahid MD    Anesthesia Type: regional, MAC ASA Status: 3            Anesthesia Type: regional, MAC    Vitals  Vitals Value Taken Time   /57 01/05/24 0848   Temp     Pulse 71 01/05/24 0856   Resp     SpO2 98 % 01/05/24 0856   Vitals shown include unfiled device data.        Post Anesthesia Care and Evaluation    Patient location during evaluation: PACU  Patient participation: complete - patient participated  Level of consciousness: awake and alert  Pain management: adequate    Airway patency: patent  Anesthetic complications: No anesthetic complications  PONV Status: controlled  Cardiovascular status: acceptable and hemodynamically stable  Respiratory status: acceptable  Hydration status: acceptable    Comments: /71   Pulse 82   Temp 36.7 °C (98.1 °F) (Oral)   Resp 16   Wt 98.9 kg (218 lb 0.6 oz)   SpO2 99%   BMI 34.14 kg/m²

## 2024-01-05 NOTE — ANESTHESIA PREPROCEDURE EVALUATION
Anesthesia Evaluation     Patient summary reviewed   history of anesthetic complications:  PONV               Airway   Mallampati: III  TM distance: >3 FB  Neck ROM: full  Dental    (+) poor dentition    Pulmonary    (+) ,sleep apnea    ROS comment: Positive TAYLOR screen/Positive TAYLOR diagnosis and 2 or more mitigating factors used (recovery in non-supine position and multimodal analgesia)    Cardiovascular     ECG reviewed    (+) hypertension, PVD, hyperlipidemia    ROS comment: TTE:  ·  Left ventricular systolic function is normal. Left ventricular ejection fraction appears to be 61 - 65%.  ·  Left ventricular wall thickness is consistent with mild to moderate concentric hypertrophy.  ·  No clear evidence for valvular heart disease.      Neuro/Psych  (+) seizures well controlled, headaches, psychiatric history  GI/Hepatic/Renal/Endo    (+) obesity, renal disease-, diabetes mellitus poorly controlled using insulin    Musculoskeletal     Abdominal    Substance History      OB/GYN          Other   arthritis,                     Anesthesia Plan    ASA 3     regional and MAC     (I have reviewed the patient's history with the patient and the chart, including all pertinent laboratory results and imaging. Risks of peripheral nerve block were discussed with patient including but not limited to: inadequate block, bleeding, infection, persistent numbness or weakness, nerve damage, painful dysesthesia and need to protect limb while numb.    I have reviewed the patient's history with the patient and the chart, including all pertinent laboratory results and imaging. I have explained the risks of anesthesia including but not limited to dental damage, corneal abrasion, nerve injury, MI, stroke, and death. Questions asked and answered. Anesthetic plan discussed with patient and team as indicated. Patient expressed understanding of the above.    Minimal sensation in lower extremities; plan ankle block  )    Anesthetic plan, risks,  benefits, and alternatives have been provided, discussed and informed consent has been obtained with: patient.      CODE STATUS:

## 2024-01-05 NOTE — OP NOTE
Operative Note  Date of Admission:  1/5/2024  OR Date: 1/5/2024    Pre-op Diagnosis:   Left fourth and fifth toe gangrene with diabetic foot infection    Post-Op Diagnosis Codes:  Same    Procedure:   1) left fourth and fifth toe amputations    Surgeon: Derrick Alvarado MD    Assistant:  Sloane Cota CST and they provided critical assistance during the case including suctioning, exposure, retraction, and reduction of blood loss.    Anesthesia: Monitored Anesthesia Care with Regional    Staff:   Circulator: Martha Sharpe RN; Arleen Lubin RN  Scrub Person: Alysa Garcia  Assistant: Sloane Cota CST    Estimated Blood Loss: minimal    Specimens:   Order Name Source Comment Collection Info Order Time   ANAEROBIC CULTURE Toe, Left  Collected By: Jasbir Alvarado MD 1/5/2024  8:27 AM     Release to patient   Routine Release        WOUND CULTURE Toe, Left  Collected By: Jasbir Alvarado MD 1/5/2024  8:27 AM     Release to patient   Routine Release        BASIC METABOLIC PANEL Arm, Right  Collected By: Ignacia Stone RN 1/5/2024  6:58 AM     Release to patient   Routine Release        TISSUE PATHOLOGY EXAM Toe, Left  Collected By: Jasbir Alvarado MD 1/5/2024  8:27 AM     Release to patient   Routine Release             Complications: None    Findings: See dictation    Indications:    The patient is an 73 y.o. male seen for evaluation progressive injury with gangrene and cellulitis to the fourth toe and early injury to the fifth toe.  Patient is undergoing toe amputations fourth and fifth complete all his toes from the foot.  No evidence of infection involving the metatarsal heads is seen and simple toe amputations are planned.  Patient understands risk benefits complications agrees to procedure       Procedure:    The patient was prepped and draped sterilely.  Using 15 blade scalpel incisions were made on both toes at the level of the first interphalangeal joint and.  Bovie cutting cautery was  used to dissect down to the bone and bone biter was used to remove the bone and toes.  A large rongeur was then used to debride the first phalangeal bone back to a smooth edge at the mid segment of each bone and then tendons were divided high hemostasis was assured with Bovie cutting cautery and then closure after irrigation with interrupted vertical mattress 3-0 nylon sutures was performed.  Dressings were applied.  Patient tolerated procedure well.  Cultures were sent IntraOp          Active Hospital Problems    Diagnosis  POA    Gangrene of toe of left foot [I96]  Yes      Resolved Hospital Problems   No resolved problems to display.      Jasbir Alvarado MD     Date: 1/5/2024  Time: 08:51 EST

## 2024-01-05 NOTE — ANESTHESIA PROCEDURE NOTES
Peripheral Block      Patient reassessed immediately prior to procedure    Patient location during procedure: pre-op  Start time: 1/5/2024 7:25 AM  Stop time: 1/5/2024 7:30 AM  Reason for block: at surgeon's request and post-op pain management  Performed by  Anesthesiologist: Francois Shahid MD  Preanesthetic Checklist  Completed: patient identified, IV checked, site marked, risks and benefits discussed, surgical consent, monitors and equipment checked, pre-op evaluation and timeout performed  Prep:  Pt Position: supine  Sterile barriers:cap, gloves, mask and washed/disinfected hands  Prep: ChloraPrep  Patient monitoring: blood pressure monitoring, continuous pulse oximetry and EKG  Procedure    Sedation: yes  Performed under: local infiltration  ULTRASOUND INTERPRETATION.  Using ultrasound guidance a 22 G (22G needle for placement of 3cc 2%lido to site prior to start of procedure.) gauge needle was placed in close proximity to the nerve, at which point, under ultrasound guidance anesthetic was injected in the area of the nerve and spread of the anesthesia was seen on ultrasound in close proximity thereto.  There were no abnormalities seen on ultrasound; a digital image was taken; and the patient tolerated the procedure with no complications.   Laterality:left  Block Type:ankle  Injection Technique:single-shot  Needle Type:echogenic  Needle Gauge:22 G      Medications Used: Mepivacaine HCl (PF) (CARBOCAINE) 1.5 % injection - Injection   15 mL - 1/5/2024 7:25:00 AM  ropivacaine (NAROPIN) 0.5 % injection - Injection   15 mL - 1/5/2024 7:25:00 AM      Post Assessment  Injection Assessment: negative aspiration for heme, no paresthesia on injection and incremental injection  Patient Tolerance:comfortable throughout block  Complications:no  Additional Notes  Ultrasound interpretation: Under ultrasound guidance anatomy was evaluated, needle placement was viewed and nerve structures verified,   medication disbursement  was visualized for this block.

## 2024-01-05 NOTE — H&P
"[facility]   HISTORY AND PHYSICAL    Patient Name:Kelby Peters  : 1950  MRN: [id number]  Primary Care Physician: Susy Germain APRN  Date of admission: 2024    Subjective   Subjective     Chief Complaint: Left fourth and fifth toe gangrene    History of Present Illness   Kelby Peters is a 73 y.o. male with left fourth toe gangrene and residual left fifth toe that is to be taken with the fourth to avoid further trauma.  Patient has already had the first 3 toes removed and will have a functional TMA at the completion of his procedures.  Patient understands and agrees with the plan    Review of Systems no fevers or chills    Personal History     Past Medical History:   Diagnosis Date    Basal cell carcinoma 2022    Brain injury     Broken toes     Diabetes mellitus     Finger injury     Gallbladder obstruction     Hearing disorder     Hiatal hernia     Hypercholesterolemia     Leg injury     Migraine     Osteomyelitis     Peripheral neuropathy     PONV (postoperative nausea and vomiting)     Prostate enlargement     Risk factors for obstructive sleep apnea 2024    STOP BANG \"6\"    Seizures     Shingles     Sleep apnea        Past Surgical History:   Procedure Laterality Date    AMPUTATION DIGIT Left 2023    Procedure: 3RD LEFT TOE AMPUTATION AND LEFT FOOT DEBRIDEMENT;  Surgeon: Ivonne Martin MD;  Location: [facility] ;  Service: Vascular;  Laterality: Left;    AMPUTATION DIGIT Bilateral 3/31/2023    Procedure: RIGHT FIRST TOE AMPUTATION AND LEFT FIFTH TOE AMPUTATION;  Surgeon: Jasbir Alvarado MD;  Location: [facility] ;  Service: Vascular;  Laterality: Bilateral;    AMPUTATION DIGIT Left 2023    Procedure: LEFT 2ND TOE AMPUTATION;  Surgeon: Jasbir Alvarado MD;  Location: [facility];  Service: Vascular;  Laterality: Left;    ANGIOPLASTY FEMORAL ARTERY Left 2023    Procedure: LEFT LEG ANGIOGRAM;  Surgeon: Ivonne Martin MD;  Location: " [facility] HYBRID OR 18/19;  Service: Vascular;  Laterality: Left;    BASAL CELL CARCINOMA EXCISION  10/13/2022    CATARACT EXTRACTION Right     CORNEAL TRANSPLANT Left 03/31/2021    EYE SURGERY Left     GALLBLADDER SURGERY      INNER EAR SURGERY         Family History: His family history includes Alzheimer's disease in his maternal grandmother; COPD in his paternal aunt; Cancer in his mother; Diabetes in his father and paternal aunt; Emphysema in his paternal grandfather; Heart disease in his father; Stroke in his maternal grandfather; Transient ischemic attack in his mother.     Social History: He  reports that he has quit smoking. He has never used smokeless tobacco. He reports current alcohol use. He reports current drug use. Drug: Marijuana.    Home Medications:  EPINEPHrine (Anaphylaxis), FreeStyle Milka 3 Sensor, HYDROcodone-acetaminophen, Insulin Pen Needle, [employer] Lancets Micro Thin 33G, OneTouch Delica Lancets 33G, acetaminophen, amLODIPine, aspirin, clopidogrel, diphenhydrAMINE, dorzolamide-timolol, doxycycline, gabapentin, glucagon, glucose blood, hydroCHLOROthiazide, insulin NPH-insulin regular, levETIRAcetam, lisinopril, prednisoLONE acetate, rosuvastatin, and sodium hypochlorite    Allergies:  He has No Known Allergies.    Objective    Objective     Vitals:    Temp:  [98.1 °F (36.7 °C)] 98.1 °F (36.7 °C)  Heart Rate:  [74] 74  Resp:  [18] 18  BP: (144)/(70) 144/70    Physical Exam   Lungs clear and equal  Heart regular rate and rhythm  Abdomen benign  Left fourth toe with cellulitis and digital distal gangrene.  Cellulitis has improved with oral antibiotics.  Result Review    Result Review:  I have personally reviewed the results from the time of this admission to 1/5/2024 07:29 EST and agree with these findings:  []  Laboratory list / accordion  []  Microbiology  []  Radiology  []  EKG/Telemetry   []  Cardiology/Vascular   []  Pathology  []  Old records  []  Other:        Assessment & Plan   Assessment /  Plan     Brief Patient Summary:  Kelby Peters is a 73 y.o. male with infected ulcer and early gangrene to the left fourth toe.  Started at rest of his toes removed and leaving the fifth toe will only put at risk for further trauma.  Plans for fourth and fifth toe amputations with closure made.  Patient agrees.    Active Hospital Problems:  Active Hospital Problems    Diagnosis     Gangrene of toe of left foot      Plan:   Left fourth and fifth toe amputations with primary closure        Jasbir Alvarado MD

## 2024-01-06 LAB
LAB AP CASE REPORT: NORMAL
PATH REPORT.FINAL DX SPEC: NORMAL
PATH REPORT.GROSS SPEC: NORMAL

## 2024-01-07 LAB
BACTERIA SPEC AEROBE CULT: NORMAL
GRAM STN SPEC: NORMAL

## 2024-01-08 ENCOUNTER — PRIOR AUTHORIZATION (OUTPATIENT)
Dept: PAIN MEDICINE | Facility: CLINIC | Age: 74
End: 2024-01-08
Payer: MEDICARE

## 2024-01-10 LAB — BACTERIA SPEC ANAEROBE CULT: NORMAL

## 2024-01-13 DIAGNOSIS — Z87.898 HISTORY OF SEIZURES: ICD-10-CM

## 2024-01-16 RX ORDER — LEVETIRACETAM 500 MG/1
500 TABLET ORAL 2 TIMES DAILY
Qty: 180 TABLET | Refills: 0 | Status: SHIPPED | OUTPATIENT
Start: 2024-01-16

## 2024-01-19 ENCOUNTER — OFFICE VISIT (OUTPATIENT)
Dept: PAIN MEDICINE | Facility: CLINIC | Age: 74
End: 2024-01-19
Payer: MEDICARE

## 2024-01-19 VITALS
BODY MASS INDEX: 35.22 KG/M2 | HEART RATE: 68 BPM | TEMPERATURE: 96.8 F | HEIGHT: 67 IN | RESPIRATION RATE: 18 BRPM | DIASTOLIC BLOOD PRESSURE: 60 MMHG | WEIGHT: 224.4 LBS | OXYGEN SATURATION: 97 % | SYSTOLIC BLOOD PRESSURE: 151 MMHG

## 2024-01-19 DIAGNOSIS — E11.42 DIABETIC PERIPHERAL NEUROPATHY: ICD-10-CM

## 2024-01-19 DIAGNOSIS — G62.9 POLYNEUROPATHY: Primary | ICD-10-CM

## 2024-01-19 DIAGNOSIS — Z79.899 ENCOUNTER FOR LONG-TERM (CURRENT) USE OF HIGH-RISK MEDICATION: ICD-10-CM

## 2024-01-19 DIAGNOSIS — M79.609 PAIN IN EXTREMITY, UNSPECIFIED EXTREMITY: ICD-10-CM

## 2024-01-19 RX ORDER — GABAPENTIN 800 MG/1
800 TABLET ORAL TAKE AS DIRECTED
Qty: 120 TABLET | Refills: 1 | Status: SHIPPED | OUTPATIENT
Start: 2024-01-19

## 2024-01-19 RX ORDER — HYDROCODONE BITARTRATE AND ACETAMINOPHEN 7.5; 325 MG/1; MG/1
1 TABLET ORAL EVERY 4 HOURS PRN
Qty: 150 TABLET | Refills: 0 | Status: SHIPPED | OUTPATIENT
Start: 2024-01-19

## 2024-01-19 NOTE — PROGRESS NOTES
CHIEF COMPLAINT  Extremity pain,peripheral neuropathy  Pain is consistent,pt wants to discuss switching pain regimen.  Urine poc:opi    Subjective   Kelby Peters is a 73 y.o. male  who presents for follow-up.  He has a history of extremity pain d/t peripheral neuropathy.  Today his pain is 7/10VAS in severity.  He recently underwent amputation of the 4th and 5th toes on his left foot. He states that he is slowly getting his balance back.     He continues with Hydrocodone 10/325 4/day and gabapentin 800 mg 4/day(1-1-2). Also continues with Keppra 500 mg BID (prescribed by PCP).  This medication regimen decreases  his pain by a significant amount. ADLs by self. He denies any side effects including somnolence or constipation. He requests to return to norco 7.5/325 5/day. He was put on this because of the medication shortage and found that he liked this regimen better. He states that he felt his peaks and valleys in his pain were less with this regimen. I am fine with returning to this as it is actually a small reduction in morphine equivalency.     He is maintained on Plavix       1/5/2024-left fourth and fifth toe amputation performed by Dr. Alvarado  7/25/2023-left second toe simple amputation due to diabetic foot ulcer with hammertoe and clinical osteomyelitis performed Dr. Alvarado  3/31/2023-left great toe amputation with resection of metatarsal phalangeal joint and metatarsal head and shaft, right fifth toe amputation with resection of metatarsal phalangeal joint metatarsal head and shaft due to osteomyelitis performed by Dr. Alvarado  1/19/2023-left leg angiogram, third left toe amputation and left foot debridement performed by Dr. Martin     Failed Cymbalta.  Horizant and Gralise Cost-prohibitive.  Failed Lyrica---pedal edema  History of IDDM    Extremity Pain   The pain is present in the left hand, right hand, left lower leg and right lower leg. This is a chronic problem. The current episode started more than 1 year ago.  The problem occurs constantly. The problem has been waxing and waning. The quality of the pain is described as burning. The pain is at a severity of 7/10. The pain is moderate. Associated symptoms include numbness (hands,feet). Pertinent negatives include no fever. He has tried oral narcotics and rest for the symptoms. The treatment provided significant relief. His past medical history is significant for diabetes.      PEG Assessment   What number best describes your pain on average in the past week?7  What number best describes how, during the past week, pain has interfered with your enjoyment of life?6  What number best describes how, during the past week, pain has interfered with your general activity?  7    The following portions of the patient's history were reviewed and updated as appropriate: allergies, current medications, past family history, past medical history, past social history, past surgical history, and problem list.    Review of Systems   Constitutional:  Negative for activity change, fatigue and fever.   HENT:  Negative for congestion.    Respiratory:  Negative for cough and chest tightness.    Cardiovascular:  Negative for chest pain.   Gastrointestinal:  Negative for abdominal pain, constipation and diarrhea.   Genitourinary:  Positive for difficulty urinating. Negative for dysuria.   Neurological:  Positive for dizziness, weakness (arms), light-headedness and numbness (hands,feet). Negative for headaches.   Psychiatric/Behavioral:  Positive for agitation and sleep disturbance. Negative for suicidal ideas. The patient is nervous/anxious.      --  The aforementioned information the Chief Complaint section and above subjective data including any HPI data, and also the Review of Systems data, has been personally reviewed and affirmed.  --    Vitals:    01/19/24 0933   BP: 151/60   BP Location: Right arm   Patient Position: Sitting   Cuff Size: Large Adult   Pulse: 68   Resp: 18   Temp: 96.8 °F (36  "°C)   TempSrc: Temporal   SpO2: 97%   Weight: 102 kg (224 lb 6.4 oz)   Height: 170.2 cm (67.01\")   PainSc:   7     Objective   Physical Exam  Vitals and nursing note reviewed.   Constitutional:       Appearance: Normal appearance. He is well-developed.   Eyes:      General: Lids are normal.   Cardiovascular:      Rate and Rhythm: Normal rate.   Pulmonary:      Effort: Pulmonary effort is normal.   Musculoskeletal:      Left foot: Deformity present.      Comments: Surgical boot to left foot      Left Lower Extremity: Left leg is amputated below ankle.   Neurological:      Mental Status: He is alert and oriented to person, place, and time.   Psychiatric:         Attention and Perception: Attention normal.         Mood and Affect: Mood normal.         Speech: Speech normal.         Behavior: Behavior normal.         Judgment: Judgment normal.       Assessment & Plan   Diagnoses and all orders for this visit:    1. Polyneuropathy (Primary)  -     HYDROcodone-acetaminophen (NORCO) 7.5-325 MG per tablet; Take 1 tablet by mouth Every 4 (Four) Hours As Needed for Moderate Pain. Max 5/day 30 day supply. Fill date 1/27/2024  Dispense: 150 tablet; Refill: 0  -     gabapentin (NEURONTIN) 800 MG tablet; Take 1 tablet by mouth Take As Directed. 800 mg in the morning, 800 mg in the afternoon, 1600 mg at night  Dispense: 120 tablet; Refill: 1    2. Encounter for long-term (current) use of high-risk medication  -     HYDROcodone-acetaminophen (NORCO) 7.5-325 MG per tablet; Take 1 tablet by mouth Every 4 (Four) Hours As Needed for Moderate Pain. Max 5/day 30 day supply. Fill date 1/27/2024  Dispense: 150 tablet; Refill: 0    3. Diabetic peripheral neuropathy  -     HYDROcodone-acetaminophen (NORCO) 7.5-325 MG per tablet; Take 1 tablet by mouth Every 4 (Four) Hours As Needed for Moderate Pain. Max 5/day 30 day supply. Fill date 1/27/2024  Dispense: 150 tablet; Refill: 0    4. Pain in extremity, unspecified extremity  -     " HYDROcodone-acetaminophen (NORCO) 7.5-325 MG per tablet; Take 1 tablet by mouth Every 4 (Four) Hours As Needed for Moderate Pain. Max 5/day 30 day supply. Fill date 1/27/2024  Dispense: 150 tablet; Refill: 0        Kelby Peters reports a pain score of 7.  Given his pain assessment as noted, treatment options were discussed and the following options were decided upon as a follow-up plan to address the patient's pain: continuation of current treatment plan for pain.    --- Routine UDS in office today as part of monitoring requirements for controlled substances.  The specimen was viewed and the immunoassay result reviewed and is +OPI.  This specimen will be sent to [facility] laboratory for confirmation.     --- CSA updated 7/17/2023  --- Opioid Risk--Moderate  --- Change Norco to 7.5/325 every 4 hours Max 5/day. Fill date 1/27/2024 applied. Patient appears stable with current regimen. No adverse effects. Regarding continuation of opioids, there is no evidence of aberrant behavior or any red flags.  The patient continues with appropriate response to opioid therapy. ADL's remain intact by self.   --- Refill Gabapentin  --- Follow-up 2 months or sooner if needed.      HOME REPORT  As part of the patient's treatment plan, I am prescribing controlled substances. The patient has been made aware of appropriate use of such medications, including potential risk of somnolence, limited ability to drive and/or work safely, and the potential for dependence or overdose. It has also been made clear that these medications are for use by this patient only, without concomitant use of alcohol or other substances unless prescribed.     Patient has completed prescribing agreement detailing terms of continued prescribing of controlled substances, including monitoring HOME reports, urine drug screening, and pill counts if necessary. The patient is aware that inappropriate use will results in cessation of prescribing such  medications.    As the clinician, I personally reviewed the HOME from 1/19/2024 while the patient was in the office today.    History and physical exam exhibit continued safe and appropriate use of controlled substances.    Dictated utilizing Dragon dictation.

## 2024-01-23 ENCOUNTER — TELEPHONE (OUTPATIENT)
Dept: PAIN MEDICINE | Facility: CLINIC | Age: 74
End: 2024-01-23

## 2024-01-23 DIAGNOSIS — M79.609 PAIN IN EXTREMITY, UNSPECIFIED EXTREMITY: ICD-10-CM

## 2024-01-23 DIAGNOSIS — E11.42 DIABETIC PERIPHERAL NEUROPATHY: Primary | ICD-10-CM

## 2024-01-23 RX ORDER — HYDROCODONE BITARTRATE AND ACETAMINOPHEN 10; 325 MG/1; MG/1
1 TABLET ORAL EVERY 6 HOURS PRN
Qty: 120 TABLET | Refills: 0 | Status: SHIPPED | OUTPATIENT
Start: 2024-01-23 | End: 2024-02-20 | Stop reason: SDUPTHER

## 2024-01-23 NOTE — TELEPHONE ENCOUNTER
Caller: Kelby Peters    Relationship to patient: Self    Best call back number: [phone]    Patient is needing: PATIENT STATED [facility] CANNOT GET HYDROCODONE 7.5, BUT THEY DO HAVE  - PLEASE REACH OUT AND ADVISE

## 2024-02-02 ENCOUNTER — TELEPHONE (OUTPATIENT)
Dept: FAMILY MEDICINE CLINIC | Facility: CLINIC | Age: 74
End: 2024-02-02

## 2024-02-02 NOTE — TELEPHONE ENCOUNTER
Caller: My Peters    Relationship to patient: Emergency Contact    Best call back number: [phone]     Patient is needing: NEEDING HOSPITAL FOLLOW UP FROM SURGERY. DOS IS 1/5/2024-AMPUTATION OF TOES.

## 2024-02-03 DIAGNOSIS — Z87.898 HISTORY OF SEIZURES: ICD-10-CM

## 2024-02-07 ENCOUNTER — OFFICE VISIT (OUTPATIENT)
Dept: ENDOCRINOLOGY | Age: 74
End: 2024-02-07
Payer: MEDICARE

## 2024-02-07 VITALS
DIASTOLIC BLOOD PRESSURE: 80 MMHG | HEIGHT: 67 IN | BODY MASS INDEX: 35.41 KG/M2 | HEART RATE: 70 BPM | OXYGEN SATURATION: 95 % | SYSTOLIC BLOOD PRESSURE: 152 MMHG | WEIGHT: 225.6 LBS

## 2024-02-07 DIAGNOSIS — E11.42 CONTROLLED TYPE 2 DIABETES MELLITUS WITH DIABETIC POLYNEUROPATHY, WITH LONG-TERM CURRENT USE OF INSULIN: Primary | ICD-10-CM

## 2024-02-07 DIAGNOSIS — Z79.4 CONTROLLED TYPE 2 DIABETES MELLITUS WITH DIABETIC POLYNEUROPATHY, WITH LONG-TERM CURRENT USE OF INSULIN: Primary | ICD-10-CM

## 2024-02-07 DIAGNOSIS — E78.5 DYSLIPIDEMIA: ICD-10-CM

## 2024-02-07 PROCEDURE — 99214 OFFICE O/P EST MOD 30 MIN: CPT | Performed by: INTERNAL MEDICINE

## 2024-02-07 PROCEDURE — 1159F MED LIST DOCD IN RCRD: CPT | Performed by: INTERNAL MEDICINE

## 2024-02-07 PROCEDURE — 3079F DIAST BP 80-89 MM HG: CPT | Performed by: INTERNAL MEDICINE

## 2024-02-07 PROCEDURE — G2211 COMPLEX E/M VISIT ADD ON: HCPCS | Performed by: INTERNAL MEDICINE

## 2024-02-07 PROCEDURE — 3077F SYST BP >= 140 MM HG: CPT | Performed by: INTERNAL MEDICINE

## 2024-02-07 PROCEDURE — 1160F RVW MEDS BY RX/DR IN RCRD: CPT | Performed by: INTERNAL MEDICINE

## 2024-02-07 RX ORDER — HUMAN INSULIN 100 [IU]/ML
35 INJECTION, SUSPENSION SUBCUTANEOUS 2 TIMES DAILY WITH MEALS
Qty: 20 ML | Refills: 5 | Status: SHIPPED | OUTPATIENT
Start: 2024-02-07

## 2024-02-07 NOTE — PROGRESS NOTES
Chief complaint/Reason for consult: T2DM    HPI:   - year old female here for management of diabetes mellitus type 2  - Has had diabetes for over 30 years  - Complications include peripheral neuropathy, amputations of toes, retinopathy  - Is currently taking Novolin 70/30, 40 units bid  - Rarely has hypoglycemia  - Is also on Crestor 10 mg daily      The following portions of the patient's history were reviewed and updated as appropriate: allergies, current medications, past family history, past medical history, past social history, past surgical history, and problem list.      Objective     Vitals:    02/07/24 1341   BP: 152/80   Pulse: 70   SpO2: 95%        Physical Exam  Vitals reviewed.   Constitutional:       Appearance: Normal appearance.   HENT:      Head: Normocephalic and atraumatic.   Eyes:      General: No scleral icterus.  Pulmonary:      Effort: Pulmonary effort is normal. No respiratory distress.   Neurological:      Mental Status: He is alert.      Gait: Gait normal.   Psychiatric:         Mood and Affect: Mood normal.         Behavior: Behavior normal.         Thought Content: Thought content normal.         Judgment: Judgment normal.       Assessment & Plan   T2DM, complicated by peripheral neuropathy and retinopathy (requires ongoing follow-up and monitoring)  - Will check A1c  - Currently on Novolin 70/30, 40 units bid    2. Hyperlipidemia  - On Crestor 10 mg daily    - Return to clinic in 6 months

## 2024-02-08 LAB
ALBUMIN SERPL-MCNC: 4.1 G/DL (ref 3.8–4.8)
ALBUMIN/GLOB SERPL: 1.5 {RATIO} (ref 1.2–2.2)
ALP SERPL-CCNC: 61 IU/L (ref 44–121)
ALT SERPL-CCNC: 16 IU/L (ref 0–44)
AST SERPL-CCNC: 18 IU/L (ref 0–40)
BILIRUB SERPL-MCNC: <0.2 MG/DL (ref 0–1.2)
BUN SERPL-MCNC: 22 MG/DL (ref 8–27)
BUN/CREAT SERPL: 27 (ref 10–24)
CALCIUM SERPL-MCNC: 9.1 MG/DL (ref 8.6–10.2)
CHLORIDE SERPL-SCNC: 98 MMOL/L (ref 96–106)
CO2 SERPL-SCNC: 26 MMOL/L (ref 20–29)
CREAT SERPL-MCNC: 0.82 MG/DL (ref 0.76–1.27)
EGFRCR SERPLBLD CKD-EPI 2021: 93 ML/MIN/1.73
GLOBULIN SER CALC-MCNC: 2.7 G/DL (ref 1.5–4.5)
GLUCOSE SERPL-MCNC: 133 MG/DL (ref 70–99)
HBA1C MFR BLD: 9 % (ref 4.8–5.6)
POTASSIUM SERPL-SCNC: 4.2 MMOL/L (ref 3.5–5.2)
PROT SERPL-MCNC: 6.8 G/DL (ref 6–8.5)
SODIUM SERPL-SCNC: 138 MMOL/L (ref 134–144)

## 2024-02-13 ENCOUNTER — OFFICE VISIT (OUTPATIENT)
Dept: FAMILY MEDICINE CLINIC | Facility: CLINIC | Age: 74
End: 2024-02-13
Payer: MEDICARE

## 2024-02-13 VITALS
RESPIRATION RATE: 12 BRPM | HEIGHT: 67 IN | OXYGEN SATURATION: 93 % | HEART RATE: 68 BPM | DIASTOLIC BLOOD PRESSURE: 77 MMHG | BODY MASS INDEX: 35.46 KG/M2 | TEMPERATURE: 97.7 F | SYSTOLIC BLOOD PRESSURE: 142 MMHG | WEIGHT: 225.9 LBS

## 2024-02-13 DIAGNOSIS — Z79.4 CONTROLLED TYPE 2 DIABETES MELLITUS WITH DIABETIC POLYNEUROPATHY, WITH LONG-TERM CURRENT USE OF INSULIN: Chronic | ICD-10-CM

## 2024-02-13 DIAGNOSIS — Z89.422 S/P AMPUTATION OF LESSER TOE, LEFT: Primary | ICD-10-CM

## 2024-02-13 DIAGNOSIS — I10 PRIMARY HYPERTENSION: Chronic | ICD-10-CM

## 2024-02-13 DIAGNOSIS — E78.2 MIXED HYPERLIPIDEMIA: Chronic | ICD-10-CM

## 2024-02-13 DIAGNOSIS — E11.42 CONTROLLED TYPE 2 DIABETES MELLITUS WITH DIABETIC POLYNEUROPATHY, WITH LONG-TERM CURRENT USE OF INSULIN: Chronic | ICD-10-CM

## 2024-02-13 DIAGNOSIS — I73.9 PERIPHERAL VASCULAR DISEASE, UNSPECIFIED: Chronic | ICD-10-CM

## 2024-02-13 RX ORDER — HUMAN INSULIN 100 [IU]/ML
40 INJECTION, SUSPENSION SUBCUTANEOUS 2 TIMES DAILY WITH MEALS
Start: 2024-02-13

## 2024-02-13 NOTE — PROGRESS NOTES
"Chief Complaint  Hospital Follow Up Visit    Subjective        Kelby Peters presents to [facility] PRIMARY CARE  History of Present Illness    Had 4th and 5th toes amputated from left foot secondary to infection per vascular.  He has had f/u appt and been seen by new endo.  He is also followed by nephrology.  Has been taking and tolerating current insulin without hypoglycemia-FBS is typically 130s.  Checks BS appr QOD. Most recent A1C was 9.0% and no changes made to tx plan.  He is not to follow up x 6 months for next A1C.  No foot exam done done at that visit.      Pt plan to control diabetes is to start exercising-bought green house and enjoys gardening as weather warms.  UTD on eye exam.     Spouse has been checking feet carefully-they have still been using Dakins, alcohol. Betadine and DSD due to fear of infection.  Pt has appt today for new left foot orthotic.      Continues with pain mgmt.  No changes to tx plan.      He is constipated-last BM appr 4 days ago.      Upcoming appt with nephrology    He has not been taking plavix-taken off prior to surgery and never restarted-is taking asa.  No abd pain, melena.     Continues on keppra for PMH seizures-no recent seizure.      Denies dizziness except after gabapentin.  No HA, chest pain, palpitations, dyspnea, cough, good appetite.      Reports BP usually controlled-is higher in office.  Taking and tolerating meds without side effects.         Objective   Vital Signs:  /77   Pulse 68   Temp 97.7 °F (36.5 °C) (Temporal)   Resp 12   Ht 170.2 cm (67.01\")   Wt 102 kg (225 lb 14.4 oz)   SpO2 93%   BMI 35.37 kg/m²   Estimated body mass index is 35.37 kg/m² as calculated from the following:    Height as of this encounter: 170.2 cm (67.01\").    Weight as of this encounter: 102 kg (225 lb 14.4 oz).       Class 2 Severe Obesity (BMI >=35 and <=39.9). Obesity-related health conditions include the following: hypertension, diabetes mellitus, " dyslipidemias, and peripheral vascular disease. Obesity is unchanged. BMI is is above average; BMI management plan is completed. We discussed portion control and increasing exercise.      Physical Exam  Vitals and nursing note reviewed.   Constitutional:       General: He is not in acute distress.     Appearance: He is well-developed. He is not ill-appearing or diaphoretic.   HENT:      Head: Normocephalic and atraumatic.   Eyes:      General:         Right eye: No discharge.         Left eye: No discharge.      Conjunctiva/sclera: Conjunctivae normal.   Cardiovascular:      Rate and Rhythm: Normal rate and regular rhythm.      Heart sounds: Normal heart sounds.   Pulmonary:      Effort: Pulmonary effort is normal.      Breath sounds: Normal breath sounds.   Abdominal:      General: Bowel sounds are normal.      Palpations: Abdomen is soft.   Musculoskeletal:      Comments: Gait smooth and steady   Feet:      Comments: Well-healed incision status post amputation toes of left foot.  Foot warm and dry with good skin integrity  Skin:     General: Skin is warm and dry.      Comments: Left foot s/p toe amputation is well healed with few scabs distal site where toes 4-5 had been.  Well healed incision, skin warm and dry and perfused. No s/s complications   Neurological:      Mental Status: He is alert and oriented to person, place, and time.   Psychiatric:         Mood and Affect: Mood normal.         Behavior: Behavior normal.        Result Review :                     Assessment and Plan     Diagnoses and all orders for this visit:    1. S/P amputation of lesser toe, left (Primary)    2. Peripheral vascular disease, unspecified    3. Controlled type 2 diabetes mellitus with diabetic polyneuropathy, with long-term current use of insulin  -     insulin NPH-insulin regular (NovoLIN 70/30 ReliOn) (70-30) 100 UNIT/ML injection; Inject 40 Units under the skin into the appropriate area as directed 2 (Two) Times a Day With  Meals. Inject  Units in the AM &  Units in the PM, and titrate as directed up to 50 units twice a day.  Indications: Type 2 Diabetes    4. Mixed hyperlipidemia    5. Primary hypertension    Amputation site left foot is well healed.  Does not need Dakins, alcohol, betadine, abx ointment due to potential to break down skin.  No DRD needed.  Foot care reinforced.     Med rec completed.  Patient has not been taking Plavix but has been taking aspirin.  I really think he should be on the Plavix and that was held just preop.  I do not see vascular note or discharge summary-will have spouse contact vascular to verify whether or not he should still be on Plavix.    Type 2 diabetes: Followed by endocrinology.  Reviewed Endo note and looks like patient is 6-month follow-up, which we discussed was surprising given recent amputations and uncontrolled type 2 diabetes.  Patient had been resistant to medication changes due to previous history of hypoglycemia.  We discussed that previous hypoglycemia does not mean he will have current hypoglycemia with good diabetes control.  Recommend contacting Endo via [employer] with any problems related to diabetes.  Diabetic eye and foot exams discussed.  Management of hypoglycemia discussed.    Hyperlipidemia: Patient has since started statin due to PVD and tolerating without side effects.    Hypertension: Blood pressure is up a little bit today-patient thinks it is typically controlled and will check at home.  Will review again at upcoming AWV.    Patient will follow-up for AWV.         Follow Up     No follow-ups on file.  Patient was given instructions and counseling regarding his condition or for health maintenance advice. Please see specific information pulled into the AVS if appropriate.          Scribe Attestation (For Scribes USE Only)...

## 2024-02-20 DIAGNOSIS — M79.609 PAIN IN EXTREMITY, UNSPECIFIED EXTREMITY: ICD-10-CM

## 2024-02-20 DIAGNOSIS — E11.42 DIABETIC PERIPHERAL NEUROPATHY: ICD-10-CM

## 2024-02-20 RX ORDER — HYDROCODONE BITARTRATE AND ACETAMINOPHEN 10; 325 MG/1; MG/1
1 TABLET ORAL EVERY 6 HOURS PRN
Qty: 120 TABLET | Refills: 0 | Status: SHIPPED | OUTPATIENT
Start: 2024-02-20

## 2024-02-22 ENCOUNTER — OFFICE VISIT (OUTPATIENT)
Dept: FAMILY MEDICINE CLINIC | Facility: CLINIC | Age: 74
End: 2024-02-22
Payer: MEDICARE

## 2024-02-22 ENCOUNTER — LAB (OUTPATIENT)
Dept: LAB | Facility: HOSPITAL | Age: 74
End: 2024-02-22
Payer: MEDICARE

## 2024-02-22 VITALS
OXYGEN SATURATION: 98 % | HEART RATE: 70 BPM | SYSTOLIC BLOOD PRESSURE: 142 MMHG | BODY MASS INDEX: 35.75 KG/M2 | HEIGHT: 67 IN | WEIGHT: 227.8 LBS | DIASTOLIC BLOOD PRESSURE: 70 MMHG | TEMPERATURE: 97.7 F

## 2024-02-22 DIAGNOSIS — R80.9 PROTEINURIA, UNSPECIFIED TYPE: ICD-10-CM

## 2024-02-22 DIAGNOSIS — E78.2 MIXED HYPERLIPIDEMIA: ICD-10-CM

## 2024-02-22 DIAGNOSIS — G89.29 CHRONIC PAIN OF RIGHT THUMB: ICD-10-CM

## 2024-02-22 DIAGNOSIS — M79.644 CHRONIC PAIN OF RIGHT THUMB: ICD-10-CM

## 2024-02-22 DIAGNOSIS — R80.9 PROTEINURIA, UNSPECIFIED TYPE: Primary | ICD-10-CM

## 2024-02-22 DIAGNOSIS — Z00.00 ENCOUNTER FOR ANNUAL WELLNESS VISIT (AWV) IN MEDICARE PATIENT: Primary | ICD-10-CM

## 2024-02-22 LAB
ALBUMIN UR-MCNC: 16.6 MG/DL
CHOLEST SERPL-MCNC: 207 MG/DL (ref 0–200)
CREAT UR-MCNC: 73.2 MG/DL
HDLC SERPL-MCNC: 34 MG/DL (ref 40–60)
LDLC SERPL CALC-MCNC: 100 MG/DL (ref 0–100)
LDLC/HDLC SERPL: 2.55 {RATIO}
MICROALBUMIN/CREAT UR: 226.8 MG/G (ref 0–29)
TRIGL SERPL-MCNC: 431 MG/DL (ref 0–150)
VLDLC SERPL-MCNC: 73 MG/DL (ref 5–40)

## 2024-02-22 PROCEDURE — 1160F RVW MEDS BY RX/DR IN RCRD: CPT | Performed by: NURSE PRACTITIONER

## 2024-02-22 PROCEDURE — 3052F HG A1C>EQUAL 8.0%<EQUAL 9.0%: CPT | Performed by: NURSE PRACTITIONER

## 2024-02-22 PROCEDURE — 80061 LIPID PANEL: CPT | Performed by: NURSE PRACTITIONER

## 2024-02-22 PROCEDURE — 82570 ASSAY OF URINE CREATININE: CPT | Performed by: NURSE PRACTITIONER

## 2024-02-22 PROCEDURE — 3077F SYST BP >= 140 MM HG: CPT | Performed by: NURSE PRACTITIONER

## 2024-02-22 PROCEDURE — 3078F DIAST BP <80 MM HG: CPT | Performed by: NURSE PRACTITIONER

## 2024-02-22 PROCEDURE — 36415 COLL VENOUS BLD VENIPUNCTURE: CPT | Performed by: NURSE PRACTITIONER

## 2024-02-22 PROCEDURE — 82043 UR ALBUMIN QUANTITATIVE: CPT | Performed by: NURSE PRACTITIONER

## 2024-02-22 PROCEDURE — G0439 PPPS, SUBSEQ VISIT: HCPCS | Performed by: NURSE PRACTITIONER

## 2024-02-22 PROCEDURE — 1159F MED LIST DOCD IN RCRD: CPT | Performed by: NURSE PRACTITIONER

## 2024-02-22 RX ORDER — ROSUVASTATIN CALCIUM 20 MG/1
20 TABLET, COATED ORAL DAILY
Qty: 90 TABLET | Refills: 0 | Status: SHIPPED | OUTPATIENT
Start: 2024-02-22

## 2024-02-22 RX ORDER — LISINOPRIL 30 MG/1
30 TABLET ORAL DAILY
Qty: 90 TABLET | Refills: 0 | Status: SHIPPED | OUTPATIENT
Start: 2024-02-22

## 2024-02-22 NOTE — PROGRESS NOTES
The ABCs of the Annual Wellness Visit  Subsequent Medicare Wellness Visit    Subjective    Kelby Peters is a 73 y.o. male who presents for a Subsequent Medicare Wellness Visit.    Patient here with spouse for AWV.  Patient's main health concern today is that he thinks he has chronic finger infection of right hand and wants to see hand specialty.  He had left toes amputated and wants to make sure his fingers stay intact.    Followed by Endo and taking and tolerating insulin mostly as directed.  He reports he has had 1 low blood sugar that occurred because he did not eat lunch that day and went down to 41.  He was able to bring it up with sugary type snack.    Patient and spouse report that after last visit for hospital follow-up that with med rec patient had stopped that clopidogrel and I had asked him to contact vascular as I thought he is should still be taking that and he was told yes he should take it and he has been taking it since without evidence of bleeding or bruising.    The following portions of the patient's history were reviewed and   updated as appropriate: allergies, current medications, past family history, past medical history, past social history, past surgical history, and problem list.    Compared to one year ago, the patient feels his physical   health is the same.    Compared to one year ago, the patient feels his mental   health is the same.    Recent Hospitalizations:  This patient has had a [facility] admission record on file within the last 365 days.    Current Medical Providers:  updated  Patient Care Team:  Susy Germain APRN as PCP - General (Nurse Practitioner)  Diogenes Ruiz DO as Consulting Physician (Endocrinology)  Jasbir Alvarado MD as Surgeon (Vascular Surgery)  Mary Henderson APRN as Nurse Practitioner (Nurse Practitioner)  Salas Diaz MD as Consulting Physician (Nephrology)    Outpatient Medications Prior to Visit   Medication Sig Dispense Refill     amLODIPine (NORVASC) 10 MG tablet TAKE ONE TABLET BY MOUTH DAILY , NEEDS FOLLOW UP APPOINTMENT FOR REFILLS 90 tablet 0    aspirin 81 MG EC tablet Take 1 tablet by mouth Daily. 30 tablet 3    clopidogrel (PLAVIX) 75 MG tablet Take 1 tablet by mouth Daily. (Patient taking differently: Take 1 tablet by mouth Daily.) 30 tablet 3    dorzolamide-timolol (COSOPT) 22.3-6.8 MG/ML ophthalmic solution Administer 1 drop to both eyes 2 (Two) Times a Day. Indications: Wide-Angle Glaucoma      gabapentin (NEURONTIN) 800 MG tablet Take 1 tablet by mouth Take As Directed. 800 mg in the morning, 800 mg in the afternoon, 1600 mg at night 120 tablet 1    glucose blood (OneTouch Verio) test strip Use to check blood sugar 3 times daily  E11.8 300 each 3    hydroCHLOROthiazide (HYDRODIURIL) 25 MG tablet Take 2 tablets by mouth Daily. Indications: Edema 30 tablet 3    HYDROcodone-acetaminophen (NORCO)  MG per tablet Take 1 tablet by mouth Every 6 (Six) Hours As Needed for Moderate Pain. Fill date 2/26/2024 120 tablet 0    insulin NPH-insulin regular (NovoLIN 70/30 ReliOn) (70-30) 100 UNIT/ML injection Inject 40 Units under the skin into the appropriate area as directed 2 (Two) Times a Day With Meals. Inject  Units in the AM &  Units in the PM, and titrate as directed up to 50 units twice a day.  Indications: Type 2 Diabetes      [employer] LANCETS MICRO THIN 33G misc       levETIRAcetam (KEPPRA) 500 MG tablet TAKE 1 TABLET BY MOUTH TWICE A  tablet 0    OneTouch Delica Lancets 33G misc Use to check blood sugar 4 times daily  E11.8 400 each 3    prednisoLONE acetate (PRED FORTE) 1 % ophthalmic suspension Administer 1 drop to both eyes 3 (Three) Times a Day. Indications: Conjunctivitis      lisinopril (PRINIVIL,ZESTRIL) 20 MG tablet Take 1 tablet by mouth Daily. 90 tablet 0    rosuvastatin (CRESTOR) 10 MG tablet Take 1 tablet by mouth Every Night. 90 tablet 1     No facility-administered medications prior to visit.       Opioid  medication/s are on active medication list.  and I have evaluated his active treatment plan and pain score trends (see table).  Vitals:    02/22/24 1031   PainSc: 0-No pain     I have reviewed the chart for potential of high risk medication and harmful drug interactions in the elderly.          Aspirin is on active medication list. Aspirin use is indicated based on review of current medical condition/s. Pros and cons of this therapy have been discussed today. Benefits of this medication outweigh potential harm.  Patient has been encouraged to continue taking this medication.  .      Patient Active Problem List   Diagnosis    Autonomic neuropathy    Chronic pain    Poorly controlled type 1 diabetes mellitus with autonomic neuropathy    Paraparesis    Neuritis or radiculitis due to rupture of lumbar intervertebral disc    Muscle pain    Neck pain    Pain in thoracic spine    Polyneuropathy    Ataxia    Cholelithiasis    Dysthymic disorder    Hypertension    Syncope and collapse    Pain in extremity    Encounter for long-term (current) use of high-risk medication    Diabetic peripheral neuropathy    Abnormal gait    Acquired hammer toe of left foot    Disorder of nervous system due to type 2 diabetes mellitus    Mixed hyperlipidemia    History of seizures    Noncompliance    Diabetic toe ulcer    Peripheral vascular disease    Type 2 diabetes mellitus with diabetic polyneuropathy    Acute hematogenous osteomyelitis of left foot    Septic arthritis of left foot    Obese    Glaucoma    Gangrene of toe of left foot    Staphylococcus aureus infection    Chronic osteomyelitis of foot    Diabetes mellitus with nephropathy     Advance Care Planning   Advance Care Planning     Advance Directive is not on file.  ACP discussion was held with the patient during this visit. Patient does not have an advance directive, information provided.     Objective    Vitals:    02/22/24 1031   BP: 142/70   BP Location: Left arm   Patient  "Position: Sitting   Cuff Size: Adult   Pulse: 70   Temp: 97.7 °F (36.5 °C)   TempSrc: Temporal   SpO2: 98%   Weight: 103 kg (227 lb 12.8 oz)   Height: 170.2 cm (67.01\")   PainSc: 0-No pain     Estimated body mass index is 35.67 kg/m² as calculated from the following:    Height as of this encounter: 170.2 cm (67.01\").    Weight as of this encounter: 103 kg (227 lb 12.8 oz).    Class 2 Severe Obesity (BMI >=35 and <=39.9). Obesity-related health conditions include the following: hypertension, diabetes mellitus, dyslipidemias, and peripheral vascular disease. Obesity is unchanged. BMI is is above average; BMI management plan is completed. We discussed portion control and increasing exercise.      Does the patient have evidence of cognitive impairment? No    Lab Results   Component Value Date    TRIG 431 (H) 2024    HDL 34 (L) 2024     2024    VLDL 73 (H) 2024    HGBA1C 9.0 (H) 2024        HEALTH RISK ASSESSMENT    Smoking Status:  Social History     Tobacco Use   Smoking Status Former   Smokeless Tobacco Never     Alcohol Consumption:  Social History     Substance and Sexual Activity   Alcohol Use Yes    Comment: social drinker     Fall Risk Screen:    [place] Fall Risk Assessment was completed, and patient is at LOW risk for falls.Assessment completed on:2024    Depression Screenin/22/2024    11:00 AM   PHQ-2/PHQ-9 Depression Screening   Little Interest or Pleasure in Doing Things 0-->not at all   Trouble Falling or Staying Asleep, or Sleeping Too Much 0-->not at all   Feeling Tired or Having Little Energy 0-->not at all   Poor Appetite or Overeating 0-->not at all   Feeling Bad about Yourself - or that You are a Failure or Have Let Yourself or Your Family Down 0-->not at all   Trouble Concentrating on Things, Such as Reading the Newspaper or Watching Television 0-->not at all   Moving or Speaking So Slowly that Other People Could Have Noticed? Or the Opposite - Being " So Fidgety 0-->not at all   Thoughts that You Would be Better Off Dead or of Hurting Yourself in Some Way 0-->not at all   If You Checked Off Any Problems, How Difficult Have These Problems Made It For You to Do Your Work, Take Care of Things at Home, or Get Along with Other People? not difficult at all       Health Habits and Functional and Cognitive Screenin/22/2024    10:27 AM   Functional & Cognitive Status   Do you have difficulty preparing food and eating? No   Do you have difficulty bathing yourself, getting dressed or grooming yourself? No   Do you have difficulty using the toilet? No   Do you have difficulty moving around from place to place? No   Do you have trouble with steps or getting out of a bed or a chair? No   Current Diet Well Balanced Diet   Dental Exam Up to date   Eye Exam Up to date   Exercise (times per week) 5 times per week   Current Exercises Include House Cleaning;Yard Work   Do you need help using the phone?  No   Are you deaf or do you have serious difficulty hearing?  Yes   Do you need help to go to places out of walking distance? No   Do you need help shopping? No   Do you need help preparing meals?  No   Do you need help with housework?  No   Do you need help with laundry? No   Do you need help taking your medications? No   Do you need help managing money? No   Do you ever drive or ride in a car without wearing a seat belt? No   Have you felt unusual stress, anger or loneliness in the last month? No   Who do you live with? Spouse   If you need help, do you have trouble finding someone available to you? No   Have you been bothered in the last four weeks by sexual problems? No       Age-appropriate Screening Schedule:  Refer to the list below for future screening recommendations based on patient's age, sex and/or medical conditions. Orders for these recommended tests are listed in the plan section. The patient has been provided with a written plan.    Health Maintenance   Topic  Date Due    DIABETIC FOOT EXAM  03/01/2023    RSV Vaccine - Adults (1 - 1-dose 60+ series) 03/13/2024 (Originally 7/23/2010)    INFLUENZA VACCINE  03/31/2024 (Originally 8/1/2023)    COVID-19 Vaccine (1 - 2023-24 season) 12/31/2024 (Originally 9/1/2023)    ZOSTER VACCINE (2 of 2) 02/13/2025 (Originally 12/29/2017)    DIABETIC EYE EXAM  03/22/2024    HEMOGLOBIN A1C  08/07/2024    TDAP/TD VACCINES (2 - Td or Tdap) 11/09/2024    ANNUAL WELLNESS VISIT  02/22/2025    LIPID PANEL  02/22/2025    BMI FOLLOWUP  02/22/2025    URINE MICROALBUMIN  03/01/2025    COLORECTAL CANCER SCREENING  11/03/2027    HEPATITIS C SCREENING  Completed    Pneumococcal Vaccine 65+  Completed    AAA SCREEN (ONE-TIME)  Completed    Hepatitis B  Discontinued                  CMS Preventative Services Quick Reference  Risk Factors Identified During Encounter  Chronic Pain: Natural history and expected course discussed. Questions answered.  Inactivity/Sedentary: Patient was advised to exercise at least 150 minutes a week per CDC recommendations.  The above risks/problems have been discussed with the patient.  Pertinent information has been shared with the patient in the After Visit Summary.  An After Visit Summary and PPPS were made available to the patient.    Follow Up:   Next Medicare Wellness visit to be scheduled in 1 year.       Additional E&M Note during same encounter follows:  Patient has multiple medical problems which are significant and separately identifiable that require additional work above and beyond the Medicare Wellness Visit.      Chief Complaint  Finger Injury ( RIGHT THUMB- patient states that he has a bump that is bothersome and would like to get this evaluated/)    Subjective        HPI  Kelby KING Fran is also being seen today for AWV    Review of Systems   Constitutional:  Negative for chills and fever.   HENT:  Negative for hearing loss and trouble swallowing.    Respiratory:  Negative for cough and shortness of breath.   "  Cardiovascular:  Negative for chest pain, palpitations and leg swelling.   Gastrointestinal:  Negative for abdominal pain and blood in stool.   Endocrine: Negative for polydipsia, polyphagia and polyuria.   Genitourinary:  Positive for frequency and urgency. Negative for difficulty urinating and hematuria.   Musculoskeletal:  Positive for arthralgias, back pain and myalgias.   Neurological:  Negative for dizziness.   Hematological:  Does not bruise/bleed easily.   Psychiatric/Behavioral:  Negative for dysphoric mood and sleep disturbance. The patient is not nervous/anxious.        Objective   Vital Signs:  /70 (BP Location: Left arm, Patient Position: Sitting, Cuff Size: Adult)   Pulse 70   Temp 97.7 °F (36.5 °C) (Temporal)   Ht 170.2 cm (67.01\")   Wt 103 kg (227 lb 12.8 oz)   SpO2 98%   BMI 35.67 kg/m²     Physical Exam  Vitals and nursing note reviewed.   Constitutional:       General: He is not in acute distress.     Appearance: He is well-developed. He is ill-appearing (Chronically ill).   HENT:      Head: Normocephalic and atraumatic.      Right Ear: Tympanic membrane, ear canal and external ear normal.      Left Ear: Tympanic membrane, ear canal and external ear normal.      Mouth/Throat:      Mouth: Mucous membranes are moist.      Pharynx: Uvula midline. No posterior oropharyngeal erythema.   Eyes:      General: No scleral icterus.        Right eye: No discharge.         Left eye: No discharge.      Conjunctiva/sclera: Conjunctivae normal.   Neck:      Thyroid: No thyromegaly.   Cardiovascular:      Rate and Rhythm: Normal rate and regular rhythm.      Pulses:           Dorsalis pedis pulses are 1+ on the right side and 1+ on the left side.      Heart sounds: Normal heart sounds.   Pulmonary:      Effort: Pulmonary effort is normal.      Breath sounds: Normal breath sounds.   Abdominal:      General: Bowel sounds are normal.      Palpations: Abdomen is soft.      Tenderness: There is no " abdominal tenderness.   Musculoskeletal:         General: No deformity.      Cervical back: Neck supple.      Left foot: Deformity present.      Comments: Gait smooth and steady   Feet:      Right foot:      Protective Sensation: 10 sites tested.  7 sites sensed.      Skin integrity: Skin integrity normal.      Toenail Condition: Right toenails are abnormally thick.      Left foot:      Protective Sensation: 8 sites tested.  4 sites sensed.      Skin integrity: Skin integrity normal.      Toenail Condition: Left toenails are abnormally thick.   Lymphadenopathy:      Cervical: No cervical adenopathy.   Skin:     General: Skin is warm and dry.   Neurological:      General: No focal deficit present.      Mental Status: He is alert and oriented to person, place, and time.   Psychiatric:         Mood and Affect: Mood normal.         Behavior: Behavior normal.         Thought Content: Thought content normal.                         Assessment and Plan   Diagnoses and all orders for this visit:    1. Encounter for annual wellness visit (AWV) in Medicare patient (Primary)    2. Chronic pain of right thumb  -     Ambulatory Referral to Hand Surgery    3. Proteinuria, unspecified type  -     Microalbumin / Creatinine Urine Ratio - Urine, Clean Catch    4. Mixed hyperlipidemia  -     Lipid Panel With LDL / HDL Ratio    Appropriate health maintenance and prevention topics specific for this patient were discussed today.  Additionally, health goals, and health concerns addressed as appropriate.  Pt was encouraged to stay up to date on recommended screenings and vaccines based on USPSTF guidelines.  Today we discussed screenings that patient would have would not like to have.  He does not want PSAs done and does not want referral to urology.  He says he was previously diagnosed with prostate cancer which she says he did not have and has been fine since and so does not have high regard for having further screening.    Referred to  hand specialty for chronic thumb pain.    Proteinuria: Will get urine micro today.  He is followed by the nephrology and endocrinology.  I may need to increase his ACE if significant proteinuria or increased from previous.  His blood pressure is not as well-controlled as I would like it to be and when labs back may go ahead and increase his medication.    Hyperlipidemia: Check lipids.  He is on statin and has been actually taking it.               Follow Up   No follow-ups on file.  Patient was given instructions and counseling regarding his condition or for health maintenance advice. Please see specific information pulled into the AVS if appropriate.

## 2024-03-01 ENCOUNTER — LAB (OUTPATIENT)
Dept: LAB | Facility: HOSPITAL | Age: 74
End: 2024-03-01
Payer: MEDICARE

## 2024-03-01 ENCOUNTER — TRANSCRIBE ORDERS (OUTPATIENT)
Dept: ADMINISTRATIVE | Facility: HOSPITAL | Age: 74
End: 2024-03-01
Payer: MEDICARE

## 2024-03-01 DIAGNOSIS — R80.0 ISOLATED NON-NEPHROTIC PROTEINURIA: Primary | ICD-10-CM

## 2024-03-01 DIAGNOSIS — I12.9 HYPERTENSIVE NEPHROPATHY: ICD-10-CM

## 2024-03-01 DIAGNOSIS — E11.22 TYPE 2 DIABETES MELLITUS WITH DIABETIC CHRONIC KIDNEY DISEASE, UNSPECIFIED CKD STAGE, UNSPECIFIED WHETHER LONG TERM INSULIN USE: ICD-10-CM

## 2024-03-01 DIAGNOSIS — R80.0 ISOLATED NON-NEPHROTIC PROTEINURIA: ICD-10-CM

## 2024-03-01 LAB
ALBUMIN SERPL-MCNC: 4.3 G/DL (ref 3.5–5.2)
ALBUMIN UR-MCNC: 35.4 MG/DL
ANION GAP SERPL CALCULATED.3IONS-SCNC: 8.6 MMOL/L (ref 5–15)
BACTERIA UR QL AUTO: NORMAL /HPF
BILIRUB UR QL STRIP: NEGATIVE
BUN SERPL-MCNC: 19 MG/DL (ref 8–23)
BUN/CREAT SERPL: 23.2 (ref 7–25)
CALCIUM SPEC-SCNC: 9.7 MG/DL (ref 8.6–10.5)
CHLORIDE SERPL-SCNC: 96 MMOL/L (ref 98–107)
CLARITY UR: CLEAR
CO2 SERPL-SCNC: 29.4 MMOL/L (ref 22–29)
COLOR UR: YELLOW
CREAT SERPL-MCNC: 0.82 MG/DL (ref 0.76–1.27)
CREAT UR-MCNC: 57.9 MG/DL
CREAT UR-MCNC: 57.9 MG/DL
EGFRCR SERPLBLD CKD-EPI 2021: 92.8 ML/MIN/1.73
GLUCOSE SERPL-MCNC: 262 MG/DL (ref 65–99)
GLUCOSE UR STRIP-MCNC: ABNORMAL MG/DL
HGB UR QL STRIP.AUTO: ABNORMAL
HYALINE CASTS UR QL AUTO: NORMAL /LPF
KETONES UR QL STRIP: NEGATIVE
LEUKOCYTE ESTERASE UR QL STRIP.AUTO: NEGATIVE
MICROALBUMIN/CREAT UR: 611.4 MG/G (ref 0–29)
NITRITE UR QL STRIP: NEGATIVE
PH UR STRIP.AUTO: 7 [PH] (ref 5–8)
PHOSPHATE SERPL-MCNC: 3.9 MG/DL (ref 2.5–4.5)
POTASSIUM SERPL-SCNC: 4.6 MMOL/L (ref 3.5–5.2)
PROT ?TM UR-MCNC: 59 MG/DL
PROT UR QL STRIP: ABNORMAL
PROT/CREAT UR: 1019 MG/G CREA (ref 0–200)
RBC # UR STRIP: NORMAL /HPF
REF LAB TEST METHOD: NORMAL
SODIUM SERPL-SCNC: 134 MMOL/L (ref 136–145)
SP GR UR STRIP: 1.02 (ref 1–1.03)
SQUAMOUS #/AREA URNS HPF: NORMAL /HPF
UROBILINOGEN UR QL STRIP: ABNORMAL
WBC # UR STRIP: NORMAL /HPF

## 2024-03-01 PROCEDURE — 36415 COLL VENOUS BLD VENIPUNCTURE: CPT

## 2024-03-01 PROCEDURE — 81001 URINALYSIS AUTO W/SCOPE: CPT

## 2024-03-01 PROCEDURE — 84156 ASSAY OF PROTEIN URINE: CPT

## 2024-03-01 PROCEDURE — 80069 RENAL FUNCTION PANEL: CPT

## 2024-03-01 PROCEDURE — 82043 UR ALBUMIN QUANTITATIVE: CPT

## 2024-03-01 PROCEDURE — 82570 ASSAY OF URINE CREATININE: CPT

## 2024-03-19 ENCOUNTER — OFFICE VISIT (OUTPATIENT)
Dept: PAIN MEDICINE | Facility: CLINIC | Age: 74
End: 2024-03-19
Payer: MEDICARE

## 2024-03-19 VITALS
HEART RATE: 63 BPM | BODY MASS INDEX: 34.94 KG/M2 | DIASTOLIC BLOOD PRESSURE: 77 MMHG | HEIGHT: 67 IN | TEMPERATURE: 96.6 F | WEIGHT: 222.6 LBS | OXYGEN SATURATION: 97 % | SYSTOLIC BLOOD PRESSURE: 157 MMHG | RESPIRATION RATE: 18 BRPM

## 2024-03-19 DIAGNOSIS — Z79.899 ENCOUNTER FOR LONG-TERM (CURRENT) USE OF HIGH-RISK MEDICATION: ICD-10-CM

## 2024-03-19 DIAGNOSIS — G62.9 POLYNEUROPATHY: ICD-10-CM

## 2024-03-19 DIAGNOSIS — G89.29 OTHER CHRONIC PAIN: ICD-10-CM

## 2024-03-19 DIAGNOSIS — E11.42 DIABETIC PERIPHERAL NEUROPATHY: Primary | ICD-10-CM

## 2024-03-19 DIAGNOSIS — M79.609 PAIN IN EXTREMITY, UNSPECIFIED EXTREMITY: ICD-10-CM

## 2024-03-19 PROCEDURE — 1126F AMNT PAIN NOTED NONE PRSNT: CPT | Performed by: NURSE PRACTITIONER

## 2024-03-19 PROCEDURE — 1159F MED LIST DOCD IN RCRD: CPT | Performed by: NURSE PRACTITIONER

## 2024-03-19 PROCEDURE — 1160F RVW MEDS BY RX/DR IN RCRD: CPT | Performed by: NURSE PRACTITIONER

## 2024-03-19 PROCEDURE — 3078F DIAST BP <80 MM HG: CPT | Performed by: NURSE PRACTITIONER

## 2024-03-19 PROCEDURE — 99213 OFFICE O/P EST LOW 20 MIN: CPT | Performed by: NURSE PRACTITIONER

## 2024-03-19 PROCEDURE — 3077F SYST BP >= 140 MM HG: CPT | Performed by: NURSE PRACTITIONER

## 2024-03-19 RX ORDER — HYDROCODONE BITARTRATE AND ACETAMINOPHEN 10; 325 MG/1; MG/1
1 TABLET ORAL EVERY 6 HOURS PRN
Qty: 120 TABLET | Refills: 0 | Status: SHIPPED | OUTPATIENT
Start: 2024-03-19

## 2024-03-19 NOTE — PROGRESS NOTES
CHIEF COMPLAINT  Extremity pain  Peripheral neuropathy  Medication follow up     Subjective   Kelby Peters is a 73 y.o. male  who presents for follow-up.  He has a history of extremity pain d/t peripheral neuropathy. He states that his wounds on his left foot are healing well. He has new prosthetic shoes, he has only worn them once.     He continues with Hydrocodone 10/325 4/day (previously on Norco 7.5/325 5/day, changed d/t pharmacy shortage) and gabapentin 800 mg 4/day(1-1-2). Also continues with Keppra 500 mg BID (prescribed by PCP). This medication regimen decreases his pain by a significant amount. He states that he is able to maintain his activity level with this activity regimen. He denies any side effects including somnolence or constipation.  ADLs by self.  He denies any recent falls.     He is maintained on Plavix       1/5/2024-left fourth and fifth toe amputation performed by Dr. Alvarado  7/25/2023-left second toe simple amputation due to diabetic foot ulcer with hammertoe and clinical osteomyelitis performed Dr. Alvarado  3/31/2023-left great toe amputation with resection of metatarsal phalangeal joint and metatarsal head and shaft, right fifth toe amputation with resection of metatarsal phalangeal joint metatarsal head and shaft due to osteomyelitis performed by Dr. Alvarado  1/19/2023-left leg angiogram, third left toe amputation and left foot debridement performed by Dr. Martin     Failed Cymbalta.  Horizant and Gralise Cost-prohibitive.  Failed Lyrica---pedal edema  History of IDDM    Extremity Pain   The pain is present in the left hand, right hand, left lower leg and right lower leg. This is a chronic problem. The problem has been waxing and waning. The quality of the pain is described as burning. The pain is at a severity of 0/10. The pain is moderate. Associated symptoms include numbness (lower extremities). Pertinent negatives include no fever. He has tried oral narcotics and rest for the symptoms. The  "treatment provided significant relief. His past medical history is significant for diabetes.      PEG Assessment   What number best describes your pain on average in the past week?7  What number best describes how, during the past week, pain has interfered with your enjoyment of life?8  What number best describes how, during the past week, pain has interfered with your general activity?  8    The following portions of the patient's history were reviewed and updated as appropriate: allergies, current medications, past family history, past medical history, past social history, past surgical history, and problem list.    Review of Systems   Constitutional:  Positive for fatigue. Negative for fever.   Respiratory:  Negative for chest tightness and shortness of breath.    Gastrointestinal:  Negative for constipation and diarrhea.   Genitourinary:  Negative for difficulty urinating and dysuria.   Neurological:  Positive for dizziness and numbness (lower extremities).   Psychiatric/Behavioral:  Negative for confusion and suicidal ideas. The patient is nervous/anxious.      --  The aforementioned information the Chief Complaint section and above subjective data including any HPI data, and also the Review of Systems data, has been personally reviewed and affirmed.  --    Vitals:    03/19/24 0925   BP: 157/77   BP Location: Left arm   Patient Position: Sitting   Cuff Size: Large Adult   Pulse: 63   Resp: 18   Temp: 96.6 °F (35.9 °C)   SpO2: 97%   Weight: 101 kg (222 lb 9.6 oz)   Height: 170.2 cm (67.01\")   PainSc: 0-No pain     Objective   Physical Exam  Vitals and nursing note reviewed.   Constitutional:       Appearance: Normal appearance. He is well-developed.   Eyes:      General: Lids are normal.   Cardiovascular:      Rate and Rhythm: Normal rate.   Pulmonary:      Effort: Pulmonary effort is normal.   Musculoskeletal:      Right Lower Extremity: Right leg is amputated below ankle.      Left Lower Extremity: Left leg is " amputated below ankle.   Neurological:      Mental Status: He is alert and oriented to person, place, and time.   Psychiatric:         Attention and Perception: Attention normal.         Mood and Affect: Mood normal.         Speech: Speech normal.         Behavior: Behavior normal.         Judgment: Judgment normal.       Assessment & Plan   Diagnoses and all orders for this visit:    1. Diabetic peripheral neuropathy (Primary)  -     HYDROcodone-acetaminophen (NORCO)  MG per tablet; Take 1 tablet by mouth Every 6 (Six) Hours As Needed for Moderate Pain. Fill date 3/27/2024  Dispense: 120 tablet; Refill: 0    2. Pain in extremity, unspecified extremity  -     HYDROcodone-acetaminophen (NORCO)  MG per tablet; Take 1 tablet by mouth Every 6 (Six) Hours As Needed for Moderate Pain. Fill date 3/27/2024  Dispense: 120 tablet; Refill: 0    3. Encounter for long-term (current) use of high-risk medication    4. Polyneuropathy    5. Other chronic pain      Kelby Peters reports a pain score of 0.  Given his pain assessment as noted, treatment options were discussed and the following options were decided upon as a follow-up plan to address the patient's pain: continuation of current treatment plan for pain.    --- The urine drug screen confirmation from 1/19/2024 has been reviewed and the result is appropriate based on patient history and HOME report  --- CSA updated 7/17/2023  --- Opioid Risk--Moderate  --- Refill Norco 10/325. Fill date 3/27/24 applied. Patient appears stable with current regimen. No adverse effects. Regarding continuation of opioids, there is no evidence of aberrant behavior or any red flags.  The patient continues with appropriate response to opioid therapy. ADL's remain intact by self.   --- Continue Gabapentin  --- Follow-up 2 months or sooner if needed.      HOME REPORT  As part of the patient's treatment plan, I am prescribing controlled substances. The patient has been made aware of  appropriate use of such medications, including potential risk of somnolence, limited ability to drive and/or work safely, and the potential for dependence or overdose. It has also been made clear that these medications are for use by this patient only, without concomitant use of alcohol or other substances unless prescribed.     Patient has completed prescribing agreement detailing terms of continued prescribing of controlled substances, including monitoring HOME reports, urine drug screening, and pill counts if necessary. The patient is aware that inappropriate use will results in cessation of prescribing such medications.    As the clinician, I personally reviewed the HOME from 3/19/2024 while the patient was in the office today.    History and physical exam exhibit continued safe and appropriate use of controlled substances.    Dictated utilizing Dragon dictation.

## 2024-03-29 DIAGNOSIS — E11.42 DIABETIC PERIPHERAL NEUROPATHY: ICD-10-CM

## 2024-03-29 DIAGNOSIS — M79.609 PAIN IN EXTREMITY, UNSPECIFIED EXTREMITY: ICD-10-CM

## 2024-03-29 RX ORDER — HYDROCODONE BITARTRATE AND ACETAMINOPHEN 10; 325 MG/1; MG/1
1 TABLET ORAL EVERY 6 HOURS PRN
Qty: 120 TABLET | Refills: 0 | OUTPATIENT
Start: 2024-03-29

## 2024-03-29 NOTE — TELEPHONE ENCOUNTER
Caller: KAI SOLARES     Relationship: PATIENT     Best call back number: [phone]    Requested Prescriptions:   Requested Prescriptions      No prescriptions requested or ordered in this encounter   HYDROCODONE     Pharmacy where request should be sent:    [facility] [id number] - [place], [place] - [address] AT [facility] - [phone]  - [phone] FX [address] [facility] [address] Phone: [phone] Fax: [phone] Hours: Not open 24 hours     Last office visit with prescribing clinician: 3/19/2024   Last telemedicine visit with prescribing clinician: Visit date not found   Next office visit with prescribing clinician: 5/17/2024     Additional details provided by patient: PATIENT IS COMPLETELY OUT, CALLED PHARMACY WHO STATES NO RX.  PATIENT STATES HE LIVES A GOOD DISTANCE FROM PHARMACY, PLEASE CALL HIM ONCE RX SUBMITTED     Does the patient have less than a 3 day supply:  [x] Yes  [] No    Would you like a call back once the refill request has been completed: [x] Yes [] No    If the office needs to give you a call back, can they leave a voicemail: [] Yes [] No    Deepika Harrison Rep   03/29/24 08:56 EDT

## 2024-04-04 ENCOUNTER — HOSPITAL ENCOUNTER (OUTPATIENT)
Facility: HOSPITAL | Age: 74
Discharge: HOME | End: 2024-04-04
Payer: MEDICARE

## 2024-04-04 DIAGNOSIS — I73.9 PERIPHERAL VASCULAR DISEASE, UNSPECIFIED: ICD-10-CM

## 2024-04-04 LAB
BH CV LEA LEFT ANT TIBIAL A DISTAL PSV: 35.8 CM/S
BH CV LEA LEFT ANT TIBIAL A MID PSV: 38.7 CM/S
BH CV LEA LEFT ANT TIBIAL A PROX PSV: 45.1 CM/S
BH CV LEA LEFT CFA DISTAL PSV: 105 CM/S
BH CV LEA LEFT DFA PROX PSV: 75.4 CM/S
BH CV LEA LEFT POPITEAL A  DISTAL PSV: -184.6 CM/S
BH CV LEA LEFT POPITEAL A  MID PSV: 139 CM/S
BH CV LEA LEFT POPITEAL A  PROX PSV: 86.4 CM/S
BH CV LEA LEFT PTA DISTAL EDV: -20.4 CM/S
BH CV LEA LEFT PTA DISTAL PSV: -143.8 CM/S
BH CV LEA LEFT PTA MID EDV: 11.8 CM/S
BH CV LEA LEFT PTA MID PSV: 97.4 CM/S
BH CV LEA LEFT PTA PROX EDV: 11 CM/S
BH CV LEA LEFT PTA PROX PSV: 81.7 CM/S
BH CV LEA LEFT SFA DISTAL PSV: -108.4 CM/S
BH CV LEA LEFT SFA MID PSV: -96.6 CM/S
BH CV LEA LEFT SFA PROX PSV: 129.6 CM/S
BH CV LOWER ARTERIAL LEFT ABI RATIO: 1.07
BH CV LOWER ARTERIAL LEFT DORSALIS PEDIS SYS MAX: 166
BH CV LOWER ARTERIAL LEFT POST TIBIAL SYS MAX: 192
BH CV LOWER ARTERIAL RIGHT ABI RATIO: 1.09
BH CV LOWER ARTERIAL RIGHT DORSALIS PEDIS SYS MAX: 186
BH CV LOWER ARTERIAL RIGHT POST TIBIAL SYS MAX: 196
LEFT GROIN CFA SYS: 106.1 CM/SEC
UPPER ARTERIAL LEFT ARM BRACHIAL SYS MAX: NORMAL
UPPER ARTERIAL RIGHT ARM BRACHIAL SYS MAX: NORMAL

## 2024-04-04 PROCEDURE — 93926 LOWER EXTREMITY STUDY: CPT

## 2024-04-04 PROCEDURE — 93922 UPR/L XTREMITY ART 2 LEVELS: CPT

## 2024-04-08 PROBLEM — S98.132A AMPUTATION OF FIFTH TOE OF LEFT FOOT: Status: ACTIVE | Noted: 2024-04-08

## 2024-04-08 PROBLEM — Z98.890 PERIPHERAL ARTERIAL DISEASE WITH HISTORY OF REVASCULARIZATION: Status: ACTIVE | Noted: 2023-01-15

## 2024-04-10 ENCOUNTER — TELEPHONE (OUTPATIENT)
Dept: FAMILY MEDICINE CLINIC | Facility: CLINIC | Age: 74
End: 2024-04-10
Payer: MEDICARE

## 2024-04-10 DIAGNOSIS — L98.9 SKIN LESION OF FACE: Primary | ICD-10-CM

## 2024-04-10 NOTE — TELEPHONE ENCOUNTER
Caller: Kelby Peters    Relationship: Self    Best call back number: [phone]     What is the medical concern/diagnosis: HISTORY BASAL CELL CARCINOMA - NEW SPOT APPEARING ON FACE    What specialty or service is being requested: DERMATOLOGY / RECCOPMENDED    Any additional details: PLEASE LET PATIENT KNOW REFERRAL DETAILS WHEN PLACED

## 2024-04-11 ENCOUNTER — OFFICE VISIT (OUTPATIENT)
Age: 74
End: 2024-04-11
Payer: MEDICARE

## 2024-04-11 VITALS
DIASTOLIC BLOOD PRESSURE: 74 MMHG | HEART RATE: 66 BPM | HEIGHT: 67 IN | WEIGHT: 222 LBS | SYSTOLIC BLOOD PRESSURE: 135 MMHG | BODY MASS INDEX: 34.84 KG/M2

## 2024-04-11 DIAGNOSIS — I73.9 PERIPHERAL ARTERIAL DISEASE WITH HISTORY OF REVASCULARIZATION: ICD-10-CM

## 2024-04-11 DIAGNOSIS — E11.42 DIABETIC PERIPHERAL NEUROPATHY: Chronic | ICD-10-CM

## 2024-04-11 DIAGNOSIS — Z98.890 PERIPHERAL ARTERIAL DISEASE WITH HISTORY OF REVASCULARIZATION: ICD-10-CM

## 2024-04-11 DIAGNOSIS — S98.132A AMPUTATION OF TOE OF LEFT FOOT: Primary | ICD-10-CM

## 2024-04-11 NOTE — PROGRESS NOTES
Chief Complaint  Peripheral Vascular Disease    Subjective        Kelby Peters presents to [facility] VASCULAR SURGERY  HPI   Kelby Peters is a 73 y.o. male that has been followed in our office for peripheral arterial disease.  On 1/19/2023, he had a left anterior tibial and posterior tibial artery angioplasty.  He has a history of a left fourth and fifth toe amputation 1/5/2024.  He has all of his toes on his left foot now amputated.  He has a right fifth toe amputation that is healed as well.  He returns today in follow up along with ABIs. He reports he  has been doing well without hospitalizations or surgeries. He denies any worsening claudication symptoms, rest pain, or tissue loss.  He is wearing orthotic shoes from local prosthetics.    Review of Systems   Constitutional:  Negative for fever.   Eyes:  Negative for visual disturbance.   Cardiovascular:  Negative for leg swelling.   Gastrointestinal:  Negative for abdominal pain.   Musculoskeletal:  Negative for back pain.   Skin:  Negative for color change, pallor and wound.   Neurological:  Negative for dizziness, facial asymmetry, speech difficulty and weakness.        Kelby Peters  reports that he has quit smoking. He has never used smokeless tobacco..        Objective   Vital Signs:  Vitals:    04/11/24 1038   BP: 135/74   Pulse: 66      Body mass index is 34.76 kg/m².       Physical Exam  Vitals reviewed.   Constitutional:       Appearance: Normal appearance.   HENT:      Head: Normocephalic.   Cardiovascular:      Rate and Rhythm: Normal rate and regular rhythm.      Pulses: Normal pulses.           Dorsalis pedis pulses are 3+ on the right side and 3+ on the left side.        Posterior tibial pulses are 3+ on the right side and 3+ on the left side.      Comments: Absence of all toes on left foot.  Area of scabbing to medial left great toe amputation site.    Right fifth toe amputation  Pulmonary:      Effort: Pulmonary effort is normal.    Skin:     General: Skin is warm.   Neurological:      General: No focal deficit present.      Mental Status: He is alert and oriented to person, place, and time.   Psychiatric:         Mood and Affect: Mood normal.          Result Review :    Duplex Lower Extremity Art / Grafts - Left CAR (04/04/2024 12:04)   Doppler Ankle Brachial Index Single Level CAR (04/04/2024 11:42)       ABIs from today: Greater than 1 bilaterally with monophasic waveforms, likely falsely elevated.    Left anterior tibial artery patent                   Assessment and Plan     Diagnoses and all orders for this visit:    1. Amputation of toe of left foot (Primary)  -     Doppler Ankle Brachial Index Single Level CAR; Future  -     Duplex Lower Extremity Art / Grafts - Left CAR; Future    2. Diabetic peripheral neuropathy  -     Doppler Ankle Brachial Index Single Level CAR; Future  -     Duplex Lower Extremity Art / Grafts - Left CAR; Future    3. Peripheral arterial disease with history of revascularization  -     Doppler Ankle Brachial Index Single Level CAR; Future  -     Duplex Lower Extremity Art / Grafts - Left CAR; Future          Patient has stable peripheral arterial disease status post left tibial angioplasty and amputation of all toes on the left.  There is an area of scabbing to his right great toe amputation site that is stable.  I did not want to debride this to cause a wound.  We did discuss if this worsens or starts to get red to contact our office.  He  is to continue her antiplatelet agent which is aspirin.  He is also on Plavix. He is to continue his statin for cholesterol control.    We discussed continuing his walking protocol. He will return in 6 months along with ABIs and a left lower extremity arterial duplex.         Follow Up     Return in about 6 months (around 10/11/2024) for Recheck, brenden.  Patient was given instructions and counseling regarding his condition or for health maintenance advice. Please see specific  information pulled into the AVS if appropriate.     ABEBA Melvin

## 2024-04-12 ENCOUNTER — TELEPHONE (OUTPATIENT)
Dept: FAMILY MEDICINE CLINIC | Facility: CLINIC | Age: 74
End: 2024-04-12

## 2024-04-12 NOTE — TELEPHONE ENCOUNTER
Spw pt informed him the referral was placed to [facility] on 4/11/24. I gave pt the phone number to call for scheduling

## 2024-04-12 NOTE — TELEPHONE ENCOUNTER
Caller: My Peters    Relationship: Emergency Contact    Best call back number: [phone]    What is the best time to reach you: ANYTIME    Who are you requesting to speak with (clinical staff, provider,  specific staff member): WILBER VEGAS        What was the call regarding: PATIENTS WIFE WAS CALLING CHECKING ON THE REFERRAL TO DERMATOLOGY. THEY DID WANT IT IN [facility] NETWORK PLEASE CALL TO UPDATE    PATIENTS WIFE ALSO WANTED TO LET YOU KNOW THEY HAVEN'T HEARD ANYTHING FROM ANYBODY.

## 2024-04-19 DIAGNOSIS — G62.9 POLYNEUROPATHY: ICD-10-CM

## 2024-04-19 RX ORDER — LEVETIRACETAM 500 MG/1
500 TABLET ORAL 2 TIMES DAILY
Qty: 180 TABLET | Refills: 0 | OUTPATIENT
Start: 2024-04-19

## 2024-04-22 ENCOUNTER — TELEPHONE (OUTPATIENT)
Dept: SPORTS MEDICINE | Facility: CLINIC | Age: 74
End: 2024-04-22

## 2024-04-22 ENCOUNTER — TELEPHONE (OUTPATIENT)
Dept: FAMILY MEDICINE CLINIC | Facility: CLINIC | Age: 74
End: 2024-04-22

## 2024-04-22 DIAGNOSIS — Z87.898 HISTORY OF SEIZURES: ICD-10-CM

## 2024-04-22 DIAGNOSIS — E11.42 DIABETIC PERIPHERAL NEUROPATHY: ICD-10-CM

## 2024-04-22 DIAGNOSIS — M79.609 PAIN IN EXTREMITY, UNSPECIFIED EXTREMITY: ICD-10-CM

## 2024-04-22 RX ORDER — GABAPENTIN 800 MG/1
TABLET ORAL
Qty: 120 TABLET | Refills: 0 | Status: SHIPPED | OUTPATIENT
Start: 2024-04-22

## 2024-04-22 RX ORDER — LEVETIRACETAM 500 MG/1
500 TABLET ORAL 2 TIMES DAILY
Qty: 180 TABLET | Refills: 1 | Status: SHIPPED | OUTPATIENT
Start: 2024-04-22

## 2024-04-22 RX ORDER — HYDROCODONE BITARTRATE AND ACETAMINOPHEN 10; 325 MG/1; MG/1
1 TABLET ORAL EVERY 6 HOURS PRN
Qty: 120 TABLET | Refills: 0 | Status: SHIPPED | OUTPATIENT
Start: 2024-04-22

## 2024-04-22 NOTE — TELEPHONE ENCOUNTER
Spoke to Mrs Peters and she requested that the list of PCP's and phone number be sent to her via My Chart.  I will be sending her the names and phone number the [facility] in [address] at the [place] location.

## 2024-04-22 NOTE — TELEPHONE ENCOUNTER
Rx Refill Note  Requested Prescriptions     Pending Prescriptions Disp Refills    HYDROcodone-acetaminophen (NORCO)  MG per tablet 120 tablet 0     Sig: Take 1 tablet by mouth Every 6 (Six) Hours As Needed for Moderate Pain. Fill date 3/27/2024      Last office visit with prescribing clinician: 3/19/2024   Last telemedicine visit with prescribing clinician: Visit date not found   Next office visit with prescribing clinician: 5/17/2024                         Would you like a call back once the refill request has been completed: [] Yes [] No    If the office needs to give you a call back, can they leave a voicemail: [] Yes [] No    Ann Calix CMA  04/22/24, 10:21 EDT

## 2024-04-22 NOTE — TELEPHONE ENCOUNTER
Provider: WILBER VEGAS    Caller: YURIY SOLARES    Relationship to Patient: WIFE    Phone Number: [phone]     Reason for Call: PATIENT IS CALLING TO REQUEST A CALL BACK FROM WILBER VEGAS. PATIENT WIFE WOULD NOT GO INTO DETAIL JUST STATED THEY WANTED TO SPEAK WITH THE PROVIDER.

## 2024-04-22 NOTE — TELEPHONE ENCOUNTER
Hub staff attempted to follow warm transfer process and was unsuccessful     Caller: My Peters    Relationship to patient: Emergency Contact    Best call back number: [phone]    Patient is needing: PATIENT WAS GIVEN A LIST OF SUGGESTED PCP'S FROM DR. KHOURY AND HAS LOST THE LIST - PLEASE REACH OUT AND ADVISE ON WHO YOU SUGGESTED

## 2024-04-22 NOTE — TELEPHONE ENCOUNTER
Reviewed UDS and HOME. Both updated and appropriate. Refill appropriate.       Other (Free Text): Referral correspondence: faxed letter to Dr. Nicol Yeager Detail Level: Zone Note Text (......Xxx Chief Complaint.): This diagnosis correlates with the

## 2024-05-02 RX ORDER — ROSUVASTATIN CALCIUM 20 MG/1
20 TABLET, COATED ORAL DAILY
Qty: 90 TABLET | Refills: 0 | Status: SHIPPED | OUTPATIENT
Start: 2024-05-02

## 2024-05-09 ENCOUNTER — OFFICE VISIT (OUTPATIENT)
Age: 74
End: 2024-05-09
Payer: MEDICARE

## 2024-05-09 VITALS
BODY MASS INDEX: 34.53 KG/M2 | HEART RATE: 76 BPM | HEIGHT: 67 IN | DIASTOLIC BLOOD PRESSURE: 77 MMHG | WEIGHT: 220 LBS | SYSTOLIC BLOOD PRESSURE: 124 MMHG

## 2024-05-09 DIAGNOSIS — E11.21 DIABETES MELLITUS WITH NEPHROPATHY: ICD-10-CM

## 2024-05-09 DIAGNOSIS — I73.9 PVD (PERIPHERAL VASCULAR DISEASE): ICD-10-CM

## 2024-05-09 DIAGNOSIS — L97.501 DIABETIC ULCER OF TOE ASSOCIATED WITH DIABETES MELLITUS DUE TO UNDERLYING CONDITION, LIMITED TO BREAKDOWN OF SKIN, UNSPECIFIED LATERALITY: Primary | ICD-10-CM

## 2024-05-09 DIAGNOSIS — M79.672 FOOT PAIN, LEFT: ICD-10-CM

## 2024-05-09 DIAGNOSIS — E08.621 DIABETIC ULCER OF TOE ASSOCIATED WITH DIABETES MELLITUS DUE TO UNDERLYING CONDITION, LIMITED TO BREAKDOWN OF SKIN, UNSPECIFIED LATERALITY: Primary | ICD-10-CM

## 2024-05-14 ENCOUNTER — TELEPHONE (OUTPATIENT)
Dept: ENDOCRINOLOGY | Age: 74
End: 2024-05-14
Payer: MEDICARE

## 2024-05-14 DIAGNOSIS — E11.42 CONTROLLED TYPE 2 DIABETES MELLITUS WITH DIABETIC POLYNEUROPATHY, WITH LONG-TERM CURRENT USE OF INSULIN: Chronic | ICD-10-CM

## 2024-05-14 DIAGNOSIS — Z79.4 CONTROLLED TYPE 2 DIABETES MELLITUS WITH DIABETIC POLYNEUROPATHY, WITH LONG-TERM CURRENT USE OF INSULIN: Chronic | ICD-10-CM

## 2024-05-14 RX ORDER — HUMAN INSULIN 100 [IU]/ML
40 INJECTION, SUSPENSION SUBCUTANEOUS 2 TIMES DAILY WITH MEALS
Qty: 10 ML | Refills: 5
Start: 2024-05-14 | End: 2024-05-15 | Stop reason: SDUPTHER

## 2024-05-14 NOTE — TELEPHONE ENCOUNTER
Caller: My Peters    Relationship: Emergency Contact    Best call back number: [phone]    Requested Prescriptions:   NOVALIN 70/30     Pharmacy where request should be sent:  [facility] - [phone]    Last office visit with prescribing clinician: 2/7/2024   Last telemedicine visit with prescribing clinician: Visit date not found   Next office visit with prescribing clinician: 8/7/2024     Additional details provided by patient: PT IS OUT OF INSULIN REFILLS AND HAS HAD TO BUY IT W/OUT INS COVERAGE. [facility] IS WAITING ON A NEW RX TO BE SENT. PLEASE SEND IN RX ASAP AND CALL PT TO ADV.     Does the patient have less than a 3 day supply:  [x] Yes  [] No    Would you like a call back once the refill request has been completed: [x] Yes [] No    If the office needs to give you a call back, can they leave a voicemail: [x] Yes [] No    Deepika Chiang Rep   05/14/24 12:31 EDT

## 2024-05-14 NOTE — TELEPHONE ENCOUNTER
"Called pt with NA. Left VM to call the office.     Okay for hub to read to pt. Please schedule labs or follow up if needed.  Okay for  to read to pt. Please schedule labs or follow up if needed.     \"The prescription for insulin was sent to the pharmacy.\"  "

## 2024-05-15 DIAGNOSIS — Z79.4 CONTROLLED TYPE 2 DIABETES MELLITUS WITH DIABETIC POLYNEUROPATHY, WITH LONG-TERM CURRENT USE OF INSULIN: Chronic | ICD-10-CM

## 2024-05-15 DIAGNOSIS — E11.42 CONTROLLED TYPE 2 DIABETES MELLITUS WITH DIABETIC POLYNEUROPATHY, WITH LONG-TERM CURRENT USE OF INSULIN: Chronic | ICD-10-CM

## 2024-05-15 RX ORDER — HUMAN INSULIN 100 [IU]/ML
40 INJECTION, SUSPENSION SUBCUTANEOUS 2 TIMES DAILY WITH MEALS
Qty: 30 ML | Refills: 5 | Status: SHIPPED | OUTPATIENT
Start: 2024-05-15

## 2024-05-15 RX ORDER — HUMAN INSULIN 100 [IU]/ML
40 INJECTION, SUSPENSION SUBCUTANEOUS 2 TIMES DAILY WITH MEALS
Status: CANCELLED | OUTPATIENT
Start: 2024-05-15

## 2024-05-17 ENCOUNTER — PRIOR AUTHORIZATION (OUTPATIENT)
Dept: PAIN MEDICINE | Facility: CLINIC | Age: 74
End: 2024-05-17

## 2024-05-17 ENCOUNTER — OFFICE VISIT (OUTPATIENT)
Dept: PAIN MEDICINE | Facility: CLINIC | Age: 74
End: 2024-05-17
Payer: MEDICARE

## 2024-05-17 VITALS
WEIGHT: 220.2 LBS | SYSTOLIC BLOOD PRESSURE: 150 MMHG | BODY MASS INDEX: 34.56 KG/M2 | TEMPERATURE: 96.8 F | DIASTOLIC BLOOD PRESSURE: 76 MMHG | OXYGEN SATURATION: 95 % | HEART RATE: 69 BPM | RESPIRATION RATE: 18 BRPM | HEIGHT: 67 IN

## 2024-05-17 DIAGNOSIS — E11.42 DIABETIC PERIPHERAL NEUROPATHY: ICD-10-CM

## 2024-05-17 DIAGNOSIS — G62.9 POLYNEUROPATHY: ICD-10-CM

## 2024-05-17 DIAGNOSIS — M54.16 LUMBAR RADICULOPATHY: Primary | ICD-10-CM

## 2024-05-17 DIAGNOSIS — M79.609 PAIN IN EXTREMITY, UNSPECIFIED EXTREMITY: ICD-10-CM

## 2024-05-17 RX ORDER — HYDROCODONE BITARTRATE AND ACETAMINOPHEN 10; 325 MG/1; MG/1
1 TABLET ORAL EVERY 6 HOURS PRN
Qty: 120 TABLET | Refills: 0 | Status: SHIPPED | OUTPATIENT
Start: 2024-05-17

## 2024-05-17 RX ORDER — GABAPENTIN 800 MG/1
800 TABLET ORAL 4 TIMES DAILY
Qty: 120 TABLET | Refills: 1 | Status: SHIPPED | OUTPATIENT
Start: 2024-05-17

## 2024-05-17 NOTE — PROGRESS NOTES
CHIEF COMPLAINT  Follow-up for Diabetic peripheral neuropathy and Extremity Pain     Subjective   Kelby Peters is a 73 y.o. male  who presents for follow-up.  He has a history of extremity pain d/t diabetic peripheral neuropathy.  Today his pain is 6/10VAS in severity. He states his normal pain has been stable since his last office visit. He has not had any additional surgeries on his feet since his last office visit. He does report worsening pain in his lateral hips radiating into his anterior/lateral thighs.  He states that this is worse with standing and walking. He denies any back pain. He denies any bowel or bladder dysfunction.     He continues with Hydrocodone 10/325 4/day (previously on Norco 7.5/325 5/day, changed d/t pharmacy shortage) and gabapentin 800 mg 4/day(1-1-2). Also continues with Keppra 500 mg BID (prescribed by PCP). This medication regimen decreases his pain by ~80%. ADLs by self. He denies any side effects including somnolence or constipation.     He is maintained on Plavix       1/5/2024-left fourth and fifth toe amputation performed by Dr. Alvarado  7/25/2023-left second toe simple amputation due to diabetic foot ulcer with hammertoe and clinical osteomyelitis performed Dr. Alvarado  3/31/2023-left great toe amputation with resection of metatarsal phalangeal joint and metatarsal head and shaft, right fifth toe amputation with resection of metatarsal phalangeal joint metatarsal head and shaft due to osteomyelitis performed by Dr. Alvarado  1/19/2023-left leg angiogram, third left toe amputation and left foot debridement performed by Dr. Martin     Failed Cymbalta.  Horizant and Gralise Cost-prohibitive.  Failed Lyrica---pedal edema  History of IDDM    Extremity Pain   The pain is present in the left hand, right hand, left lower leg and right lower leg. This is a chronic problem. The problem has been waxing and waning. The quality of the pain is described as burning. The pain is at a severity of 6/10.  The pain is moderate. Associated symptoms include numbness (bilateral hands and feet). Pertinent negatives include no fever. He has tried oral narcotics and rest for the symptoms. The treatment provided significant relief. His past medical history is significant for diabetes.      PEG Assessment   What number best describes your pain on average in the past week?7  What number best describes how, during the past week, pain has interfered with your enjoyment of life?8  What number best describes how, during the past week, pain has interfered with your general activity?  9    Review of Pertinent Medical Data ---  Narrative & Impression         MRI EXAMINATION OF THE LUMBAR SPINE WITHOUT CONTRAST      HISTORY: 63-year-old with low back pain, right radiculopathy.     TECHNIQUE:   A noncontrasted MRI examination of the lumbar spine was  performed.     FINDINGS:  There is moderate loss of disk height and disk desiccation at  L5-S1. There is mild loss of disk height and disk desiccation at L2-L3,  L3-L4 and L4-L5. The conus is at L1 and the caudal aspect of the spinal  cord appears unremarkable.     Axial images were obtained from L1 to S1.     L1-L2: There is no evidence of disk bulge or herniation.     L2-L3: There is a mild central disk bulge with no evidence of  herniation. Mild facet degenerative disease is present bilaterally.     L3-L4: There is a mild broad-based disk bulge with no evidence of  herniation. Mild facet degenerative disease is present bilaterally.     L4-L5: Mild-to-moderate facet degenerative disease is present  bilaterally. There is a mild broad-based disk osteophyte complex  resulting in mild flattening of the ventral surface of the thecal sac.  There is an annular tear present far laterally to the left associated  with a focal disk protrusion or a broad-based herniation. This  approaches the exiting L4 nerve as it exits the neuroforamen.     L5-S1: Mild facet degenerative disease is present  bilaterally. There is  a small central disk osteophyte complex with no evidence of herniation.  There is mild neuroforaminal compromise bilaterally secondary to  extension of the disk osteophyte complex into the neuroforamen. Mild  endplate degenerative changes are noted to the right.     IMPRESSION-  Multilevel degenerative disease involving the lumbar spine  as noted as described above including loss of disk height and disk  desiccation at L5-S1. There is prominence of the disk at L4-L5 far  laterally to the left associated with an annular tear. This approaches  and may potentially mildly abut the left L4 nerve as it exits the  neuroforamen. There is no evidence of focal herniation on the right.  Mild-to-moderate facet degenerative disease is noted at L4-L5 and L5-S1  as described. See above.          - JIHAN TOMLINSON       Reading Radiologist- ZACKERY ARMANDO M.D.       Releasing Radiologist- ZACKERY ARMANDO M.D.       Released Date Time- 06/06/14 0835   ------------------------------------------------------------------------------       The following portions of the patient's history were reviewed and updated as appropriate: allergies, current medications, past family history, past medical history, past social history, past surgical history, and problem list.    Review of Systems   Constitutional:  Negative for fever.   Gastrointestinal:  Positive for diarrhea. Negative for constipation.   Genitourinary:  Negative for difficulty urinating.   Neurological:  Positive for weakness (bilateral legs) and numbness (bilateral hands and feet).   Psychiatric/Behavioral:  Negative for sleep disturbance and suicidal ideas. The patient is nervous/anxious.      --  The aforementioned information the Chief Complaint section and above subjective data including any HPI data, and also the Review of Systems data, has been personally reviewed and affirmed.  --    Vitals:    05/17/24 0839   BP: 150/76   Pulse:  "69   Resp: 18   Temp: 96.8 °F (36 °C)   SpO2: 95%   Weight: 99.9 kg (220 lb 3.2 oz)   Height: 170.2 cm (67.01\")   PainSc:   6   PainLoc: Generalized     Objective   Physical Exam  Vitals and nursing note reviewed.   Constitutional:       Appearance: Normal appearance. He is well-developed.   Eyes:      General: Lids are normal.   Cardiovascular:      Rate and Rhythm: Normal rate.   Pulmonary:      Effort: Pulmonary effort is normal.   Musculoskeletal:      Lumbar back: No tenderness. Normal range of motion. Negative right straight leg raise test and negative left straight leg raise test.      Right foot: Tenderness present.      Left foot: Tenderness present.   Neurological:      Mental Status: He is alert and oriented to person, place, and time.   Psychiatric:         Attention and Perception: Attention normal.         Mood and Affect: Mood normal.         Speech: Speech normal.         Behavior: Behavior normal.         Judgment: Judgment normal.       Assessment & Plan   Diagnoses and all orders for this visit:    1. Lumbar radiculopathy (Primary)  -     Ambulatory Referral to Physical Therapy    2. Diabetic peripheral neuropathy  -     HYDROcodone-acetaminophen (NORCO)  MG per tablet; Take 1 tablet by mouth Every 6 (Six) Hours As Needed for Moderate Pain. Fill date 5/31/2024  Dispense: 120 tablet; Refill: 0    3. Pain in extremity, unspecified extremity  -     HYDROcodone-acetaminophen (NORCO)  MG per tablet; Take 1 tablet by mouth Every 6 (Six) Hours As Needed for Moderate Pain. Fill date 5/31/2024  Dispense: 120 tablet; Refill: 0    4. Polyneuropathy  -     gabapentin (NEURONTIN) 800 MG tablet; Take 1 tablet by mouth 4 (Four) Times a Day.  Dispense: 120 tablet; Refill: 1  -     Ambulatory Referral to Physical Therapy      Kelby Peters reports a pain score of 6.  Given his pain assessment as noted, treatment options were discussed and the following options were decided upon as a follow-up plan to " address the patient's pain: continuation of current treatment plan for pain.    --- I am concerned that some of his leg pain is actually arising from his lumbar spine. I recommend we update a lumbar MRI. He declines at this time d/t his wife having a number of upcoming tests/procedures. Will go ahead and refer him to PT in the meantime.   --- The urine drug screen confirmation from 1/19/2024 has been reviewed and the result is appropriate based on patient history and HOME report  --- CSA updated 7/17/2023  --- Opioid Risk--Moderate  --- Refill Norco 10/325. Fill date 5/31/2024 applied. Patient appears stable with current regimen. No adverse effects. Regarding continuation of opioids, there is no evidence of aberrant behavior or any red flags.  The patient continues with appropriate response to opioid therapy. ADL's remain intact by self.   --- Follow-up 2 months or sooner if needed.      HOME REPORT  As part of the patient's treatment plan, I am prescribing controlled substances. The patient has been made aware of appropriate use of such medications, including potential risk of somnolence, limited ability to drive and/or work safely, and the potential for dependence or overdose. It has also been made clear that these medications are for use by this patient only, without concomitant use of alcohol or other substances unless prescribed.     Patient has completed prescribing agreement detailing terms of continued prescribing of controlled substances, including monitoring HOME reports, urine drug screening, and pill counts if necessary. The patient is aware that inappropriate use will results in cessation of prescribing such medications.    As the clinician, I personally reviewed the HOME from 5/17/2024 while the patient was in the office today.    History and physical exam exhibit continued safe and appropriate use of controlled substances.    Dictated utilizing Dragon dictation.

## 2024-05-17 NOTE — TELEPHONE ENCOUNTER
STARTED A PA FOR [place] 10S OUTCOME  Outcome  Additional Information Required  The patient currently has access to the requested medication and a Prior Authorization is not needed for the patient/medication. PT HAS A CURRENT PA ON FILE EXPIRES 12-31-24.      PT HAS BEEN NOTIFIED AND WILL CONTACT HIS PHARMACY.

## 2024-05-24 DIAGNOSIS — R80.9 PROTEINURIA, UNSPECIFIED TYPE: ICD-10-CM

## 2024-05-24 RX ORDER — LISINOPRIL 30 MG/1
30 TABLET ORAL DAILY
Qty: 90 TABLET | Refills: 1 | Status: SHIPPED | OUTPATIENT
Start: 2024-05-24

## 2024-05-24 NOTE — TELEPHONE ENCOUNTER
Rx Refill Note  Requested Prescriptions     Pending Prescriptions Disp Refills    lisinopril (PRINIVIL,ZESTRIL) 30 MG tablet [Pharmacy Med Name: LISINOPRIL 30 MG TABLET] 90 tablet 1     Sig: TAKE 1 TABLET BY MOUTH DAILY      Last office visit with prescribing clinician: 2/22/2024   Last telemedicine visit with prescribing clinician: Visit date not found   Next office visit with prescribing clinician: Visit date not found                         Would you like a call back once the refill request has been completed: [] Yes [] No    If the office needs to give you a call back, can they leave a voicemail: [] Yes [] No    Deepika Martines Rep  05/24/24, 08:46 EDT

## 2024-06-03 ENCOUNTER — TREATMENT (OUTPATIENT)
Dept: PHYSICAL THERAPY | Facility: CLINIC | Age: 74
End: 2024-06-03
Payer: MEDICARE

## 2024-06-03 DIAGNOSIS — M25.552 BILATERAL HIP PAIN: Primary | ICD-10-CM

## 2024-06-03 DIAGNOSIS — R29.898 WEAKNESS OF BOTH LOWER EXTREMITIES: ICD-10-CM

## 2024-06-03 DIAGNOSIS — M25.651 DECREASED RANGE OF MOTION OF BOTH HIPS: ICD-10-CM

## 2024-06-03 DIAGNOSIS — M25.551 BILATERAL HIP PAIN: Primary | ICD-10-CM

## 2024-06-03 DIAGNOSIS — M25.652 DECREASED RANGE OF MOTION OF BOTH HIPS: ICD-10-CM

## 2024-06-03 PROCEDURE — 97110 THERAPEUTIC EXERCISES: CPT | Performed by: PHYSICAL THERAPIST

## 2024-06-03 PROCEDURE — 97162 PT EVAL MOD COMPLEX 30 MIN: CPT | Performed by: PHYSICAL THERAPIST

## 2024-06-03 PROCEDURE — 97535 SELF CARE MNGMENT TRAINING: CPT | Performed by: PHYSICAL THERAPIST

## 2024-06-03 NOTE — PROGRESS NOTES
Physical Therapy Initial Evaluation and Plan of Care  [facility] [address], [address]  [place] [address]     Patient: Kelby Peters   : 1950  Referring practitioner: Mary Henderson APRN  Date of Initial Visit: 6/3/2024  Today's Date: 6/3/2024  Patient seen for 1 sessions  PT Clinic location: [address], [address] [place] [address].  [id number]          Visit Diagnoses:    ICD-10-CM ICD-9-CM   1. Bilateral hip pain  M25.551 719.45    M25.552    2. Weakness of both lower extremities  R29.898 729.89   3. Decreased range of motion of both hips  M25.651 719.55    M25.652        Subjective Questionnaire:   LEFS: 51/80  Back Index: 21/50 - 42%    Subjective Evaluation    History of Present Illness  Mechanism of injury: Pt presents with complaints of weakness in both legs and bilateral hip pain. He reports that is gradually increasing.  He has a significant medical history including long term pain management, polyneuropathy including diabetic and autonomic.      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  He notes that walking increases hip symptoms, eases with rest.     He denies regular exercise, but cuts his grass, shoveling rock and the like for yard care. He feels like his balance has gotten a lot worse due to impairments in sensation from neuropathies and amputations. He thinks he's had 2 falls in the last year.    He reports that he was in PT a few years ago that he thinks was helping, but that he had to stop due to insurance limitations.    Pain  At best pain ratin  At worst pain ratin      Medical history: T1DM/T2DM, autonomic/polyneuropathy, PVD, HTN, left medial forefoot and right lateral foot amputations. See chart for further detail.   Therapy Precautions: N/A        Objective          Palpation   Left   No palpable tenderness to the gluteus medius.   Tenderness of the gluteus  radha and quadratus lumborum.     Right   No palpable tenderness to the gluteus medius. Tenderness of the gluteus radha and quadratus lumborum.     Tenderness     Left Hip   Tenderness in the PSIS and iliac crest. No tenderness in the greater trochanter.     Right Hip   Tenderness in the PSIS and iliac crest. No tenderness in the greater trochanter.   Left Knee   Tenderness in the ITB.     Right Knee   Tenderness in the ITB.     Passive Range of Motion   Left Hip   Flexion: 90 degrees   Extension: 15 degrees   External rotation (90/90): 75 degrees   Internal rotation (90/90): 15 degrees     Right Hip   Flexion: 90 degrees   Extension: 15 degrees   External rotation (90/90): 75 degrees   Internal rotation (90/90): 10 degrees     Strength/Myotome Testing     Left Hip   Planes of Motion   Flexion: 5  Extension: 4  Abduction: 4-  External rotation: 4+  Internal rotation: 5    Right Hip   Planes of Motion   Flexion: 5  Extension: 4-  Abduction: 4  External rotation: 5  Internal rotation: 5    Left Knee   Flexion: 5  Extension: 5    Right Knee   Flexion: 5  Extension: 5    Tests     Left Hip   Negative IMTIAZ, FADIR and scour.     Right Hip   Positive FADIR.   Negative IMTIAZ and scour.     Left Hip Flexibility Comments:   90/90 test ~60 degrees limited    Right Hip Flexibility Comments:   90/90 test ~70 degrees limited            Assessment & Plan       Assessment  Impairments: abnormal or restricted ROM, activity intolerance, impaired physical strength, lacks appropriate home exercise program and pain with function   Functional limitations: walking, uncomfortable because of pain, standing and unable to perform repetitive tasks   Assessment details: The patient is a 73 y.o. male who presents to physical therapy today for complaints of bilateral hip pain and weakness. Upon initial evaluation, the patient demonstrates the following impairments: decreased B hip ROM, decreased B LE strength, hamstring tightness, and  significant balance deficits. Due to these impairments, the patient is unable to perform or has difficulty with the following functional tasks: standing, walking, stairs. The patient would benefit from skilled PT services to address functional limitations and impairments and to improve patient quality of life.      Prognosis: good    Goals  Plan Goals: ST. Pt will be independent and compliant with initial HEP in 2 weeks.  2. Pt will report a 40% improvement in symptoms since starting therapy in 4 weeks.  3. Pt will report pain level at worst <5 during walking at least 10 minutes in 4 weeks.  4. Pt will be independent with sitting postural corrections in 2 weeks in order to decrease strain on hips.     LT. Pt will be independent with final HEP for self-management of condition by DC.  2. Pt will improve score on LEFS to greater than 62/80 by DC.   3. Pt will report a 75% improvement in symptoms by DC in order to allow return to PLOF.  4. Pt will improve B hip abduction/extension strength to 4+ in order to complete ADLs with improved function by DC.       Plan  Therapy options: will be seen for skilled therapy services  Planned modality interventions: cryotherapy, electrical stimulation/Russian stimulation, TENS, traction, thermotherapy (hydrocollator packs) and ultrasound  Planned therapy interventions: abdominal trunk stabilization, body mechanics training, flexibility, functional ROM exercises, gait training, joint mobilization, home exercise program, manual therapy, neuromuscular re-education, postural training, soft tissue mobilization, spinal/joint mobilization, strengthening, stretching, therapeutic activities and motor coordination training  Frequency: 2x week  Duration in weeks: 8  Treatment plan discussed with: patient        See flowsheets for treatment detail.  Education: POC, pathoanatomy, rationale, HEP    History # of Personal Factors and/or Comorbidities: HIGH (3+)  Examination of Body  System(s): # of elements: MODERATE (3)  Clinical Presentation: EVOLVING  Clinical Decision Making: MODERATE      Timed:         Manual Therapy:         mins  35264;     Therapeutic Exercise:    15     mins  45531;     Neuromuscular Ursula:        mins  20027;    Therapeutic Activity:     5     mins  25801;     Gait Training:           mins  48425;     Ultrasound:          mins  20879;    Ionto                                   mins   08728  Self Care                       10     mins   46662      Un-Timed:  Electrical Stimulation:         mins  38920 ( );  Dry Needling          mins self-pay  Traction          mins 81381  Low Eval          Mins  76736  Mod Eval     20     Mins  02992  High Eval                            Mins  20803  Re-Eval                               mins  94498      Timed Treatment:   30   mins   Total Treatment:     50   mins    PT SIGNATURE: Evelio Cintron PT   [place] PT license #: [id number]  DATE TREATMENT INITIATED: 6/3/2024    Initial Certification  Certification Period: 8/31/2024  I certify that the therapy services are furnished while this patient is under my care.  The services outlined above are required by this patient, and will be reviewed every 90 days.    PHYSICIAN: Mary Henderson APRN  NPI: [id number]                                      DATE:     Please sign and return via fax to [facility] - Fax #: [phone]. Thank you, [facility].

## 2024-06-27 DIAGNOSIS — E11.42 DIABETIC PERIPHERAL NEUROPATHY: ICD-10-CM

## 2024-06-27 DIAGNOSIS — M79.609 PAIN IN EXTREMITY, UNSPECIFIED EXTREMITY: ICD-10-CM

## 2024-06-27 NOTE — TELEPHONE ENCOUNTER
Medication Refill Request    Date of phone call: 6/26/2024    Medication being requested: hydro/apap  mg sig: take 1 tab q 6 hrs prn  Qty: 120    Date of last visit: 5/17/2024    Date of last refill: 5/31/2024    HOME up to date?: yes    Next Follow up?: 7/17/2024    Any new pertinent information? (i.e, new medication allergies, new use of medications, change in patient's health or condition, non-compliance or inconsistency with prescribing agreement?):

## 2024-06-28 RX ORDER — HYDROCODONE BITARTRATE AND ACETAMINOPHEN 10; 325 MG/1; MG/1
1 TABLET ORAL EVERY 6 HOURS PRN
Qty: 120 TABLET | Refills: 0 | Status: SHIPPED | OUTPATIENT
Start: 2024-06-28

## 2024-07-08 ENCOUNTER — PRIOR AUTHORIZATION (OUTPATIENT)
Dept: PAIN MEDICINE | Facility: CLINIC | Age: 74
End: 2024-07-08
Payer: MEDICARE

## 2024-07-08 NOTE — TELEPHONE ENCOUNTER
PER [employer] PA IS NOT REQUIRED AT THIS TIME FOR THE GABAPENTIN 800 MG.    Information regarding your request  The patient currently has access to the requested medication and a Prior Authorization is not needed for the patient/medication.

## 2024-07-17 ENCOUNTER — OFFICE VISIT (OUTPATIENT)
Dept: PAIN MEDICINE | Facility: CLINIC | Age: 74
End: 2024-07-17
Payer: MEDICARE

## 2024-07-17 VITALS
OXYGEN SATURATION: 100 % | SYSTOLIC BLOOD PRESSURE: 140 MMHG | TEMPERATURE: 96 F | DIASTOLIC BLOOD PRESSURE: 70 MMHG | HEIGHT: 67 IN | WEIGHT: 217.2 LBS | HEART RATE: 59 BPM | RESPIRATION RATE: 18 BRPM | BODY MASS INDEX: 34.09 KG/M2

## 2024-07-17 DIAGNOSIS — G89.29 OTHER CHRONIC PAIN: ICD-10-CM

## 2024-07-17 DIAGNOSIS — Z79.899 ENCOUNTER FOR LONG-TERM (CURRENT) USE OF HIGH-RISK MEDICATION: ICD-10-CM

## 2024-07-17 DIAGNOSIS — E11.42 DIABETIC PERIPHERAL NEUROPATHY: Primary | ICD-10-CM

## 2024-07-17 DIAGNOSIS — M79.609 PAIN IN EXTREMITY, UNSPECIFIED EXTREMITY: ICD-10-CM

## 2024-07-17 PROCEDURE — 1159F MED LIST DOCD IN RCRD: CPT | Performed by: NURSE PRACTITIONER

## 2024-07-17 PROCEDURE — 3077F SYST BP >= 140 MM HG: CPT | Performed by: NURSE PRACTITIONER

## 2024-07-17 PROCEDURE — 99213 OFFICE O/P EST LOW 20 MIN: CPT | Performed by: NURSE PRACTITIONER

## 2024-07-17 PROCEDURE — 80305 DRUG TEST PRSMV DIR OPT OBS: CPT | Performed by: NURSE PRACTITIONER

## 2024-07-17 PROCEDURE — 3078F DIAST BP <80 MM HG: CPT | Performed by: NURSE PRACTITIONER

## 2024-07-17 PROCEDURE — 1160F RVW MEDS BY RX/DR IN RCRD: CPT | Performed by: NURSE PRACTITIONER

## 2024-07-17 PROCEDURE — 1125F AMNT PAIN NOTED PAIN PRSNT: CPT | Performed by: NURSE PRACTITIONER

## 2024-07-17 RX ORDER — HYDROCODONE BITARTRATE AND ACETAMINOPHEN 10; 325 MG/1; MG/1
1 TABLET ORAL EVERY 6 HOURS PRN
Qty: 120 TABLET | Refills: 0 | Status: SHIPPED | OUTPATIENT
Start: 2024-07-17

## 2024-07-17 NOTE — PROGRESS NOTES
"CHIEF COMPLAINT  Follow-up for Extremity Pain.    Subjective   Kelby Peters is a 73 y.o. male  who presents for follow-up.  He has a history of extremity pain.  Today his pain is 8/10VAS in severity. He states that lately his pain has been elevated. He states that he hasn't changed anything and he is not sure why his pain is severe. He states that his hands this morning are particularly numb. He also notes that his legs and feet are numb, but this is \"normal numb\".     He continues with Hydrocodone 10/325 4/day (previously on Norco 7.5/325 5/day, changed d/t pharmacy shortage) and gabapentin 800 mg 4/day(1-1-2). Also continues with Keppra 500 mg BID (prescribed by PCP). This medication regimen decreases his pain by a significant amount. ADLs by self. He denies any side effects including somnolence or constipation.     He is having dizziness that he feels has worsened over the last week, he denies any chest pain or headache. He states that he is going to start using a cane for stability.   He is maintained on Plavix       1/5/2024-left fourth and fifth toe amputation performed by Dr. Alvarado  7/25/2023-left second toe simple amputation due to diabetic foot ulcer with hammertoe and clinical osteomyelitis performed Dr. Alvarado  3/31/2023-left great toe amputation with resection of metatarsal phalangeal joint and metatarsal head and shaft, right fifth toe amputation with resection of metatarsal phalangeal joint metatarsal head and shaft due to osteomyelitis performed by Dr. Alvarado  1/19/2023-left leg angiogram, third left toe amputation and left foot debridement performed by Dr. Martin     Failed Cymbalta.  Horizant and Gralise Cost-prohibitive.  Failed Lyrica---pedal edema  History of IDDM    Extremity Pain   The pain is present in the left hand, right hand, left lower leg and right lower leg. This is a chronic problem. The problem has been waxing and waning. The quality of the pain is described as burning. The pain is at a " "severity of 8/10. The pain is moderate. Associated symptoms include numbness (bilateral arms,hands and lower legs). Pertinent negatives include no fever. He has tried oral narcotics and rest for the symptoms. The treatment provided significant relief. His past medical history is significant for diabetes.      PEG Assessment   What number best describes your pain on average in the past week?8  What number best describes how, during the past week, pain has interfered with your enjoyment of life?8  What number best describes how, during the past week, pain has interfered with your general activity?  8    The following portions of the patient's history were reviewed and updated as appropriate: allergies, current medications, past family history, past medical history, past social history, past surgical history, and problem list.    Review of Systems   Constitutional:  Negative for fever.   Gastrointestinal:  Negative for constipation and diarrhea.   Genitourinary:  Negative for difficulty urinating.   Musculoskeletal:  Negative for back pain.   Neurological:  Positive for weakness and numbness (bilateral arms,hands and lower legs).   Psychiatric/Behavioral:  Negative for sleep disturbance and suicidal ideas. The patient is not nervous/anxious.      --  The aforementioned information the Chief Complaint section and above subjective data including any HPI data, and also the Review of Systems data, has been personally reviewed and affirmed.  --    Vitals:    07/17/24 0859 07/17/24 0920   BP:  140/70   BP Location:  Right arm   Pulse: 59    Resp: 18    Temp: 96 °F (35.6 °C)    SpO2: 100%    Weight: 98.5 kg (217 lb 3.2 oz)    Height: 170.2 cm (67.01\")    PainSc:   8    PainLoc: Generalized      Objective   Physical Exam  Vitals and nursing note reviewed.   Constitutional:       Appearance: Normal appearance. He is well-developed.   Eyes:      General: Lids are normal.   Cardiovascular:      Rate and Rhythm: Normal rate. "   Pulmonary:      Effort: Pulmonary effort is normal.   Musculoskeletal:      Lumbar back: Tenderness present. Decreased range of motion.      Right foot: Tenderness present.      Left foot: Tenderness present.      Right Lower Extremity: Right leg is amputated below ankle.      Left Lower Extremity: Left leg is amputated below ankle.   Neurological:      Mental Status: He is alert and oriented to person, place, and time.   Psychiatric:         Attention and Perception: Attention normal.         Mood and Affect: Mood normal.         Speech: Speech normal.         Behavior: Behavior normal.         Judgment: Judgment normal.         Assessment & Plan   Diagnoses and all orders for this visit:    1. Diabetic peripheral neuropathy (Primary)  -     Urine Drug Screen Confirmation - Urine, Clean Catch; Future  -     POC Urine Drug Screen, Triage  -     HYDROcodone-acetaminophen (NORCO)  MG per tablet; Take 1 tablet by mouth Every 6 (Six) Hours As Needed for Moderate Pain. Fill date 7/30/2024  Dispense: 120 tablet; Refill: 0    2. Other chronic pain  -     Urine Drug Screen Confirmation - Urine, Clean Catch; Future  -     POC Urine Drug Screen, Triage    3. Encounter for long-term (current) use of high-risk medication  -     Urine Drug Screen Confirmation - Urine, Clean Catch; Future  -     POC Urine Drug Screen, Triage    4. Pain in extremity, unspecified extremity  -     HYDROcodone-acetaminophen (NORCO)  MG per tablet; Take 1 tablet by mouth Every 6 (Six) Hours As Needed for Moderate Pain. Fill date 7/30/2024  Dispense: 120 tablet; Refill: 0      Kelby Peters reports a pain score of 8.  Given his pain assessment as noted, treatment options were discussed and the following options were decided upon as a follow-up plan to address the patient's pain: continuation of current treatment plan for pain.    --- Routine UDS in office today as part of monitoring requirements for controlled substances.  The specimen was  viewed and the immunoassay result reviewed and is +OPI.  This specimen will be sent to [facility] for confirmation.     --- CSA updated in office today  --- Opioid Risk--Moderate  --- Refill Norco 10/325. Fill date 7/30/2024 applied. Patient appears stable with current regimen. No adverse effects. Regarding continuation of opioids, there is no evidence of aberrant behavior or any red flags.  The patient continues with appropriate response to opioid therapy. ADL's remain intact by self.   --- With regards to his dizziness. I recommend he follow up with his PCP regarding this. Also discussed that if his dizziness does not improve my next recommendation would be to wean down on his Gabapentin some as this may be contributing. He states understanding  --- Follow-up 1 month or sooner if needed.      HOME REPORT  As part of the patient's treatment plan, I am prescribing controlled substances. The patient has been made aware of appropriate use of such medications, including potential risk of somnolence, limited ability to drive and/or work safely, and the potential for dependence or overdose. It has also been made clear that these medications are for use by this patient only, without concomitant use of alcohol or other substances unless prescribed.     Patient has completed prescribing agreement detailing terms of continued prescribing of controlled substances, including monitoring HOME reports, urine drug screening, and pill counts if necessary. The patient is aware that inappropriate use will results in cessation of prescribing such medications.    As the clinician, I personally reviewed the HOME from 7/17/2024 while the patient was in the office today.    History and physical exam exhibit continued safe and appropriate use of controlled substances.'     Dictated utilizing Dragon dictation.

## 2024-07-30 RX ORDER — ROSUVASTATIN CALCIUM 20 MG/1
20 TABLET, COATED ORAL DAILY
Qty: 90 TABLET | Refills: 1 | Status: SHIPPED | OUTPATIENT
Start: 2024-07-30

## 2024-07-30 NOTE — TELEPHONE ENCOUNTER
Rx Refill Note  Requested Prescriptions     Pending Prescriptions Disp Refills    rosuvastatin (CRESTOR) 20 MG tablet [Pharmacy Med Name: ROSUVASTATIN CALCIUM 20 MG TAB] 90 tablet 1     Sig: TAKE 1 TABLET BY MOUTH DAILY      Last office visit with prescribing clinician: 2/22/2024   Last telemedicine visit with prescribing clinician: Visit date not found   Next office visit with prescribing clinician: Visit date not found                         Would you like a call back once the refill request has been completed: [] Yes [] No    If the office needs to give you a call back, can they leave a voicemail: [] Yes [] No    Deepika Martines Rep  07/30/24, 10:24 EDT

## 2024-08-07 ENCOUNTER — OFFICE VISIT (OUTPATIENT)
Dept: ENDOCRINOLOGY | Age: 74
End: 2024-08-07
Payer: MEDICARE

## 2024-08-07 VITALS
SYSTOLIC BLOOD PRESSURE: 132 MMHG | HEIGHT: 67 IN | OXYGEN SATURATION: 96 % | WEIGHT: 221 LBS | DIASTOLIC BLOOD PRESSURE: 76 MMHG | BODY MASS INDEX: 34.69 KG/M2 | HEART RATE: 67 BPM

## 2024-08-07 DIAGNOSIS — E78.5 DYSLIPIDEMIA: Primary | ICD-10-CM

## 2024-08-07 DIAGNOSIS — E11.42 CONTROLLED TYPE 2 DIABETES MELLITUS WITH DIABETIC POLYNEUROPATHY, WITH LONG-TERM CURRENT USE OF INSULIN: Chronic | ICD-10-CM

## 2024-08-07 DIAGNOSIS — Z79.4 CONTROLLED TYPE 2 DIABETES MELLITUS WITH DIABETIC POLYNEUROPATHY, WITH LONG-TERM CURRENT USE OF INSULIN: Chronic | ICD-10-CM

## 2024-08-07 LAB
ALBUMIN SERPL-MCNC: 4.3 G/DL (ref 3.5–5.2)
ALBUMIN/GLOB SERPL: 2 G/DL
ALP SERPL-CCNC: 59 U/L (ref 39–117)
ALT SERPL-CCNC: 13 U/L (ref 1–41)
AST SERPL-CCNC: 14 U/L (ref 1–40)
BILIRUB SERPL-MCNC: 0.2 MG/DL (ref 0–1.2)
BUN SERPL-MCNC: 19 MG/DL (ref 8–23)
BUN/CREAT SERPL: 20 (ref 7–25)
CALCIUM SERPL-MCNC: 9.7 MG/DL (ref 8.6–10.5)
CHLORIDE SERPL-SCNC: 96 MMOL/L (ref 98–107)
CO2 SERPL-SCNC: 31.5 MMOL/L (ref 22–29)
CREAT SERPL-MCNC: 0.95 MG/DL (ref 0.76–1.27)
EGFRCR SERPLBLD CKD-EPI 2021: 84 ML/MIN/1.73
GLOBULIN SER CALC-MCNC: 2.2 GM/DL
GLUCOSE SERPL-MCNC: 241 MG/DL (ref 65–99)
HBA1C MFR BLD: 11.4 % (ref 4.8–5.6)
POTASSIUM SERPL-SCNC: 5 MMOL/L (ref 3.5–5.2)
PROT SERPL-MCNC: 6.5 G/DL (ref 6–8.5)
SODIUM SERPL-SCNC: 137 MMOL/L (ref 136–145)

## 2024-08-07 RX ORDER — HUMAN INSULIN 100 [IU]/ML
40 INJECTION, SUSPENSION SUBCUTANEOUS 2 TIMES DAILY WITH MEALS
Qty: 30 ML | Refills: 5 | Status: SHIPPED | OUTPATIENT
Start: 2024-08-07

## 2024-08-07 NOTE — PROGRESS NOTES
Chief complaint/Reason for consult: DMT2    HPI:   - 74 year old female here for management of diabetes mellitus type 2  - Has had diabetes for over 30 years  - Complications include peripheral neuropathy, amputations of toes, retinopathy  - Is currently taking Novolin 70/30, 40 units bid  - Rarely has hypoglycemia  - Is also on Crestor 10 mg daily       The following portions of the patient's history were reviewed and updated as appropriate: allergies, current medications, past family history, past medical history, past social history, past surgical history, and problem list.      Objective     Vitals:    08/07/24 0932   BP: 132/76   Pulse: 67   SpO2: 96%        Physical Exam  Vitals reviewed.   Constitutional:       Appearance: Normal appearance.   HENT:      Head: Normocephalic and atraumatic.   Eyes:      General: No scleral icterus.  Pulmonary:      Effort: Pulmonary effort is normal. No respiratory distress.   Neurological:      Mental Status: He is alert.      Gait: Gait normal.   Psychiatric:         Mood and Affect: Mood normal.         Behavior: Behavior normal.         Thought Content: Thought content normal.         Judgment: Judgment normal.         Assessment & Plan   T2DM, complicated by peripheral neuropathy and retinopathy (requires ongoing follow-up and monitoring)  - Will check A1c  - Currently on Novolin 70/30, 40 units bid     2. Hyperlipidemia  - On Crestor 10 mg daily     - Return to clinic in 6 months

## 2024-08-08 DIAGNOSIS — G62.9 POLYNEUROPATHY: ICD-10-CM

## 2024-08-09 RX ORDER — GABAPENTIN 800 MG/1
800 TABLET ORAL 4 TIMES DAILY
Qty: 120 TABLET | Refills: 0 | Status: SHIPPED | OUTPATIENT
Start: 2024-08-09

## 2024-08-19 ENCOUNTER — OFFICE VISIT (OUTPATIENT)
Dept: PAIN MEDICINE | Facility: CLINIC | Age: 74
End: 2024-08-19
Payer: MEDICARE

## 2024-08-19 VITALS
OXYGEN SATURATION: 96 % | WEIGHT: 226.8 LBS | HEART RATE: 64 BPM | RESPIRATION RATE: 18 BRPM | HEIGHT: 67 IN | TEMPERATURE: 98.4 F | BODY MASS INDEX: 35.6 KG/M2

## 2024-08-19 DIAGNOSIS — E11.42 DIABETIC PERIPHERAL NEUROPATHY: ICD-10-CM

## 2024-08-19 DIAGNOSIS — Z79.899 ENCOUNTER FOR LONG-TERM (CURRENT) USE OF HIGH-RISK MEDICATION: Primary | ICD-10-CM

## 2024-08-19 DIAGNOSIS — G62.9 POLYNEUROPATHY: ICD-10-CM

## 2024-08-19 DIAGNOSIS — G89.29 OTHER CHRONIC PAIN: ICD-10-CM

## 2024-08-19 DIAGNOSIS — M79.609 PAIN IN EXTREMITY, UNSPECIFIED EXTREMITY: ICD-10-CM

## 2024-08-19 PROCEDURE — 99213 OFFICE O/P EST LOW 20 MIN: CPT | Performed by: NURSE PRACTITIONER

## 2024-08-19 PROCEDURE — 1160F RVW MEDS BY RX/DR IN RCRD: CPT | Performed by: NURSE PRACTITIONER

## 2024-08-19 PROCEDURE — 1159F MED LIST DOCD IN RCRD: CPT | Performed by: NURSE PRACTITIONER

## 2024-08-19 PROCEDURE — 1125F AMNT PAIN NOTED PAIN PRSNT: CPT | Performed by: NURSE PRACTITIONER

## 2024-08-19 RX ORDER — GABAPENTIN 800 MG/1
800 TABLET ORAL 4 TIMES DAILY
Qty: 120 TABLET | Refills: 0 | Status: SHIPPED | OUTPATIENT
Start: 2024-08-19

## 2024-08-19 RX ORDER — HYDROCODONE BITARTRATE AND ACETAMINOPHEN 10; 325 MG/1; MG/1
1 TABLET ORAL EVERY 6 HOURS PRN
Qty: 120 TABLET | Refills: 0 | Status: SHIPPED | OUTPATIENT
Start: 2024-08-19

## 2024-08-19 NOTE — PROGRESS NOTES
"CHIEF COMPLAINT  Follow-up for extremity pain.    Subjective   Kelby Peters is a 74 y.o. male  who presents for follow-up.  He has a history of extremity pain d/t DPN. He states \"It feels like fire ants are biting me from the neck down\". He has numbness from the knee down bilaterally and from the elbow down bilaterally. He notes that the areas that are numb are not painful.  Today his pain is 8/10VAS in severity.     He continues with Hydrocodone 10/325 4/day (previously on Norco 7.5/325 5/day, changed d/t pharmacy shortage) and gabapentin 800 mg 4/day(1-1-2). Also continues with Keppra 500 mg BID (prescribed by PCP). This medication regimen decreases his pain by a significant amount. He does report fatigue, he denies any other side effects including somnolence or constipation.     1/5/2024-left fourth and fifth toe amputation performed by Dr. Alvarado  7/25/2023-left second toe simple amputation due to diabetic foot ulcer with hammertoe and clinical osteomyelitis performed Dr. Alvarado  3/31/2023-left great toe amputation with resection of metatarsal phalangeal joint and metatarsal head and shaft, right fifth toe amputation with resection of metatarsal phalangeal joint metatarsal head and shaft due to osteomyelitis performed by Dr. Alvarado  1/19/2023-left leg angiogram, third left toe amputation and left foot debridement performed by Dr. Martin     Failed Cymbalta.  Horizant and Gralise Cost-prohibitive.  Failed Lyrica---pedal edema  History of IDDM    Extremity Pain   The pain is present in the left hand, right hand, left lower leg and right lower leg. This is a chronic problem. The problem has been waxing and waning. The quality of the pain is described as burning. The pain is at a severity of 8/10. The pain is severe. Associated symptoms include numbness (bilateral hands and feet). Pertinent negatives include no fever. He has tried oral narcotics and rest for the symptoms. The treatment provided significant relief. His " "past medical history is significant for diabetes.      PEG Assessment   What number best describes your pain on average in the past week?8  What number best describes how, during the past week, pain has interfered with your enjoyment of life?8  What number best describes how, during the past week, pain has interfered with your general activity?  8    The following portions of the patient's history were reviewed and updated as appropriate: allergies, current medications, past family history, past medical history, past social history, past surgical history, and problem list.    Review of Systems   Constitutional:  Negative for fever.   Gastrointestinal:  Negative for constipation and diarrhea.   Genitourinary:  Positive for difficulty urinating.   Neurological:  Positive for weakness and numbness (bilateral hands and feet).   Psychiatric/Behavioral:  Positive for sleep disturbance. Negative for suicidal ideas. The patient is not nervous/anxious.      --  The aforementioned information the Chief Complaint section and above subjective data including any HPI data, and also the Review of Systems data, has been personally reviewed and affirmed.  --    Vitals:    08/19/24 0751   Pulse: 64   Resp: 18   Temp: 98.4 °F (36.9 °C)   SpO2: 96%   Weight: 103 kg (226 lb 12.8 oz)   Height: 170.2 cm (67.01\")   PainSc:   8   PainLoc: Generalized     Objective   Physical Exam  Vitals and nursing note reviewed.   Constitutional:       Appearance: Normal appearance. He is well-developed.   Eyes:      General: Lids are normal.   Cardiovascular:      Rate and Rhythm: Normal rate.   Pulmonary:      Effort: Pulmonary effort is normal.   Musculoskeletal:      Right upper arm: Tenderness present.      Left upper arm: Tenderness present.      Right upper leg: Tenderness present.      Left upper leg: Tenderness present.      Right foot: Deformity present.      Left foot: Deformity present.      Right Lower Extremity: Right leg is amputated below " ankle.      Left Lower Extremity: Left leg is amputated below ankle.   Neurological:      Mental Status: He is alert and oriented to person, place, and time.   Psychiatric:         Attention and Perception: Attention normal.         Mood and Affect: Mood normal.         Speech: Speech normal.         Behavior: Behavior normal.         Judgment: Judgment normal.       Assessment & Plan   Diagnoses and all orders for this visit:    1. Encounter for long-term (current) use of high-risk medication (Primary)    2. Polyneuropathy  -     gabapentin (NEURONTIN) 800 MG tablet; Take 1 tablet by mouth 4 (Four) Times a Day. Fill date 9/8/2024  Dispense: 120 tablet; Refill: 0    3. Diabetic peripheral neuropathy  -     HYDROcodone-acetaminophen (NORCO)  MG per tablet; Take 1 tablet by mouth Every 6 (Six) Hours As Needed for Moderate Pain. Fill date 8/29/2024  Dispense: 120 tablet; Refill: 0    4. Pain in extremity, unspecified extremity  -     HYDROcodone-acetaminophen (NORCO)  MG per tablet; Take 1 tablet by mouth Every 6 (Six) Hours As Needed for Moderate Pain. Fill date 8/29/2024  Dispense: 120 tablet; Refill: 0    5. Other chronic pain      Kelby Peters reports a pain score of 8.  Given his pain assessment as noted, treatment options were discussed and the following options were decided upon as a follow-up plan to address the patient's pain: continuation of current treatment plan for pain.    --- The urine drug screen confirmation from 7/17/2024 has been reviewed and the result is appropriate based on patient history and HOME report  --- CSA updated 7/17/2024  --- Opioid Risk--Moderate  --- Refill Norco 10/325. Fill date 8/29/2024 applied. Patient appears stable with current regimen. No adverse effects. Regarding continuation of opioids, there is no evidence of aberrant behavior or any red flags.  The patient continues with appropriate response to opioid therapy. ADL's remain intact by self.   --- Refill  gabapentin.   --- Follow-up 2 months or sooner if needed.      HOME REPORT  As part of the patient's treatment plan, I am prescribing controlled substances. The patient has been made aware of appropriate use of such medications, including potential risk of somnolence, limited ability to drive and/or work safely, and the potential for dependence or overdose. It has also been made clear that these medications are for use by this patient only, without concomitant use of alcohol or other substances unless prescribed.     Patient has completed prescribing agreement detailing terms of continued prescribing of controlled substances, including monitoring HOME reports, urine drug screening, and pill counts if necessary. The patient is aware that inappropriate use will results in cessation of prescribing such medications.    As the clinician, I personally reviewed the HOME from 8/19/2024 while the patient was in the office today.    History and physical exam exhibit continued safe and appropriate use of controlled substances.    Dictated utilizing Dragon dictation.

## 2024-10-02 DIAGNOSIS — Z87.898 HISTORY OF SEIZURES: ICD-10-CM

## 2024-10-02 DIAGNOSIS — E11.42 DIABETIC PERIPHERAL NEUROPATHY: ICD-10-CM

## 2024-10-02 DIAGNOSIS — M79.609 PAIN IN EXTREMITY, UNSPECIFIED EXTREMITY: ICD-10-CM

## 2024-10-02 RX ORDER — HYDROCODONE BITARTRATE AND ACETAMINOPHEN 10; 325 MG/1; MG/1
1 TABLET ORAL EVERY 6 HOURS PRN
Qty: 120 TABLET | Refills: 0 | Status: SHIPPED | OUTPATIENT
Start: 2024-10-02

## 2024-10-02 NOTE — TELEPHONE ENCOUNTER
Rx Refill Note  Requested Prescriptions     Pending Prescriptions Disp Refills    levETIRAcetam (KEPPRA) 500 MG tablet [Pharmacy Med Name: levETIRAcetam 500 MG TABLET] 180 tablet 1     Sig: TAKE 1 TABLET BY MOUTH 2 TIMES A DAY      Last office visit with prescribing clinician: 2/22/2024   Last telemedicine visit with prescribing clinician: Visit date not found   Next office visit with prescribing clinician: Visit date not found                         Would you like a call back once the refill request has been completed: [] Yes [] No    If the office needs to give you a call back, can they leave a voicemail: [] Yes [] No    Deepika Martines Rep  10/02/24, 11:22 EDT

## 2024-10-03 RX ORDER — LEVETIRACETAM 500 MG/1
500 TABLET ORAL 2 TIMES DAILY
Qty: 180 TABLET | Refills: 1 | Status: SHIPPED | OUTPATIENT
Start: 2024-10-03

## 2024-10-03 NOTE — TELEPHONE ENCOUNTER
Rx Refill Note  Requested Prescriptions     Pending Prescriptions Disp Refills    levETIRAcetam (KEPPRA) 500 MG tablet [Pharmacy Med Name: levETIRAcetam 500 MG TABLET] 180 tablet 1     Sig: TAKE 1 TABLET BY MOUTH 2 TIMES A DAY      Last office visit with prescribing clinician: 2/22/2024   Last telemedicine visit with prescribing clinician: Visit date not found   Next office visit with prescribing clinician: Visit date not found                         Would you like a call back once the refill request has been completed: [] Yes [] No    If the office needs to give you a call back, can they leave a voicemail: [] Yes [] No    Deepika Martines Rep  10/03/24, 10:42 EDT

## 2024-10-03 NOTE — TELEPHONE ENCOUNTER
Caller: Kelby Peters    Relationship: Self    Best call back number:[phone]     Requested Prescriptions:   Requested Prescriptions     Pending Prescriptions Disp Refills    levETIRAcetam (KEPPRA) 500 MG tablet [Pharmacy Med Name: levETIRAcetam 500 MG TABLET] 180 tablet 1     Sig: TAKE 1 TABLET BY MOUTH 2 TIMES A DAY        Pharmacy where request should be sent: [facility] [id number] [address] AT [facility] [phone] [facility] [phone] FX     Last office visit with prescribing clinician: 2/22/2024   Last telemedicine visit with prescribing clinician: Visit date not found   Next office visit with prescribing clinician: Visit date not found     Additional details provided by patient: PATIENT IS COMPLETELY OUT OF MEDICATION    Does the patient have less than a 3 day supply:  [x] Yes  [] No    Would you like a call back once the refill request has been completed: [] Yes [] No    If the office needs to give you a call back, can they leave a voicemail: [] Yes [] No    Deepika Shell Rep   10/03/24 10:28 EDT

## 2024-10-16 DIAGNOSIS — G62.9 POLYNEUROPATHY: ICD-10-CM

## 2024-10-16 RX ORDER — GABAPENTIN 800 MG/1
800 TABLET ORAL 4 TIMES DAILY
Qty: 120 TABLET | Refills: 0 | Status: SHIPPED | OUTPATIENT
Start: 2024-10-16

## 2024-10-23 ENCOUNTER — OFFICE VISIT (OUTPATIENT)
Dept: PAIN MEDICINE | Facility: CLINIC | Age: 74
End: 2024-10-23
Payer: MEDICARE

## 2024-10-23 VITALS
WEIGHT: 224 LBS | BODY MASS INDEX: 35.16 KG/M2 | TEMPERATURE: 96.5 F | HEIGHT: 67 IN | OXYGEN SATURATION: 91 % | HEART RATE: 71 BPM | DIASTOLIC BLOOD PRESSURE: 64 MMHG | RESPIRATION RATE: 18 BRPM | SYSTOLIC BLOOD PRESSURE: 124 MMHG

## 2024-10-23 DIAGNOSIS — M79.609 PAIN IN EXTREMITY, UNSPECIFIED EXTREMITY: ICD-10-CM

## 2024-10-23 DIAGNOSIS — E11.42 DIABETIC PERIPHERAL NEUROPATHY: ICD-10-CM

## 2024-10-23 DIAGNOSIS — Z79.899 ENCOUNTER FOR LONG-TERM (CURRENT) USE OF HIGH-RISK MEDICATION: Primary | ICD-10-CM

## 2024-10-23 DIAGNOSIS — G62.9 POLYNEUROPATHY: ICD-10-CM

## 2024-10-23 RX ORDER — GABAPENTIN 800 MG/1
800 TABLET ORAL 4 TIMES DAILY
Qty: 120 TABLET | Refills: 2 | Status: SHIPPED | OUTPATIENT
Start: 2024-10-23

## 2024-10-23 RX ORDER — HYDROCODONE BITARTRATE AND ACETAMINOPHEN 10; 325 MG/1; MG/1
1 TABLET ORAL EVERY 6 HOURS PRN
Qty: 120 TABLET | Refills: 0 | Status: SHIPPED | OUTPATIENT
Start: 2024-10-23

## 2024-10-23 NOTE — PROGRESS NOTES
CHIEF COMPLAINT  Extremity  Pain is stable.    Subjective   Kelby Peters is a 74 y.o. male  who presents for follow-up.  He has a history of extremity pain. Today his pain is 8/10VAS in severity. He states that his feet are relatively healed. He state that he has one spot that is still healing and he is keeping a close eye on this. He continues to follow with Dr. Alvarado with vascular.     He continues with Hydrocodone 10/325 4/day (previously on Norco 7.5/325 5/day, changed d/t pharmacy shortage) and gabapentin 800 mg 4/day(1-1-2).  Also continues with Keppra 500 mg BID (prescribed by PCP). This medication regimen decreases his pain by a significant amount. ADLs by self. He denies any side effects including somnolence or constipation.     1/5/2024-left fourth and fifth toe amputation performed by Dr. Alvarado  7/25/2023-left second toe simple amputation due to diabetic foot ulcer with hammertoe and clinical osteomyelitis performed Dr. Alvarado  3/31/2023-left great toe amputation with resection of metatarsal phalangeal joint and metatarsal head and shaft, right fifth toe amputation with resection of metatarsal phalangeal joint metatarsal head and shaft due to osteomyelitis performed by Dr. Alvarado  1/19/2023-left leg angiogram, third left toe amputation and left foot debridement performed by Dr. Martin     Failed Cymbalta.  Horizant and Gralise Cost-prohibitive.  Failed Lyrica---pedal edema  History of IDDM    Extremity Pain   The pain is present in the left hand, right hand, left lower leg and right lower leg. This is a chronic problem. The problem has been unchanged. The quality of the pain is described as burning. The pain is at a severity of 8/10. The pain is severe. Associated symptoms include numbness. Pertinent negatives include no fever. He has tried oral narcotics and rest for the symptoms. The treatment provided significant relief. His past medical history is significant for diabetes.      PEG Assessment   What number  "best describes your pain on average in the past week?8  What number best describes how, during the past week, pain has interfered with your enjoyment of life?8  What number best describes how, during the past week, pain has interfered with your general activity?  8    The following portions of the patient's history were reviewed and updated as appropriate: allergies, current medications, past family history, past medical history, past social history, past surgical history, and problem list.    Review of Systems   Constitutional:  Negative for activity change, fatigue and fever.   Respiratory:  Negative for cough and chest tightness.    Cardiovascular:  Negative for chest pain.   Gastrointestinal:  Negative for abdominal pain, constipation and diarrhea.   Neurological:  Positive for weakness and numbness. Negative for dizziness, light-headedness and headaches.   Psychiatric/Behavioral:  Negative for agitation, sleep disturbance and suicidal ideas. The patient is not nervous/anxious.      --  The aforementioned information the Chief Complaint section and above subjective data including any HPI data, and also the Review of Systems data, has been personally reviewed and affirmed.  --    Vitals:    10/23/24 1035   BP: 124/64   BP Location: Right arm   Patient Position: Sitting   Cuff Size: Large Adult   Pulse: 71   Resp: 18   Temp: 96.5 °F (35.8 °C)   TempSrc: Temporal   SpO2: 91%   Weight: 102 kg (224 lb)   Height: 170.2 cm (67.01\")   PainSc:   8     Objective   Physical Exam  Vitals and nursing note reviewed.   Constitutional:       Appearance: Normal appearance. He is well-developed.   Eyes:      General: Lids are normal.   Cardiovascular:      Rate and Rhythm: Normal rate.   Pulmonary:      Effort: Pulmonary effort is normal.   Musculoskeletal:      Right foot: Tenderness present.      Left foot: Tenderness present.      Right Lower Extremity: Right leg is amputated below ankle.      Left Lower Extremity: Left leg is " amputated below ankle.   Neurological:      Mental Status: He is alert and oriented to person, place, and time.   Psychiatric:         Attention and Perception: Attention normal.         Mood and Affect: Mood normal.         Speech: Speech normal.         Behavior: Behavior normal.         Judgment: Judgment normal.         Assessment & Plan   Diagnoses and all orders for this visit:    1. Diabetic peripheral neuropathy  -     HYDROcodone-acetaminophen (NORCO)  MG per tablet; Take 1 tablet by mouth Every 6 (Six) Hours As Needed for Moderate Pain. Fill date 11/2/2024  Dispense: 120 tablet; Refill: 0    2. Pain in extremity, unspecified extremity  -     HYDROcodone-acetaminophen (NORCO)  MG per tablet; Take 1 tablet by mouth Every 6 (Six) Hours As Needed for Moderate Pain. Fill date 11/2/2024  Dispense: 120 tablet; Refill: 0    3. Polyneuropathy  -     gabapentin (NEURONTIN) 800 MG tablet; Take 1 tablet by mouth 4 (Four) Times a Day.  Dispense: 120 tablet; Refill: 2      Kelby Peters reports a pain score of 8.  Given his pain assessment as noted, treatment options were discussed and the following options were decided upon as a follow-up plan to address the patient's pain: continuation of current treatment plan for pain.    --- The urine drug screen confirmation from 7/17/2024 has been reviewed and the result is appropriate based on patient history and HOME report  --- CSA updated 7/17/2024  --- Opioid Risk--Moderate  --- Refill Norco 10/325. Fill date 11/2/2024. Patient appears stable with current regimen. No adverse effects. Regarding continuation of opioids, there is no evidence of aberrant behavior or any red flags.  The patient continues with appropriate response to opioid therapy. ADL's remain intact by self.   --- Refill Gabapentin   --- Follow-up 2 months or sooner if needed.      HOME REPORT  As part of the patient's treatment plan, I am prescribing controlled substances. The patient has been  made aware of appropriate use of such medications, including potential risk of somnolence, limited ability to drive and/or work safely, and the potential for dependence or overdose. It has also been made clear that these medications are for use by this patient only, without concomitant use of alcohol or other substances unless prescribed.     Patient has completed prescribing agreement detailing terms of continued prescribing of controlled substances, including monitoring HOME reports, urine drug screening, and pill counts if necessary. The patient is aware that inappropriate use will results in cessation of prescribing such medications.    As the clinician, I personally reviewed the HOME from 10/23/2024 while the patient was in the office today.    History and physical exam exhibit continued safe and appropriate use of controlled substances.       Dictated utilizing Dragon dictation.

## 2024-11-14 ENCOUNTER — HOSPITAL ENCOUNTER (OUTPATIENT)
Facility: HOSPITAL | Age: 74
Discharge: HOME | End: 2024-11-14
Admitting: SURGERY
Payer: MEDICARE

## 2024-11-14 ENCOUNTER — OFFICE VISIT (OUTPATIENT)
Age: 74
End: 2024-11-14
Payer: MEDICARE

## 2024-11-14 VITALS
BODY MASS INDEX: 35.16 KG/M2 | SYSTOLIC BLOOD PRESSURE: 172 MMHG | WEIGHT: 224 LBS | DIASTOLIC BLOOD PRESSURE: 82 MMHG | HEIGHT: 67 IN

## 2024-11-14 DIAGNOSIS — I73.9 PERIPHERAL ARTERIAL DISEASE WITH HISTORY OF REVASCULARIZATION: Primary | ICD-10-CM

## 2024-11-14 DIAGNOSIS — Z98.890 PERIPHERAL ARTERIAL DISEASE WITH HISTORY OF REVASCULARIZATION: Primary | ICD-10-CM

## 2024-11-14 DIAGNOSIS — I73.9 PVD (PERIPHERAL VASCULAR DISEASE): ICD-10-CM

## 2024-11-14 PROCEDURE — 93922 UPR/L XTREMITY ART 2 LEVELS: CPT

## 2024-11-14 NOTE — PROGRESS NOTES
Chief Complaint  Peripheral Vascular Disease    Subjective        Kelby Peters presents to [facility]  HPI   Kelby Peters is a 74 y.o. male that has been followed in our office for peripheral arterial disease.  On 1/19/2023, he had a left anterior tibial and posterior tibial artery angioplasty.  He has a history of a left fourth and fifth toe amputation 1/5/2024.  He has all of his toes on his left foot now amputated.  He has a right fifth toe amputation that is healed as well.  He returns today in follow up along with ABIs. He reports he  has been doing well without hospitalizations or surgeries. He denies any worsening claudication symptoms, rest pain, or tissue loss.  He is wearing orthotic shoes from [employer].    Review of Systems   Constitutional:  Negative for fever.   Eyes:  Negative for visual disturbance.   Cardiovascular:  Negative for leg swelling.   Gastrointestinal:  Negative for abdominal pain.   Musculoskeletal:  Negative for back pain.   Skin:  Negative for color change, pallor and wound.   Neurological:  Negative for dizziness, facial asymmetry, speech difficulty and weakness.        Kelby Peters  reports that he quit smoking about 38 years ago. His smoking use included cigarettes, pipe, and cigars. He started smoking about 58 years ago. He has a 10 pack-year smoking history. He has never used smokeless tobacco..        Objective   Vital Signs:  Vitals:    11/14/24 1102   BP: 172/82        Body mass index is 35.07 kg/m².       Physical Exam  Vitals reviewed.   Constitutional:       Appearance: Normal appearance.   HENT:      Head: Normocephalic.   Cardiovascular:      Rate and Rhythm: Normal rate and regular rhythm.      Pulses: Normal pulses.           Dorsalis pedis pulses are 3+ on the right side and 3+ on the left side.        Posterior tibial pulses are 3+ on the right side and 3+ on the left side.      Comments: Absence of all toes on left foot.   Area of scabbing to medial left great toe amputation site.    Right fifth toe amputation  Pulmonary:      Effort: Pulmonary effort is normal.   Skin:     General: Skin is warm.   Neurological:      General: No focal deficit present.      Mental Status: He is alert and oriented to person, place, and time.   Psychiatric:         Mood and Affect: Mood normal.          Result Review :    Duplex Lower Extremity Art / Grafts - Left CAR (04/04/2024 12:04)   Doppler Ankle Brachial Index Single Level CAR (04/04/2024 11:42)       ABIs from today:Doppler Ankle Brachial Index Single Level CAR (11/14/2024 10:53)     Left anterior tibial artery patent                   Assessment and Plan     Diagnoses and all orders for this visit:    1. Peripheral arterial disease with history of revascularization (Primary)  -     Doppler Ankle Brachial Index Single Level CAR; Future  -     Duplex Lower Extremity Art / Grafts - Right CAR; Future            Patient has stable peripheral arterial disease status post left tibial angioplasty and amputation of all toes on the left. .  I did not want to debride this to cause a wound.  We did discuss if this worsens or starts to get red to contact our office.  He  is to continue her antiplatelet agent which is aspirin.  He is also on Plavix. He is to continue his statin for cholesterol control.    We discussed continuing his walking protocol. He will return in 12  months along with ABIs and a left lower extremity arterial duplex.         Follow Up     Return in about 1 year (around 11/14/2025) for brenden, MOLINA.  Patient was given instructions and counseling regarding his condition or for health maintenance advice. Please see specific information pulled into the AVS if appropriate.     ABEBA Melvin

## 2024-11-18 LAB
BH CV LOWER ARTERIAL LEFT ABI RATIO: 0.91
BH CV LOWER ARTERIAL LEFT DORSALIS PEDIS SYS MAX: 122
BH CV LOWER ARTERIAL LEFT POST TIBIAL SYS MAX: 164
BH CV LOWER ARTERIAL RIGHT ABI RATIO: 1
BH CV LOWER ARTERIAL RIGHT DORSALIS PEDIS SYS MAX: 180
BH CV LOWER ARTERIAL RIGHT POST TIBIAL SYS MAX: 160
UPPER ARTERIAL LEFT ARM BRACHIAL SYS MAX: NORMAL
UPPER ARTERIAL RIGHT ARM BRACHIAL SYS MAX: NORMAL

## 2024-11-20 ENCOUNTER — LAB (OUTPATIENT)
Dept: LAB | Facility: HOSPITAL | Age: 74
End: 2024-11-20
Payer: MEDICARE

## 2024-11-20 ENCOUNTER — TRANSCRIBE ORDERS (OUTPATIENT)
Dept: LAB | Facility: HOSPITAL | Age: 74
End: 2024-11-20
Payer: MEDICARE

## 2024-11-20 DIAGNOSIS — N18.6 TYPE 2 DIABETES MELLITUS WITH ESRD (END-STAGE RENAL DISEASE): ICD-10-CM

## 2024-11-20 DIAGNOSIS — R80.0 ISOLATED NON-NEPHROTIC PROTEINURIA: ICD-10-CM

## 2024-11-20 DIAGNOSIS — R80.0 ISOLATED NON-NEPHROTIC PROTEINURIA: Primary | ICD-10-CM

## 2024-11-20 DIAGNOSIS — I12.9 HYPERTENSIVE NEPHROPATHY: ICD-10-CM

## 2024-11-20 DIAGNOSIS — E11.22 TYPE 2 DIABETES MELLITUS WITH ESRD (END-STAGE RENAL DISEASE): ICD-10-CM

## 2024-11-20 LAB
ALBUMIN SERPL-MCNC: 3.9 G/DL (ref 3.5–5.2)
ALBUMIN UR-MCNC: 18.1 MG/DL
ANION GAP SERPL CALCULATED.3IONS-SCNC: 6.9 MMOL/L (ref 5–15)
BILIRUB UR QL STRIP: NEGATIVE
BUN SERPL-MCNC: 19 MG/DL (ref 8–23)
BUN/CREAT SERPL: 21.8 (ref 7–25)
CALCIUM SPEC-SCNC: 8.7 MG/DL (ref 8.6–10.5)
CHLORIDE SERPL-SCNC: 98 MMOL/L (ref 98–107)
CLARITY UR: CLEAR
CO2 SERPL-SCNC: 30.1 MMOL/L (ref 22–29)
COLOR UR: YELLOW
CREAT SERPL-MCNC: 0.87 MG/DL (ref 0.76–1.27)
CREAT UR-MCNC: 59 MG/DL
CREAT UR-MCNC: 59 MG/DL
EGFRCR SERPLBLD CKD-EPI 2021: 90.5 ML/MIN/1.73
GLUCOSE SERPL-MCNC: 387 MG/DL (ref 65–99)
GLUCOSE UR STRIP-MCNC: ABNORMAL MG/DL
HBA1C MFR BLD: 11.2 % (ref 4.8–5.6)
HGB UR QL STRIP.AUTO: NEGATIVE
KETONES UR QL STRIP: NEGATIVE
LEUKOCYTE ESTERASE UR QL STRIP.AUTO: NEGATIVE
MICROALBUMIN/CREAT UR: 306.8 MG/G (ref 0–29)
NITRITE UR QL STRIP: NEGATIVE
PH UR STRIP.AUTO: 6.5 [PH] (ref 5–8)
PHOSPHATE SERPL-MCNC: 3.2 MG/DL (ref 2.5–4.5)
POTASSIUM SERPL-SCNC: 4.5 MMOL/L (ref 3.5–5.2)
PROT ?TM UR-MCNC: 35.3 MG/DL
PROT UR QL STRIP: ABNORMAL
PROT/CREAT UR: 598.3 MG/G CREA (ref 0–200)
SODIUM SERPL-SCNC: 135 MMOL/L (ref 136–145)
SP GR UR STRIP: 1.01 (ref 1–1.03)
UROBILINOGEN UR QL STRIP: ABNORMAL

## 2024-11-20 PROCEDURE — 80069 RENAL FUNCTION PANEL: CPT

## 2024-11-20 PROCEDURE — 36415 COLL VENOUS BLD VENIPUNCTURE: CPT

## 2024-11-20 PROCEDURE — 82570 ASSAY OF URINE CREATININE: CPT

## 2024-11-20 PROCEDURE — 81003 URINALYSIS AUTO W/O SCOPE: CPT

## 2024-11-20 PROCEDURE — 84156 ASSAY OF PROTEIN URINE: CPT

## 2024-11-20 PROCEDURE — 82043 UR ALBUMIN QUANTITATIVE: CPT

## 2024-11-20 PROCEDURE — 83036 HEMOGLOBIN GLYCOSYLATED A1C: CPT

## 2024-11-23 DIAGNOSIS — R80.9 PROTEINURIA, UNSPECIFIED TYPE: ICD-10-CM

## 2024-11-25 ENCOUNTER — TELEPHONE (OUTPATIENT)
Dept: FAMILY MEDICINE CLINIC | Facility: CLINIC | Age: 74
End: 2024-11-25
Payer: MEDICARE

## 2024-11-25 DIAGNOSIS — E11.42 CONTROLLED TYPE 2 DIABETES MELLITUS WITH DIABETIC POLYNEUROPATHY, WITH LONG-TERM CURRENT USE OF INSULIN: ICD-10-CM

## 2024-11-25 DIAGNOSIS — I10 PRIMARY HYPERTENSION: ICD-10-CM

## 2024-11-25 DIAGNOSIS — Z79.4 CONTROLLED TYPE 2 DIABETES MELLITUS WITH DIABETIC POLYNEUROPATHY, WITH LONG-TERM CURRENT USE OF INSULIN: ICD-10-CM

## 2024-11-25 DIAGNOSIS — E78.2 MIXED HYPERLIPIDEMIA: Primary | ICD-10-CM

## 2024-11-25 RX ORDER — LISINOPRIL 30 MG/1
30 TABLET ORAL DAILY
Qty: 90 TABLET | Refills: 1 | Status: SHIPPED | OUTPATIENT
Start: 2024-11-25

## 2024-11-25 NOTE — TELEPHONE ENCOUNTER
Rx Refill Note  Requested Prescriptions     Pending Prescriptions Disp Refills    lisinopril (PRINIVIL,ZESTRIL) 30 MG tablet [Pharmacy Med Name: LISINOPRIL 30 MG TABLET] 90 tablet 1     Sig: TAKE 1 TABLET BY MOUTH DAILY      Last office visit with prescribing clinician: 2/22/2024   Last telemedicine visit with prescribing clinician: Visit date not found   Next office visit with prescribing clinician: Visit date not found                         Would you like a call back once the refill request has been completed: [] Yes [] No    If the office needs to give you a call back, can they leave a voicemail: [] Yes [] No    Deepika Martines Rep  11/25/24, 10:48 EST

## 2024-11-26 NOTE — TELEPHONE ENCOUNTER
Please let pt know he does not need labs done at this time. Pt had lab completed by kidney dr and his endo dr is managing his A1C

## 2024-12-02 DIAGNOSIS — E11.42 DIABETIC PERIPHERAL NEUROPATHY: ICD-10-CM

## 2024-12-02 DIAGNOSIS — M79.609 PAIN IN EXTREMITY, UNSPECIFIED EXTREMITY: ICD-10-CM

## 2024-12-02 RX ORDER — HYDROCODONE BITARTRATE AND ACETAMINOPHEN 10; 325 MG/1; MG/1
1 TABLET ORAL EVERY 6 HOURS PRN
Qty: 120 TABLET | Refills: 0 | Status: SHIPPED | OUTPATIENT
Start: 2024-12-02

## 2024-12-02 NOTE — TELEPHONE ENCOUNTER
Medication Refill Request    Date of phone call: 12/2/2024    Medication being requested: hydro/apap  mg sig: take 1 tab q 6 hrs prn  Qty: 120    Date of last visit: 10/23/2024    Date of last refill: 11/2/2024    HOME up to date?: yes    Next Follow up?: 12/20/2024    Any new pertinent information? (i.e, new medication allergies, new use of medications, change in patient's health or condition, non-compliance or inconsistency with prescribing agreement?):

## 2024-12-20 ENCOUNTER — OFFICE VISIT (OUTPATIENT)
Dept: PAIN MEDICINE | Facility: CLINIC | Age: 74
End: 2024-12-20
Payer: MEDICARE

## 2024-12-20 VITALS
HEART RATE: 67 BPM | HEIGHT: 67 IN | OXYGEN SATURATION: 91 % | TEMPERATURE: 95.7 F | WEIGHT: 223.8 LBS | RESPIRATION RATE: 18 BRPM | DIASTOLIC BLOOD PRESSURE: 76 MMHG | BODY MASS INDEX: 35.12 KG/M2 | SYSTOLIC BLOOD PRESSURE: 145 MMHG

## 2024-12-20 DIAGNOSIS — M79.609 PAIN IN EXTREMITY, UNSPECIFIED EXTREMITY: ICD-10-CM

## 2024-12-20 DIAGNOSIS — Z79.899 ENCOUNTER FOR LONG-TERM (CURRENT) USE OF HIGH-RISK MEDICATION: Primary | ICD-10-CM

## 2024-12-20 DIAGNOSIS — G62.9 POLYNEUROPATHY: ICD-10-CM

## 2024-12-20 RX ORDER — GABAPENTIN 800 MG/1
800 TABLET ORAL 3 TIMES DAILY
Qty: 90 TABLET | Refills: 0 | Status: SHIPPED | OUTPATIENT
Start: 2024-12-20

## 2024-12-20 RX ORDER — GABAPENTIN 600 MG/1
600 TABLET ORAL NIGHTLY
Qty: 30 TABLET | Refills: 0 | Status: SHIPPED | OUTPATIENT
Start: 2024-12-20

## 2024-12-20 RX ORDER — HYDROCODONE BITARTRATE AND ACETAMINOPHEN 10; 325 MG/1; MG/1
1 TABLET ORAL EVERY 6 HOURS PRN
Qty: 120 TABLET | Refills: 0 | Status: SHIPPED | OUTPATIENT
Start: 2024-12-20

## 2024-12-20 NOTE — PROGRESS NOTES
CHIEF COMPLAINT  Extremity pain  Pain is consistent.    Subjective   Kelby Peters is a 74 y.o. male  who presents for follow-up.  He has a history of extremity pain d/t peripheral neuropathy.  Today her pain is 7-7.5/10VAS in severity. He continues to follow with vascular for wounds on his feet, he states that these are currently. He states that his pain is stable at this time. He continues with Hydrocodone 10/325 4/day (previously on Norco 7.5/325 5/day, changed d/t pharmacy shortage) and gabapentin 800 mg 4/day(1-1-2).  Also continues with Keppra 500 mg BID (prescribed by PCP). He would like to trial decreasing his Gabapentin to see if this is causing drowsiness. He notes that this regimen decreases his pain and allows her to remain active. ADLs by self. He reports drowsiness, but denies other side effects including constipation.     He is maintained on Plavix.     1/5/2024-left fourth and fifth toe amputation performed by Dr. Alvarado  7/25/2023-left second toe simple amputation due to diabetic foot ulcer with hammertoe and clinical osteomyelitis performed Dr. Alvarado  3/31/2023-left great toe amputation with resection of metatarsal phalangeal joint and metatarsal head and shaft, right fifth toe amputation with resection of metatarsal phalangeal joint metatarsal head and shaft due to osteomyelitis performed by Dr. Alvarado  1/19/2023-left leg angiogram, third left toe amputation and left foot debridement performed by Dr. Martin     Failed Cymbalta.  Horizant and Gralise Cost-prohibitive.  Failed Lyrica---pedal edema  History of IDDM    Extremity Pain   The pain is present in the left hand, right hand, left lower leg and right lower leg. This is a chronic problem. The problem has been unchanged. The quality of the pain is described as burning. The pain is at a severity of 7/10. The pain is severe. Pertinent negatives include no fever or numbness. He has tried oral narcotics and rest for the symptoms. The treatment provided  "significant relief. His past medical history is significant for diabetes.      PEG Assessment   What number best describes your pain on average in the past week?8  What number best describes how, during the past week, pain has interfered with your enjoyment of life?8  What number best describes how, during the past week, pain has interfered with your general activity?  8    The following portions of the patient's history were reviewed and updated as appropriate: allergies, current medications, past family history, past medical history, past social history, past surgical history, and problem list.    Review of Systems   Constitutional:  Negative for activity change, fatigue and fever.   Respiratory:  Negative for cough and chest tightness.    Cardiovascular:  Negative for chest pain.   Gastrointestinal:  Negative for abdominal pain, constipation and diarrhea.   Neurological:  Positive for weakness. Negative for dizziness, light-headedness, numbness and headaches.   Psychiatric/Behavioral:  Negative for agitation, sleep disturbance and suicidal ideas. The patient is not nervous/anxious.      --  The aforementioned information the Chief Complaint section and above subjective data including any HPI data, and also the Review of Systems data, has been personally reviewed and affirmed.  --    Vitals:    12/20/24 0845   BP: 145/76   BP Location: Right arm   Patient Position: Sitting   Cuff Size: Adult   Pulse: 67   Resp: 18   Temp: 95.7 °F (35.4 °C)   TempSrc: Temporal   SpO2: 91%   Weight: 102 kg (223 lb 12.8 oz)   Height: 170.2 cm (67.01\")   PainSc:   8     Objective   Physical Exam  Vitals and nursing note reviewed.   Constitutional:       Appearance: Normal appearance. He is well-developed.   Eyes:      General: Lids are normal.   Cardiovascular:      Rate and Rhythm: Normal rate.   Pulmonary:      Effort: Pulmonary effort is normal.   Musculoskeletal:      Right Lower Extremity: Right leg is amputated below ankle.      " Left Lower Extremity: Left leg is amputated below ankle.   Neurological:      Mental Status: He is alert and oriented to person, place, and time.   Psychiatric:         Attention and Perception: Attention normal.         Mood and Affect: Mood normal.         Speech: Speech normal.         Behavior: Behavior normal.         Judgment: Judgment normal.       Assessment & Plan   Diagnoses and all orders for this visit:    1. Encounter for long-term (current) use of high-risk medication (Primary)    2. Polyneuropathy  -     gabapentin (NEURONTIN) 800 MG tablet; Take 1 tablet by mouth 3 (Three) Times a Day. In addition to 600 mg at night  Dispense: 90 tablet; Refill: 0  -     gabapentin (NEURONTIN) 600 MG tablet; Take 1 tablet by mouth Every Night.  Dispense: 30 tablet; Refill: 0    3. Pain in extremity, unspecified extremity  -     HYDROcodone-acetaminophen (NORCO)  MG per tablet; Take 1 tablet by mouth Every 6 (Six) Hours As Needed for Moderate Pain. Fill date 12/31/2024  Dispense: 120 tablet; Refill: 0      --- I spent 35 minutes caring for Kelby on this date of service. This time includes time spent by me in the following activities: preparing for the visit, reviewing tests, performing a medically appropriate examination and/or evaluation, counseling and educating the patient/family/caregiver, documenting information in the medical record, care coordination, and ordering medications     Kelby Peters reports a pain score of 8.  Given his pain assessment as noted, treatment options were discussed and the following options were decided upon as a follow-up plan to address the patient's pain: prescription for non-opioid analgesics and prescription for opioid analgesics.    --- The urine drug screen confirmation from 7/17/2024 has been reviewed and the result is appropriate based on patient history and HOME report  --- CSA updated 7/17/2024  --- Opioid Risk--Moderate   --- Refill Norco 10/325. Fill date 12/31/2024  d/t holiday hours at his pharmacy. Patient appears stable with current regimen. No adverse effects. Regarding continuation of opioids, there is no evidence of aberrant behavior or any red flags.  The patient continues with appropriate response to opioid therapy. ADL's remain intact by self.   --- He would like to slowly decrease his Gabapentin. We will decrease this medication VERY slowly as he does have a history of seizures. Will start with decreasing his Gabapentin to 800 mg TID and 600 mg at night (a 200 mg total daily decrease). I will also reach out to his PCP regarding further weaning of this medication in light of his distant seizure history.   --- Follow-up 1 month or sooner if needed.      HOME REPORT  As part of the patient's treatment plan, I am prescribing controlled substances. The patient has been made aware of appropriate use of such medications, including potential risk of somnolence, limited ability to drive and/or work safely, and the potential for dependence or overdose. It has also been made clear that these medications are for use by this patient only, without concomitant use of alcohol or other substances unless prescribed.     Patient has completed prescribing agreement detailing terms of continued prescribing of controlled substances, including monitoring HOME reports, urine drug screening, and pill counts if necessary. The patient is aware that inappropriate use will results in cessation of prescribing such medications.    As the clinician, I personally reviewed the HOME from 12/20/2024 while the patient was in the office today.    History and physical exam exhibit continued safe and appropriate use of controlled substances.       Dictated utilizing Dragon dictation.

## 2024-12-31 ENCOUNTER — TELEPHONE (OUTPATIENT)
Dept: PAIN MEDICINE | Facility: CLINIC | Age: 74
End: 2024-12-31
Payer: MEDICARE

## 2024-12-31 DIAGNOSIS — Z87.898 HISTORY OF SEIZURES: Primary | ICD-10-CM

## 2024-12-31 NOTE — TELEPHONE ENCOUNTER
Please let Mr. Peters know that I discussed with ABEBA Moreau. With his history of seizures we both recommend an evaluation with neurology prior to any further weaning of his Gabapentin. I have placed this referral.

## 2025-01-11 DIAGNOSIS — G62.9 POLYNEUROPATHY: ICD-10-CM

## 2025-01-13 RX ORDER — GABAPENTIN 600 MG/1
600 TABLET ORAL NIGHTLY
Qty: 30 TABLET | Refills: 0 | Status: SHIPPED | OUTPATIENT
Start: 2025-01-13

## 2025-01-13 RX ORDER — GABAPENTIN 800 MG/1
800 TABLET ORAL 3 TIMES DAILY
Qty: 90 TABLET | Refills: 0 | Status: SHIPPED | OUTPATIENT
Start: 2025-01-13

## 2025-01-21 ENCOUNTER — OFFICE VISIT (OUTPATIENT)
Dept: PAIN MEDICINE | Facility: CLINIC | Age: 75
End: 2025-01-21
Payer: MEDICARE

## 2025-01-21 VITALS
DIASTOLIC BLOOD PRESSURE: 70 MMHG | HEIGHT: 67 IN | WEIGHT: 229 LBS | TEMPERATURE: 95.2 F | SYSTOLIC BLOOD PRESSURE: 178 MMHG | BODY MASS INDEX: 35.94 KG/M2 | RESPIRATION RATE: 20 BRPM | OXYGEN SATURATION: 97 % | HEART RATE: 74 BPM

## 2025-01-21 DIAGNOSIS — M79.609 PAIN IN EXTREMITY, UNSPECIFIED EXTREMITY: ICD-10-CM

## 2025-01-21 DIAGNOSIS — G62.9 POLYNEUROPATHY: Primary | ICD-10-CM

## 2025-01-21 DIAGNOSIS — Z87.898 HISTORY OF SEIZURES: ICD-10-CM

## 2025-01-21 DIAGNOSIS — Z79.899 ENCOUNTER FOR LONG-TERM (CURRENT) USE OF HIGH-RISK MEDICATION: ICD-10-CM

## 2025-01-21 PROCEDURE — 1125F AMNT PAIN NOTED PAIN PRSNT: CPT | Performed by: NURSE PRACTITIONER

## 2025-01-21 PROCEDURE — 99214 OFFICE O/P EST MOD 30 MIN: CPT | Performed by: NURSE PRACTITIONER

## 2025-01-21 PROCEDURE — 3077F SYST BP >= 140 MM HG: CPT | Performed by: NURSE PRACTITIONER

## 2025-01-21 PROCEDURE — 1160F RVW MEDS BY RX/DR IN RCRD: CPT | Performed by: NURSE PRACTITIONER

## 2025-01-21 PROCEDURE — 1159F MED LIST DOCD IN RCRD: CPT | Performed by: NURSE PRACTITIONER

## 2025-01-21 PROCEDURE — 3078F DIAST BP <80 MM HG: CPT | Performed by: NURSE PRACTITIONER

## 2025-01-21 PROCEDURE — 80305 DRUG TEST PRSMV DIR OPT OBS: CPT | Performed by: NURSE PRACTITIONER

## 2025-01-21 RX ORDER — GABAPENTIN 800 MG/1
800 TABLET ORAL 3 TIMES DAILY
Qty: 90 TABLET | Refills: 0 | Status: SHIPPED | OUTPATIENT
Start: 2025-01-21

## 2025-01-21 RX ORDER — HYDROCODONE BITARTRATE AND ACETAMINOPHEN 7.5; 325 MG/1; MG/1
1 TABLET ORAL EVERY 6 HOURS PRN
Qty: 120 TABLET | Refills: 0 | Status: SHIPPED | OUTPATIENT
Start: 2025-01-21

## 2025-01-21 RX ORDER — GABAPENTIN 600 MG/1
600 TABLET ORAL NIGHTLY
Qty: 30 TABLET | Refills: 0 | Status: SHIPPED | OUTPATIENT
Start: 2025-01-21

## 2025-01-21 NOTE — PROGRESS NOTES
CHIEF COMPLAINT  Extremity pain  Pain is consistent.    Subjective   Kelby Peters is a 74 y.o. male  who presents for follow-up.  He has a history of extremity pain d/t peripheral neuropathy.  At his last office visit we started a very slow wean of his gabapentin per patient request d/t dizziness and balance issues.  We decreased him from 800 mg 4 times daily to 800 mg 3 times daily and 600 mg at night.  Discussed with his primary care and also decided to refer to neurology for guidance with further weaning of gabapentin due to his distant history of seizures.  He is scheduled for evaluation with Dr. Sifuentes with neurology on 2/10/2025.    Today his pain is 7/10VAS in severity. He states that his pain is stable since his last office visit. He continues with Hydrocodone 10/325 4/day and Gabapentin 600mg in the am, 800 mg in the afternoon, and 1600 mg at night.  Also continues with Keppra 500 mg BID (prescribed by PCP). He saw no worsening in his pain with the small decrease his in Gabapentin. He states that this regimen is helpful to his pain, but he continues to have issues with drowsiness. He notes today that he has had vivid dreams for years and he thinks these started with either the Norco or Gabapentin, he is unsure which one.     He is maintained on Plavix.      1/5/2024-left fourth and fifth toe amputation performed by Dr. Alvarado  7/25/2023-left second toe simple amputation due to diabetic foot ulcer with hammertoe and clinical osteomyelitis performed Dr. Alvarado  3/31/2023-left great toe amputation with resection of metatarsal phalangeal joint and metatarsal head and shaft, right fifth toe amputation with resection of metatarsal phalangeal joint metatarsal head and shaft due to osteomyelitis performed by Dr. Alvarado  1/19/2023-left leg angiogram, third left toe amputation and left foot debridement performed by Dr. Martin     Failed Cymbalta.  Horizant and Gralise Cost-prohibitive.  Failed Lyrica---pedal  edema  History of IDDM    Extremity Pain   The pain is present in the left hand, right hand, left lower leg and right lower leg. This is a chronic problem. Progression since onset: fluctuating in severity. The quality of the pain is described as burning. The pain is at a severity of 7/10. The pain is severe. Associated symptoms include numbness. Pertinent negatives include no fever. He has tried oral narcotics and rest for the symptoms. The treatment provided significant relief. His past medical history is significant for diabetes.      PEG Assessment   What number best describes your pain on average in the past week?8  What number best describes how, during the past week, pain has interfered with your enjoyment of life?9  What number best describes how, during the past week, pain has interfered with your general activity?  9    The following portions of the patient's history were reviewed and updated as appropriate: allergies, current medications, past family history, past medical history, past social history, past surgical history, and problem list.    Review of Systems   Constitutional:  Negative for activity change, fatigue and fever.   Respiratory:  Negative for cough and chest tightness.    Cardiovascular:  Negative for chest pain.   Gastrointestinal:  Negative for abdominal pain, constipation and diarrhea.   Neurological:  Positive for dizziness, weakness, light-headedness and numbness. Negative for headaches.   Psychiatric/Behavioral:  Negative for agitation, sleep disturbance and suicidal ideas. The patient is not nervous/anxious.        --  The aforementioned information the Chief Complaint section and above subjective data including any HPI data, and also the Review of Systems data, has been personally reviewed and affirmed.  --    Vitals:    01/21/25 0942   BP: 178/70   BP Location: Left arm   Patient Position: Sitting   Cuff Size: Adult   Pulse: 74   Resp: 20   Temp: 95.2 °F (35.1 °C)   TempSrc:  "Temporal   SpO2: 97%   Weight: 104 kg (229 lb)   Height: 170.2 cm (67.01\")   PainSc:   7     Objective   Physical Exam  Vitals and nursing note reviewed.   Constitutional:       Appearance: Normal appearance. He is well-developed.   Eyes:      General: Lids are normal.   Cardiovascular:      Rate and Rhythm: Normal rate.   Pulmonary:      Effort: Pulmonary effort is normal.   Musculoskeletal:      Right foot: Tenderness present.      Left foot: Tenderness present.      Right Lower Extremity: Right leg is amputated below ankle.      Left Lower Extremity: Left leg is amputated below ankle.   Neurological:      Mental Status: He is alert and oriented to person, place, and time.      Gait: Gait abnormal.   Psychiatric:         Attention and Perception: Attention normal.         Mood and Affect: Mood normal.         Speech: Speech normal.         Behavior: Behavior normal.         Judgment: Judgment normal.       Assessment & Plan   Diagnoses and all orders for this visit:    1. Polyneuropathy (Primary)  -     HYDROcodone-acetaminophen (NORCO) 7.5-325 MG per tablet; Take 1 tablet by mouth Every 6 (Six) Hours As Needed for Moderate Pain. Fill date 2/1/2025  Dispense: 120 tablet; Refill: 0  -     gabapentin (NEURONTIN) 600 MG tablet; Take 1 tablet by mouth Every Night.  Dispense: 30 tablet; Refill: 0  -     gabapentin (NEURONTIN) 800 MG tablet; Take 1 tablet by mouth 3 (Three) Times a Day. In addition to 600 mg at night  Dispense: 90 tablet; Refill: 0  -     Urine Drug Screen Confirmation - Urine, Clean Catch; Future  -     POC Urine Drug Screen, Triage    2. Pain in extremity, unspecified extremity  -     HYDROcodone-acetaminophen (NORCO) 7.5-325 MG per tablet; Take 1 tablet by mouth Every 6 (Six) Hours As Needed for Moderate Pain. Fill date 2/1/2025  Dispense: 120 tablet; Refill: 0  -     Urine Drug Screen Confirmation - Urine, Clean Catch; Future  -     POC Urine Drug Screen, Triage    3. Encounter for long-term " (current) use of high-risk medication    4. History of seizures      Kelby Peters reports a pain score of 7.  Given his pain assessment as noted, treatment options were discussed and the following options were decided upon as a follow-up plan to address the patient's pain: continuation of current treatment plan for pain.    --- Routine UDS in office today as part of monitoring requirements for controlled substances.  The specimen was viewed and the immunoassay result reviewed and is +OPI.  This specimen will be sent to [facility] laboratory for confirmation.     --- CSA updated 7/17/2024  --- Opioid Risk--Moderate  --- Will hold on further Gabapentin wean until he sees Neurology d/t his history of seizures  --- Wean Norco 10/325 4/day to 7.5/325 4/day which will hopefully help with his drowsiness. Reviewed that we will trial this and he will let us know if his pain increases.   --- Follow-up 1 month or sooner if needed.      HOME REPORT  As part of the patient's treatment plan, I am prescribing controlled substances. The patient has been made aware of appropriate use of such medications, including potential risk of somnolence, limited ability to drive and/or work safely, and the potential for dependence or overdose. It has also been made clear that these medications are for use by this patient only, without concomitant use of alcohol or other substances unless prescribed.     Patient has completed prescribing agreement detailing terms of continued prescribing of controlled substances, including monitoring HOME reports, urine drug screening, and pill counts if necessary. The patient is aware that inappropriate use will results in cessation of prescribing such medications.    As the clinician, I personally reviewed the HOME from 1/21/2025 while the patient was in the office today.    History and physical exam exhibit continued safe and appropriate use of controlled substances.    Dictated utilizing Dragon  dictation.

## 2025-01-24 RX ORDER — ROSUVASTATIN CALCIUM 20 MG/1
20 TABLET, COATED ORAL DAILY
Qty: 90 TABLET | Refills: 1 | Status: SHIPPED | OUTPATIENT
Start: 2025-01-24

## 2025-01-24 NOTE — TELEPHONE ENCOUNTER
Rx Refill Note  Requested Prescriptions     Pending Prescriptions Disp Refills    rosuvastatin (CRESTOR) 20 MG tablet [Pharmacy Med Name: ROSUVASTATIN CALCIUM 20 MG TAB] 90 tablet 1     Sig: TAKE 1 TABLET BY MOUTH DAILY      Last office visit with prescribing clinician: 2/22/2024   Last telemedicine visit with prescribing clinician: Visit date not found   Next office visit with prescribing clinician: 2/24/2025                         Would you like a call back once the refill request has been completed: [] Yes [] No    If the office needs to give you a call back, can they leave a voicemail: [] Yes [] No    Deepika Martines Rep  01/24/25, 10:30 EST

## 2025-02-05 RX ORDER — CLOPIDOGREL BISULFATE 75 MG/1
75 TABLET ORAL DAILY
Qty: 90 TABLET | Refills: 3 | Status: SHIPPED | OUTPATIENT
Start: 2025-02-05

## 2025-02-07 ENCOUNTER — OFFICE VISIT (OUTPATIENT)
Dept: ENDOCRINOLOGY | Age: 75
End: 2025-02-07
Payer: MEDICARE

## 2025-02-07 VITALS
DIASTOLIC BLOOD PRESSURE: 78 MMHG | BODY MASS INDEX: 35.31 KG/M2 | SYSTOLIC BLOOD PRESSURE: 128 MMHG | HEART RATE: 69 BPM | WEIGHT: 225 LBS | HEIGHT: 67 IN | OXYGEN SATURATION: 97 %

## 2025-02-07 DIAGNOSIS — E11.42 CONTROLLED TYPE 2 DIABETES MELLITUS WITH DIABETIC POLYNEUROPATHY, WITH LONG-TERM CURRENT USE OF INSULIN: Chronic | ICD-10-CM

## 2025-02-07 DIAGNOSIS — Z79.4 CONTROLLED TYPE 2 DIABETES MELLITUS WITH DIABETIC POLYNEUROPATHY, WITH LONG-TERM CURRENT USE OF INSULIN: Chronic | ICD-10-CM

## 2025-02-07 DIAGNOSIS — E78.5 DYSLIPIDEMIA: Primary | ICD-10-CM

## 2025-02-07 DIAGNOSIS — N18.9 CHRONIC KIDNEY DISEASE, UNSPECIFIED CKD STAGE: ICD-10-CM

## 2025-02-07 LAB
ALBUMIN SERPL-MCNC: 4.2 G/DL (ref 3.5–5.2)
ALBUMIN/GLOB SERPL: 1.6 G/DL
ALP SERPL-CCNC: 67 U/L (ref 39–117)
ALT SERPL-CCNC: 12 U/L (ref 1–41)
AST SERPL-CCNC: 15 U/L (ref 1–40)
BILIRUB SERPL-MCNC: 0.3 MG/DL (ref 0–1.2)
BUN SERPL-MCNC: 16 MG/DL (ref 8–23)
BUN/CREAT SERPL: 16.2 (ref 7–25)
CALCIUM SERPL-MCNC: 9.4 MG/DL (ref 8.6–10.5)
CHLORIDE SERPL-SCNC: 96 MMOL/L (ref 98–107)
CO2 SERPL-SCNC: 31 MMOL/L (ref 22–29)
CREAT SERPL-MCNC: 0.99 MG/DL (ref 0.76–1.27)
EGFRCR SERPLBLD CKD-EPI 2021: 79.9 ML/MIN/1.73
GLOBULIN SER CALC-MCNC: 2.6 GM/DL
GLUCOSE SERPL-MCNC: 191 MG/DL (ref 65–99)
HBA1C MFR BLD: 10.5 % (ref 4.8–5.6)
POTASSIUM SERPL-SCNC: 4.1 MMOL/L (ref 3.5–5.2)
PROT SERPL-MCNC: 6.8 G/DL (ref 6–8.5)
SODIUM SERPL-SCNC: 137 MMOL/L (ref 136–145)

## 2025-02-07 RX ORDER — HYDROCHLOROTHIAZIDE 12.5 MG/1
CAPSULE ORAL
Qty: 2 EACH | Refills: 3 | Status: SHIPPED | OUTPATIENT
Start: 2025-02-07

## 2025-02-07 RX ORDER — HUMAN INSULIN 100 [IU]/ML
40 INJECTION, SUSPENSION SUBCUTANEOUS 2 TIMES DAILY WITH MEALS
Qty: 30 ML | Refills: 5 | Status: SHIPPED | OUTPATIENT
Start: 2025-02-07

## 2025-02-07 NOTE — PROGRESS NOTES
Chief complaint/Reason for consult:  T2DM    HPI:   - 74 year old female here for management of diabetes mellitus type 2  - Last seen in 8/2024  - Has had diabetes for over 30 years  - Complications include peripheral neuropathy, amputations of toes, retinopathy, CKD (positive microalbumin)  - Is currently taking Novolin 70/30, 35 units bid (he is supposed to take 40 units bid but lowered his dose due to hypoglycemia)  - He says he sometimes has hypoglycemia  - Is also on Crestor 10 mg daily       The following portions of the patient's history were reviewed and updated as appropriate: allergies, current medications, past family history, past medical history, past social history, past surgical history, and problem list.      Objective     Vitals:    02/07/25 0947   BP: 128/78   Pulse: 69   SpO2: 97%        Physical Exam  Vitals reviewed.   Constitutional:       Appearance: Normal appearance. He is obese.   HENT:      Head: Normocephalic and atraumatic.   Eyes:      General: No scleral icterus.  Pulmonary:      Effort: Pulmonary effort is normal. No respiratory distress.   Neurological:      Mental Status: He is alert.      Gait: Gait normal.   Psychiatric:         Mood and Affect: Mood normal.         Behavior: Behavior normal.         Thought Content: Thought content normal.         Judgment: Judgment normal.         Assessment & Plan   T2DM, complicated by CKD, peripheral neuropathy, and retinopathy (requires ongoing follow-up and monitoring)  - Will check A1c  - Currently on Novolin 70/30, 35 units bid  - Ordered FreeStyle Milka 3 Plus     2. Hyperlipidemia  - On Crestor 10 mg daily     - Return to clinic in 6 months        [facility] with [facility]# and [place].  Calling to give Lumbar MRI results.

## 2025-02-10 ENCOUNTER — OFFICE VISIT (OUTPATIENT)
Dept: NEUROLOGY | Facility: CLINIC | Age: 75
End: 2025-02-10
Payer: MEDICARE

## 2025-02-10 ENCOUNTER — PATIENT ROUNDING (BHMG ONLY) (OUTPATIENT)
Dept: NEUROLOGY | Facility: CLINIC | Age: 75
End: 2025-02-10
Payer: MEDICARE

## 2025-02-10 VITALS
DIASTOLIC BLOOD PRESSURE: 78 MMHG | OXYGEN SATURATION: 99 % | SYSTOLIC BLOOD PRESSURE: 136 MMHG | WEIGHT: 225 LBS | BODY MASS INDEX: 35.31 KG/M2 | HEART RATE: 69 BPM | HEIGHT: 67 IN

## 2025-02-10 DIAGNOSIS — Z87.898 HISTORY OF SEIZURES: Primary | ICD-10-CM

## 2025-02-10 PROCEDURE — 99203 OFFICE O/P NEW LOW 30 MIN: CPT | Performed by: PSYCHIATRY & NEUROLOGY

## 2025-02-10 PROCEDURE — 1159F MED LIST DOCD IN RCRD: CPT | Performed by: PSYCHIATRY & NEUROLOGY

## 2025-02-10 PROCEDURE — 3075F SYST BP GE 130 - 139MM HG: CPT | Performed by: PSYCHIATRY & NEUROLOGY

## 2025-02-10 PROCEDURE — 1160F RVW MEDS BY RX/DR IN RCRD: CPT | Performed by: PSYCHIATRY & NEUROLOGY

## 2025-02-10 PROCEDURE — 3078F DIAST BP <80 MM HG: CPT | Performed by: PSYCHIATRY & NEUROLOGY

## 2025-02-10 NOTE — PROGRESS NOTES
Chief Complaint  Seizures (Accompanied by Wife (My) No recent sz, hasn't had one for about 3 years - No recent imaging )    Subjective          Kelby Peters presents to [facility] for   HISTORY OF PRESENT ILLNESS:    Kelby Peters is a 74 year old man who presents to neurology clinic with his wife, My, for initial evaluation and treatment of potential seizures.  He tells me he has been taking gabapentin for neuropathic pain and it was making him too drowsy and he told her that he wanted to cut back on this medicine but his PCP was concerned due to history of seizures.  He thinks his first seizure was triggered by high blood sugar and stress.  He was taking 3200 mg daily of gabapentin and she cut this down to 3000 mg per day which is divided to 600 mg in the AM and 800 mg in the afternoon and 800 mg at bedtime.  He reports getting diagnosed with seizures in 2010 when he was sitting in his car and had a grand-mal seizure.  He thinks he has had a total of 4 seizures since 2010.  He knows that he has had a seizure when he has a grand-mal seizure, or wakes up and has bit the inside of his mouth and tongue or out of body experience where he is not sure where he is out of it for 10 minutes or less.  He thinks he has had a total of 2 grand-mal seizures.  He thinks his last seizure was 2 years ago when he was asleep.  He is also on levetiracetam 500 mg BID.  He has not had any seizures since cutting back on the gabapentin.  He reports having seizure testing done in the past but not more recently and tells me they did not find anything abnormal and he tells me today he does not want to have any further testing done and he does not think he needs to be here today.  He denies any falls.           Past Medical History:   Diagnosis Date    Atherosclerosis of native artery of both lower extremities with bilateral ulceration of heels     Atherosclerosis of native artery of both lower extremities  "with bilateral ulceration of other part of feet     Basal cell carcinoma 2022    Brain injury     Broken toes     Diabetes insipidus     Diabetes mellitus     DUE TO UNDERLYING CONDITION WITH FOOT ULCER    Finger injury     Gallbladder obstruction     Hearing disorder     Hiatal hernia     Hypercholesterolemia     Hypertension     Leg injury     Migraine     Osteomyelitis     Peripheral neuropathy     PONV (postoperative nausea and vomiting)     Prostate enlargement     PVD (peripheral vascular disease) 2023    Risk factors for obstructive sleep apnea 2024    STOP BANG \"6\"    Seizures     Shingles     Sleep apnea     Toe gangrene     Type 2 diabetes mellitus         Family History   Problem Relation Age of Onset    Cancer Mother     Transient ischemic attack Mother     Diabetes Father     Heart disease Father     COPD Paternal Aunt     Diabetes Paternal Aunt     Alzheimer's disease Maternal Grandmother     Stroke Maternal Grandfather     Emphysema Paternal Grandfather     Malig Hyperthermia Neg Hx         Social History     Socioeconomic History    Marital status:    Tobacco Use    Smoking status: Former     Current packs/day: 0.00     Average packs/day: 0.5 packs/day for 20.0 years (10.0 ttl pk-yrs)     Types: Cigarettes, Pipe, Cigars     Start date: 1966     Quit date: 1986     Years since quittin.1    Smokeless tobacco: Never    Tobacco comments:     None   Vaping Use    Vaping status: Never Used   Substance and Sexual Activity    Alcohol use: Not Currently     Comment: Very rarely    Drug use: Yes     Types: Marijuana     Comment: 55 years ago    Sexual activity: Not Currently     Partners: Female     Birth control/protection: Other, Coitus interruptus        I have reviewed and confirmed the accuracy of the ROS as documented by the MA/LPN/RN Harish Sifuentes MD   Review of Systems   Neurological:  Positive for seizures, weakness, numbness and headache. Negative for dizziness, " "tremors, syncope, facial asymmetry, speech difficulty, light-headedness, memory problem and confusion.   Psychiatric/Behavioral:  Positive for agitation. Negative for behavioral problems, decreased concentration, dysphoric mood, hallucinations, self-injury, sleep disturbance, suicidal ideas, negative for hyperactivity, depressed mood and stress. The patient is not nervous/anxious.         Objective   Vital Signs:   /78   Pulse 69   Ht 170.2 cm (67.01\")   Wt 102 kg (225 lb)   SpO2 99%   BMI 35.23 kg/m²       PHYSICAL EXAM:    General   Mental Status - Alert. General Appearance - Well developed, Well groomed, Oriented and Cooperative. Orientation - Oriented X3.       Head and Neck  Head - normocephalic, atraumatic with no lesions or palpable masses.  Neck    Global Assessment - supple.       Eye   Sclera/Conjunctiva - Bilateral - Normal.    ENMT  Mouth and Throat   Oral Cavity/Oropharynx: Oropharynx - the soft palate,uvula and tongue are normal in appearance.    Chest and Lung Exam   Chest - lung clear to auscultation bilaterally.    Cardiovascular   Cardiovascular examination reveals  - normal heart sounds, regular rate and rhythm.    Neurologic   Mental Status: Speech - Normal. Cognitive function - appropriate fund of knowledge. No impairment of attention, Impairment of concentration, impairment of long term memory or impairment of short term memory.  Cranial Nerves:   II Optic: Visual acuity - Left - Normal. Right - Normal. Visual fields - Normal (to confrontation).  III Oculomotor: Pupillary constriction - Left - Normal. Right - Normal.  VII Facial: - Normal Bilaterally.   IX Glossopharyngeal / X Vagus - Normal.  XI Accessory: Trapezius - Bilateral - Normal. Sternocleidomastoid - Bilateral - Normal.  XII Hypoglossal - Bilateral - Normal.  Eye Movements: - Normal Bilaterally.  Sensory:   Light Touch: Intact - Globally.  Motor:   Bulk and Contour: - Normal.  Tone: - Normal.  Tremor: Not " present.  Strength: 5/5 normal muscle strength - All Muscles.   General Assessment of Reflexes: - deep tendon reflexes are normal. Coordination - No Impairment of finger-to-nose or Impairment of rapid alternating movements. Gait - Broad based.           Result Review :                 Assessment and Plan    Problem List Items Addressed This Visit       History of seizures - Primary    Current Assessment & Plan     74 year old man with history of potential seizures.  He tells me he has been taking gabapentin for neuropathic pain and it was making him too drowsy and he told her that he wanted to cut back on this medicine but his PCP was concerned due to history of seizures.  He thinks his first seizure was triggered by high blood sugar and stress.  He was taking 3200 mg daily of gabapentin and she cut this down to 3000 mg per day which is divided to 600 mg in the AM and 800 mg in the afternoon and 800 mg at bedtime.  He reports getting diagnosed with seizures in 2010 when he was sitting in his car and had a grand-mal seizure.  He thinks he has had a total of 4 seizures since 2010.  He knows that he has had a seizure when he has a grand-mal seizure, or wakes up and has bit the inside of his mouth and tongue or out of body experience where he is not sure where he is out of it for 10 minutes or less.  He thinks he has had a total of 2 grand-mal seizures.  He thinks his last seizure was 2 years ago when he was asleep.  He is also on levetiracetam 500 mg BID.  He has not had any seizures since cutting back on the gabapentin.  He reports having seizure testing done in the past but not more recently and tells me they did not find anything abnormal and he tells me today he does not want to have any further testing done and he does not think he needs to be here today.  He denies any falls.  I spoke with them about blood sugar dramatic changes causing seizures.  That is potentially what caused his seizures.  He is not  interested in doing further testing at this time.  He is doing well with slightly lower dose of the gabapentin and continuing the levetiracetam at this time which he can continue.  Discussed seizure precautions.  Will follow up as needed as he appears to be doing well at this time and does not want to have further testing done.             I spent 35 minutes caring for Kelby on this date of service. This time includes time spent by me in the following activities:preparing for the visit, reviewing tests, obtaining and/or reviewing a separately obtained history, performing a medically appropriate examination and/or evaluation , counseling and educating the patient/family/caregiver, documenting information in the medical record, and care coordination    Follow Up   No follow-ups on file.  Patient was given instructions and counseling regarding his condition or for health maintenance advice. Please see specific information pulled into the AVS if appropriate.

## 2025-02-10 NOTE — ASSESSMENT & PLAN NOTE
74 year old man with history of potential seizures.  He tells me he has been taking gabapentin for neuropathic pain and it was making him too drowsy and he told her that he wanted to cut back on this medicine but his PCP was concerned due to history of seizures.  He thinks his first seizure was triggered by high blood sugar and stress.  He was taking 3200 mg daily of gabapentin and she cut this down to 3000 mg per day which is divided to 600 mg in the AM and 800 mg in the afternoon and 800 mg at bedtime.  He reports getting diagnosed with seizures in 2010 when he was sitting in his car and had a grand-mal seizure.  He thinks he has had a total of 4 seizures since 2010.  He knows that he has had a seizure when he has a grand-mal seizure, or wakes up and has bit the inside of his mouth and tongue or out of body experience where he is not sure where he is out of it for 10 minutes or less.  He thinks he has had a total of 2 grand-mal seizures.  He thinks his last seizure was 2 years ago when he was asleep.  He is also on levetiracetam 500 mg BID.  He has not had any seizures since cutting back on the gabapentin.  He reports having seizure testing done in the past but not more recently and tells me they did not find anything abnormal and he tells me today he does not want to have any further testing done and he does not think he needs to be here today.  He denies any falls.  I spoke with them about blood sugar dramatic changes causing seizures.  That is potentially what caused his seizures.  He is not interested in doing further testing at this time.  He is doing well with slightly lower dose of the gabapentin and continuing the levetiracetam at this time which he can continue.  Discussed seizure precautions.  Will follow up as needed as he appears to be doing well at this time and does not want to have further testing done.

## 2025-02-10 NOTE — PATIENT INSTRUCTIONS
In general, we recommend using good judgement when you are doing certain activities and to avoid those activities that if you were to have a seizure, you could harm yourself or others. In the state of [place], it is the law that you cannot drive within 90 days of a seizure. We also recommend not standing over open flames, not getting on high ladders or the roof, not swimming or taking baths by yourself (showers are ok) and not operating heavy machinery or power tools.  [facility]  Harish Sifuentes MD  Neurology clinic  [phone]    With anti-seizure medications, you may initially notice side effects of fatigue, drowsiness, unsteadiness, and dizziness.  Other possible side effects include nausea, abdominal pain, headache, blurry or double vision, slurred speech and mood changes.  Generally, patients will noticed these symptoms when the medication is first started or with higher doses and will go away with time.    It is import to consistently take your medication every day.  Missing just one dose may put you at risk for a breakthrough seizure.  Consider using reminders on your phone or a pill box.    If you develop a rash, please call the neurology clinic immediately or notify another healthcare professional, as this may be potentially life-threatening.  If you are unable to reach a healthcare professional, go to the emergency room immediately for further evaluation.    If you develop thoughts of wanting to hurt yourself or others, please call the neurology clinic immediately to notify another healthcare professional.  If you are unable to reach a healthcare professional, go to the emergency room immediately for further evaluation.    It is the [place] state law that you cannot drive within 90 days of a seizure.    You should avoid certain activities that if you were to have a seizure, you could harm yourself or others. In general, it is recommended that you avoid operating heavy machinery or  power tools, swimming or taking baths by yourself (showers are ok), don't stand over open flames, don't get on high ladders or the roof.  I also recommend to avoid sleeping on your stomach.    For further information on epilepsy and resources available to patients and their families, please visit the [facility] at www.efky.org or call [phone].    **Check out the [facility]'s monthly Art Group Gathering.  [facility] [address].  Call Nieves Avila at [phone] or email her at [email or web address] for the dates of future gatherings.**      **If you have having memory problems, consider HOBSCOTCH (Home-Based Self-management and Cognitive Training Changes lives).  It is an 8 week self-management program for adults with epilepsy and memory problems.  The program is free at the [facility].  Contact Amisha Carrasquillo at [phone] or [email or web address].**

## 2025-02-21 ENCOUNTER — OFFICE VISIT (OUTPATIENT)
Dept: PAIN MEDICINE | Facility: CLINIC | Age: 75
End: 2025-02-21
Payer: MEDICARE

## 2025-02-21 VITALS
OXYGEN SATURATION: 97 % | SYSTOLIC BLOOD PRESSURE: 174 MMHG | TEMPERATURE: 96.9 F | HEART RATE: 68 BPM | DIASTOLIC BLOOD PRESSURE: 81 MMHG | BODY MASS INDEX: 34.94 KG/M2 | WEIGHT: 222.6 LBS | HEIGHT: 67 IN | RESPIRATION RATE: 20 BRPM

## 2025-02-21 DIAGNOSIS — G62.9 POLYNEUROPATHY: Primary | ICD-10-CM

## 2025-02-21 DIAGNOSIS — Z79.899 ENCOUNTER FOR LONG-TERM (CURRENT) USE OF HIGH-RISK MEDICATION: ICD-10-CM

## 2025-02-21 DIAGNOSIS — G89.29 OTHER CHRONIC PAIN: ICD-10-CM

## 2025-02-21 DIAGNOSIS — Z87.898 HISTORY OF SEIZURES: ICD-10-CM

## 2025-02-21 PROCEDURE — 1125F AMNT PAIN NOTED PAIN PRSNT: CPT | Performed by: NURSE PRACTITIONER

## 2025-02-21 PROCEDURE — 99214 OFFICE O/P EST MOD 30 MIN: CPT | Performed by: NURSE PRACTITIONER

## 2025-02-21 PROCEDURE — 1160F RVW MEDS BY RX/DR IN RCRD: CPT | Performed by: NURSE PRACTITIONER

## 2025-02-21 PROCEDURE — 3079F DIAST BP 80-89 MM HG: CPT | Performed by: NURSE PRACTITIONER

## 2025-02-21 PROCEDURE — 1159F MED LIST DOCD IN RCRD: CPT | Performed by: NURSE PRACTITIONER

## 2025-02-21 PROCEDURE — 3077F SYST BP >= 140 MM HG: CPT | Performed by: NURSE PRACTITIONER

## 2025-02-21 RX ORDER — GABAPENTIN 600 MG/1
600 TABLET ORAL 2 TIMES DAILY
Qty: 60 TABLET | Refills: 0 | Status: SHIPPED | OUTPATIENT
Start: 2025-02-21

## 2025-02-21 RX ORDER — GABAPENTIN 800 MG/1
1600 TABLET ORAL NIGHTLY
Qty: 60 TABLET | Refills: 0 | Status: SHIPPED | OUTPATIENT
Start: 2025-02-21

## 2025-02-21 RX ORDER — HYDROCODONE BITARTRATE AND ACETAMINOPHEN 7.5; 325 MG/1; MG/1
1 TABLET ORAL EVERY 6 HOURS PRN
Qty: 120 TABLET | Refills: 0 | Status: SHIPPED | OUTPATIENT
Start: 2025-02-21

## 2025-02-21 NOTE — PROGRESS NOTES
CHIEF COMPLAINT  Extremity pain  Pain is consistent.    Subjective   Kelby Peters is a 74 y.o. male  who presents for follow-up.  He has a history of extremity pain d/t peripheral neuropathy. He has been hoping to wean his Gabapentin dosing down. D/t history of seizures he was referred to neurology for guidance in this. He declined further testing with neurology. Discussed with Dr. Sifuentes who noted that recommendations regarding weaning of the Gabapentin is difficult d/t Mr. Peters declining further testing for epilepsy. He did recommend checking gabapentin levels with weaning to ensure that he remains therapeutic from a seizure perspective.     At his last office visit his Norco was decreased from 10/325 to 7.5/325 d/t his reported drowsiness. Today his pain is 7/10VAS in severity. He states that his pain is stable and he has seen no worsening in his pain with weaning his Norco. He continues with Hydrocodone 7.5/325 4/day and Gabapentin 600mg in the am, 800 mg in the afternoon, and 1600 mg at night.  Also continues with Keppra 500 mg BID (prescribed by PCP). He does note ongoing daytime drowsiness and would like to continue weaning his medication if possible.     He is maintained on Plavix.      1/5/2024-left fourth and fifth toe amputation performed by Dr. Alvarado  7/25/2023-left second toe simple amputation due to diabetic foot ulcer with hammertoe and clinical osteomyelitis performed Dr. Alvarado  3/31/2023-left great toe amputation with resection of metatarsal phalangeal joint and metatarsal head and shaft, right fifth toe amputation with resection of metatarsal phalangeal joint metatarsal head and shaft due to osteomyelitis performed by Dr. Alvarado  1/19/2023-left leg angiogram, third left toe amputation and left foot debridement performed by Dr. Martin     Failed Cymbalta.  Horizant and Gralise Cost-prohibitive.  Failed Lyrica---pedal edema  History of IDDM    Extremity Pain   The pain is present in the left hand,  right hand, left lower leg and right lower leg. This is a chronic problem. The problem has been unchanged (fluctuating in severity). The quality of the pain is described as burning. The pain is at a severity of 7/10. The pain is severe. Associated symptoms include numbness. Pertinent negatives include no fever. He has tried oral narcotics and rest for the symptoms. The treatment provided significant relief. His past medical history is significant for diabetes.      PEG Assessment   What number best describes your pain on average in the past week?8  What number best describes how, during the past week, pain has interfered with your enjoyment of life?8  What number best describes how, during the past week, pain has interfered with your general activity?  8    Review of Pertinent Medical Data ---    Office visit with Dr. Sifuentes reviewed.  Patient evaluated for treatment of potential seizures.  Patient thinks first seizure was triggered by high blood sugar and stress.  He thinks he has had a total of 4 seizures since 2010.  He has not had any seizures since cutting back on his gabapentin.  He reports having seizure testing done in the past but not more recently.  States that they did not find anything abnormal.  He declines any further testing and does not think he needs to be here.  He is doing well with a slightly lower dose of gabapentin and continuing the levetiracetam at this time which she can continue.  Discussed seizure precautions.  Follow-up as needed as he appears to be doing well at this time and does not want to have further testing done.    The following portions of the patient's history were reviewed and updated as appropriate: allergies, current medications, past family history, past medical history, past social history, past surgical history, and problem list.    Review of Systems   Constitutional:  Negative for activity change, fatigue and fever.   Respiratory:  Negative for cough, choking and  "shortness of breath.    Cardiovascular:  Negative for chest pain.   Gastrointestinal:  Negative for abdominal pain, constipation and diarrhea.   Neurological:  Positive for weakness, numbness and headaches. Negative for dizziness and light-headedness.   Psychiatric/Behavioral:  Positive for agitation. Negative for sleep disturbance and suicidal ideas. The patient is nervous/anxious.        --  The aforementioned information the Chief Complaint section and above subjective data including any HPI data, and also the Review of Systems data, has been personally reviewed and affirmed.  --    Vitals:    02/21/25 0858   BP: 174/81   BP Location: Left arm   Patient Position: Sitting   Cuff Size: Large Adult   Pulse: 68   Resp: 20   Temp: 96.9 °F (36.1 °C)   TempSrc: Temporal   SpO2: 97%   Weight: 101 kg (222 lb 9.6 oz)   Height: 170.2 cm (67.01\")   PainSc: 7      Objective   Physical Exam  Vitals and nursing note reviewed.   Constitutional:       Appearance: Normal appearance. He is well-developed.   Eyes:      General: Lids are normal.   Cardiovascular:      Rate and Rhythm: Normal rate.   Pulmonary:      Effort: Pulmonary effort is normal.   Musculoskeletal:      Right foot: Tenderness present.      Left foot: Tenderness present.      Right Lower Extremity: Right leg is amputated below ankle.      Left Lower Extremity: Left leg is amputated below ankle.   Neurological:      Mental Status: He is alert and oriented to person, place, and time.   Psychiatric:         Attention and Perception: Attention normal.         Mood and Affect: Mood normal.         Speech: Speech normal.         Behavior: Behavior normal.         Judgment: Judgment normal.       Assessment & Plan   Diagnoses and all orders for this visit:    1. Polyneuropathy (Primary)  -     Gabapentin Level; Future  -     gabapentin (NEURONTIN) 600 MG tablet; Take 1 tablet by mouth 2 (Two) Times a Day. Take in the morning and at lunch  Dispense: 60 tablet; Refill: " 0  -     gabapentin (NEURONTIN) 800 MG tablet; Take 2 tablets by mouth Every Night. In addition to 600 mg at night  Dispense: 60 tablet; Refill: 0  -     HYDROcodone-acetaminophen (NORCO) 7.5-325 MG per tablet; Take 1 tablet by mouth Every 6 (Six) Hours As Needed for Moderate Pain. Fill date 3/2/2025  Dispense: 120 tablet; Refill: 0    2. History of seizures  -     Gabapentin Level; Future    3. Encounter for long-term (current) use of high-risk medication    4. Other chronic pain      Kelby Peters reports a pain score of 7.  Given his pain assessment as noted, treatment options were discussed and the following options were decided upon as a follow-up plan to address the patient's pain: continuation of current treatment plan for pain.    --- The urine drug screen confirmation from 1/21/2025 has been reviewed and the result is appropriate based on patient history and HOME report  --- CSA and consent to treat with controlled substances signed on 7/17/2024  --- Opioid Risk--Moderate  --- Refill Norco 7.5/325. Fill date 3/2/2025 applied. Patient appears stable with current regimen. No adverse effects. Regarding continuation of opioids, there is no evidence of aberrant behavior or any red flags.  The patient continues with appropriate response to opioid therapy. ADL's remain intact by self.   --- Will again decrease Gabapentin by just 200 mg/day. Decrease Gabapentin to 600 mg in the morning and 600 mg in the afternoon and 1600 mg at night.   --- He declines further testing to evaluate for epilepsy. Will check a Gabapentin level prior to his next office visit. To ensure he remains in a therapeutic range.   --- Follow-up 1 month or sooner If needed.      HOME REPORT  As part of the patient's treatment plan, I am prescribing controlled substances. The patient has been made aware of appropriate use of such medications, including potential risk of somnolence, limited ability to drive and/or work safely, and the potential  for dependence or overdose. It has also been made clear that these medications are for use by this patient only, without concomitant use of alcohol or other substances unless prescribed.     Patient has completed prescribing agreement detailing terms of continued prescribing of controlled substances, including monitoring HOME reports, urine drug screening, and pill counts if necessary. The patient is aware that inappropriate use will results in cessation of prescribing such medications.    As the clinician, I personally reviewed the HOME from 2/21/2025 while the patient was in the office today.    History and physical exam exhibit continued safe and appropriate use of controlled substances.    Dictated utilizing Dragon dictation.

## 2025-03-07 DIAGNOSIS — E11.42 CONTROLLED TYPE 2 DIABETES MELLITUS WITH DIABETIC POLYNEUROPATHY, WITH LONG-TERM CURRENT USE OF INSULIN: Chronic | ICD-10-CM

## 2025-03-07 DIAGNOSIS — Z79.4 CONTROLLED TYPE 2 DIABETES MELLITUS WITH DIABETIC POLYNEUROPATHY, WITH LONG-TERM CURRENT USE OF INSULIN: Chronic | ICD-10-CM

## 2025-03-10 RX ORDER — HUMAN INSULIN 100 [USP'U]/ML
INJECTION, SUSPENSION SUBCUTANEOUS
Qty: 20 ML | Refills: 1 | OUTPATIENT
Start: 2025-03-10

## 2025-03-10 NOTE — TELEPHONE ENCOUNTER
Rx Refill Note  Requested Prescriptions     Pending Prescriptions Disp Refills    NovoLIN 70/30 (70-30) 100 UNIT/ML injection [Pharmacy Med Name: NOVOLIN 70-30 100 UNIT/ML VIAL] 20 mL      Sig: INJECT 40 UNITS UNDER THE SKIN INTO APPROPRIATE AREA TWO TIMES A DAY AS DIRECTED WITH MEALS FOR TYPE 2 DIABETES      Last office visit with prescribing clinician: 2/7/2025   Last telemedicine visit with prescribing clinician: Visit date not found   Next office visit with prescribing clinician: 6/6/2025                         Would you like a call back once the refill request has been completed: [] Yes [] No    If the office needs to give you a call back, can they leave a voicemail: [] Yes [] No    Eduarda Kelley MA  03/10/25, 07:56 EDT

## 2025-03-25 ENCOUNTER — LAB (OUTPATIENT)
Dept: LAB | Facility: HOSPITAL | Age: 75
End: 2025-03-25
Payer: MEDICARE

## 2025-03-25 ENCOUNTER — OFFICE VISIT (OUTPATIENT)
Dept: PAIN MEDICINE | Facility: CLINIC | Age: 75
End: 2025-03-25
Payer: MEDICARE

## 2025-03-25 VITALS
WEIGHT: 222 LBS | SYSTOLIC BLOOD PRESSURE: 145 MMHG | RESPIRATION RATE: 16 BRPM | DIASTOLIC BLOOD PRESSURE: 71 MMHG | HEART RATE: 70 BPM | BODY MASS INDEX: 34.84 KG/M2 | HEIGHT: 67 IN | OXYGEN SATURATION: 92 % | TEMPERATURE: 96.9 F

## 2025-03-25 DIAGNOSIS — M79.609 PAIN IN EXTREMITY, UNSPECIFIED EXTREMITY: Primary | ICD-10-CM

## 2025-03-25 DIAGNOSIS — E11.42 DIABETIC PERIPHERAL NEUROPATHY: ICD-10-CM

## 2025-03-25 DIAGNOSIS — G62.9 POLYNEUROPATHY: ICD-10-CM

## 2025-03-25 DIAGNOSIS — Z79.899 ENCOUNTER FOR LONG-TERM (CURRENT) USE OF HIGH-RISK MEDICATION: ICD-10-CM

## 2025-03-25 DIAGNOSIS — Z87.898 HISTORY OF SEIZURES: ICD-10-CM

## 2025-03-25 PROCEDURE — 3077F SYST BP >= 140 MM HG: CPT | Performed by: NURSE PRACTITIONER

## 2025-03-25 PROCEDURE — 80171 DRUG SCREEN QUANT GABAPENTIN: CPT

## 2025-03-25 PROCEDURE — 3078F DIAST BP <80 MM HG: CPT | Performed by: NURSE PRACTITIONER

## 2025-03-25 PROCEDURE — 36415 COLL VENOUS BLD VENIPUNCTURE: CPT

## 2025-03-25 PROCEDURE — 99213 OFFICE O/P EST LOW 20 MIN: CPT | Performed by: NURSE PRACTITIONER

## 2025-03-25 PROCEDURE — 1125F AMNT PAIN NOTED PAIN PRSNT: CPT | Performed by: NURSE PRACTITIONER

## 2025-03-25 PROCEDURE — 1159F MED LIST DOCD IN RCRD: CPT | Performed by: NURSE PRACTITIONER

## 2025-03-25 PROCEDURE — 1160F RVW MEDS BY RX/DR IN RCRD: CPT | Performed by: NURSE PRACTITIONER

## 2025-03-25 RX ORDER — HYDROCODONE BITARTRATE AND ACETAMINOPHEN 10; 325 MG/1; MG/1
1 TABLET ORAL EVERY 6 HOURS PRN
Qty: 120 TABLET | Refills: 0 | Status: SHIPPED | OUTPATIENT
Start: 2025-03-25

## 2025-03-25 NOTE — PROGRESS NOTES
CHIEF COMPLAINT  Extremity pain  Fluctuating pain.    Subjective   Kelby Peters is a 74 y.o. male  who presents for follow-up.  He has a history of extremity pain d/t peripheral neuropathy.  He notes that his brother passed from cancer last week which has contributed to Mr. Peters's stress level being elevated.     At his last office visit we decreased his gabapentin to 600 mg in the morning, 600 mg in the afternoon, and continued 1600 mg at night. He does note that his pain has been higher since his last office visit.     Today his pain is 9/10VAS in severity. He states that his pain has been higher recently, but he wants to continue working on weaning his Gabapentin because of feeling drowsy. He states today that his goal is to to get his Gabapentin down to 600mg QID. He states that with his increased pain he would like to increase his Norco 7.5/325 back to 10/325 4/day which he was previously maintained on. He notes that he continues to have daytime drowsiness. He denies other side effects including constipation.      He is maintained on Plavix.     Evaluated by neurology on 2/10/2025 due to reported history of seizures.  Patient declined further testing.  It was recommended to check gabapentin levels while weaning gabapentin to ensure he remains therapeutic from a seizure perspective.     1/5/2024-left fourth and fifth toe amputation performed by Dr. Alvarado  7/25/2023-left second toe simple amputation due to diabetic foot ulcer with hammertoe and clinical osteomyelitis performed Dr. Alvarado  3/31/2023-left great toe amputation with resection of metatarsal phalangeal joint and metatarsal head and shaft, right fifth toe amputation with resection of metatarsal phalangeal joint metatarsal head and shaft due to osteomyelitis performed by Dr. Alvarado  1/19/2023-left leg angiogram, third left toe amputation and left foot debridement performed by Dr. Martin     Failed Cymbalta.  Horizant and Gralise Cost-prohibitive.  Failed  Lyrica---pedal edema  History of IDDM    Extremity Pain   The pain is present in the left hand, right hand, left lower leg and right lower leg. This is a chronic problem. The problem has been unchanged (fluctuating in severity). The quality of the pain is described as burning. The pain is at a severity of 9/10. The pain is severe. Associated symptoms include numbness. Pertinent negatives include no fever. He has tried oral narcotics and rest for the symptoms. The treatment provided significant relief. His past medical history is significant for diabetes.        PEG Assessment   What number best describes your pain on average in the past week?9  What number best describes how, during the past week, pain has interfered with your enjoyment of life?9  What number best describes how, during the past week, pain has interfered with your general activity?  9    The following portions of the patient's history were reviewed and updated as appropriate: allergies, current medications, past family history, past medical history, past social history, past surgical history, and problem list.    Review of Systems   Constitutional:  Negative for activity change (less), fatigue and fever.   Respiratory:  Negative for cough and chest tightness.    Cardiovascular:  Negative for chest pain.   Gastrointestinal:  Negative for abdominal pain, constipation and diarrhea.   Neurological:  Positive for weakness, light-headedness and numbness. Negative for dizziness and headaches.   Psychiatric/Behavioral:  Positive for agitation. Negative for sleep disturbance and suicidal ideas. The patient is not nervous/anxious.        --  The aforementioned information the Chief Complaint section and above subjective data including any HPI data, and also the Review of Systems data, has been personally reviewed and affirmed.  --    Vitals:    03/25/25 1152   BP: 145/71  Comment: out BP meds   BP Location: Right arm   Patient Position: Sitting   Cuff Size:  "Adult   Pulse: 70   Resp: 16   Temp: 96.9 °F (36.1 °C)   TempSrc: Temporal   SpO2: 92%   Weight: 101 kg (222 lb)   Height: 170.2 cm (67.01\")   PainSc: 9      Objective   Physical Exam  Vitals and nursing note reviewed.   Constitutional:       Appearance: Normal appearance. He is well-developed.   Eyes:      General: Lids are normal.   Cardiovascular:      Rate and Rhythm: Normal rate.   Pulmonary:      Effort: Pulmonary effort is normal.   Musculoskeletal:      Right foot: Tenderness present.      Left foot: Tenderness present.      Right Lower Extremity: Right leg is amputated below ankle.      Left Lower Extremity: Left leg is amputated below ankle.   Neurological:      Mental Status: He is alert and oriented to person, place, and time.   Psychiatric:         Attention and Perception: Attention normal.         Mood and Affect: Mood normal.         Speech: Speech normal.         Behavior: Behavior normal.         Judgment: Judgment normal.       Assessment & Plan   Diagnoses and all orders for this visit:    1. Pain in extremity, unspecified extremity (Primary)  -     HYDROcodone-acetaminophen (NORCO)  MG per tablet; Take 1 tablet by mouth Every 6 (Six) Hours As Needed for Moderate Pain. Fill date 4/1/2025  Dispense: 120 tablet; Refill: 0    2. Diabetic peripheral neuropathy  -     HYDROcodone-acetaminophen (NORCO)  MG per tablet; Take 1 tablet by mouth Every 6 (Six) Hours As Needed for Moderate Pain. Fill date 4/1/2025  Dispense: 120 tablet; Refill: 0    3. Encounter for long-term (current) use of high-risk medication      Kelby Peters reports a pain score of 9.  Given his pain assessment as noted, treatment options were discussed and the following options were decided upon as a follow-up plan to address the patient's pain: prescription for non-opioid analgesics and prescription for opioid analgesics.    --- The urine drug screen confirmation from 1/21/2025 has been reviewed and the result is " appropriate based on patient history and HOME report  --- CSA and consent to treat with controlled substances signed on 7/17/2024  --- Opioid Risk--Moderate  --- Increase Norco to 10/325 q6h PRN.   --- Continue Gabapentin, will check a Gabapentin level and will then consider further weaning of his medication.   --- Follow up with Susy Germain to discuss his ongoing drowsiness. Reviewed that he has been on both Gabapentin and Norco for years, his persistent drowsiness is relatively new and it is possible that this may be related to other ongoing health issues.   --- Follow-up 1 month or sooner if needed.      HOME REPORT  As part of the patient's treatment plan, I am prescribing controlled substances. The patient has been made aware of appropriate use of such medications, including potential risk of somnolence, limited ability to drive and/or work safely, and the potential for dependence or overdose. It has also been made clear that these medications are for use by this patient only, without concomitant use of alcohol or other substances unless prescribed.     Patient has completed prescribing agreement detailing terms of continued prescribing of controlled substances, including monitoring HOME reports, urine drug screening, and pill counts if necessary. The patient is aware that inappropriate use will results in cessation of prescribing such medications.    As the clinician, I personally reviewed the HOME from 3/25/2025 while the patient was in the office today.    History and physical exam exhibit continued safe and appropriate use of controlled substances.    Dictated utilizing Dragon dictation.

## 2025-03-26 DIAGNOSIS — G62.9 POLYNEUROPATHY: ICD-10-CM

## 2025-03-28 ENCOUNTER — RESULTS FOLLOW-UP (OUTPATIENT)
Dept: PAIN MEDICINE | Facility: CLINIC | Age: 75
End: 2025-03-28
Payer: MEDICARE

## 2025-03-28 LAB — GABAPENTIN SERPLBLD-MCNC: 14.5 UG/ML (ref 4–16)

## 2025-03-28 RX ORDER — GABAPENTIN 600 MG/1
600 TABLET ORAL 3 TIMES DAILY
Qty: 90 TABLET | Refills: 0 | Status: SHIPPED | OUTPATIENT
Start: 2025-03-28

## 2025-03-28 RX ORDER — GABAPENTIN 800 MG/1
800 TABLET ORAL NIGHTLY
Qty: 30 TABLET | Refills: 0 | Status: SHIPPED | OUTPATIENT
Start: 2025-03-28

## 2025-03-28 NOTE — TELEPHONE ENCOUNTER
I am still waiting for his Gabapentin level. Will go ahead to decrease to 600 mg three times a day and 800 mg nightly as discussed in office. Once we have his Gabapentin level will let him know if we need to change this dosing at all. Thanks

## 2025-04-03 ENCOUNTER — TELEPHONE (OUTPATIENT)
Dept: FAMILY MEDICINE CLINIC | Facility: CLINIC | Age: 75
End: 2025-04-03
Payer: MEDICARE

## 2025-04-03 DIAGNOSIS — Z87.898 HISTORY OF SEIZURES: ICD-10-CM

## 2025-04-03 RX ORDER — LEVETIRACETAM 500 MG/1
500 TABLET ORAL 2 TIMES DAILY
Qty: 180 TABLET | Refills: 1 | Status: CANCELLED | OUTPATIENT
Start: 2025-04-03

## 2025-04-03 NOTE — TELEPHONE ENCOUNTER
Pt scheduled appt for 4/21/25 for AWV. Pt stated he needs the medication ASAP. Advised Marcellus is out of office today but the med has been sent over for her to sign-off.

## 2025-04-03 NOTE — TELEPHONE ENCOUNTER
HUB TO RELAY:  Called patient to schedule wellness exam. Patient was upset if prescription does not get refilled. Informed patient RX was put in 4/3/25 and PCP is required by law to see patient to refill prescriptions. Patient stated will contact pharmacey before scheduling an appointment. Will callback to schedule. PLEASE schedule patient appointment.

## 2025-04-03 NOTE — TELEPHONE ENCOUNTER
Caller: Kelby Peters    Relationship: Self    Best call back number: [phone]    Requested Prescriptions:   Requested Prescriptions     Pending Prescriptions Disp Refills    levETIRAcetam (KEPPRA) 500 MG tablet 180 tablet 1     Sig: Take 1 tablet by mouth 2 (Two) Times a Day.        Pharmacy where request should be sent: [facility] [id number] [address] AT [facility] [phone] [facility] [phone] FX     Last office visit with prescribing clinician: 2/22/2024   Last telemedicine visit with prescribing clinician: Visit date not found   Next office visit with prescribing clinician: Visit date not found     Additional details provided by patient:     Does the patient have less than a 3 day supply:  [x] Yes  [] No    Would you like a call back once the refill request has been completed: [] Yes [x] No    If the office needs to give you a call back, can they leave a voicemail: [] Yes [x] No    Deepika Baker Rep   04/03/25 10:33 EDT

## 2025-04-07 NOTE — TELEPHONE ENCOUNTER
Pt gave a call to the office in regards to refilling his medication (Keppra) Pt has an appt scheduled for 04/21/25 and Pt stated he has been out of this medication for a week now and needs a refill. Please advise if this is possible? Pt would like a call back to know if/when medication was refilled. Thank you!

## 2025-04-08 DIAGNOSIS — Z87.898 HISTORY OF SEIZURES: ICD-10-CM

## 2025-04-08 DIAGNOSIS — G62.9 POLYNEUROPATHY: ICD-10-CM

## 2025-04-08 NOTE — TELEPHONE ENCOUNTER
Rx Refill Note  Requested Prescriptions     Pending Prescriptions Disp Refills    levETIRAcetam (KEPPRA) 500 MG tablet 180 tablet 1     Sig: Take 1 tablet by mouth 2 (Two) Times a Day.      Last office visit with prescribing clinician: 2/22/2024   Last telemedicine visit with prescribing clinician: Visit date not found   Next office visit with prescribing clinician: 4/21/2025                         Would you like a call back once the refill request has been completed: [] Yes [] No    If the office needs to give you a call back, can they leave a voicemail: [] Yes [] No    Deepika Martines Rep  04/08/25, 08:36 EDT

## 2025-04-08 NOTE — TELEPHONE ENCOUNTER
Spoke w/ pt. Advised that Marcellus stated the medication would be sent over today. Pt voiced understanding.

## 2025-04-08 NOTE — TELEPHONE ENCOUNTER
Please call pt letting them I know I have resent over the med refill request over to allison to have her sign

## 2025-04-09 RX ORDER — LEVETIRACETAM 500 MG/1
500 TABLET ORAL 2 TIMES DAILY
Qty: 180 TABLET | Refills: 1 | Status: SHIPPED | OUTPATIENT
Start: 2025-04-09

## 2025-04-22 ENCOUNTER — TELEPHONE (OUTPATIENT)
Dept: PAIN MEDICINE | Facility: CLINIC | Age: 75
End: 2025-04-22
Payer: MEDICARE

## 2025-04-22 DIAGNOSIS — G62.9 POLYNEUROPATHY: ICD-10-CM

## 2025-04-22 RX ORDER — GABAPENTIN 800 MG/1
800 TABLET ORAL NIGHTLY
Qty: 5 TABLET | Refills: 0 | Status: SHIPPED | OUTPATIENT
Start: 2025-04-22 | End: 2025-04-25 | Stop reason: ALTCHOICE

## 2025-04-22 RX ORDER — GABAPENTIN 600 MG/1
TABLET ORAL
Qty: 60 TABLET | Refills: 0 | OUTPATIENT
Start: 2025-04-22

## 2025-04-22 RX ORDER — GABAPENTIN 800 MG/1
TABLET ORAL
Qty: 30 TABLET | Refills: 0 | OUTPATIENT
Start: 2025-04-22

## 2025-04-22 NOTE — TELEPHONE ENCOUNTER
I send in a quantity of 5 of the 800 mg to bridge to his office visit. We may reduce his regimen at his upcoming office visit on Friday.

## 2025-04-22 NOTE — TELEPHONE ENCOUNTER
Caller: Kelby Peters    Relationship to patient: Self    Best call back number: [phone]    Patient is needing: PATIENT CALLED TO CHECK STATUS OF GABAPENTIN 800 BEING CALLED IN - PLEASE REACH OUT AND ADVISE

## 2025-04-24 DIAGNOSIS — G62.9 POLYNEUROPATHY: ICD-10-CM

## 2025-04-25 ENCOUNTER — OFFICE VISIT (OUTPATIENT)
Dept: PAIN MEDICINE | Facility: CLINIC | Age: 75
End: 2025-04-25
Payer: MEDICARE

## 2025-04-25 VITALS
TEMPERATURE: 95.7 F | DIASTOLIC BLOOD PRESSURE: 72 MMHG | OXYGEN SATURATION: 98 % | HEIGHT: 67 IN | HEART RATE: 65 BPM | BODY MASS INDEX: 35.6 KG/M2 | WEIGHT: 226.8 LBS | SYSTOLIC BLOOD PRESSURE: 153 MMHG | RESPIRATION RATE: 18 BRPM

## 2025-04-25 DIAGNOSIS — M79.609 PAIN IN EXTREMITY, UNSPECIFIED EXTREMITY: ICD-10-CM

## 2025-04-25 DIAGNOSIS — Z79.899 ENCOUNTER FOR LONG-TERM (CURRENT) USE OF HIGH-RISK MEDICATION: Primary | ICD-10-CM

## 2025-04-25 DIAGNOSIS — G62.9 POLYNEUROPATHY: ICD-10-CM

## 2025-04-25 RX ORDER — GABAPENTIN 600 MG/1
TABLET ORAL
Qty: 60 TABLET | OUTPATIENT
Start: 2025-04-25

## 2025-04-25 RX ORDER — HYDROCODONE BITARTRATE AND ACETAMINOPHEN 10; 325 MG/1; MG/1
1 TABLET ORAL EVERY 6 HOURS PRN
Qty: 120 TABLET | Refills: 0 | Status: SHIPPED | OUTPATIENT
Start: 2025-04-25

## 2025-04-25 RX ORDER — GABAPENTIN 800 MG/1
TABLET ORAL
Qty: 30 TABLET | OUTPATIENT
Start: 2025-04-25

## 2025-04-25 RX ORDER — GABAPENTIN 600 MG/1
600 TABLET ORAL 4 TIMES DAILY
Qty: 120 TABLET | Refills: 0 | Status: SHIPPED | OUTPATIENT
Start: 2025-04-25

## 2025-04-25 NOTE — PROGRESS NOTES
CHIEF COMPLAINT  Extremity pain  Unchanged pain.    Subjective   Kelby Peters is a 74 y.o. male  who presents for follow-up.  He has a history of extremity pain d/t peripheral neuropathy. Today his pain is 7/10VAS in severity. At his last office visit we decreased his total Gabapentin dosage by a total of 200 mg/day d/t ongoing complaints of fatigue. We did increase his Norco back up to 10/325 after a previous wean down to 7.5 d/t increasing pain.     Today he is utilizing Norco 10/325 4/day and Gabapentin 600 mg in the morning, 600 mg in the afternoon, and 1400 mg at night. He states that this regimen decreases his pain by a significant amount. He has seen improvement in his drowsiness with a reduction in his Gabapentin dosage. He denies any side effects including somnolence or constipation. He would like to decrease his Gabapentin to 600 mg 4/day.     He is maintained on Plavix.      Evaluated by neurology on 2/10/2025 due to reported distant history of seizures.  Patient declined further testing.  It was recommended to check a gabapentin level while weaning gabapentin to ensure he remains therapeutic from a seizure perspective.     1/5/2024-left fourth and fifth toe amputation performed by Dr. Alvarado  7/25/2023-left second toe simple amputation due to diabetic foot ulcer with hammertoe and clinical osteomyelitis performed Dr. Alvarado  3/31/2023-left great toe amputation with resection of metatarsal phalangeal joint and metatarsal head and shaft, right fifth toe amputation with resection of metatarsal phalangeal joint metatarsal head and shaft due to osteomyelitis performed by Dr. Alvarado  1/19/2023-left leg angiogram, third left toe amputation and left foot debridement performed by Dr. Martin     Failed Cymbalta.  Horizant and Gralise Cost-prohibitive.  Failed Lyrica---pedal edema  History of IDDM    Extremity Pain   The pain is present in the left hand, right hand, left lower leg and right lower leg. This is a chronic  problem. The problem has been unchanged (fluctuating in severity). The quality of the pain is described as burning. The pain is at a severity of 7/10. The pain is severe. Associated symptoms include numbness. Pertinent negatives include no fever. He has tried oral narcotics and rest for the symptoms. The treatment provided significant relief. His past medical history is significant for diabetes.      PEG Assessment   What number best describes your pain on average in the past week?7  What number best describes how, during the past week, pain has interfered with your enjoyment of life?8  What number best describes how, during the past week, pain has interfered with your general activity?  8    The following portions of the patient's history were reviewed and updated as appropriate: allergies, current medications, past family history, past medical history, past social history, past surgical history, and problem list.    Review of Systems   Constitutional:  Negative for activity change, fatigue and fever.   Respiratory:  Negative for cough and chest tightness.    Gastrointestinal:  Negative for abdominal distention, constipation and diarrhea.   Neurological:  Positive for dizziness, weakness, numbness and headaches. Negative for light-headedness.   Psychiatric/Behavioral:  Negative for agitation, sleep disturbance and suicidal ideas. The patient is not nervous/anxious.      --  The aforementioned information the Chief Complaint section and above subjective data including any HPI data, and also the Review of Systems data, has been personally reviewed and affirmed.  --    Review of pertinent health history:     II. ELECTROMYOGRAPHY:    Electromyography was performed on the following muscles of the left lower  extremity using a monopolar needle: Vastus lateralis, tibialis anterior,  peroneus longus, medial and lateral gastrocnemius, and bicep femoris.      There were no changes in insertional activity. There were  "denervation  potentials present.      Recruitment was decreased in the left vastus lateralis, tibialis anterior,  peroneus longus, medial and lateral gastrocnemius, and bicep femoris.      III. IMPRESSION:   1.  The study shows evidence of a severe mixed sensorimotor peripheral  neuropathy.   2.  There is no electrophysiologic evidence of a lumbar radiculopathy. If  clinical suspicion is high for a radicular process, spinal imaging should be  considered.       Caesar Rahman M.D.*  VKW:pl  D:   2014 10:53:17  T:   2014 19:20:33  Job ID:   [id number]  Document ID:   [id number]  Rev:   0  cc:   Francois Dougherty M.D.*  DO NOT TEXT EDIT THIS LINE :EM:  Authenticated by DR. CAESAR RAHMAN On 2014 09:19:49 AM     Vitals:    25 0908   BP: 153/72   BP Location: Right arm   Patient Position: Sitting   Cuff Size: Adult   Pulse: 65   Resp: 18   Temp: 95.7 °F (35.4 °C)   TempSrc: Temporal   SpO2: 98%   Weight: 103 kg (226 lb 12.8 oz)   Height: 170.2 cm (67.01\")   PainSc: 7      Objective   Physical Exam  Vitals and nursing note reviewed.   Constitutional:       Appearance: Normal appearance. He is well-developed.   Eyes:      General: Lids are normal.   Cardiovascular:      Rate and Rhythm: Normal rate.   Pulmonary:      Effort: Pulmonary effort is normal.   Musculoskeletal:      Right Lower Extremity: Right leg is amputated below ankle.      Left Lower Extremity: Left leg is amputated below ankle.   Neurological:      Mental Status: He is alert and oriented to person, place, and time.      Gait: Gait normal.      Comments:   BLE strength 5/5 across all muscle groups   Psychiatric:         Attention and Perception: Attention normal.         Mood and Affect: Mood normal.         Speech: Speech normal.         Behavior: Behavior normal.         Judgment: Judgment normal.       Assessment & Plan   Diagnoses and all orders for this visit:    1. Encounter for long-term (current) use of high-risk " medication (Primary)    2. Polyneuropathy  -     gabapentin (NEURONTIN) 600 MG tablet; Take 1 tablet by mouth 4 (Four) Times a Day. Take in the morning and at lunch  Dispense: 120 tablet; Refill: 0    3. Pain in extremity, unspecified extremity  -     HYDROcodone-acetaminophen (NORCO)  MG per tablet; Take 1 tablet by mouth Every 6 (Six) Hours As Needed for Moderate Pain. FILL DATE 5/2/2025  Dispense: 120 tablet; Refill: 0      Kelby Peters reports a pain score of 7.  Given his pain assessment as noted, treatment options were discussed and the following options were decided upon as a follow-up plan to address the patient's pain: prescription for non-opioid analgesics and prescription for opioid analgesics.    --- The urine drug screen confirmation from 1/21/2025 has been reviewed and the result is appropriate based on patient history and HOME report  --- CSA and consent to treat with controlled substances signed on 7/17/2024  --- Opioid Risk-- Moderate  --- Decrease Gabapentin to 600 mg 4/day  --- Refill [place] 10/325. Fill date 5/2/2024 applied. Patient appears stable with current regimen. No adverse effects. Regarding continuation of opioids, there is no evidence of aberrant behavior or any red flags.  The patient continues with appropriate response to opioid therapy. ADL's remain intact by self.   --- Follow-up 1 month or sooner if needed.      HOME REPORT  As part of the patient's treatment plan, I am prescribing controlled substances. The patient has been made aware of appropriate use of such medications, including potential risk of somnolence, limited ability to drive and/or work safely, and the potential for dependence or overdose. It has also been made clear that these medications are for use by this patient only, without concomitant use of alcohol or other substances unless prescribed.     Patient has completed prescribing agreement detailing terms of continued prescribing of controlled substances,  including monitoring HOME reports, urine drug screening, and pill counts if necessary. The patient is aware that inappropriate use will results in cessation of prescribing such medications.    As the clinician, I personally reviewed the HOME from 4/25/2025 while the patient was in the office today.    History and physical exam exhibit continued safe and appropriate use of controlled substances.    Dictated utilizing Dragon dictation.

## 2025-05-21 ENCOUNTER — OFFICE VISIT (OUTPATIENT)
Dept: FAMILY MEDICINE CLINIC | Facility: CLINIC | Age: 75
End: 2025-05-21
Payer: MEDICARE

## 2025-05-21 VITALS
OXYGEN SATURATION: 92 % | BODY MASS INDEX: 35.44 KG/M2 | TEMPERATURE: 97.9 F | SYSTOLIC BLOOD PRESSURE: 118 MMHG | HEIGHT: 67 IN | WEIGHT: 225.8 LBS | HEART RATE: 65 BPM | DIASTOLIC BLOOD PRESSURE: 56 MMHG

## 2025-05-21 DIAGNOSIS — E11.65 UNCONTROLLED TYPE 2 DIABETES MELLITUS WITH HYPERGLYCEMIA: Primary | ICD-10-CM

## 2025-05-21 DIAGNOSIS — I10 PRIMARY HYPERTENSION: Chronic | ICD-10-CM

## 2025-05-21 DIAGNOSIS — E78.2 MIXED HYPERLIPIDEMIA: ICD-10-CM

## 2025-05-21 PROCEDURE — 3046F HEMOGLOBIN A1C LEVEL >9.0%: CPT

## 2025-05-21 PROCEDURE — 1160F RVW MEDS BY RX/DR IN RCRD: CPT

## 2025-05-21 PROCEDURE — 1125F AMNT PAIN NOTED PAIN PRSNT: CPT

## 2025-05-21 PROCEDURE — 99214 OFFICE O/P EST MOD 30 MIN: CPT

## 2025-05-21 PROCEDURE — G2211 COMPLEX E/M VISIT ADD ON: HCPCS

## 2025-05-21 PROCEDURE — 3074F SYST BP LT 130 MM HG: CPT

## 2025-05-21 PROCEDURE — 3078F DIAST BP <80 MM HG: CPT

## 2025-05-21 PROCEDURE — 1159F MED LIST DOCD IN RCRD: CPT

## 2025-05-21 NOTE — PROGRESS NOTES
Chief Complaint  Establish Care, Hypertension, Hyperlipidemia, and Diabetes    Subjective          History of Present Illness     Kelyb Peters 74 y.o. male presents today for a new patient appointment. He is here to establish care and is a new patient to me. I reviewed the PFSH recorded today by my MA/LPN staff.  The patient presents for evaluation of hypertension, diabetes mellitus, and hyperlipidemia.    Mr. Peters has a history of hypertension, managed with amlodipine 10 mg daily and hydrochlorothiazide 25 mg twice daily. However, he has been taking only one dose of hydrochlorothiazide instead of the prescribed two. He reports no history of myocardial infarction, cerebrovascular accident, or coronary artery disease. His medication regimen also includes clopidogrel, initiated due to unexplained syncope episodes that occurred several years ago. Despite extensive testing, no definitive cause for these episodes was identified, although silent migraines were suggested as a potential etiology. He reports experiencing ecchymosis following minor trauma to his arm.    He is under the care of an Endocrinologist, whom he consults every 3 to 6 months. He has had two consultations in the past year and has a scheduled appointment next month. His insulin dosage was recently increased to 40 units twice daily, which he reports as beneficial. He utilizes a continuous glucose monitor (CGM) for glucose monitoring.    He is currently on Rosuvastatin 40 mg for cholesterol management.    He has a history of migraines in his 20s, which have since resolved. He has undergone partial amputations on both feet and is under the care of a nephrologist and ophthalmologist. 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 He contracted COVID-19 pneumonia three years ago, resulting in hospitalization and the development of blood clots. He continues to experience a persistent cough,  "which his pulmonologist attributes to residual effects from the pneumonia. He is working with pain management for hydrocodone and gabapentin.    PAST SURGICAL HISTORY:  Partial amputations on both feet  Lens replacement and corneal transplantation      Review of Systems   Constitutional:  Negative for appetite change, chills, fatigue and fever.   HENT:  Negative for trouble swallowing.    Eyes:  Negative for visual disturbance.   Respiratory:  Negative for cough, chest tightness, shortness of breath and wheezing.    Cardiovascular:  Negative for chest pain, palpitations and leg swelling.   Gastrointestinal:  Negative for abdominal pain, nausea and vomiting.   Endocrine: Negative for polydipsia, polyphagia and polyuria.   Genitourinary:  Negative for dysuria, frequency and urgency.   Musculoskeletal:  Negative for gait problem, myalgias and neck pain.   Skin:  Negative for rash.   Neurological:  Negative for dizziness, syncope, weakness and light-headedness.   Psychiatric/Behavioral:  Negative for behavioral problems, dysphoric mood, self-injury and suicidal ideas. The patient is not nervous/anxious.         Objective   Vital Signs:   /56 (BP Location: Right arm, Patient Position: Sitting, Cuff Size: Adult)   Pulse 65   Temp 97.9 °F (36.6 °C) (Oral)   Ht 170.2 cm (67.01\")   Wt 102 kg (225 lb 12.8 oz)   SpO2 92%   BMI 35.35 kg/m²      Class 2 Severe Obesity (BMI >=35 and <=39.9). Obesity-related health conditions include the following: hypertension, diabetes mellitus, and dyslipidemias. Obesity is newly identified. BMI is is above average; BMI management plan is completed. We discussed low calorie, low carb based diet program, portion control, and increasing exercise.       Physical Exam  Vitals and nursing note reviewed.   Constitutional:       General: He is not in acute distress.     Appearance: He is well-developed. He is obese.   HENT:      Head: Normocephalic and atraumatic.   Eyes:      General: No " scleral icterus.        Right eye: No discharge.         Left eye: No discharge.      Conjunctiva/sclera: Conjunctivae normal.      Pupils: Pupils are equal, round, and reactive to light.   Neck:      Trachea: No tracheal deviation.   Cardiovascular:      Rate and Rhythm: Normal rate and regular rhythm.      Pulses: Normal pulses.      Heart sounds: Normal heart sounds.   Pulmonary:      Effort: Pulmonary effort is normal. No respiratory distress.      Breath sounds: Normal breath sounds. No wheezing or rales.   Musculoskeletal:         General: Normal range of motion.      Cervical back: Normal range of motion and neck supple.   Skin:     General: Skin is warm.      Coloration: Skin is not pale.      Findings: No erythema or rash.   Neurological:      Mental Status: He is alert and oriented to person, place, and time.      Motor: No abnormal muscle tone.      Coordination: Coordination normal.   Psychiatric:         Behavior: Behavior normal.         Thought Content: Thought content normal.         Judgment: Judgment normal.           Neck: No evidence of plaque buildup in the carotid arteries  Respiratory: Clear to auscultation, no wheezing, rales or rhonchi  Cardiovascular: Regular rate and rhythm       The following data was reviewed by: ABEBA Perez on 05/21/2025:  Hemoglobin A1c (02/07/2025 10:14)   Lipid Panel With LDL / HDL Ratio (02/22/2024 11:48)     Results  Labs   - Total cholesterol: 02/2024, 207 mg/dL   - Triglycerides: 02/2024, 431 mg/dL   - Blood sugar: 104 mg/dL               Assessment and Plan       1. Hypertension.  - His systolic blood pressure is within the optimal range at 118 mmHg, however, the diastolic reading is slightly low at 56 mmHg.  - He is currently taking amlodipine 10 mg once a day and hydrochlorothiazide 25 mg once a day.  - He is advised to continue with the current dosage of hydrochlorothiazide 25 mg once a day and not increase it to two tablets as originally prescribed,  as this could further lower his blood pressure.  - Blood pressure will continue to be monitored to ensure it remains within a safe range.    2. Hyperlipidemia.  - His last cholesterol panel conducted in 02/2024 revealed elevated total cholesterol levels at 207 mg/dL and significantly high triglycerides at 431 mg/dL.  - The relationship between blood sugar levels and triglycerides was discussed, emphasizing that elevated blood sugar can lead to increased triglycerides.  - A re-evaluation of his cholesterol levels will be conducted through fasting labs.   - If his triglyceride levels remain high, adjustments to his medication regimen may be necessary, potentially including the addition of a medication specifically targeting triglycerides.    3. Diabetes Mellitus.  - His blood sugar level is currently well-controlled at 104 mg/dL.  - He is under the care of an endocrinologist and uses a continuous glucose monitor (CGM) on his arm.  - He reports that his insulin dosage was increased to 40 units twice a day, which has been helping.  - He is advised to continue monitoring his blood sugar levels and follow up with his endocrinologist as scheduled.    4. Medication Management.  - He reports difficulty obtaining Hydrocodone and Gabapentin in the past but is currently working with pain management to address this issue.  - No new refills are needed at this time.  - Medication management will continue to be monitored to ensure efficacy and address any issues with obtaining prescriptions.    Follow-up  The patient will follow up in 6 months for a Medicare wellness visit.          Uncontrolled type 2 diabetes mellitus with hyperglycemia  Diabetes is newly identified.   Continue current treatment regimen.  Reminded to bring in blood sugar diary at next visit.  Recommended an ADA diet.  Recommended a Mediterranean style of eating  Regular aerobic exercise.  Discussed foot care.  Reminded to get yearly retinal exam.  Managed by  Endocrinology.  Diabetes will be reassessed in 6 months    Orders:    Comprehensive metabolic panel    Lipid panel    CBC and Differential    TSH    Microalbumin / Creatinine Urine Ratio - Urine, Clean Catch    Hemoglobin A1c    Primary hypertension    Orders:    Comprehensive metabolic panel    Lipid panel    CBC and Differential    TSH    Microalbumin / Creatinine Urine Ratio - Urine, Clean Catch    Mixed hyperlipidemia       Orders:    Lipid panel             Follow Up     Return in about 6 months (around 11/21/2025) for Next scheduled follow up.    Patient or patient representative verbalized consent for the use of Ambient Listening during the visit with  ABEBA Perez for chart documentation. 5/21/2025  14:18 EDT    Patient was given instructions and counseling regarding his condition or for health maintenance advice. Please see specific information pulled into the AVS if appropriate.

## 2025-05-21 NOTE — ASSESSMENT & PLAN NOTE
Orders:    Comprehensive metabolic panel    Lipid panel    CBC and Differential    TSH    Microalbumin / Creatinine Urine Ratio - Urine, Clean Catch

## 2025-05-23 ENCOUNTER — PATIENT ROUNDING (BHMG ONLY) (OUTPATIENT)
Dept: FAMILY MEDICINE CLINIC | Facility: CLINIC | Age: 75
End: 2025-05-23
Payer: MEDICARE

## 2025-05-23 NOTE — PROGRESS NOTES
A My-Chart message has been sent to the patient for PATIENT ROUNDING with [facility] [place]

## 2025-05-25 DIAGNOSIS — R80.9 PROTEINURIA, UNSPECIFIED TYPE: ICD-10-CM

## 2025-05-27 RX ORDER — LISINOPRIL 30 MG/1
30 TABLET ORAL DAILY
Qty: 90 TABLET | Refills: 1 | Status: SHIPPED | OUTPATIENT
Start: 2025-05-27

## 2025-05-28 ENCOUNTER — OFFICE VISIT (OUTPATIENT)
Dept: PAIN MEDICINE | Facility: CLINIC | Age: 75
End: 2025-05-28
Payer: MEDICARE

## 2025-05-28 VITALS
RESPIRATION RATE: 18 BRPM | OXYGEN SATURATION: 96 % | WEIGHT: 227 LBS | HEIGHT: 67 IN | TEMPERATURE: 97.8 F | HEART RATE: 89 BPM | BODY MASS INDEX: 35.63 KG/M2 | DIASTOLIC BLOOD PRESSURE: 75 MMHG | SYSTOLIC BLOOD PRESSURE: 151 MMHG

## 2025-05-28 DIAGNOSIS — M79.609 PAIN IN EXTREMITY, UNSPECIFIED EXTREMITY: ICD-10-CM

## 2025-05-28 DIAGNOSIS — M54.50 CHRONIC MIDLINE LOW BACK PAIN, UNSPECIFIED WHETHER SCIATICA PRESENT: ICD-10-CM

## 2025-05-28 DIAGNOSIS — G89.29 CHRONIC MIDLINE LOW BACK PAIN, UNSPECIFIED WHETHER SCIATICA PRESENT: ICD-10-CM

## 2025-05-28 DIAGNOSIS — G62.9 POLYNEUROPATHY: ICD-10-CM

## 2025-05-28 DIAGNOSIS — Z79.899 ENCOUNTER FOR LONG-TERM (CURRENT) USE OF HIGH-RISK MEDICATION: Primary | ICD-10-CM

## 2025-05-28 RX ORDER — GABAPENTIN 600 MG/1
600 TABLET ORAL 4 TIMES DAILY
Qty: 120 TABLET | Refills: 1 | Status: SHIPPED | OUTPATIENT
Start: 2025-05-28

## 2025-05-28 NOTE — PROGRESS NOTES
"CHIEF COMPLAINT  Extremity pain  Patient reports his pain is about the same since his last ov.    Subjective   Kelby Peters is a 74 y.o. male  who presents for follow-up.  He has a history of extremity pain d/t peripheral neuropathy.  Today his pain is 7/10VAS in severity. Mr. Peters's pain fluctuates in severity. He states today that he does notice his pain increases if his blood sugars are elevated. He states he thinks he has Myasthenia Gravis. He states that his legs feel weak.  He does note that he has back pain with aching pain in his lateral hips and thighs that is worsened with prolonged activity. He states this has been going on for \"some time\".      He continues to utilized Norco 10/325 4/day and Gabapentin 600 mg in the morning, 600 mg in the afternoon, and 1200 mg at night. This regimen decreases his pain by a moderate amount. He states that he no longer feels as drowsy as he was previously. He denies any side effects including somnolence or constipation.     He is maintained on Plavix.      Evaluated by neurology on 2/10/2025 due to reported distant history of seizures.  Patient declined further testing.  It was recommended to check a gabapentin level while weaning gabapentin to ensure he remains therapeutic from a seizure perspective.     1/5/2024-left fourth and fifth toe amputation performed by Dr. Alvarado  7/25/2023-left second toe simple amputation due to diabetic foot ulcer with hammertoe and clinical osteomyelitis performed Dr. Alvarado  3/31/2023-left great toe amputation with resection of metatarsal phalangeal joint and metatarsal head and shaft, right fifth toe amputation with resection of metatarsal phalangeal joint metatarsal head and shaft due to osteomyelitis performed by Dr. Alvarado  1/19/2023-left leg angiogram, third left toe amputation and left foot debridement performed by Dr. Martin     Failed Cymbalta.  Horizant and Gralise Cost-prohibitive.  Failed Lyrica---pedal edema  History of " "IDDM    Extremity Pain  Chronicity:  Chronic  Progression since onset: fluctuating in severity.  Pain location:  Left hand, right hand, left lower leg and right lower leg  Pain quality:  Burning  Pain - numeric:  7/10  Pain severity:  Severe  Associated symptoms: numbness (bilateral legs)    Associated symptoms: no fever    Treatments tried:  Oral narcotics and rest  Improvement on treatment:  Significant  PMH includes: diabetes       PEG Assessment   What number best describes your pain on average in the past week?7  What number best describes how, during the past week, pain has interfered with your enjoyment of life?8  What number best describes how, during the past week, pain has interfered with your general activity?  8    The following portions of the patient's history were reviewed and updated as appropriate: allergies, current medications, past family history, past medical history, past social history, past surgical history, and problem list.    Review of Systems   Constitutional:  Positive for activity change (more). Negative for fever.   Gastrointestinal:  Negative for constipation and diarrhea.   Genitourinary:  Negative for difficulty urinating.   Neurological:  Positive for weakness (bilateral legs) and numbness (bilateral legs).   Psychiatric/Behavioral:  Negative for self-injury, sleep disturbance and suicidal ideas.      --  The aforementioned information the Chief Complaint section and above subjective data including any HPI data, and also the Review of Systems data, has been personally reviewed and affirmed.  --    Vitals:    05/28/25 0841   BP: 151/75   Pulse: 89   Resp: 18   Temp: 97.8 °F (36.6 °C)   SpO2: 96%   Weight: 103 kg (227 lb)   Height: 170.2 cm (67.01\")   PainSc: 7      Objective   Physical Exam  Vitals and nursing note reviewed.   Constitutional:       Appearance: Normal appearance. He is well-developed.   Eyes:      General: Lids are normal.   Pulmonary:      Effort: Pulmonary effort " is normal.   Musculoskeletal:      Lumbar back: Decreased range of motion. Negative right straight leg raise test and negative left straight leg raise test.      Comments:   +shopping cart sign   Neurological:      Mental Status: He is alert and oriented to person, place, and time.      Comments:                                           Motor Function:                                  Right                        Left                           Iliopsoas:                     5                             5      Quadriceps:                 5                             5  Hamstrings:                 5                             5  Tibialis Anterior:          5                             5   Gastroc/Soleus:           5                             5      Psychiatric:         Attention and Perception: Attention normal.         Mood and Affect: Mood normal.         Speech: Speech normal.         Behavior: Behavior normal.         Judgment: Judgment normal.       Assessment & Plan   Diagnoses and all orders for this visit:    1. Encounter for long-term (current) use of high-risk medication (Primary)    2. Pain in extremity, unspecified extremity    3. Polyneuropathy  -     gabapentin (NEURONTIN) 600 MG tablet; Take 1 tablet by mouth 4 (Four) Times a Day. Take in the morning and at lunch  Dispense: 120 tablet; Refill: 1    4. Chronic midline low back pain, unspecified whether sciatica present      Kelby Peters reports a pain score of 7.  Given his pain assessment as noted, treatment options were discussed and the following options were decided upon as a follow-up plan to address the patient's pain: continuation of current treatment plan for pain.    --- The urine drug screen confirmation from 1/21/2025 has been reviewed and the result is appropriate based on patient history and HOME report  --- CSA and consent to treat with controlled substances signed on 7/17/2024  --- Opioid Risk--Moderate  --- Continue Norco 10/325.  No refill needed. Patient appears stable with current regimen. No adverse effects. Regarding continuation of opioids, there is no evidence of aberrant behavior or any red flags.  The patient continues with appropriate response to opioid therapy. ADL's remain intact by self.   --- Refill Gabapentin  --- On exam today there is no objective weakness in his lower extremities. I do recommend he discuss his concerns about Myasthenia Gravis with his PCP. Discussed PT for strengthening, he declines.   --- Offered to update lumbar MRI as the aching pain in his legs could be related to his lumbar spine. He declines at this time.   --- Follow-up 2 months or sooner if needed.      HOME REPORT  As part of the patient's treatment plan, I am prescribing controlled substances. The patient has been made aware of appropriate use of such medications, including potential risk of somnolence, limited ability to drive and/or work safely, and the potential for dependence or overdose. It has also been made clear that these medications are for use by this patient only, without concomitant use of alcohol or other substances unless prescribed.     Patient has completed prescribing agreement detailing terms of continued prescribing of controlled substances, including monitoring HOME reports, urine drug screening, and pill counts if necessary. The patient is aware that inappropriate use will results in cessation of prescribing such medications.    As the clinician, I personally reviewed the HOME from 5/28/2025 while the patient was in the office today.    History and physical exam exhibit continued safe and appropriate use of controlled substances.    Dictated utilizing Dragon dictation.

## 2025-06-06 ENCOUNTER — OFFICE VISIT (OUTPATIENT)
Dept: ENDOCRINOLOGY | Age: 75
End: 2025-06-06
Payer: MEDICARE

## 2025-06-06 VITALS
BODY MASS INDEX: 35.63 KG/M2 | HEART RATE: 64 BPM | DIASTOLIC BLOOD PRESSURE: 74 MMHG | OXYGEN SATURATION: 97 % | WEIGHT: 227 LBS | SYSTOLIC BLOOD PRESSURE: 132 MMHG | HEIGHT: 67 IN

## 2025-06-06 DIAGNOSIS — Z79.4 CONTROLLED TYPE 2 DIABETES MELLITUS WITH DIABETIC POLYNEUROPATHY, WITH LONG-TERM CURRENT USE OF INSULIN: Chronic | ICD-10-CM

## 2025-06-06 DIAGNOSIS — E11.42 CONTROLLED TYPE 2 DIABETES MELLITUS WITH DIABETIC POLYNEUROPATHY, WITH LONG-TERM CURRENT USE OF INSULIN: Chronic | ICD-10-CM

## 2025-06-06 PROCEDURE — 1160F RVW MEDS BY RX/DR IN RCRD: CPT | Performed by: INTERNAL MEDICINE

## 2025-06-06 PROCEDURE — 3075F SYST BP GE 130 - 139MM HG: CPT | Performed by: INTERNAL MEDICINE

## 2025-06-06 PROCEDURE — 3078F DIAST BP <80 MM HG: CPT | Performed by: INTERNAL MEDICINE

## 2025-06-06 PROCEDURE — 95251 CONT GLUC MNTR ANALYSIS I&R: CPT | Performed by: INTERNAL MEDICINE

## 2025-06-06 PROCEDURE — 3046F HEMOGLOBIN A1C LEVEL >9.0%: CPT | Performed by: INTERNAL MEDICINE

## 2025-06-06 PROCEDURE — 99214 OFFICE O/P EST MOD 30 MIN: CPT | Performed by: INTERNAL MEDICINE

## 2025-06-06 PROCEDURE — 1159F MED LIST DOCD IN RCRD: CPT | Performed by: INTERNAL MEDICINE

## 2025-06-06 RX ORDER — HUMAN INSULIN 100 [IU]/ML
40 INJECTION, SUSPENSION SUBCUTANEOUS 2 TIMES DAILY WITH MEALS
Qty: 30 ML | Refills: 5 | Status: SHIPPED | OUTPATIENT
Start: 2025-06-06

## 2025-06-06 RX ORDER — HYDROCHLOROTHIAZIDE 12.5 MG/1
CAPSULE ORAL
Qty: 2 EACH | Refills: 3 | Status: SHIPPED | OUTPATIENT
Start: 2025-06-06

## 2025-06-06 NOTE — PROGRESS NOTES
Chief complaint/Reason for consult: T2DM    HPI:   - 74 year old female here for management of diabetes mellitus type 2  - Last seen in 8/2024  - Has had diabetes for over 30 years  - Complications include peripheral neuropathy, amputations of toes, retinopathy, CKD (positive microalbumin)  - Is currently taking Novolin 70/30, 40 units bid  - He says he sometimes has hypoglycemia in the a.m. and hyperglycemia in the pm  - Is also on Crestor 10 mg daily    The following portions of the patient's history were reviewed and updated as appropriate: allergies, current medications, past family history, past medical history, past social history, past surgical history, and problem list.      Objective     Vitals:    06/06/25 0831   BP: 132/74   Pulse: 64   SpO2: 97%        Physical Exam  Vitals reviewed.   Constitutional:       Appearance: Normal appearance. He is obese.   HENT:      Head: Normocephalic and atraumatic.   Eyes:      General: No scleral icterus.  Pulmonary:      Effort: Pulmonary effort is normal. No respiratory distress.   Neurological:      Mental Status: He is alert.      Gait: Gait normal.   Psychiatric:         Mood and Affect: Mood normal.         Behavior: Behavior normal.         Thought Content: Thought content normal.         Judgment: Judgment normal.     CGM interpretation  Dates reviewed:  5/24-6/6/25  Data:  Avg of 209, 30% very high, 34% high, 35% in target, 1% low  Interpretation:  Hypoglycemia overnight at times with hyperglycemia in the evening    Assessment & Plan   T2DM, complicated by CKD, peripheral neuropathy, and retinopathy, uncontrolled due to hypo- and hyperglycemia  - Will check A1c  - Change Novolin 70/30 to 44 units with breakfast and 30 units for supper    2. Hyperlipidemia  - On Crestor 20 mg daily    3. Obesity (BMI of 35.54)  - Dietary changes and exercise will help glycemic control       - Return to clinic in 6 months

## 2025-06-09 DIAGNOSIS — E11.42 CONTROLLED TYPE 2 DIABETES MELLITUS WITH DIABETIC POLYNEUROPATHY, WITH LONG-TERM CURRENT USE OF INSULIN: Chronic | ICD-10-CM

## 2025-06-09 DIAGNOSIS — Z79.4 CONTROLLED TYPE 2 DIABETES MELLITUS WITH DIABETIC POLYNEUROPATHY, WITH LONG-TERM CURRENT USE OF INSULIN: Chronic | ICD-10-CM

## 2025-06-09 RX ORDER — HYDROCHLOROTHIAZIDE 12.5 MG/1
CAPSULE ORAL
Qty: 2 EACH | Refills: 3 | OUTPATIENT
Start: 2025-06-09

## 2025-06-09 NOTE — TELEPHONE ENCOUNTER
Rx Refill Note  Requested Prescriptions     Pending Prescriptions Disp Refills    Continuous Glucose Sensor (FreeStyle Milka 3 Plus Sensor) [Pharmacy Med Name: FREESTYLE MILKA 3 PLUS SENSOR] 2 each 3     Sig: APPLY SENSOR TO SKIN FOR CONTINUOUS BLOOD SUGAR MONITORING, REPLACE EVERY 15 DAYS      Last office visit with prescribing clinician: 6/6/2025   Last telemedicine visit with prescribing clinician: Visit date not found   Next office visit with prescribing clinician: 10/9/2025                         Would you like a call back once the refill request has been completed: [] Yes [] No    If the office needs to give you a call back, can they leave a voicemail: [] Yes [] No  Eduarda Kelley MA  6/9/2025  13:02 EDT

## 2025-06-11 ENCOUNTER — TELEPHONE (OUTPATIENT)
Dept: FAMILY MEDICINE CLINIC | Facility: CLINIC | Age: 75
End: 2025-06-11
Payer: MEDICARE

## 2025-06-11 DIAGNOSIS — I10 PRIMARY HYPERTENSION: Primary | ICD-10-CM

## 2025-06-11 RX ORDER — HYDROCHLOROTHIAZIDE 25 MG/1
50 TABLET ORAL DAILY
Qty: 30 TABLET | Refills: 3 | Status: SHIPPED | OUTPATIENT
Start: 2025-06-11

## 2025-06-11 NOTE — TELEPHONE ENCOUNTER
Rx Refill Note  Requested Prescriptions     Pending Prescriptions Disp Refills    hydroCHLOROthiazide 25 MG tablet 30 tablet 3     Sig: Take 2 tablets by mouth Daily. Indications: Edema      Last office visit with prescribing clinician: 5/21/2025   Last telemedicine visit with prescribing clinician: Visit date not found   Next office visit with prescribing clinician: 11/4/2025                         Would you like a call back once the refill request has been completed: [] Yes [] No    If the office needs to give you a call back, can they leave a voicemail: [] Yes [] No    Philly Cruz MA  06/11/25, 08:35 EDT     Detail Level: Simple Initiate Treatment: OTC Differin 0.1% Gel QHS-QOHS as tolerated. Apply Vaseline or Aquaphor over top

## 2025-06-11 NOTE — TELEPHONE ENCOUNTER
Patient called for a refill on his Hydrochlorothiazide. Advised the patient to scheduled an appointment to come into the office. The patient stated he has already seen the provided, and he can not make an appointment to come in right now. Informed the patient that I would send a message to the provider and her MA.

## 2025-06-13 ENCOUNTER — TELEPHONE (OUTPATIENT)
Dept: FAMILY MEDICINE CLINIC | Facility: CLINIC | Age: 75
End: 2025-06-13
Payer: MEDICARE

## 2025-06-13 DIAGNOSIS — M79.609 PAIN IN EXTREMITY, UNSPECIFIED EXTREMITY: ICD-10-CM

## 2025-06-13 NOTE — TELEPHONE ENCOUNTER
Medication Refill Request    Date of phone call: 25    Medication being requested: Norco  mg  si tab po q 6 hrs prn  Qty: 120    Date of last visit: 25    Date of last refill:     HOME up to date?:     Next Follow up?: 25    Any new pertinent information? (i.e, new medication allergies, new use of medications, change in patient's health or condition, non-compliance or inconsistency with prescribing agreement?): Can you refill for Mary

## 2025-06-13 NOTE — TELEPHONE ENCOUNTER
Caller: My Peters    Relationship to patient: Emergency Contact    Best call back number: [phone]    Patient is needing: THEY WANT TO DO A CONSULATION TO GO OVER ALL HIS MEDICATIONS AND MEDICAL HISTORY WITH MIRNA BEV BUT NEEDS MORE TIME THAN 15 MINS.  CAN THEY GET IT APPROVED FOR A 30 MIN VISIT?

## 2025-06-16 RX ORDER — HYDROCODONE BITARTRATE AND ACETAMINOPHEN 10; 325 MG/1; MG/1
1 TABLET ORAL EVERY 6 HOURS PRN
Qty: 120 TABLET | Refills: 0 | Status: SHIPPED | OUTPATIENT
Start: 2025-06-16

## 2025-06-19 LAB
ALBUMIN SERPL-MCNC: 4.4 G/DL (ref 3.5–5.2)
ALBUMIN/CREAT UR: 278 MG/G CREAT (ref 0–29)
ALBUMIN/GLOB SERPL: 1.7 G/DL
ALP SERPL-CCNC: 56 U/L (ref 39–117)
ALT SERPL-CCNC: 14 U/L (ref 1–41)
AST SERPL-CCNC: 19 U/L (ref 1–40)
BASOPHILS # BLD AUTO: 0.04 10*3/MM3 (ref 0–0.2)
BASOPHILS NFR BLD AUTO: 0.6 % (ref 0–1.5)
BILIRUB SERPL-MCNC: 0.2 MG/DL (ref 0–1.2)
BUN SERPL-MCNC: 15 MG/DL (ref 8–23)
BUN/CREAT SERPL: 14.7 (ref 7–25)
CALCIUM SERPL-MCNC: 9.1 MG/DL (ref 8.6–10.5)
CHLORIDE SERPL-SCNC: 100 MMOL/L (ref 98–107)
CHOLEST SERPL-MCNC: 110 MG/DL (ref 0–200)
CO2 SERPL-SCNC: 26.6 MMOL/L (ref 22–29)
CREAT SERPL-MCNC: 1.02 MG/DL (ref 0.76–1.27)
CREAT UR-MCNC: 85.6 MG/DL
EGFRCR SERPLBLD CKD-EPI 2021: 77.1 ML/MIN/1.73
EOSINOPHIL # BLD AUTO: 0.19 10*3/MM3 (ref 0–0.4)
EOSINOPHIL NFR BLD AUTO: 2.9 % (ref 0.3–6.2)
ERYTHROCYTE [DISTWIDTH] IN BLOOD BY AUTOMATED COUNT: 13.3 % (ref 12.3–15.4)
GLOBULIN SER CALC-MCNC: 2.6 GM/DL
GLUCOSE SERPL-MCNC: 102 MG/DL (ref 65–99)
HBA1C MFR BLD: 9.2 % (ref 4.8–5.6)
HCT VFR BLD AUTO: 41.6 % (ref 37.5–51)
HDLC SERPL-MCNC: 35 MG/DL (ref 40–60)
HGB BLD-MCNC: 13.4 G/DL (ref 13–17.7)
IMM GRANULOCYTES # BLD AUTO: 0.02 10*3/MM3 (ref 0–0.05)
IMM GRANULOCYTES NFR BLD AUTO: 0.3 % (ref 0–0.5)
LDLC SERPL CALC-MCNC: 40 MG/DL (ref 0–100)
LYMPHOCYTES # BLD AUTO: 1.5 10*3/MM3 (ref 0.7–3.1)
LYMPHOCYTES NFR BLD AUTO: 23 % (ref 19.6–45.3)
MCH RBC QN AUTO: 30.5 PG (ref 26.6–33)
MCHC RBC AUTO-ENTMCNC: 32.2 G/DL (ref 31.5–35.7)
MCV RBC AUTO: 94.5 FL (ref 79–97)
MICROALBUMIN UR-MCNC: 237.6 UG/ML
MONOCYTES # BLD AUTO: 0.7 10*3/MM3 (ref 0.1–0.9)
MONOCYTES NFR BLD AUTO: 10.7 % (ref 5–12)
NEUTROPHILS # BLD AUTO: 4.08 10*3/MM3 (ref 1.7–7)
NEUTROPHILS NFR BLD AUTO: 62.5 % (ref 42.7–76)
NRBC BLD AUTO-RTO: 0 /100 WBC (ref 0–0.2)
PLATELET # BLD AUTO: 234 10*3/MM3 (ref 140–450)
POTASSIUM SERPL-SCNC: 4.4 MMOL/L (ref 3.5–5.2)
PROT SERPL-MCNC: 7 G/DL (ref 6–8.5)
RBC # BLD AUTO: 4.4 10*6/MM3 (ref 4.14–5.8)
SODIUM SERPL-SCNC: 139 MMOL/L (ref 136–145)
TRIGL SERPL-MCNC: 224 MG/DL (ref 0–150)
TSH SERPL DL<=0.005 MIU/L-ACNC: 1.16 UIU/ML (ref 0.27–4.2)
VLDLC SERPL CALC-MCNC: 35 MG/DL (ref 5–40)
WBC # BLD AUTO: 6.53 10*3/MM3 (ref 3.4–10.8)

## 2025-06-25 ENCOUNTER — OFFICE VISIT (OUTPATIENT)
Dept: FAMILY MEDICINE CLINIC | Facility: CLINIC | Age: 75
End: 2025-06-25
Payer: MEDICARE

## 2025-06-25 VITALS
SYSTOLIC BLOOD PRESSURE: 120 MMHG | WEIGHT: 227.4 LBS | HEIGHT: 67 IN | TEMPERATURE: 98.5 F | HEART RATE: 62 BPM | DIASTOLIC BLOOD PRESSURE: 60 MMHG | BODY MASS INDEX: 35.69 KG/M2 | OXYGEN SATURATION: 97 %

## 2025-06-25 DIAGNOSIS — Z79.4 CONTROLLED TYPE 2 DIABETES MELLITUS WITH DIABETIC POLYNEUROPATHY, WITH LONG-TERM CURRENT USE OF INSULIN: Chronic | ICD-10-CM

## 2025-06-25 DIAGNOSIS — E11.42 CONTROLLED TYPE 2 DIABETES MELLITUS WITH DIABETIC POLYNEUROPATHY, WITH LONG-TERM CURRENT USE OF INSULIN: Chronic | ICD-10-CM

## 2025-06-25 PROCEDURE — 99214 OFFICE O/P EST MOD 30 MIN: CPT

## 2025-06-25 PROCEDURE — 3078F DIAST BP <80 MM HG: CPT

## 2025-06-25 PROCEDURE — 3074F SYST BP LT 130 MM HG: CPT

## 2025-06-25 PROCEDURE — 3046F HEMOGLOBIN A1C LEVEL >9.0%: CPT

## 2025-06-25 PROCEDURE — G2211 COMPLEX E/M VISIT ADD ON: HCPCS

## 2025-06-25 NOTE — PROGRESS NOTES
"Chief Complaint  Med Management    Subjective          History of Present Illness       Kelby Peters 74 y.o. male presents to refill medications. Overall he feels well. No medication side effects are reported.           Objective   Vital Signs:   /60 (BP Location: Left arm, Patient Position: Sitting, Cuff Size: Adult)   Pulse 62   Temp 98.5 °F (36.9 °C) (Oral)   Ht 170.2 cm (67.01\")   Wt 103 kg (227 lb 6.4 oz)   SpO2 97%   BMI 35.61 kg/m²      Class 2 Severe Obesity (BMI >=35 and <=39.9). Obesity-related health conditions include the following: hypertension, diabetes mellitus, impaired fasting glucose, dyslipidemias, and peripheral vascular disease. Obesity is unchanged. BMI is is above average; BMI management plan is completed. We discussed portion control and increasing exercise.       Physical Exam  Vitals and nursing note reviewed.   Constitutional:       General: He is not in acute distress.     Appearance: He is well-developed. He is obese.   HENT:      Head: Normocephalic and atraumatic.   Eyes:      General: No scleral icterus.        Right eye: No discharge.         Left eye: No discharge.      Conjunctiva/sclera: Conjunctivae normal.      Pupils: Pupils are equal, round, and reactive to light.   Neck:      Trachea: No tracheal deviation.   Cardiovascular:      Rate and Rhythm: Normal rate and regular rhythm.      Pulses: Normal pulses.      Heart sounds: Normal heart sounds.   Pulmonary:      Effort: Pulmonary effort is normal. No respiratory distress.      Breath sounds: Normal breath sounds. No wheezing or rales.   Musculoskeletal:         General: Normal range of motion.      Cervical back: Normal range of motion and neck supple.   Skin:     General: Skin is warm.      Coloration: Skin is not pale.      Findings: No erythema or rash.   Neurological:      Mental Status: He is alert and oriented to person, place, and time.      Motor: No abnormal muscle tone.      Coordination: Coordination " normal.   Psychiatric:         Behavior: Behavior normal.         Thought Content: Thought content normal.         Judgment: Judgment normal.                  The following data was reviewed by: ABEBA Perez on 06/25/2025:  Results                 Assessment and Plan                     Controlled type 2 diabetes mellitus with diabetic polyneuropathy, with long-term current use of insulin  Diabetes is improving with treatment.   Continue current treatment regimen.  Reminded to bring in blood sugar diary at next visit.  Recommended an ADA diet.  Regular aerobic exercise.  Discussed foot care.  Diabetes will be reassessed in 6 months    Orders:    Continuous Glucose Sensor (FreeStyle Milka 3 Plus Sensor); Use 1 every 15 days             Follow Up     No follow-ups on file.    Patient was given instructions and counseling regarding his condition or for health maintenance advice. Please see specific information pulled into the AVS if appropriate.

## 2025-06-26 ENCOUNTER — TELEPHONE (OUTPATIENT)
Dept: FAMILY MEDICINE CLINIC | Facility: CLINIC | Age: 75
End: 2025-06-26
Payer: MEDICARE

## 2025-06-26 NOTE — TELEPHONE ENCOUNTER
Patient wife stated that her  was seen in office yesterday and you were going to place lab order for patient to have completed. Patient wife is requesting for lab orders to be placed.

## 2025-06-29 DIAGNOSIS — Z79.4 CONTROLLED TYPE 2 DIABETES MELLITUS WITH DIABETIC POLYNEUROPATHY, WITH LONG-TERM CURRENT USE OF INSULIN: Chronic | ICD-10-CM

## 2025-06-29 DIAGNOSIS — E11.42 CONTROLLED TYPE 2 DIABETES MELLITUS WITH DIABETIC POLYNEUROPATHY, WITH LONG-TERM CURRENT USE OF INSULIN: Chronic | ICD-10-CM

## 2025-06-29 RX ORDER — HUMAN INSULIN 100 [IU]/ML
45 INJECTION, SUSPENSION SUBCUTANEOUS 2 TIMES DAILY WITH MEALS
Qty: 30 ML | Refills: 5 | Status: SHIPPED | OUTPATIENT
Start: 2025-06-29

## 2025-06-30 DIAGNOSIS — Z77.21 EXPOSURE TO BLOODBORNE PATHOGEN: Primary | ICD-10-CM

## 2025-06-30 DIAGNOSIS — Z11.59 ENCOUNTER FOR SCREENING FOR OTHER VIRAL DISEASES: ICD-10-CM

## 2025-07-01 LAB
HBV SURFACE AB SER QL: NON REACTIVE
HBV SURFACE AG SERPL QL IA: NEGATIVE
HCV IGG SERPL QL IA: NON REACTIVE
HIV 1+2 AB+HIV1 P24 AG SERPL QL IA: NON REACTIVE

## 2025-07-07 RX ORDER — HYDROCHLOROTHIAZIDE 12.5 MG/1
CAPSULE ORAL
Qty: 6 EACH | Refills: 3 | Status: SHIPPED | OUTPATIENT
Start: 2025-07-07

## 2025-07-15 DIAGNOSIS — M79.609 PAIN IN EXTREMITY, UNSPECIFIED EXTREMITY: ICD-10-CM

## 2025-07-15 RX ORDER — HYDROCODONE BITARTRATE AND ACETAMINOPHEN 10; 325 MG/1; MG/1
1 TABLET ORAL EVERY 6 HOURS PRN
Qty: 120 TABLET | Refills: 0 | Status: SHIPPED | OUTPATIENT
Start: 2025-07-15

## 2025-07-21 ENCOUNTER — TELEPHONE (OUTPATIENT)
Dept: FAMILY MEDICINE CLINIC | Facility: CLINIC | Age: 75
End: 2025-07-21
Payer: MEDICARE

## 2025-07-21 NOTE — TELEPHONE ENCOUNTER
Soumya from [facility] called into our office requesting patient last office visit to be faxed to their office at [phone]

## 2025-07-23 RX ORDER — ROSUVASTATIN CALCIUM 20 MG/1
20 TABLET, COATED ORAL DAILY
Qty: 90 TABLET | Refills: 1 | Status: SHIPPED | OUTPATIENT
Start: 2025-07-23

## 2025-07-23 NOTE — TELEPHONE ENCOUNTER
Rx Refill Note  Requested Prescriptions     Pending Prescriptions Disp Refills    rosuvastatin (CRESTOR) 20 MG tablet [Pharmacy Med Name: ROSUVASTATIN CALCIUM 20 MG TAB] 90 tablet 1     Sig: TAKE 1 TABLET BY MOUTH DAILY      Last office visit with prescribing clinician: 2/22/2024   Last telemedicine visit with prescribing clinician: Visit date not found   Next office visit with prescribing clinician: Visit date not found                         Would you like a call back once the refill request has been completed: [] Yes [] No    If the office needs to give you a call back, can they leave a voicemail: [] Yes [] No    Deepika Martines Rep  07/23/25, 08:30 EDT

## 2025-07-28 ENCOUNTER — OFFICE VISIT (OUTPATIENT)
Dept: PAIN MEDICINE | Facility: CLINIC | Age: 75
End: 2025-07-28
Payer: MEDICARE

## 2025-07-28 VITALS
HEART RATE: 68 BPM | SYSTOLIC BLOOD PRESSURE: 153 MMHG | OXYGEN SATURATION: 98 % | DIASTOLIC BLOOD PRESSURE: 68 MMHG | WEIGHT: 227.6 LBS | HEIGHT: 67 IN | TEMPERATURE: 97.1 F | BODY MASS INDEX: 35.72 KG/M2 | RESPIRATION RATE: 18 BRPM

## 2025-07-28 DIAGNOSIS — G62.9 POLYNEUROPATHY: ICD-10-CM

## 2025-07-28 DIAGNOSIS — M79.609 PAIN IN EXTREMITY, UNSPECIFIED EXTREMITY: ICD-10-CM

## 2025-07-28 DIAGNOSIS — E11.42 DIABETIC PERIPHERAL NEUROPATHY: ICD-10-CM

## 2025-07-28 DIAGNOSIS — Z79.899 ENCOUNTER FOR LONG-TERM (CURRENT) USE OF HIGH-RISK MEDICATION: Primary | ICD-10-CM

## 2025-07-28 LAB
POC AMPHETAMINES: NEGATIVE
POC BARBITURATES: NEGATIVE
POC BENZODIAZEPHINES: NEGATIVE
POC BUPRENORPHINE: NEGATIVE
POC COCAINE: NEGATIVE
POC METHADONE: NEGATIVE
POC METHAMPHETAMINE SCREEN URINE: NEGATIVE
POC OPIATES: POSITIVE
POC OXYCODONE: NEGATIVE
POC PHENCYCLIDINE: NEGATIVE
POC PROPOXYPHENE: NEGATIVE
POC THC: NEGATIVE
POC TRICYCLIC ANTIDEPRESSANTS: NEGATIVE

## 2025-07-28 PROCEDURE — 1159F MED LIST DOCD IN RCRD: CPT | Performed by: NURSE PRACTITIONER

## 2025-07-28 PROCEDURE — 3078F DIAST BP <80 MM HG: CPT | Performed by: NURSE PRACTITIONER

## 2025-07-28 PROCEDURE — 3077F SYST BP >= 140 MM HG: CPT | Performed by: NURSE PRACTITIONER

## 2025-07-28 PROCEDURE — 1125F AMNT PAIN NOTED PAIN PRSNT: CPT | Performed by: NURSE PRACTITIONER

## 2025-07-28 PROCEDURE — 80305 DRUG TEST PRSMV DIR OPT OBS: CPT | Performed by: NURSE PRACTITIONER

## 2025-07-28 PROCEDURE — 1160F RVW MEDS BY RX/DR IN RCRD: CPT | Performed by: NURSE PRACTITIONER

## 2025-07-28 PROCEDURE — 99213 OFFICE O/P EST LOW 20 MIN: CPT | Performed by: NURSE PRACTITIONER

## 2025-07-28 RX ORDER — GABAPENTIN 600 MG/1
600 TABLET ORAL 4 TIMES DAILY
Qty: 120 TABLET | Refills: 1 | Status: SHIPPED | OUTPATIENT
Start: 2025-07-28

## 2025-07-28 NOTE — PROGRESS NOTES
CHIEF COMPLAINT  Extremity pain  Pain is consistent.    Subjective   Kelby Peters is a 75 y.o. male  who presents for follow-up.  He has a history of extremity pain d/t peripheral neuropathy. Today his pain is 8/10VAS in severity. He states that his pain is unchanged since his last office visit.     He continues to utilized Norco 10/325 4/day and Gabapentin 600 mg in the morning, 600 mg in the afternoon, and 1200 mg at night (weaned from 800 mg 4/day). This medication regimen decreases his pain by a significant amount. He states that he does still feel drowsy intermittently. He states that he does have some coordination issues, some of which are going to be d/t his neuropathy and the amputation that he had on his feet.     He is maintained on Plavix.      1/5/2024-left fourth and fifth toe amputation performed by Dr. Alvarado  7/25/2023-left second toe simple amputation due to diabetic foot ulcer with hammertoe and clinical osteomyelitis performed Dr. Alvarado  3/31/2023-left great toe amputation with resection of metatarsal phalangeal joint and metatarsal head and shaft, right fifth toe amputation with resection of metatarsal phalangeal joint metatarsal head and shaft due to osteomyelitis performed by Dr. Alvarado  1/19/2023-left leg angiogram, third left toe amputation and left foot debridement performed by Dr. Martin     Failed Cymbalta.  Horizant and Gralise Cost-prohibitive.  Failed Lyrica---pedal edema  History of IDDM    Extremity Pain  Chronicity:  Chronic  Progression since onset: fluctuating in severity.  Pain location:  Left hand, right hand, left lower leg and right lower leg  Pain quality:  Burning  Pain - numeric:  8/10  Pain severity:  Severe  Associated symptoms: numbness    Associated symptoms: no fever    Treatments tried:  Oral narcotics and rest  Improvement on treatment:  Significant  PMH includes: diabetes       PEG Assessment   What number best describes your pain on average in the past week?8  What  "number best describes how, during the past week, pain has interfered with your enjoyment of life?8  What number best describes how, during the past week, pain has interfered with your general activity?  8    The following portions of the patient's history were reviewed and updated as appropriate: allergies, current medications, past family history, past medical history, past social history, past surgical history, and problem list.    Review of Systems   Constitutional:  Negative for activity change and fever.   Gastrointestinal:  Negative for constipation and diarrhea.   Genitourinary:  Positive for difficulty urinating.   Musculoskeletal:  Positive for back pain.   Neurological:  Positive for dizziness, weakness and numbness. Negative for headaches.   Psychiatric/Behavioral:  Positive for agitation (occ) and sleep disturbance. Negative for suicidal ideas. The patient is not nervous/anxious.      --  The aforementioned information the Chief Complaint section and above subjective data including any HPI data, and also the Review of Systems data, has been personally reviewed and affirmed.  --    Vitals:    07/28/25 0852   BP: 153/68   BP Location: Right arm   Patient Position: Sitting   Cuff Size: Large Adult   Pulse: 68   Resp: 18   Temp: 97.1 °F (36.2 °C)   TempSrc: Temporal   SpO2: 98%   Weight: 103 kg (227 lb 9.6 oz)   Height: 170.2 cm (67.01\")   PainSc: 8      Objective   Physical Exam  Vitals and nursing note reviewed.   Constitutional:       Appearance: Normal appearance. He is well-developed.   Eyes:      General: Lids are normal.   Pulmonary:      Effort: Pulmonary effort is normal.   Musculoskeletal:      Right hand: Decreased range of motion.      Left hand: Decreased range of motion.      Right foot: Tenderness present.      Left foot: Tenderness present.      Right Lower Extremity: Right leg is amputated below ankle.      Left Lower Extremity: Left leg is amputated below ankle.   Neurological:      Mental " Status: He is alert and oriented to person, place, and time.      Comments:                                           Motor Function:                                  Right                        Left                           Deltoid:                        5                             5  Biceps:                         5                             5  Triceps:                        5                             5      Wrist extension:           5                             5  Wrist flexion:                5                             5  Interossei:                    5                             5  Iliopsoas:                     5                             5      Quadriceps:                 5                             5  Hamstrings:                 5                             5  Tibialis Anterior:          5                             5   Gastroc/Soleus:           5                             5      Psychiatric:         Attention and Perception: Attention normal.         Mood and Affect: Mood normal.         Speech: Speech normal.         Behavior: Behavior normal.         Judgment: Judgment normal.     Assessment & Plan   Diagnoses and all orders for this visit:    1. Encounter for long-term (current) use of high-risk medication (Primary)    2. Diabetic peripheral neuropathy    3. Pain in extremity, unspecified extremity      Kelby Peters reports a pain score of 8.  Given his pain assessment as noted, treatment options were discussed and the following options were decided upon as a follow-up plan to address the patient's pain: prescription for non-opioid analgesics and prescription for opioid analgesics.    --- Routine UDS in office today as part of monitoring requirements for controlled substances.  The specimen was viewed and the immunoassay result reviewed and is +OPI.  This specimen will be sent to [facility] laboratory for confirmation.     --- CSA and consent to treat with controlled substances  signed on 7/28/2025  --- Opioid Risk--Moderate  --- Continue Norco 10/325. Patient appears stable with current regimen. No adverse effects. Regarding continuation of opioids, there is no evidence of aberrant behavior or any red flags.  The patient continues with appropriate response to opioid therapy. ADL's remain intact by self.      I have discussed with the patient the study published in the [employer] Journal published in 2025 regarding the risk of dementia with gabapentin prescriptions in chronic low back pain patients.  This study which consisted of [phone],[id number] adults who were analyzed from [id number].  It was concluded that gabapentin prescriptions in adults with chronic low back pain is associated with increased risk of dementia and cognitive impairment, particularly in nonelderly adults.  At this time the patient wishes to continue gabapentin.    --- Continue Gabapentin 600 mg, discussed weaning his further. He would like to think about this.   --- Follow-up 2 months or sooner if needed.      HOME REPORT  As part of the patient's treatment plan, I am prescribing controlled substances. The patient has been made aware of appropriate use of such medications, including potential risk of somnolence, limited ability to drive and/or work safely, and the potential for dependence or overdose. It has also been made clear that these medications are for use by this patient only, without concomitant use of alcohol or other substances unless prescribed.     Patient has completed prescribing agreement detailing terms of continued prescribing of controlled substances, including monitoring HOME reports, urine drug screening, and pill counts if necessary. The patient is aware that inappropriate use will results in cessation of prescribing such medications.    As the clinician, I personally reviewed the HOME from 7/28/2025 while the patient was in the office today.    History and physical exam  exhibit continued safe and appropriate use of controlled substances.    Dictated utilizing Dragon dictation.

## 2025-08-08 DIAGNOSIS — M79.609 PAIN IN EXTREMITY, UNSPECIFIED EXTREMITY: ICD-10-CM

## 2025-08-08 RX ORDER — HYDROCODONE BITARTRATE AND ACETAMINOPHEN 10; 325 MG/1; MG/1
1 TABLET ORAL EVERY 6 HOURS PRN
Qty: 120 TABLET | Refills: 0 | Status: SHIPPED | OUTPATIENT
Start: 2025-08-08

## (undated) DEVICE — BNDG ELAS ELITE V/CLOSE 6IN 5YD LF STRL

## (undated) DEVICE — PTA BALLOON DILATATION CATHETER: Brand: STERLING® SL

## (undated) DEVICE — PATIENT RETURN ELECTRODE, SINGLE-USE, CONTACT QUALITY MONITORING, ADULT, WITH 9FT CORD, FOR PATIENTS WEIGING OVER 33LBS. (15KG): Brand: MEGADYNE

## (undated) DEVICE — ANTIBACTERIAL UNDYED BRAIDED (POLYGLACTIN 910), SYNTHETIC ABSORBABLE SUTURE: Brand: COATED VICRYL

## (undated) DEVICE — PK ORTHO MINOR 40

## (undated) DEVICE — DRSNG GZ PETROLTM XEROFORM CURAD 1X8IN STRL

## (undated) DEVICE — TUBING, SUCTION, 1/4" X 20', STRAIGHT: Brand: MEDLINE INDUSTRIES, INC.

## (undated) DEVICE — SUT VIC 4/0 SH 27IN J415H

## (undated) DEVICE — PREMIUM WET SKIN PREP TRAY: Brand: MEDLINE INDUSTRIES, INC.

## (undated) DEVICE — GAUZE,SPONGE,FLUFF,6"X6.75",STRL,10/TRAY: Brand: MEDLINE

## (undated) DEVICE — PAD,ABDOMINAL,8"X10",ST,LF: Brand: MEDLINE

## (undated) DEVICE — BNDG ELAS ELITE V/CLOSE 4IN 5YD LF STRL

## (undated) DEVICE — GOWN,NON-REINFORCED,SIRUS,SET IN SLV,XXL: Brand: MEDLINE

## (undated) DEVICE — NDL HYPO PRECISIONGLIDE REG 25G 1 1/2

## (undated) DEVICE — RADIFOCUS TORQUE DEVICE MULTI-TORQUE VISE: Brand: RADIFOCUS TORQUE DEVICE

## (undated) DEVICE — PINNACLE INTRODUCER SHEATH: Brand: PINNACLE

## (undated) DEVICE — BANDAGE,GAUZE,BULKEE II,4.5"X4.1YD,STRL: Brand: MEDLINE

## (undated) DEVICE — ANGIO-SEAL VIP VASCULAR CLOSURE DEVICE: Brand: ANGIO-SEAL

## (undated) DEVICE — ADHS SKIN SURG TISS VISC PREMIERPRO EXOFIN HI/VISC FAST/DRY

## (undated) DEVICE — GLV SURG BIOGEL LTX PF 7 1/2

## (undated) DEVICE — CULT AER/ANAEROB FASTIDIOUS BACT

## (undated) DEVICE — SPONGE,LAP,12"X12",XR,ST,5/PK,40PK/CS: Brand: MEDLINE

## (undated) DEVICE — TRAP FLD MINIVAC MEGADYNE 100ML

## (undated) DEVICE — CATH GUIDE SOFTVU SELECT/V HT OMNI .038 5F 65CM

## (undated) DEVICE — INFLATION DEVICE: Brand: ENCORE™ 26

## (undated) DEVICE — CONN TBG Y 5 IN 1 LF STRL

## (undated) DEVICE — SOL IRR NACL 0.9PCT 3000ML

## (undated) DEVICE — TBG PENCL TELESCP MEGADYNE SMOKE EVAC 10FT

## (undated) DEVICE — SYRINGE KIT,PACKAGED,,150FT,MK 7(ANGIO-ARTERION, 150ML SYR KIT W/QFT,MC)(60729385): Brand: MEDRAD® MARK 7 ARTERION DISPOSABLE SYRINGE 150 ML WITH QUICK FILL TUBE

## (undated) DEVICE — GLV SURG BIOGEL LTX PF 7

## (undated) DEVICE — SUT ETHLN 3/0 PS1 18IN 1663H

## (undated) DEVICE — SPNG GZ WOVN 4X4IN 12PLY 10/BX STRL

## (undated) DEVICE — GOWN,NON-REINFORCED,SIRUS,SET IN SLV,XL: Brand: MEDLINE

## (undated) DEVICE — GLV SURG BIOGEL M LTX PF 6 1/2

## (undated) DEVICE — DESTINATION PERIPHERAL GUIDING SHEATH: Brand: DESTINATION

## (undated) DEVICE — TBG PRESS 96IN M/F ROT BRAID: Brand: MEDLINE INDUSTRIES, INC.

## (undated) DEVICE — BNDG GZ SOF-FORM CONFRM 2X75IN LF STRL

## (undated) DEVICE — SOL NACL 0.9PCT 1000ML

## (undated) DEVICE — RADIFOCUS GLIDEWIRE ADVANTAGE GUIDEWIRE: Brand: GLIDEWIRE ADVANTAGE

## (undated) DEVICE — SUT ETHLN 3/0 FSLX 30IN 1673H

## (undated) DEVICE — PREP SOL POVIDONE/IODINE BT 4OZ

## (undated) DEVICE — QUICK-CROSS™ SUPPORT CATHETER: Brand: QUICK-CROSS™

## (undated) DEVICE — EQUIPMENT COVER BAG TYPE 48” X 36” (122CM X 91CM): Brand: EQUIPMENT COVER BAG TYPE

## (undated) DEVICE — ST IRR CYSTO W/SPK 77IN LF

## (undated) DEVICE — RADIFOCUS GLIDEWIRE: Brand: GLIDEWIRE

## (undated) DEVICE — SUT PROLN 3/0 SH D/A 36IN 8522H

## (undated) DEVICE — ST ACC MICROPUNCTURE STFF .018 ECHO/PLDM/TP 4F/10CM 21G/7CM

## (undated) DEVICE — CVR PROB 96IN LF STRL

## (undated) DEVICE — DRSNG GZ CURAD XEROFORM PETROLTM OVERWRP 1X8IN STRL

## (undated) DEVICE — PK ANGIO 40